# Patient Record
Sex: MALE | Race: WHITE | NOT HISPANIC OR LATINO | Employment: OTHER | URBAN - METROPOLITAN AREA
[De-identification: names, ages, dates, MRNs, and addresses within clinical notes are randomized per-mention and may not be internally consistent; named-entity substitution may affect disease eponyms.]

---

## 2018-02-14 ENCOUNTER — APPOINTMENT (OUTPATIENT)
Dept: RADIOLOGY | Facility: MEDICAL CENTER | Age: 73
End: 2018-02-14
Attending: EMERGENCY MEDICINE
Payer: MEDICARE

## 2018-02-14 ENCOUNTER — APPOINTMENT (OUTPATIENT)
Dept: RADIOLOGY | Facility: MEDICAL CENTER | Age: 73
End: 2018-02-14
Attending: INTERNAL MEDICINE
Payer: MEDICARE

## 2018-02-14 ENCOUNTER — HOSPITAL ENCOUNTER (EMERGENCY)
Facility: MEDICAL CENTER | Age: 73
End: 2018-02-14
Attending: EMERGENCY MEDICINE
Payer: MEDICARE

## 2018-02-14 VITALS
OXYGEN SATURATION: 98 % | WEIGHT: 165 LBS | HEART RATE: 62 BPM | TEMPERATURE: 97.5 F | HEIGHT: 73 IN | RESPIRATION RATE: 16 BRPM | SYSTOLIC BLOOD PRESSURE: 156 MMHG | BODY MASS INDEX: 21.87 KG/M2 | DIASTOLIC BLOOD PRESSURE: 96 MMHG

## 2018-02-14 DIAGNOSIS — S01.01XA LACERATION OF SCALP, INITIAL ENCOUNTER: ICD-10-CM

## 2018-02-14 DIAGNOSIS — S09.90XA CLOSED HEAD INJURY, INITIAL ENCOUNTER: ICD-10-CM
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
## 2018-02-14 PROCEDURE — 70450 CT HEAD/BRAIN W/O DYE: CPT

## 2018-02-14 PROCEDURE — 90715 TDAP VACCINE 7 YRS/> IM: CPT | Performed by: EMERGENCY MEDICINE

## 2018-02-14 PROCEDURE — 700101 HCHG RX REV CODE 250: Performed by: EMERGENCY MEDICINE

## 2018-02-14 PROCEDURE — 72125 CT NECK SPINE W/O DYE: CPT

## 2018-02-14 PROCEDURE — 90471 IMMUNIZATION ADMIN: CPT

## 2018-02-14 PROCEDURE — 305948 HCHG GREEN TRAUMA ACT PRE-NOTIFY NO CC

## 2018-02-14 PROCEDURE — 99284 EMERGENCY DEPT VISIT MOD MDM: CPT

## 2018-02-14 PROCEDURE — 700111 HCHG RX REV CODE 636 W/ 250 OVERRIDE (IP): Performed by: EMERGENCY MEDICINE

## 2018-02-14 RX ORDER — LIDOCAINE HYDROCHLORIDE 20 MG/ML
20 INJECTION, SOLUTION INFILTRATION; PERINEURAL ONCE
Status: COMPLETED | OUTPATIENT
Start: 2018-02-14 | End: 2018-02-14

## 2018-02-14 RX ORDER — LOSARTAN POTASSIUM 100 MG/1
100 TABLET ORAL DAILY
Status: SHIPPED | COMMUNITY
End: 2022-02-15

## 2018-02-14 RX ORDER — ASPIRIN 325 MG
325 TABLET ORAL 2 TIMES DAILY PRN
Status: ON HOLD | COMMUNITY
End: 2022-02-10

## 2018-02-14 RX ADMIN — LIDOCAINE HYDROCHLORIDE 20 ML: 20 INJECTION, SOLUTION INFILTRATION; PERINEURAL at 10:45

## 2018-02-14 RX ADMIN — CLOSTRIDIUM TETANI TOXOID ANTIGEN (FORMALDEHYDE INACTIVATED), CORYNEBACTERIUM DIPHTHERIAE TOXOID ANTIGEN (FORMALDEHYDE INACTIVATED), BORDETELLA PERTUSSIS TOXOID ANTIGEN (GLUTARALDEHYDE INACTIVATED), BORDETELLA PERTUSSIS FILAMENTOUS HEMAGGLUTININ ANTIGEN (FORMALDEHYDE INACTIVATED), BORDETELLA PERTUSSIS PERTACTIN ANTIGEN, AND BORDETELLA PERTUSSIS FIMBRIAE 2/3 ANTIGEN 0.5 ML: 5; 2; 2.5; 5; 3; 5 INJECTION, SUSPENSION INTRAMUSCULAR at 10:49

## 2018-02-14 NOTE — DISCHARGE INSTRUCTIONS
Head Injury, Adult  You have a head injury. Headaches and throwing up (vomiting) are common after a head injury. It should be easy to wake up from sleeping. Sometimes you must stay in the hospital. Most problems happen within the first 24 hours. Side effects may occur up to 7-10 days after the injury.   WHAT ARE THE TYPES OF HEAD INJURIES?  Head injuries can be as minor as a bump. Some head injuries can be more severe. More severe head injuries include:  · A jarring injury to the brain (concussion).  · A bruise of the brain (contusion). This mean there is bleeding in the brain that can cause swelling.  · A cracked skull (skull fracture).  · Bleeding in the brain that collects, clots, and forms a bump (hematoma).  WHEN SHOULD I GET HELP RIGHT AWAY?   · You are confused or sleepy.  · You cannot be woken up.  · You feel sick to your stomach (nauseous) or keep throwing up (vomiting).  · Your dizziness or unsteadiness is getting worse.  · You have very bad, lasting headaches that are not helped by medicine. Take medicines only as told by your doctor.  · You cannot use your arms or legs like normal.  · You cannot walk.  · You notice changes in the black spots in the center of the colored part of your eye (pupil).  · You have clear or bloody fluid coming from your nose or ears.  · You have trouble seeing.  During the next 24 hours after the injury, you must stay with someone who can watch you. This person should get help right away (call 911 in the [place] if you start to shake and are not able to control it (have seizures), you pass out, or you are unable to wake up.  HOW CAN I PREVENT A HEAD INJURY IN THE FUTURE?  · Wear seat belts.  · Wear a helmet while bike riding and playing sports like football.  · Stay away from dangerous activities around the house.  WHEN CAN I RETURN TO NORMAL ACTIVITIES AND ATHLETICS?  See your doctor before doing these activities. You should not do normal activities or play contact sports until 1  week after the following symptoms have stopped:  · Headache that does not go away.  · Dizziness.  · Poor attention.  · Confusion.  · Memory problems.  · Sickness to your stomach or throwing up.  · Tiredness.  · Fussiness.  · Bothered by bright lights or loud noises.  · Anxiousness or depression.  · Restless sleep.  MAKE SURE YOU:   · Understand these instructions.  · Will watch your condition.  · Will get help right away if you are not doing well or get worse.     This information is not intended to replace advice given to you by your health care provider. Make sure you discuss any questions you have with your health care provider.     Document Released: 11/30/2009 Document Revised: 01/08/2016 Document Reviewed: 08/25/2014  [employer] Interactive Patient Education ©2016 [employer]      Laceration Care, Adult  A laceration is a cut that goes through all layers of the skin. The cut also goes into the tissue that is right under the skin. Some cuts heal on their own. Others need to be closed with stitches (sutures), staples, skin adhesive strips, or wound glue. Taking care of your cut lowers your risk of infection and helps your cut to heal better.  HOW TO TAKE CARE OF YOUR CUT  For stitches or staples:  · Keep the wound clean and dry.  · If you were given a bandage (dressing), you should change it at least one time per day or as told by your doctor. You should also change it if it gets wet or dirty.  · Keep the wound completely dry for the first 24 hours or as told by your doctor. After that time, you may take a shower or a bath. However, make sure that the wound is not soaked in water until after the stitches or staples have been removed.  · Clean the wound one time each day or as told by your doctor:  ¨ Wash the wound with soap and water.  ¨ Rinse the wound with water until all of the soap comes off.  ¨ Pat the wound dry with a clean towel. Do not rub the wound.  · After you clean the wound, put a thin layer of  antibiotic ointment on it as told by your doctor. This ointment:  ¨ Helps to prevent infection.  ¨ Keeps the bandage from sticking to the wound.  · Have your stitches or staples removed as told by your doctor.  If your doctor used skin adhesive strips:   · Keep the wound clean and dry.  · If you were given a bandage, you should change it at least one time per day or as told by your doctor. You should also change it if it gets dirty or wet.  · Do not get the skin adhesive strips wet. You can take a shower or a bath, but be careful to keep the wound dry.  · If the wound gets wet, pat it dry with a clean towel. Do not rub the wound.  · Skin adhesive strips fall off on their own. You can trim the strips as the wound heals. Do not remove any strips that are still stuck to the wound. They will fall off after a while.  If your doctor used wound glue:  · Try to keep your wound dry, but you may briefly wet it in the shower or bath. Do not soak the wound in water, such as by swimming.  · After you take a shower or a bath, gently pat the wound dry with a clean towel. Do not rub the wound.  · Do not do any activities that will make you really sweaty until the skin glue has fallen off on its own.  · Do not apply liquid, cream, or ointment medicine to your wound while the skin glue is still on.  · If you were given a bandage, you should change it at least one time per day or as told by your doctor. You should also change it if it gets dirty or wet.  · If a bandage is placed over the wound, do not let the tape for the bandage touch the skin glue.  · Do not pick at the glue. The skin glue usually stays on for 5-10 days. Then, it falls off of the skin.  General Instructions   · To help prevent scarring, make sure to cover your wound with sunscreen whenever you are outside after stitches are removed, after adhesive strips are removed, or when wound glue stays in place and the wound is healed. Make sure to wear a sunscreen of at least  30 SPF.  · Take over-the-counter and prescription medicines only as told by your doctor.  · If you were given antibiotic medicine or ointment, take or apply it as told by your doctor. Do not stop using the antibiotic even if your wound is getting better.  · Do not scratch or pick at the wound.  · Keep all follow-up visits as told by your doctor. This is important.  · Check your wound every day for signs of infection. Watch for:  ¨ Redness, swelling, or pain.  ¨ Fluid, blood, or pus.  · Raise (elevate) the injured area above the level of your heart while you are sitting or lying down, if possible.  GET HELP IF:  · You got a tetanus shot and you have any of these problems at the injection site:  ¨ Swelling.  ¨ Very bad pain.  ¨ Redness.  ¨ Bleeding.  · You have a fever.  · A wound that was closed breaks open.  · You notice a bad smell coming from your wound or your bandage.  · You notice something coming out of the wound, such as wood or glass.  · Medicine does not help your pain.  · You have more redness, swelling, or pain at the site of your wound.  · You have fluid, blood, or pus coming from your wound.  · You notice a change in the color of your skin near your wound.  · You need to change the bandage often because fluid, blood, or pus is coming from the wound.  · You start to have a new rash.  · You start to have numbness around the wound.  GET HELP RIGHT AWAY IF:  · You have very bad swelling around the wound.  · Your pain suddenly gets worse and is very bad.  · You notice painful lumps near the wound or on skin that is anywhere on your body.  · You have a red streak going away from your wound.  · The wound is on your hand or foot and you cannot move a finger or toe like you usually can.  · The wound is on your hand or foot and you notice that your fingers or toes look pale or bluish.     This information is not intended to replace advice given to you by your health care provider. Make sure you discuss any  questions you have with your health care provider.     Document Released: 06/05/2009 Document Revised: 05/03/2016 Document Reviewed: 12/14/2015  [employer] Interactive Patient Education ©2016 [employer]
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
## 2018-04-28 ENCOUNTER — HOSPITAL ENCOUNTER (OUTPATIENT)
Facility: MEDICAL CENTER | Age: 73
End: 2018-04-28

## 2018-04-28 ENCOUNTER — HOSPITAL ENCOUNTER (INPATIENT)
Facility: MEDICAL CENTER | Age: 73
LOS: 8 days | DRG: 029 | End: 2018-05-07
Attending: HOSPITALIST | Admitting: INTERNAL MEDICINE
Payer: MEDICARE

## 2018-04-28 DIAGNOSIS — G06.1 SPINAL EPIDURAL ABSCESS: ICD-10-CM

## 2018-04-28 PROBLEM — D72.829 LEUKOCYTOSIS: Status: ACTIVE | Noted: 2018-04-28

## 2018-04-28 PROBLEM — I10 HYPERTENSION: Status: ACTIVE | Noted: 2018-04-28

## 2018-04-28 PROCEDURE — A9270 NON-COVERED ITEM OR SERVICE: HCPCS | Performed by: INTERNAL MEDICINE

## 2018-04-28 PROCEDURE — 700102 HCHG RX REV CODE 250 W/ 637 OVERRIDE(OP): Performed by: INTERNAL MEDICINE

## 2018-04-28 PROCEDURE — G0378 HOSPITAL OBSERVATION PER HR: HCPCS

## 2018-04-28 PROCEDURE — 99220 PR INITIAL OBSERVATION CARE,LEVL III: CPT | Performed by: INTERNAL MEDICINE

## 2018-04-28 PROCEDURE — 700105 HCHG RX REV CODE 258: Performed by: INTERNAL MEDICINE

## 2018-04-28 PROCEDURE — 700111 HCHG RX REV CODE 636 W/ 250 OVERRIDE (IP): Performed by: INTERNAL MEDICINE

## 2018-04-28 RX ORDER — SODIUM CHLORIDE 9 MG/ML
30 INJECTION, SOLUTION INTRAVENOUS
Status: DISCONTINUED | OUTPATIENT
Start: 2018-04-28 | End: 2018-05-07 | Stop reason: HOSPADM

## 2018-04-28 RX ORDER — AMOXICILLIN 250 MG
2 CAPSULE ORAL 2 TIMES DAILY
Status: DISCONTINUED | OUTPATIENT
Start: 2018-04-28 | End: 2018-05-05

## 2018-04-28 RX ORDER — POLYETHYLENE GLYCOL 3350 17 G/17G
1 POWDER, FOR SOLUTION ORAL
Status: DISCONTINUED | OUTPATIENT
Start: 2018-04-28 | End: 2018-05-05

## 2018-04-28 RX ORDER — ONDANSETRON 4 MG/1
4 TABLET, ORALLY DISINTEGRATING ORAL EVERY 4 HOURS PRN
Status: DISCONTINUED | OUTPATIENT
Start: 2018-04-28 | End: 2018-05-07 | Stop reason: HOSPADM

## 2018-04-28 RX ORDER — SODIUM CHLORIDE 9 MG/ML
1000 INJECTION, SOLUTION INTRAVENOUS
Status: DISCONTINUED | OUTPATIENT
Start: 2018-04-28 | End: 2018-05-07 | Stop reason: HOSPADM

## 2018-04-28 RX ORDER — OXYCODONE HYDROCHLORIDE 10 MG/1
10 TABLET ORAL
Status: DISCONTINUED | OUTPATIENT
Start: 2018-04-28 | End: 2018-05-07 | Stop reason: HOSPADM

## 2018-04-28 RX ORDER — ONDANSETRON 2 MG/ML
4 INJECTION INTRAMUSCULAR; INTRAVENOUS EVERY 4 HOURS PRN
Status: DISCONTINUED | OUTPATIENT
Start: 2018-04-28 | End: 2018-05-07 | Stop reason: HOSPADM

## 2018-04-28 RX ORDER — MORPHINE SULFATE 4 MG/ML
4 INJECTION, SOLUTION INTRAMUSCULAR; INTRAVENOUS
Status: DISCONTINUED | OUTPATIENT
Start: 2018-04-28 | End: 2018-05-07 | Stop reason: HOSPADM

## 2018-04-28 RX ORDER — SODIUM CHLORIDE 9 MG/ML
INJECTION, SOLUTION INTRAVENOUS CONTINUOUS
Status: DISCONTINUED | OUTPATIENT
Start: 2018-04-28 | End: 2018-05-07 | Stop reason: HOSPADM

## 2018-04-28 RX ORDER — OXYCODONE HYDROCHLORIDE 5 MG/1
5 TABLET ORAL
Status: DISCONTINUED | OUTPATIENT
Start: 2018-04-28 | End: 2018-05-01

## 2018-04-28 RX ORDER — BISACODYL 10 MG
10 SUPPOSITORY, RECTAL RECTAL
Status: DISCONTINUED | OUTPATIENT
Start: 2018-04-28 | End: 2018-05-05

## 2018-04-28 RX ORDER — ACETAMINOPHEN 325 MG/1
650 TABLET ORAL EVERY 6 HOURS PRN
Status: DISCONTINUED | OUTPATIENT
Start: 2018-04-28 | End: 2018-05-07 | Stop reason: HOSPADM

## 2018-04-28 RX ADMIN — MORPHINE SULFATE 4 MG: 4 INJECTION INTRAVENOUS at 22:44

## 2018-04-28 RX ADMIN — SODIUM CHLORIDE: 9 INJECTION, SOLUTION INTRAVENOUS at 22:44

## 2018-04-28 RX ADMIN — SENNOSIDES AND DOCUSATE SODIUM 2 TABLET: 8.6; 5 TABLET ORAL at 22:44

## 2018-04-28 RX ADMIN — CEFEPIME 2 G: 2 INJECTION, POWDER, FOR SOLUTION INTRAMUSCULAR; INTRAVENOUS at 22:44

## 2018-04-28 ASSESSMENT — ENCOUNTER SYMPTOMS
HEADACHES: 0
CONSTIPATION: 1
NAUSEA: 0
DIARRHEA: 0
FOCAL WEAKNESS: 0
CHILLS: 1
SENSORY CHANGE: 0
COUGH: 0
ABDOMINAL PAIN: 0
SEIZURES: 0
FEVER: 1
SHORTNESS OF BREATH: 0
VOMITING: 0
DIZZINESS: 0
BACK PAIN: 1
SPEECH CHANGE: 0
LOSS OF CONSCIOUSNESS: 0
TINGLING: 0

## 2018-04-28 ASSESSMENT — COPD QUESTIONNAIRES
COPD SCREENING SCORE: 2
DURING THE PAST 4 WEEKS HOW MUCH DID YOU FEEL SHORT OF BREATH: NONE/LITTLE OF THE TIME
HAVE YOU SMOKED AT LEAST 100 CIGARETTES IN YOUR ENTIRE LIFE: NO/DON'T KNOW
DO YOU EVER COUGH UP ANY MUCUS OR PHLEGM?: NO/ONLY WITH OCCASIONAL COLDS OR INFECTIONS

## 2018-04-28 ASSESSMENT — LIFESTYLE VARIABLES: EVER_SMOKED: NEVER

## 2018-04-28 ASSESSMENT — PAIN SCALES - GENERAL: PAINLEVEL_OUTOF10: 10

## 2018-04-29 ENCOUNTER — HOSPITAL ENCOUNTER (OUTPATIENT)
Dept: RADIOLOGY | Facility: MEDICAL CENTER | Age: 73
End: 2018-04-29

## 2018-04-29 ENCOUNTER — APPOINTMENT (OUTPATIENT)
Dept: RADIOLOGY | Facility: MEDICAL CENTER | Age: 73
DRG: 029 | End: 2018-04-29
Attending: NEUROLOGICAL SURGERY
Payer: MEDICARE

## 2018-04-29 ENCOUNTER — APPOINTMENT (OUTPATIENT)
Dept: RADIOLOGY | Facility: MEDICAL CENTER | Age: 73
DRG: 029 | End: 2018-04-29
Attending: PHYSICIAN ASSISTANT
Payer: MEDICARE

## 2018-04-29 PROBLEM — R06.81 APNEA: Status: ACTIVE | Noted: 2018-04-29

## 2018-04-29 LAB
ANION GAP SERPL CALC-SCNC: 4 MMOL/L (ref 0–11.9)
APTT PPP: 30.9 SEC (ref 24.7–36)
BASOPHILS # BLD AUTO: 0.2 % (ref 0–1.8)
BASOPHILS # BLD: 0.02 K/UL (ref 0–0.12)
BUN SERPL-MCNC: 36 MG/DL (ref 8–22)
CALCIUM SERPL-MCNC: 8 MG/DL (ref 8.5–10.5)
CHLORIDE SERPL-SCNC: 106 MMOL/L (ref 96–112)
CO2 SERPL-SCNC: 25 MMOL/L (ref 20–33)
CREAT SERPL-MCNC: 0.93 MG/DL (ref 0.5–1.4)
EKG IMPRESSION: NORMAL
EOSINOPHIL # BLD AUTO: 0 K/UL (ref 0–0.51)
EOSINOPHIL NFR BLD: 0 % (ref 0–6.9)
ERYTHROCYTE [DISTWIDTH] IN BLOOD BY AUTOMATED COUNT: 47.7 FL (ref 35.9–50)
GLUCOSE SERPL-MCNC: 155 MG/DL (ref 65–99)
HCT VFR BLD AUTO: 39.5 % (ref 42–52)
HGB BLD-MCNC: 13 G/DL (ref 14–18)
IMM GRANULOCYTES # BLD AUTO: 0.05 K/UL (ref 0–0.11)
IMM GRANULOCYTES NFR BLD AUTO: 0.5 % (ref 0–0.9)
INR PPP: 1.1 (ref 0.87–1.13)
LYMPHOCYTES # BLD AUTO: 0.89 K/UL (ref 1–4.8)
LYMPHOCYTES NFR BLD: 8.1 % (ref 22–41)
MAGNESIUM SERPL-MCNC: 2 MG/DL (ref 1.5–2.5)
MCH RBC QN AUTO: 33.2 PG (ref 27–33)
MCHC RBC AUTO-ENTMCNC: 32.9 G/DL (ref 33.7–35.3)
MCV RBC AUTO: 100.8 FL (ref 81.4–97.8)
MONOCYTES # BLD AUTO: 1.36 K/UL (ref 0–0.85)
MONOCYTES NFR BLD AUTO: 12.4 % (ref 0–13.4)
NEUTROPHILS # BLD AUTO: 8.69 K/UL (ref 1.82–7.42)
NEUTROPHILS NFR BLD: 78.8 % (ref 44–72)
NRBC # BLD AUTO: 0 K/UL
NRBC BLD-RTO: 0 /100 WBC
PLATELET # BLD AUTO: 191 K/UL (ref 164–446)
PMV BLD AUTO: 9 FL (ref 9–12.9)
POTASSIUM SERPL-SCNC: 4 MMOL/L (ref 3.6–5.5)
PROTHROMBIN TIME: 13.9 SEC (ref 12–14.6)
RBC # BLD AUTO: 3.92 M/UL (ref 4.7–6.1)
SODIUM SERPL-SCNC: 135 MMOL/L (ref 135–145)
WBC # BLD AUTO: 11 K/UL (ref 4.8–10.8)

## 2018-04-29 PROCEDURE — A6402 STERILE GAUZE <= 16 SQ IN: HCPCS | Performed by: NEUROLOGICAL SURGERY

## 2018-04-29 PROCEDURE — 700102 HCHG RX REV CODE 250 W/ 637 OVERRIDE(OP): Performed by: INTERNAL MEDICINE

## 2018-04-29 PROCEDURE — 87206 SMEAR FLUORESCENT/ACID STAI: CPT

## 2018-04-29 PROCEDURE — 00NY0ZZ RELEASE LUMBAR SPINAL CORD, OPEN APPROACH: ICD-10-PCS | Performed by: NEUROLOGICAL SURGERY

## 2018-04-29 PROCEDURE — A6223 GAUZE >16<=48 NO W/SAL W/O B: HCPCS | Performed by: NEUROLOGICAL SURGERY

## 2018-04-29 PROCEDURE — 700111 HCHG RX REV CODE 636 W/ 250 OVERRIDE (IP): Performed by: INTERNAL MEDICINE

## 2018-04-29 PROCEDURE — 71046 X-RAY EXAM CHEST 2 VIEWS: CPT

## 2018-04-29 PROCEDURE — 160002 HCHG RECOVERY MINUTES (STAT): Performed by: NEUROLOGICAL SURGERY

## 2018-04-29 PROCEDURE — 160036 HCHG PACU - EA ADDL 30 MINS PHASE I: Performed by: NEUROLOGICAL SURGERY

## 2018-04-29 PROCEDURE — 85610 PROTHROMBIN TIME: CPT

## 2018-04-29 PROCEDURE — 93005 ELECTROCARDIOGRAM TRACING: CPT | Performed by: PHYSICIAN ASSISTANT

## 2018-04-29 PROCEDURE — A9270 NON-COVERED ITEM OR SERVICE: HCPCS | Performed by: INTERNAL MEDICINE

## 2018-04-29 PROCEDURE — 87070 CULTURE OTHR SPECIMN AEROBIC: CPT | Mod: 91

## 2018-04-29 PROCEDURE — 500122 HCHG BOVIE, BLADE: Performed by: NEUROLOGICAL SURGERY

## 2018-04-29 PROCEDURE — 160048 HCHG OR STATISTICAL LEVEL 1-5: Performed by: NEUROLOGICAL SURGERY

## 2018-04-29 PROCEDURE — 85730 THROMBOPLASTIN TIME PARTIAL: CPT

## 2018-04-29 PROCEDURE — 160009 HCHG ANES TIME/MIN: Performed by: NEUROLOGICAL SURGERY

## 2018-04-29 PROCEDURE — 700111 HCHG RX REV CODE 636 W/ 250 OVERRIDE (IP)

## 2018-04-29 PROCEDURE — 700105 HCHG RX REV CODE 258: Performed by: INTERNAL MEDICINE

## 2018-04-29 PROCEDURE — 501838 HCHG SUTURE GENERAL: Performed by: NEUROLOGICAL SURGERY

## 2018-04-29 PROCEDURE — 110454 HCHG SHELL REV 250: Performed by: NEUROLOGICAL SURGERY

## 2018-04-29 PROCEDURE — 01NB0ZZ RELEASE LUMBAR NERVE, OPEN APPROACH: ICD-10-PCS | Performed by: NEUROLOGICAL SURGERY

## 2018-04-29 PROCEDURE — 87116 MYCOBACTERIA CULTURE: CPT | Mod: 91

## 2018-04-29 PROCEDURE — 700101 HCHG RX REV CODE 250

## 2018-04-29 PROCEDURE — 80048 BASIC METABOLIC PNL TOTAL CA: CPT

## 2018-04-29 PROCEDURE — 503195 HCHG SEALER, BIPOLAR AQUAMANTYS: Performed by: NEUROLOGICAL SURGERY

## 2018-04-29 PROCEDURE — 99233 SBSQ HOSP IP/OBS HIGH 50: CPT | Performed by: INTERNAL MEDICINE

## 2018-04-29 PROCEDURE — 160029 HCHG SURGERY MINUTES - 1ST 30 MINS LEVEL 4: Performed by: NEUROLOGICAL SURGERY

## 2018-04-29 PROCEDURE — 72020 X-RAY EXAM OF SPINE 1 VIEW: CPT

## 2018-04-29 PROCEDURE — 160035 HCHG PACU - 1ST 60 MINS PHASE I: Performed by: NEUROLOGICAL SURGERY

## 2018-04-29 PROCEDURE — 87102 FUNGUS ISOLATION CULTURE: CPT | Mod: 91

## 2018-04-29 PROCEDURE — 87205 SMEAR GRAM STAIN: CPT

## 2018-04-29 PROCEDURE — 160041 HCHG SURGERY MINUTES - EA ADDL 1 MIN LEVEL 4: Performed by: NEUROLOGICAL SURGERY

## 2018-04-29 PROCEDURE — 83735 ASSAY OF MAGNESIUM: CPT

## 2018-04-29 PROCEDURE — 87015 SPECIMEN INFECT AGNT CONCNTJ: CPT | Mod: 91

## 2018-04-29 PROCEDURE — 770001 HCHG ROOM/CARE - MED/SURG/GYN PRIV*

## 2018-04-29 PROCEDURE — 500367 HCHG DRAIN KIT, HEMOVAC: Performed by: NEUROLOGICAL SURGERY

## 2018-04-29 PROCEDURE — 0SB20ZZ EXCISION OF LUMBAR VERTEBRAL DISC, OPEN APPROACH: ICD-10-PCS | Performed by: NEUROLOGICAL SURGERY

## 2018-04-29 PROCEDURE — 500885 HCHG PACK, JACKSON TABLE: Performed by: NEUROLOGICAL SURGERY

## 2018-04-29 PROCEDURE — 93010 ELECTROCARDIOGRAM REPORT: CPT | Performed by: INTERNAL MEDICINE

## 2018-04-29 PROCEDURE — 87075 CULTR BACTERIA EXCEPT BLOOD: CPT | Mod: 91

## 2018-04-29 PROCEDURE — 85025 COMPLETE CBC W/AUTO DIFF WBC: CPT

## 2018-04-29 PROCEDURE — 36415 COLL VENOUS BLD VENIPUNCTURE: CPT

## 2018-04-29 PROCEDURE — 500445 HCHG HEMOSTAT, SURGICEL 4X8: Performed by: NEUROLOGICAL SURGERY

## 2018-04-29 RX ORDER — MEPERIDINE HYDROCHLORIDE 50 MG/ML
INJECTION INTRAMUSCULAR; INTRAVENOUS; SUBCUTANEOUS
Status: COMPLETED
Start: 2018-04-29 | End: 2018-04-29

## 2018-04-29 RX ORDER — EPINEPHRINE 1 MG/ML
INJECTION INTRAMUSCULAR; INTRAVENOUS; SUBCUTANEOUS
Status: DISPENSED
Start: 2018-04-29 | End: 2018-04-30

## 2018-04-29 RX ORDER — MAGNESIUM HYDROXIDE 1200 MG/15ML
LIQUID ORAL
Status: COMPLETED | OUTPATIENT
Start: 2018-04-29 | End: 2018-04-29

## 2018-04-29 RX ORDER — BUPIVACAINE HYDROCHLORIDE AND EPINEPHRINE 5; 5 MG/ML; UG/ML
INJECTION, SOLUTION EPIDURAL; INTRACAUDAL; PERINEURAL
Status: DISCONTINUED | OUTPATIENT
Start: 2018-04-29 | End: 2018-04-29 | Stop reason: HOSPADM

## 2018-04-29 RX ORDER — SODIUM CHLORIDE, SODIUM LACTATE, POTASSIUM CHLORIDE, AND CALCIUM CHLORIDE .6; .31; .03; .02 G/100ML; G/100ML; G/100ML; G/100ML
IRRIGANT IRRIGATION
Status: DISCONTINUED | OUTPATIENT
Start: 2018-04-29 | End: 2018-04-29 | Stop reason: HOSPADM

## 2018-04-29 RX ORDER — HYDROMORPHONE HYDROCHLORIDE 2 MG/ML
INJECTION, SOLUTION INTRAMUSCULAR; INTRAVENOUS; SUBCUTANEOUS
Status: DISPENSED
Start: 2018-04-29 | End: 2018-04-30

## 2018-04-29 RX ORDER — OMEPRAZOLE 20 MG/1
20 CAPSULE, DELAYED RELEASE ORAL 2 TIMES DAILY
Status: DISCONTINUED | OUTPATIENT
Start: 2018-04-29 | End: 2018-05-07 | Stop reason: HOSPADM

## 2018-04-29 RX ORDER — BACITRACIN 50000 [IU]/1
INJECTION, POWDER, FOR SOLUTION INTRAMUSCULAR
Status: DISCONTINUED | OUTPATIENT
Start: 2018-04-29 | End: 2018-04-29 | Stop reason: HOSPADM

## 2018-04-29 RX ORDER — ALUMINA, MAGNESIA, AND SIMETHICONE 2400; 2400; 240 MG/30ML; MG/30ML; MG/30ML
10 SUSPENSION ORAL 4 TIMES DAILY PRN
Status: DISCONTINUED | OUTPATIENT
Start: 2018-04-29 | End: 2018-05-07 | Stop reason: HOSPADM

## 2018-04-29 RX ADMIN — CEFEPIME 2 G: 2 INJECTION, POWDER, FOR SOLUTION INTRAMUSCULAR; INTRAVENOUS at 07:42

## 2018-04-29 RX ADMIN — OXYCODONE HYDROCHLORIDE 10 MG: 10 TABLET ORAL at 23:24

## 2018-04-29 RX ADMIN — OMEPRAZOLE 20 MG: 20 CAPSULE, DELAYED RELEASE ORAL at 20:25

## 2018-04-29 RX ADMIN — MEPERIDINE HYDROCHLORIDE 12.5 MG: 50 INJECTION INTRAMUSCULAR; INTRAVENOUS; SUBCUTANEOUS at 16:05

## 2018-04-29 RX ADMIN — MEPERIDINE HYDROCHLORIDE 12.5 MG: 50 INJECTION INTRAMUSCULAR; INTRAVENOUS; SUBCUTANEOUS at 16:10

## 2018-04-29 RX ADMIN — MORPHINE SULFATE 4 MG: 4 INJECTION INTRAVENOUS at 07:42

## 2018-04-29 RX ADMIN — VANCOMYCIN HYDROCHLORIDE 1500 MG: 100 INJECTION, POWDER, LYOPHILIZED, FOR SOLUTION INTRAVENOUS at 20:26

## 2018-04-29 RX ADMIN — ONDANSETRON 4 MG: 4 TABLET, ORALLY DISINTEGRATING ORAL at 07:42

## 2018-04-29 RX ADMIN — Medication 0.5 MG: at 13:28

## 2018-04-29 RX ADMIN — OXYCODONE HYDROCHLORIDE 10 MG: 10 TABLET ORAL at 20:25

## 2018-04-29 RX ADMIN — VANCOMYCIN HYDROCHLORIDE 800 MG: 100 INJECTION, POWDER, LYOPHILIZED, FOR SOLUTION INTRAVENOUS at 00:19

## 2018-04-29 RX ADMIN — MORPHINE SULFATE 4 MG: 4 INJECTION INTRAVENOUS at 04:14

## 2018-04-29 RX ADMIN — OXYCODONE HYDROCHLORIDE 10 MG: 10 TABLET ORAL at 10:35

## 2018-04-29 RX ADMIN — OMEPRAZOLE 20 MG: 20 CAPSULE, DELAYED RELEASE ORAL at 10:17

## 2018-04-29 RX ADMIN — ONDANSETRON 4 MG: 2 INJECTION, SOLUTION INTRAMUSCULAR; INTRAVENOUS at 23:29

## 2018-04-29 RX ADMIN — HYDROMORPHONE HYDROCHLORIDE 1 MG: 10 INJECTION, SOLUTION INTRAMUSCULAR; INTRAVENOUS; SUBCUTANEOUS at 06:42

## 2018-04-29 RX ADMIN — OXYCODONE HYDROCHLORIDE 10 MG: 10 TABLET ORAL at 05:33

## 2018-04-29 RX ADMIN — SODIUM CHLORIDE: 9 INJECTION, SOLUTION INTRAVENOUS at 17:47

## 2018-04-29 RX ADMIN — CEFEPIME 2 G: 2 INJECTION, POWDER, FOR SOLUTION INTRAMUSCULAR; INTRAVENOUS at 22:46

## 2018-04-29 ASSESSMENT — ENCOUNTER SYMPTOMS
FEVER: 1
BACK PAIN: 1
INSOMNIA: 0
WEAKNESS: 0
FALLS: 0
HEADACHES: 0
COUGH: 1
CHILLS: 1
DIARRHEA: 0
VOMITING: 0
SEIZURES: 0
EYE PAIN: 0
MYALGIAS: 1
FOCAL WEAKNESS: 0
HEMOPTYSIS: 0
EYE REDNESS: 0
PALPITATIONS: 0
LOSS OF CONSCIOUSNESS: 0
TREMORS: 0
CONSTIPATION: 1
DIZZINESS: 0
ABDOMINAL PAIN: 0
WHEEZING: 0
BLOOD IN STOOL: 0
NERVOUS/ANXIOUS: 0
NAUSEA: 0

## 2018-04-29 ASSESSMENT — LIFESTYLE VARIABLES
EVER HAD A DRINK FIRST THING IN THE MORNING TO STEADY YOUR NERVES TO GET RID OF A HANGOVER: NO
ON A TYPICAL DAY WHEN YOU DRINK ALCOHOL HOW MANY DRINKS DO YOU HAVE: 2
AVERAGE NUMBER OF DAYS PER WEEK YOU HAVE A DRINK CONTAINING ALCOHOL: 3
CONSUMPTION TOTAL: POSITIVE
TOTAL SCORE: 0
TOTAL SCORE: 0
EVER FELT BAD OR GUILTY ABOUT YOUR DRINKING: NO
ALCOHOL_USE: YES
EVER_SMOKED: NEVER
HAVE PEOPLE ANNOYED YOU BY CRITICIZING YOUR DRINKING: NO
TOTAL SCORE: 0
HOW MANY TIMES IN THE PAST YEAR HAVE YOU HAD 5 OR MORE DRINKS IN A DAY: 2
HAVE YOU EVER FELT YOU SHOULD CUT DOWN ON YOUR DRINKING: NO

## 2018-04-29 ASSESSMENT — PATIENT HEALTH QUESTIONNAIRE - PHQ9
1. LITTLE INTEREST OR PLEASURE IN DOING THINGS: NOT AT ALL
SUM OF ALL RESPONSES TO PHQ9 QUESTIONS 1 AND 2: 0
2. FEELING DOWN, DEPRESSED, IRRITABLE, OR HOPELESS: NOT AT ALL

## 2018-04-29 ASSESSMENT — PAIN SCALES - GENERAL
PAINLEVEL_OUTOF10: 8
PAINLEVEL_OUTOF10: 0
PAINLEVEL_OUTOF10: 5
PAINLEVEL_OUTOF10: 0
PAINLEVEL_OUTOF10: 5
PAINLEVEL_OUTOF10: 9
PAINLEVEL_OUTOF10: 0
PAINLEVEL_OUTOF10: 0
PAINLEVEL_OUTOF10: 9
PAINLEVEL_OUTOF10: 10
PAINLEVEL_OUTOF10: 0
PAINLEVEL_OUTOF10: 8
PAINLEVEL_OUTOF10: 5
PAINLEVEL_OUTOF10: 5
PAINLEVEL_OUTOF10: 0
PAINLEVEL_OUTOF10: 9
PAINLEVEL_OUTOF10: 0
PAINLEVEL_OUTOF10: 9
PAINLEVEL_OUTOF10: 0

## 2018-04-29 NOTE — CARE PLAN
Problem: Safety  Goal: Will remain free from falls  Bed alarm in place.  Family at bedside.  Educated on calling before getting up.    Problem: Infection  Goal: Will remain free from infection  Hand hygiene performed before and after pt care.  Gloves worn at all times while caring for pt.

## 2018-04-29 NOTE — ASSESSMENT & PLAN NOTE
Per outside blood cultures; await record to confirm;  Check repeat blood cultures here.  On IV abx.

## 2018-04-29 NOTE — H&P
Hospital Medicine History and Physical    Date of Service  4/28/2018    Chief Complaint  Low back pain    History of Presenting Illness  73 y.o. male with a past medical history of hypertension who presented 4/28/2018 with low back pain for the past 5 days. Patient reports progressively worsening low back pain with radiation down to his right leg. Pain is worse with movement. He presented to an outlying facility where he underwent an MRI lumbar spine that revealed possible epidural abscess the level of L4-L5. He was found to have ESR 60, CRP 13.6 and WBC count of 12.6 at [facility]. He was transferred to [facility] for neurosurgical consultation. Dr. urbano from neurosurgery was consulted. Patient endorses fevers and chills. He denies any muscle weakness, numbness, tingling, urinary or bowel incontinence.      Primary Care Physician  Pcp Not In Computer    Consultants  Neurosurgery     Code Status  Full Code    Review of Systems  Review of Systems   Constitutional: Positive for chills and fever.   Respiratory: Negative for cough and shortness of breath.    Cardiovascular: Negative for chest pain.   Gastrointestinal: Positive for constipation. Negative for abdominal pain, diarrhea, nausea and vomiting.   Genitourinary: Negative for dysuria.   Musculoskeletal: Positive for back pain.   Neurological: Negative for dizziness, tingling, sensory change, speech change, focal weakness, seizures, loss of consciousness and headaches.   All other systems reviewed and are negative.       Past Medical History  Hypertension    Surgical History  Hernia repair    Medications  No current facility-administered medications on file prior to encounter.      Current Outpatient Prescriptions on File Prior to Encounter   Medication Sig Dispense Refill   • losartan (COZAAR) 100 MG Tab Take 100 mg by mouth every day.     • aspirin (ASA) 325 MG Tab Take 325 mg by mouth every day.         Family History  No pertinent  family history    Social History  Denies smoking or illicit drug use  Drinks alcohol occasionally  Allergies  No Known Allergies     Physical Exam  Laboratory   Hemodynamics  Temp (24hrs), Av.3 °C (99.1 °F), Min:37.3 °C (99.1 °F), Max:37.3 °C (99.1 °F)   Temperature: 37.3 °C (99.1 °F)  Pulse  Av  Min: 65  Max: 65    Blood Pressure : (!) 172/87      Respiratory      Respiration: 16             Physical Exam   Constitutional: He is oriented to person, place, and time. He appears well-developed and well-nourished. No distress.   HENT:   Head: Normocephalic and atraumatic.   Eyes: Pupils are equal, round, and reactive to light.   Neck: Neck supple.   Cardiovascular: Normal rate, regular rhythm and normal heart sounds.    Pulmonary/Chest: Effort normal and breath sounds normal. No respiratory distress. He has no wheezes. He has no rales.   Abdominal: Soft. Bowel sounds are normal. He exhibits no distension. There is no tenderness. There is no rebound.   Musculoskeletal: Normal range of motion. He exhibits no edema or tenderness.   Neurological: He is alert and oriented to person, place, and time. No cranial nerve deficit. Coordination normal.   Strength and sensation intact in all 4 extremities   Skin: Skin is warm.   Psychiatric: He has a normal mood and affect. His behavior is normal.   Nursing note and vitals reviewed.              No results for input(s): ALTSGPT, ASTSGOT, ALKPHOSPHAT, TBILIRUBIN, DBILIRUBIN, GAMMAGT, AMYLASE, LIPASE, ALB, PREALBUMIN, GLUCOSE in the last 72 hours.              No results found for: TROPONINI  Urinalysis:  No results found for: SPECGRAVITY, GLUCOSEUR, KETONES, NITRITE, WBCURINE, RBCURINE, BACTERIA, EPITHELCELL     Imaging  No orders to display        Assessment/Plan     I anticipate this patient will require at least two midnights for appropriate medical management, necessitating inpatient admission.    Spinal epidural abscess- (present on admission)   Assessment & Plan     MRI lumbar spine reveals epidural collection with peripheral enhancement above L4-5 disc level posteriorly and centrally, extending caudally into the left causing marked central canal stenosis.  CRP 13.6, ESR 60 and WBC 12.6, patient endorses fevers and chills  Blood cultures were obtained at [facility], we will obtain these results once available  Patient will be started on IV vancomycin and IV cefepime  Neurosurgery has been consulted and notified  Pain control with oxycodone and IV morphine, monitor respiratory status closely  PTOT  We will consider an infectious disease consultation in the morning        Leukocytosis- (present on admission)   Assessment & Plan    In the setting of epidural abscess  Patient does not appear to be septic at this time  Continue to monitor vitals and CBC        Hypertension- (present on admission)   Assessment & Plan    Uncontrolled  Continue losartan 100 mg daily  IV hydralazine when necessary for SBP greater than 160            VTE prophylaxis: SCD.

## 2018-04-29 NOTE — OR SURGEON
Immediate Post OP Note    PreOp Diagnosis: L4-S1 stenosis, epidural mass, right L4,5 synovial cyst    PostOp Diagnosis: L4-S1 stenosis, epidural pus, right L4,5 facet abscess not synovial cyst    Procedure(s):  LUMBAR LAMINECTOMY Y L4-S1 - Wound Class: Dirty or Infected  IRRIGATION & DEBRIDEMENT NEURO- epidural mass - Wound Class: Dirty or Infected    Surgeon(s):  Fercho Herron M.D.    Anesthesiologist/Type of Anesthesia:  Anesthesiologist: Teofilo Ken M.D./General    Surgical Staff:  Circulator: Alyx Hurtado R.N.  Scrub Person: Joseph Plaza  [place]: Fercho Singh R.N.  Radiology Technologist: Bahman Diaz    Specimens removed if any:  * No specimens in log *    Estimated Blood Loss: minimal    Findings: + manuel paraspinous pus in right L4,5 facet joint, + epidural pus, + stenosis    Complications: none        4/29/2018 4:08 PM Fercho Herron M.D.

## 2018-04-29 NOTE — ASSESSMENT & PLAN NOTE
MRI lumbar spine reveals epidural collection with peripheral enhancement above L4-5 disc level posteriorly and centrally, extending caudally into the left causing marked central canal stenosis.  S/p surgery 4/29, drain to be dc'ed today; clinically better with improved pain.  Wound culture grew strep viridans.  ID following.  Will need 6-8 weeks of IV abx; SNF vs home eventually.  PT/OT.

## 2018-04-29 NOTE — PROGRESS NOTES
Patient on the way to transport. I was able to see the patient briefly     He expresses concern with anesthesia, describes laryngospasm and dysphagia after anesthesia.     Will d/w anesthesia prior to surgery    Preop testing reviewed, will review CXR, EKG  Consent ordered    Surgery later today at 100 PM, d/w nursing, son, Dr. Herron    Patient agrees with treatment plan.

## 2018-04-29 NOTE — PROGRESS NOTES
Received report from Xander at [facility] and assumed care when pt arrived as direct admit to the unit.  A&Ox4.  Family at bedside.  Treaded socks and bed alarm on.  Call light and personal belongings within reach.  Pt complaining of 10/10 pain.  Medication given and food given when orders were entered into system.

## 2018-04-29 NOTE — PROGRESS NOTES
Patient taken down for Preop. CHG bath and preop checklist complete. Pt is saline locked, still in significant pain- 9/10.

## 2018-04-29 NOTE — PROGRESS NOTES
Pt continues to complain of 10/10 pain with minimal relief from IV Morphine and PO Oxycodone 10 mg.  Refuses ice and heat.  Paged hospitalist.  Dr. Chavez ordered 1 mg IV Dilaudid x1.  Medication administered and pt now sleeping.

## 2018-04-29 NOTE — PROGRESS NOTES
Patient is a direct admit from [facility], patient of Dr. Olivo's for an epidural abscess.  Dr. Addison is accepting the patient.  Telephone ADT order received and entered into epic on 4/28.  Held orders in epic to be released upon patients arrival to unit.
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
## 2018-04-29 NOTE — OP REPORT
DATE OF SERVICE:  04/29/2018    PREOPERATIVE DIAGNOSES:  L4-S1 stenosis, suspected epidural mass and   infection, right L4-L5 synovial cyst.    POSTOPERATIVE DIAGNOSES:  L4-S1 stenosis, degenerative scoliosis, epidural pus   that was confirmed, right L4-L5 juxtafacet joint abscess, not synovial cyst   confirmed, and right L4 pars defect.    PRINCIPAL PROCEDURE PERFORMED:  L4, L5, and S1 decompressive laminectomy,   bilateral medial facetectomies and bilateral foraminotomies and extradural   resection of a mass with microdissection.    SURGEON:  Fercho Herron MD    ASSISTANT:  None.    ANESTHESIA:  Procedure was performed under general anesthesia.    ANESTHESIOLOGIST:  Teofilo Ken MD    COMPLICATIONS:  There were no complications.    FINDINGS:  Include manuel paraspinous pus in the right L4-L5 facet joint,   epidural pus and stenosis and right L4 pars defect.    COMPLICATIONS:  None.    DISPOSITION:  Patient was extubated and found to be moving all 4 extremities   in the recovery room.    CLINICAL HISTORY:  The patient is a 73-year-old male who presents with severe   back pain, right hip pain, right anterior thigh, right lateral thigh, right   medial thigh and right posterior thigh pain.  The symptoms have gotten really   bad over the last 5 days.  The patient also complained of fevers.  The patient   had an elevated sed rate and C-reactive protein at [facility].    The patient was transferred to a / hospital for a higher level of care.    The patient had a nonfocal exam.  However, out of an abundance of caution, I   recommended surgery.  Risks, benefits, and options were discussed.  The   patient signed written informed consent.    DESCRIPTION OF PROCEDURE:  The patient was brought to the operating room and   placed under general anesthesia.  He was turned prone atop a radiolucent OSI   table.  All pressure points were padded.  The back was shaved, prepped and   draped in the usual sterile  fashion.  The patient had already started   antibiotics after blood cultures were drawn.  A time-out was performed.  Local   anesthetic was infiltrated in the skin.  A midline skin incision was centered   over L4-S1.  The thoracolumbar fascia was incised.  A sharp subperiosteal   dissection was performed.  Intraoperative fluoroscopy was used to demonstrate   the correct level.  I used a Leksell rongeur to remove the inferior spinous   process of L4 and removed the spinous process of L5, removed the spinous   processes of S1.  Jose Miguel pus was seen in the area of the right L4-L5 facet   joint.  The radiologist dictated a synovial cyst posterior to the right L4-L5   facet joint.  This ended up being a pus pocket.  Aerobic, anaerobic, fungal,   and AFB cultures were sent.  Pus was seen emanating out of the right L4 pars   defect.  Intraoperative fluoroscopy demonstrated the correct level.  An AM-8   drill bit was used to create gutters just medial to the facet complexes.  I   used a Kerrison 2, Kerrison 3 and Kerrison 4 punch to complete decompressive   laminectomies, bilateral medial facetectomies and bilateral foraminotomies at   L4, L5 and S1 and bilateral L4, bilateral L5, bilateral S1 nerve roots were   completely decompressed.  Perfect hemostasis was achieved.  Epidural pus was   also seen from L4-S1.  This was sent for aerobic, anaerobic, fungal, and AFB   cultures.  Perfect hemostasis was achieved.  Vancomycin powder was applied to   the wound after irrigating with 3 liters of antibiotic irrigation.  A   subfascial drain was tunneled through a separate stab wound and the wound was   closed in anatomic layers and a sterile dressing was applied.       ____________________________________     MD YOVANA TEJADA / WILSON    DD:  04/29/2018 16:28:22  DT:  04/29/2018 16:41:13    D#:  [id number]  Job#:  [id number]
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
## 2018-04-29 NOTE — PROGRESS NOTES
"Pharmacy Kinetics 73 y.o. male on vancomycin day # 1 2018    Currently on Vancomycin 1800 mg (1000mg given at outlying facility) iv loading dose followed by Vancomycin 1500 mg q24 hours    Indication for Treatment: Epidural abscess     Pertinent history per medical record: Admitted on 2018 for epidural abscess.  Patient was transferred from [facility] for a neurosurgery consult.  They have been experiencing low back pain that has been worsening over the last week.  MRI shows possible epidural abscess.  Positive for fever and chills.      Other antibiotics: Cefepime 2g q12h    Allergies: Patient has no known allergies.     List concerns for renal function: Age, BUN/SCr > 20:1    Pertinent cultures to date:     None    No results for input(s): WBC, NEUTSPOLYS, BANDSSTABS in the last 72 hours.  No results for input(s): BUN, CREATININE, ALBUMIN in the last 72 hours.  No results for input(s): VANCOTROUGH, VANCOPEAK, VANCORANDOM in the last 72 hours.  Intake/Output Summary (Last 24 hours) at 18 0254  Last data filed at 18 0200   Gross per 24 hour   Intake              350 ml   Output                0 ml   Net              350 ml      Blood pressure 126/65, pulse 67, temperature 37.8 °C (100 °F), resp. rate 18, height 1.854 m (6' 1\"), weight 72.6 kg (160 lb), SpO2 98 %. Temp (24hrs), Av.6 °C (99.6 °F), Min:37.3 °C (99.1 °F), Max:37.8 °C (100 °F)      A/P   1. Vancomycin dose change: New start  2. Next vancomycin level: Prior to 3rd dose (not ordered)  3. Goal trough: 18-22 mcg/mL  4. Comments: Patient received 1000 mg of vancomycin prior to arrival.  Another 800 mg was given here to complete a 25 mg/kg loading dose.  Will start a maintenance dose and recommend a trough level prior to the 3rd dose.  Pharmacy will continue to follow.      Quinten Molina, PharmD      "

## 2018-04-29 NOTE — PROGRESS NOTES
"Pharmacy Kinetics 73 y.o. male on vancomycin day # 1 2018    Currently on Vancomycin 1500 mg iv q24hr    Indication for Treatment: Epidural abscess    Pertinent history per medical record: Admitted on 2018 for epidural abscess.  Patient was transferred from [facility] for a neurosurgery consult.  They have been experiencing low back pain that has been worsening over the last week.  MRI shows possible epidural abscess.  Positive for fever and chills.  ID has been consulted    Other antibiotics: Cefepime 2g IV q12h    Allergies: Patient has no known allergies.     List concerns for renal function : age, BUN/SCr > 20:1     Pertinent cultures to date:   None    Recent Labs      18   0309   WBC  11.0*   NEUTSPOLYS  78.80*     Recent Labs      18   0309   BUN  36*   CREATININE  0.93     No results for input(s): VANCOTROUGH, VANCOPEAK, VANCORANDOM in the last 72 hours.  Intake/Output Summary (Last 24 hours) at 18 1446  Last data filed at 18 1415   Gross per 24 hour   Intake             2175 ml   Output              100 ml   Net             2075 ml      Blood pressure (!) 161/78, pulse 70, temperature 37.9 °C (100.2 °F), resp. rate 16, height 1.854 m (6' 1\"), weight 72.6 kg (160 lb), SpO2 97 %. Temp (24hrs), Av.6 °C (99.7 °F), Min:36.2 °C (97.1 °F), Max:39 °C (102.2 °F)      A/P   1. Vancomycin dose change: Continue current dose  2. Next vancomycin level: ~2 days (not ordered)  3. Goal trough: 18-22 mcg/mL  4. Comments: Agree with current dosing regimen.  ID has been consulted.  Recommend obtaining cultures if possible to guide antibiotic therapy.  Pharmacy will continue to monitor.    Dalia Barrera, PharmD., BCPS  PGY-2 Critical Care Resident  [phone]      "

## 2018-04-30 PROBLEM — R78.81 GRAM-NEGATIVE BACTEREMIA: Status: ACTIVE | Noted: 2018-04-28

## 2018-04-30 LAB
ANION GAP SERPL CALC-SCNC: 6 MMOL/L (ref 0–11.9)
BUN SERPL-MCNC: 18 MG/DL (ref 8–22)
CALCIUM SERPL-MCNC: 8.1 MG/DL (ref 8.5–10.5)
CHLORIDE SERPL-SCNC: 105 MMOL/L (ref 96–112)
CO2 SERPL-SCNC: 25 MMOL/L (ref 20–33)
CREAT SERPL-MCNC: 0.87 MG/DL (ref 0.5–1.4)
ERYTHROCYTE [DISTWIDTH] IN BLOOD BY AUTOMATED COUNT: 49 FL (ref 35.9–50)
GLUCOSE SERPL-MCNC: 107 MG/DL (ref 65–99)
GRAM STN SPEC: NORMAL
GRAM STN SPEC: NORMAL
HCT VFR BLD AUTO: 39 % (ref 42–52)
HGB BLD-MCNC: 12.7 G/DL (ref 14–18)
MCH RBC QN AUTO: 33.2 PG (ref 27–33)
MCHC RBC AUTO-ENTMCNC: 32.6 G/DL (ref 33.7–35.3)
MCV RBC AUTO: 102.1 FL (ref 81.4–97.8)
PLATELET # BLD AUTO: 168 K/UL (ref 164–446)
PMV BLD AUTO: 8.5 FL (ref 9–12.9)
POTASSIUM SERPL-SCNC: 4.1 MMOL/L (ref 3.6–5.5)
RBC # BLD AUTO: 3.82 M/UL (ref 4.7–6.1)
RHODAMINE-AURAMINE STN SPEC: NORMAL
RHODAMINE-AURAMINE STN SPEC: NORMAL
SIGNIFICANT IND 70042: NORMAL
SITE SITE: NORMAL
SODIUM SERPL-SCNC: 136 MMOL/L (ref 135–145)
SOURCE SOURCE: NORMAL
WBC # BLD AUTO: 9.2 K/UL (ref 4.8–10.8)

## 2018-04-30 PROCEDURE — 700111 HCHG RX REV CODE 636 W/ 250 OVERRIDE (IP): Performed by: INTERNAL MEDICINE

## 2018-04-30 PROCEDURE — 97162 PT EVAL MOD COMPLEX 30 MIN: CPT

## 2018-04-30 PROCEDURE — 85027 COMPLETE CBC AUTOMATED: CPT

## 2018-04-30 PROCEDURE — 87040 BLOOD CULTURE FOR BACTERIA: CPT | Mod: 91

## 2018-04-30 PROCEDURE — G8979 MOBILITY GOAL STATUS: HCPCS | Mod: CI

## 2018-04-30 PROCEDURE — G8988 SELF CARE GOAL STATUS: HCPCS | Mod: CI

## 2018-04-30 PROCEDURE — 36415 COLL VENOUS BLD VENIPUNCTURE: CPT

## 2018-04-30 PROCEDURE — A9270 NON-COVERED ITEM OR SERVICE: HCPCS | Performed by: INTERNAL MEDICINE

## 2018-04-30 PROCEDURE — 80048 BASIC METABOLIC PNL TOTAL CA: CPT

## 2018-04-30 PROCEDURE — 97165 OT EVAL LOW COMPLEX 30 MIN: CPT

## 2018-04-30 PROCEDURE — 99233 SBSQ HOSP IP/OBS HIGH 50: CPT | Performed by: INTERNAL MEDICINE

## 2018-04-30 PROCEDURE — G8987 SELF CARE CURRENT STATUS: HCPCS | Mod: CK

## 2018-04-30 PROCEDURE — G8978 MOBILITY CURRENT STATUS: HCPCS | Mod: CK

## 2018-04-30 PROCEDURE — 700102 HCHG RX REV CODE 250 W/ 637 OVERRIDE(OP): Performed by: INTERNAL MEDICINE

## 2018-04-30 PROCEDURE — 700105 HCHG RX REV CODE 258: Performed by: INTERNAL MEDICINE

## 2018-04-30 PROCEDURE — 770001 HCHG ROOM/CARE - MED/SURG/GYN PRIV*

## 2018-04-30 RX ORDER — CYCLOBENZAPRINE HCL 10 MG
5 TABLET ORAL 3 TIMES DAILY PRN
Status: ON HOLD | COMMUNITY
End: 2018-05-04

## 2018-04-30 RX ORDER — CYCLOBENZAPRINE HCL 10 MG
5 TABLET ORAL 3 TIMES DAILY PRN
Status: DISCONTINUED | OUTPATIENT
Start: 2018-04-30 | End: 2018-05-03

## 2018-04-30 RX ADMIN — OXYCODONE HYDROCHLORIDE 10 MG: 10 TABLET ORAL at 15:29

## 2018-04-30 RX ADMIN — CEFEPIME 2 G: 2 INJECTION, POWDER, FOR SOLUTION INTRAMUSCULAR; INTRAVENOUS at 22:57

## 2018-04-30 RX ADMIN — OMEPRAZOLE 20 MG: 20 CAPSULE, DELAYED RELEASE ORAL at 08:52

## 2018-04-30 RX ADMIN — CEFEPIME 2 G: 2 INJECTION, POWDER, FOR SOLUTION INTRAMUSCULAR; INTRAVENOUS at 08:52

## 2018-04-30 RX ADMIN — OMEPRAZOLE 20 MG: 20 CAPSULE, DELAYED RELEASE ORAL at 20:07

## 2018-04-30 RX ADMIN — MORPHINE SULFATE 4 MG: 4 INJECTION INTRAVENOUS at 06:35

## 2018-04-30 RX ADMIN — SODIUM CHLORIDE: 9 INJECTION, SOLUTION INTRAVENOUS at 13:06

## 2018-04-30 RX ADMIN — CYCLOBENZAPRINE 5 MG: 10 TABLET, FILM COATED ORAL at 18:08

## 2018-04-30 RX ADMIN — OXYCODONE HYDROCHLORIDE 10 MG: 10 TABLET ORAL at 12:21

## 2018-04-30 RX ADMIN — ONDANSETRON 4 MG: 2 INJECTION, SOLUTION INTRAMUSCULAR; INTRAVENOUS at 04:19

## 2018-04-30 RX ADMIN — VANCOMYCIN HYDROCHLORIDE 1500 MG: 100 INJECTION, POWDER, LYOPHILIZED, FOR SOLUTION INTRAVENOUS at 20:07

## 2018-04-30 RX ADMIN — OXYCODONE HYDROCHLORIDE 10 MG: 10 TABLET ORAL at 20:08

## 2018-04-30 RX ADMIN — OXYCODONE HYDROCHLORIDE 10 MG: 10 TABLET ORAL at 04:19

## 2018-04-30 RX ADMIN — POLYETHYLENE GLYCOL 3350 1 PACKET: 17 POWDER, FOR SOLUTION ORAL at 08:56

## 2018-04-30 RX ADMIN — SENNOSIDES AND DOCUSATE SODIUM 2 TABLET: 8.6; 5 TABLET ORAL at 08:52

## 2018-04-30 RX ADMIN — SENNOSIDES AND DOCUSATE SODIUM 1 TABLET: 8.6; 5 TABLET ORAL at 20:08

## 2018-04-30 ASSESSMENT — PAIN SCALES - GENERAL
PAINLEVEL_OUTOF10: 3
PAINLEVEL_OUTOF10: 8
PAINLEVEL_OUTOF10: 8
PAINLEVEL_OUTOF10: 3
PAINLEVEL_OUTOF10: 5
PAINLEVEL_OUTOF10: 7
PAINLEVEL_OUTOF10: 0
PAINLEVEL_OUTOF10: 8

## 2018-04-30 ASSESSMENT — ENCOUNTER SYMPTOMS
SEIZURES: 0
BACK PAIN: 1
FEVER: 1
VOMITING: 0
EYE PAIN: 0
TREMORS: 0
EYE REDNESS: 0
MYALGIAS: 1
FOCAL WEAKNESS: 0
WHEEZING: 0
NAUSEA: 0
HEMOPTYSIS: 0
HEADACHES: 0
FALLS: 0
CHILLS: 1
DIARRHEA: 0
CONSTIPATION: 1
BLOOD IN STOOL: 0
DIZZINESS: 0
LOSS OF CONSCIOUSNESS: 0
WEAKNESS: 0
PALPITATIONS: 0
COUGH: 1
NERVOUS/ANXIOUS: 0
ABDOMINAL PAIN: 0
INSOMNIA: 0

## 2018-04-30 ASSESSMENT — GAIT ASSESSMENTS
GAIT LEVEL OF ASSIST: CONTACT GUARD ASSIST
DEVIATION: DECREASED BASE OF SUPPORT;BRADYKINETIC;OTHER (COMMENT)
DISTANCE (FEET): 100
ASSISTIVE DEVICE: OTHER (COMMENTS)

## 2018-04-30 ASSESSMENT — COGNITIVE AND FUNCTIONAL STATUS - GENERAL
DRESSING REGULAR LOWER BODY CLOTHING: A LOT
STANDING UP FROM CHAIR USING ARMS: A LITTLE
SUGGESTED CMS G CODE MODIFIER MOBILITY: CK
DRESSING REGULAR UPPER BODY CLOTHING: A LITTLE
WALKING IN HOSPITAL ROOM: A LITTLE
SUGGESTED CMS G CODE MODIFIER DAILY ACTIVITY: CK
PERSONAL GROOMING: A LITTLE
TURNING FROM BACK TO SIDE WHILE IN FLAT BAD: A LITTLE
MOVING TO AND FROM BED TO CHAIR: A LITTLE
DAILY ACTIVITIY SCORE: 17
HELP NEEDED FOR BATHING: A LOT
CLIMB 3 TO 5 STEPS WITH RAILING: A LITTLE
MOBILITY SCORE: 18
TOILETING: A LITTLE
MOVING FROM LYING ON BACK TO SITTING ON SIDE OF FLAT BED: A LITTLE

## 2018-04-30 ASSESSMENT — ACTIVITIES OF DAILY LIVING (ADL): TOILETING: INDEPENDENT

## 2018-04-30 NOTE — CARE PLAN
Problem: Safety  Goal: Will remain free from injury  Call light within reach, bed locked in low position, hourly rounding, educated on calling for assistance     Problem: Mobility  Goal: Risk for activity intolerance will decrease  Patient continues to decline therapy/ambulation, not related to pain medication, just wants to rest, education provided

## 2018-04-30 NOTE — CARE PLAN
Problem: Infection  Goal: Will remain free from infection  Outcome: PROGRESSING AS EXPECTED  Patient remains afebrile, administering all antibiotics as ordered.     Problem: Pain Management  Goal: Pain level will decrease to patient's comfort goal  Outcome: PROGRESSING AS EXPECTED  After surgery, patient is complaining of no pain, refusing medication.

## 2018-04-30 NOTE — PROGRESS NOTES
"Pharmacy Kinetics 73 y.o. male on vancomycin day # 2 2018    Currently on Vancomycin 1500 mg iv q24hr     Indication for Treatment: Epidural abscess     Pertinent history per medical record: Admitted on 2018 for epidural abscess.  Patient was transferred from [facility] for a neurosurgery consult.  They have been experiencing low back pain that has been worsening over the last week.  MRI shows possible epidural abscess.  Positive for fever and chills.  ID has been consulted.     Other antibiotics: Cefepime 2g IV q12h     Allergies: Patient has no known allergies.      List concerns for renal function : age, BUN/SCr > 20:1      Pertinent cultures to date:   18 spinal tissue culture fungal/anaerobic: in progress  18 blood culture: in progress  18 epidural mass tissue culture: GPCs + GNRs      Recent Labs      18   0309  18   0411   WBC  11.0*  9.2   NEUTSPOLYS  78.80*   --      Recent Labs      18   0309  18   0411   BUN  36*  18   CREATININE  0.93  0.87     No results for input(s): VANCOTROUGH, VANCOPEAK, VANCORANDOM in the last 72 hours.  Intake/Output Summary (Last 24 hours) at 18 1506  Last data filed at 18 1155   Gross per 24 hour   Intake             1760 ml   Output             1435 ml   Net              325 ml      Blood pressure 126/73, pulse 64, temperature 37.9 °C (100.3 °F), resp. rate 18, height 1.854 m (6' 1\"), weight 85 kg (187 lb 6.3 oz), SpO2 97 %. Temp (24hrs), Av.2 °C (99 °F), Min:36.4 °C (97.5 °F), Max:37.9 °C (100.3 °F)      A/P   1. Vancomycin dose change: continue 1500 mg iv q24hr  2. Next vancomycin level:  @1930 (prior to 4th dose)  3. Goal trough: 18-22  4. Comments: ID has seen the patient, plan is for 6-8 weeks Iv antibiotics. Epidural abscess cultures pending, anticipate Staph aureus will be present. Renal indices WNL and stable. Plan to check a vancomycin trough level tomorrow night prior to 4th dose. Pharmacy will " follow.    Arlet HoffD

## 2018-04-30 NOTE — THERAPY
"Occupational Therapy Evaluation completed.   Functional Status:  CGA UB dressing, Mod A LB dressing, Toileting CGA, functional mobility CGA  Plan of Care: Will benefit from Occupational Therapy 3 times per week  Discharge Recommendations:  Equipment: Will Continue to Assess for Equipment Needs. Post-acute therapy Discharge to home with outpatient or home health for additional skilled therapy services.    See \"Rehab Therapy-Acute\" Patient Summary Report for complete documentation.      Pt presents to acute s/p L4-S1 laminectomy, extradural resection of mass. RN aware and approved of initiating OT eval. Pt lives in 1 story condo w/ his adult son who can provide intermittent physical support. Ed/trained pt on how to perform BADLs. Pt. Reports that he is illiterate. Pt presents w/ decreased activity tolerance, balance, and functional mobility 2' increased spinal pain impacting ability to participate in BADLs independently. Will follow for Acute OT services. Recommend DC home w/ home health or outpt therapy.   "

## 2018-04-30 NOTE — PROGRESS NOTES
[facility] Hospitalist Progress Note    Date of Service: 2018    Chief Complaint  73 y.o. male admitted 2018 low back pain      Interval Problem Update  Still complaining of low back pain  Has epidural abscess  Plan for OR today  Worried about difficult airway as he had choking sensation when intubated for hernia procedure years ago  Snores and son notes he had O2sat 87 at night    Consultants/Specialty      Disposition  PT/OT        Review of Systems   Constitutional: Positive for chills and fever.   HENT: Negative for congestion, hearing loss and nosebleeds.    Eyes: Negative for pain and redness.   Respiratory: Positive for cough (at night choking and coughing sensation). Negative for hemoptysis and wheezing.    Cardiovascular: Negative for chest pain and palpitations.   Gastrointestinal: Positive for constipation. Negative for abdominal pain, blood in stool, diarrhea, nausea and vomiting.   Genitourinary: Negative for dysuria, frequency and hematuria.   Musculoskeletal: Positive for back pain and myalgias. Negative for falls and joint pain.   Skin: Negative for rash.   Neurological: Negative for dizziness, tremors, focal weakness, seizures, loss of consciousness, weakness and headaches.   Psychiatric/Behavioral: The patient is not nervous/anxious and does not have insomnia.    All other systems reviewed and are negative.     Physical Exam  Laboratory/Imaging   Hemodynamics  Temp (24hrs), Av.6 °C (99.7 °F), Min:36.2 °C (97.1 °F), Max:39 °C (102.2 °F)   Temperature: 37.4 °C (99.4 °F)  Pulse  Av  Min: 60  Max: 87 Heart Rate (Monitored): 66  Blood Pressure : (!) 161/78, NIBP: 133/77      Respiratory      Respiration: 15, Pulse Oximetry: 98 %, O2 Daily Delivery Respiratory : Room Air with O2 Available             Fluids    Intake/Output Summary (Last 24 hours) at 18 1756  Last data filed at 18 1645   Gross per 24 hour   Intake             2235 ml   Output              275 ml   Net              1960 ml       Nutrition  Orders Placed This Encounter   Procedures   • DIET ORDER     Standing Status:   Standing     Number of Occurrences:   1     Order Specific Question:   Diet:     Answer:   Clear Liquid [10]     Order Specific Question:   Diet:     Answer:   Regular [1]     Physical Exam   Constitutional: He appears well-developed and well-nourished.   HENT:   Head: Normocephalic and atraumatic.   Eyes: Conjunctivae and EOM are normal. No scleral icterus.   Neck: Normal range of motion. Neck supple.   Cardiovascular: Normal rate and regular rhythm.  Exam reveals no gallop and no friction rub.    No murmur heard.  Pulmonary/Chest: Effort normal and breath sounds normal. No respiratory distress. He has no wheezes. He has no rales.   Abdominal: Soft. Bowel sounds are normal. He exhibits no distension. There is no tenderness. There is no rebound and no guarding.   Musculoskeletal: He exhibits tenderness (back soreness). He exhibits no edema.   Neurological: He is alert.   Skin: Skin is warm.   Psychiatric: He has a normal mood and affect. His behavior is normal.       Recent Labs      04/29/18   0309   WBC  11.0*   RBC  3.92*   HEMOGLOBIN  13.0*   HEMATOCRIT  39.5*   MCV  100.8*   MCH  33.2*   MCHC  32.9*   RDW  47.7   PLATELETCT  191   MPV  9.0     Recent Labs      04/29/18   0309   SODIUM  135   POTASSIUM  4.0   CHLORIDE  106   CO2  25   GLUCOSE  155*   BUN  36*   CREATININE  0.93   CALCIUM  8.0*     Recent Labs      04/29/18   0309   APTT  30.9   INR  1.10                  Assessment/Plan     * Spinal epidural abscess- (present on admission)   Assessment & Plan    MRI lumbar spine reveals epidural collection with peripheral enhancement above L4-5 disc level posteriorly and centrally, extending caudally into the left causing marked central canal stenosis.  CRP 13.6, ESR 60 and WBC 12.6, patient endorses fevers and chills  Blood cultures were obtained at [facility], we will obtain these  results once available  Patient will be started on IV vancomycin and IV cefepime  Neurosurgery has been consulted and notified  Pain control with oxycodone and IV morphine, monitor respiratory status closely  PTOT  We will consider an infectious disease consultation in the morning        Apnea   Assessment & Plan    Complaints of snoring and choking at night  Treat acid reflux  Nocturnal oximetry; if positive will consult Pulmo for empiric CPAP        Leukocytosis- (present on admission)   Assessment & Plan    In the setting of epidural abscess  Patient does not appear to be septic at this time  Continue to monitor vitals and CBC        Hypertension- (present on admission)   Assessment & Plan    Uncontrolled  Continue losartan 100 mg daily  IV hydralazine when necessary for SBP greater than 160        I spent *39 minutes, reviewing the chart, notes, vitals, labs, imaging, ordering labs, evaluating Yunier Denney for assessment, enacting the plan above. 50% of the time was spent in counseling Yunier Denney and and son answering questions. Discussed with RN, Dr. Valerio. Medical decision making is therefore complex. Time was devoted to counseling and coordinating care including review of records, pertinent lab data and studies, as well as discussing diagnostic evaluation and work up, planned therapeutic interventions and future disposition of care. Where indicated, the assessment and plan reflect discussion of patient with consultants, other healthcare providers, family members, and additional research needed to obtain further information in formulating the plan of care for Yunier Denney.     Quality-Core Measures
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
## 2018-04-30 NOTE — PROGRESS NOTES
Patient arrived from PACU this evening. Asleep but easily aroused to voice. Slightly confused but reoriented easily. NC on 1 L, saturating 94%. SCDs on, bed alarm on, NS running 125 mL/hr. Pt reports no pain. Pt has voided without difficulty. Hemovac in place. Sleeping. Will continue to monitor breathing and pain.

## 2018-04-30 NOTE — PROGRESS NOTES
[facility] Hospitalist Progress Note    Date of Service: 2018    Chief Complaint  73 y.o. male admitted 2018 low back pain      Interval Problem Update  Improving low back pain  Understands about positive blood cultures  If he is to go home on antibiotics, he prefer to do it at home or [facility] and not facility    Consultants/Specialty      Disposition  PT/OT        Review of Systems   Constitutional: Positive for chills and fever.   HENT: Negative for congestion, hearing loss and nosebleeds.    Eyes: Negative for pain and redness.   Respiratory: Positive for cough (at night choking and coughing sensation). Negative for hemoptysis and wheezing.    Cardiovascular: Negative for chest pain and palpitations.   Gastrointestinal: Positive for constipation. Negative for abdominal pain, blood in stool, diarrhea, nausea and vomiting.   Genitourinary: Negative for dysuria, frequency and hematuria.   Musculoskeletal: Positive for back pain and myalgias. Negative for falls and joint pain.   Skin: Negative for rash.   Neurological: Negative for dizziness, tremors, focal weakness, seizures, loss of consciousness, weakness and headaches.   Psychiatric/Behavioral: The patient is not nervous/anxious and does not have insomnia.    All other systems reviewed and are negative.     Physical Exam  Laboratory/Imaging   Hemodynamics  Temp (24hrs), Av.3 °C (99.1 °F), Min:36.4 °C (97.5 °F), Max:37.9 °C (100.3 °F)   Temperature: 37.8 °C (100.1 °F)  Pulse  Av.6  Min: 60  Max: 87 Heart Rate (Monitored): 66  Blood Pressure : 132/70, NIBP: 133/77      Respiratory      Respiration: 16, Pulse Oximetry: 97 %, O2 Daily Delivery Respiratory : Silicone Nasal Cannula             Fluids    Intake/Output Summary (Last 24 hours) at 18 1653  Last data filed at 18 1613   Gross per 24 hour   Intake             1700 ml   Output             1610 ml   Net               90 ml       Nutrition  Orders Placed This Encounter   Procedures   •  DIET ORDER     Standing Status:   Standing     Number of Occurrences:   1     Order Specific Question:   Diet:     Answer:   Clear Liquid [10]     Order Specific Question:   Diet:     Answer:   Regular [1]     Physical Exam   Constitutional: He appears well-developed and well-nourished.   HENT:   Head: Normocephalic and atraumatic.   Eyes: Conjunctivae and EOM are normal. No scleral icterus.   Neck: Normal range of motion. Neck supple.   Cardiovascular: Normal rate and regular rhythm.  Exam reveals no gallop and no friction rub.    No murmur heard.  Pulmonary/Chest: Effort normal and breath sounds normal. No respiratory distress. He has no wheezes. He has no rales.   Abdominal: Soft. Bowel sounds are normal. He exhibits no distension. There is no tenderness. There is no rebound and no guarding.   Musculoskeletal: He exhibits tenderness (back soreness). He exhibits no edema.   Neurological: He is alert.   Skin: Skin is warm.   Psychiatric: He has a normal mood and affect. His behavior is normal.       Recent Labs      04/29/18   0309  04/30/18   0411   WBC  11.0*  9.2   RBC  3.92*  3.82*   HEMOGLOBIN  13.0*  12.7*   HEMATOCRIT  39.5*  39.0*   MCV  100.8*  102.1*   MCH  33.2*  33.2*   MCHC  32.9*  32.6*   RDW  47.7  49.0   PLATELETCT  191  168   MPV  9.0  8.5*     Recent Labs      04/29/18   0309  04/30/18   0411   SODIUM  135  136   POTASSIUM  4.0  4.1   CHLORIDE  106  105   CO2  25  25   GLUCOSE  155*  107*   BUN  36*  18   CREATININE  0.93  0.87   CALCIUM  8.0*  8.1*     Recent Labs      04/29/18   0309   APTT  30.9   INR  1.10                  Assessment/Plan     * Spinal epidural abscess- (present on admission)   Assessment & Plan    MRI lumbar spine reveals epidural collection with peripheral enhancement above L4-5 disc level posteriorly and centrally, extending caudally into the left causing marked central canal stenosis.  CRP 13.6, ESR 60 and WBC 12.6, patient endorses fevers and chills  Blood cultures were  obtained at [facility], we will obtain these results once available  Patient will be started on IV vancomycin and IV cefepime  Neurosurgery has been consulted and notified  Pain control with oxycodone and IV morphine, monitor respiratory status closely  PTOT  I called Dr. Grewal for ID consultation        Apnea   Assessment & Plan    Complaints of snoring and choking at night  Treat acid reflux  Nocturnal oximetry  I called Dr. Fitzgerald, Pulmonology, no pulmonary issue to postpone surgery. As per Anesthesia regarding difficult airway        Leukocytosis- (present on admission)   Assessment & Plan    Leukocytosis the setting of epidural abscess  Patient does not appear to be septic at this time; please review admission physician's and prior attending's notes if there was sepsis on admission  Continue to monitor vitals and CBC  On 4/30, discussed with ID and report was outside facility blood cultures were pasitive for Gram negatives; continue current antibiotics and will try to obtain blood cultures from that facility. Meanwhile repeat blood cultures ordered.          Hypertension- (present on admission)   Assessment & Plan    Uncontrolled  Continue losartan 100 mg daily  IV hydralazine when necessary for SBP greater than 160        I spent 40 minutes, reviewing the chart, notes, vitals, labs, imaging, ordering labs, evaluating Yunier Denney for assessment, enacting the plan above. 50% of the time was spent in counseling Yunier Denney and and son answering questions. Discussed with RN, Dr. Grewal. Medical decision making is therefore complex. Time was devoted to counseling and coordinating care including review of records, pertinent lab data and studies, as well as discussing diagnostic evaluation and work up, planned therapeutic interventions and future disposition of care. Where indicated, the assessment and plan reflect discussion of patient with consultants, other healthcare providers, family  members, and additional research needed to obtain further information in formulating the plan of care for Yunier ZIGGY VicenteDenney.     Quality-Core Measures

## 2018-04-30 NOTE — PROGRESS NOTES
Neurosurgery Progress Note    Subjective:  Reports improvement in BLE pain, some back/incisional pain.   Drain in with 10ml/shift.   Blood cultures so far have been negative, verbal read for positive blood cultures this am per nursing.   VSS- no fevers, chills, nausea, vomiting.   No new increases in pain, numbness, tingling.     Exam:  VSS  A&Ox4, NAD  NM: 5/5 hip flexion, knee extension, knee flexion, plantar flexion, dorsiflexion, EHL  Sensation intact and equal throughout all four extremities.   Abdomen: soft, non-tender  Non-labored breathing on room air, normal respiratory effort  Capillary refill in all four extremities <2 seconds  No LE edema, erythema, cyanosis, clubbing  Calves non-tender to compression bilat  Incision CDI, no halo sign  Drain: 10ml/shift, sanguinous drainage.           BP  Min: 124/67  Max: 161/78  Pulse  Av.9  Min: 62  Max: 87  Resp  Avg: 15.2  Min: 12  Max: 18  Temp  Av.3 °C (99.2 °F)  Min: 36.4 °C (97.5 °F)  Max: 37.9 °C (100.3 °F)  SpO2  Av.2 %  Min: 91 %  Max: 98 %    No Data Recorded    Recent Labs      18   03018   041   WBC  11.0*  9.2   RBC  3.92*  3.82*   HEMOGLOBIN  13.0*  12.7*   HEMATOCRIT  39.5*  39.0*   MCV  100.8*  102.1*   MCH  33.2*  33.2*   MCHC  32.9*  32.6*   RDW  47.7  49.0   PLATELETCT  191  168   MPV  9.0  8.5*     Recent Labs      18   0309  18   0411   SODIUM  135  136   POTASSIUM  4.0  4.1   CHLORIDE  106  105   CO2  25  25   GLUCOSE  155*  107*   BUN  36*  18   CREATININE  0.93  0.87   CALCIUM  8.0*  8.1*     Recent Labs      18   APTT  30.9   INR  1.10           Intake/Output       18 0700 - 18 0659 18 07 - 18 0659      [id number] 5874-1541 Total [id number] 6600-4446 Total       Intake    P.O.  60  -- 60  --  -- --    P.O. 60 -- 60 -- -- --    I.V.  --  1700 1700  --  -- --    IV Piggyback Volume (Antibiotics) -- 600 600 -- -- --    IV Volume (NS) -- 1100 1100 -- -- --    Total  Intake 60 1700 1760 -- -- --       Output    Urine  275  1200 1475  --  -- --    Urine Void (mL) (non-catheter) 275 1200 1475 -- -- --    Drains  --  10 10  --  -- --    Output (ml) (Surgical Drain Group Lower;Back Hemovac 1 (A)) -- 10 10 -- -- --    Total Output 275 1210 1485 -- -- --       Net I/O     -215 490 275 -- -- --            Intake/Output Summary (Last 24 hours) at 04/30/18 0803  Last data filed at 04/30/18 0600   Gross per 24 hour   Intake             1760 ml   Output             1485 ml   Net              275 ml            • vancomycin  20 mg/kg Q24HR   • omeprazole  20 mg BID   • mag hydrox-al hydrox-simeth  10 mL 4X/DAY PRN   • NS  30 mL/kg Once PRN   • senna-docusate  2 Tab BID    And   • polyethylene glycol/lytes  1 Packet QDAY PRN    And   • magnesium hydroxide  30 mL QDAY PRN    And   • bisacodyl  10 mg QDAY PRN   • Respiratory Care per Protocol   Continuous RT   • NS   Continuous   • NS  1,000 mL Once PRN   • acetaminophen  650 mg Q6HRS PRN   • Pharmacy Consult Request   PRN    And   • oxyCODONE immediate-release  5 mg Q3HRS PRN    And   • oxyCODONE immediate-release  10 mg Q3HRS PRN    And   • morphine injection  4 mg Q3HRS PRN   • ondansetron  4 mg Q4HRS PRN   • ondansetron  4 mg Q4HRS PRN   • MD ALERT... vancomycin   pharmacy to dose   • cefepime  2 g Q12HRS       Assessment:  POD #1 L4-S1 laminectomy, extradural resection of mass  Prophylactic anticoagulation: no, 24h after drain is out.      Plan:  Increase ambulation, work with therapies.   Continue pain control  Continue drain until this afternoon, RN to call with output  IS every hour  Will continue to monitor results of blood cultures, so far have been negative aside from this am. Repeat blood cultures, ID on board.   Continue stool softeners to encourage BM.     Case discussed with patient, RN, Dr. Herron.

## 2018-04-30 NOTE — DISCHARGE PLANNING
Medical Social Work    Referral:  Rounds    Intervention:  Pt discussed during rounds. Pt is not medically clear.  Pt is receiving IV abx for 6/8 weeks.  SW needs TBD    Plan:  SW will remain available for dc planning

## 2018-04-30 NOTE — PROGRESS NOTES
Call received from Ingris Brewer from [facility]. When pt was in their ER they oliver blood cultures, she reports that 3/4 have come back positive for gram + cocci chain. Asked if results could be faxed, she said to call [facility]'s micro department in the morning after 0700.

## 2018-04-30 NOTE — PROGRESS NOTES
Pt is AAOx4.  Moves all extremities 4/5.  Denies numbness or tingling.  Not OOB this shift.  Pt complains of 5/10 pain.  Oxycodone given PRN.  Pt denies nausea/vomiting.  Tolerating full liquid diet.  Pt voiding without difficulty.  Hemovac in place, compressed.  Surgical incision/dressing CDI.  POC discussed, pt verbalizes understanding.  Bed alarm on.  Call light within reach, bed in lowest position.          0230- Spoke with Dr. Chavez to report that the pt had blood cultures drawn at [facility] that are positive for gram positive cocci chains.  Reported that pt is currently of Vancomycin and Cefepime.  No new orders at this time.

## 2018-04-30 NOTE — RESPIRATORY CARE
Nocturnal Oximetry    Setup Time: 2233    O2 (LPM): 2 (04/30/18 0000)    RN Notified: YES    Study End Time: 0530      Pt started NOC OX study on ROOM AIR @ 2233  Pt's SpO2% decreased to mid-80's and maintained for >10 minutes  Pt then started on 2 LPM O2 therapy via Nasal Cannula and remained on 2 LPM for the rest of the NOC OX study.

## 2018-04-30 NOTE — THERAPY
Physical Therapy Contact Note    Pt declining to get out of bed until next round of pain meds in late morning; will attempt back when able;     Jennifer Rojas, PT, DPT Pager: [phone]

## 2018-04-30 NOTE — PROGRESS NOTES
AAOX4, SO   Denies N/T  Not yet OOB, continues to decline ambulating and declining pain medication, states just wants to rest and catch up on sleep   Pt complaining of back pain at  3/10   Declines intervention at this time  Hemovac in place, compressed   Dressing clean, dry and intact  Voiding without difficulty  Bed alarm on. Call light within reach. Treaded socks on

## 2018-05-01 LAB
BACTERIA TISS AEROBE CULT: ABNORMAL
BACTERIA TISS AEROBE CULT: ABNORMAL
BACTERIA WND AEROBE CULT: ABNORMAL
BACTERIA WND AEROBE CULT: ABNORMAL
ERYTHROCYTE [DISTWIDTH] IN BLOOD BY AUTOMATED COUNT: 47 FL (ref 35.9–50)
GRAM STN SPEC: ABNORMAL
GRAM STN SPEC: ABNORMAL
HCT VFR BLD AUTO: 36.3 % (ref 42–52)
HGB BLD-MCNC: 12 G/DL (ref 14–18)
MCH RBC QN AUTO: 33.1 PG (ref 27–33)
MCHC RBC AUTO-ENTMCNC: 33.1 G/DL (ref 33.7–35.3)
MCV RBC AUTO: 100.3 FL (ref 81.4–97.8)
PLATELET # BLD AUTO: 174 K/UL (ref 164–446)
PMV BLD AUTO: 8.9 FL (ref 9–12.9)
RBC # BLD AUTO: 3.62 M/UL (ref 4.7–6.1)
SIGNIFICANT IND 70042: ABNORMAL
SIGNIFICANT IND 70042: ABNORMAL
SITE SITE: ABNORMAL
SITE SITE: ABNORMAL
SOURCE SOURCE: ABNORMAL
SOURCE SOURCE: ABNORMAL
VANCOMYCIN TROUGH SERPL-MCNC: 6.4 UG/ML (ref 10–20)
WBC # BLD AUTO: 9.7 K/UL (ref 4.8–10.8)

## 2018-05-01 PROCEDURE — 700102 HCHG RX REV CODE 250 W/ 637 OVERRIDE(OP): Performed by: INTERNAL MEDICINE

## 2018-05-01 PROCEDURE — A9270 NON-COVERED ITEM OR SERVICE: HCPCS | Performed by: PHYSICIAN ASSISTANT

## 2018-05-01 PROCEDURE — 700111 HCHG RX REV CODE 636 W/ 250 OVERRIDE (IP): Performed by: INTERNAL MEDICINE

## 2018-05-01 PROCEDURE — 770001 HCHG ROOM/CARE - MED/SURG/GYN PRIV*

## 2018-05-01 PROCEDURE — A9270 NON-COVERED ITEM OR SERVICE: HCPCS | Performed by: INTERNAL MEDICINE

## 2018-05-01 PROCEDURE — 700105 HCHG RX REV CODE 258: Performed by: INTERNAL MEDICINE

## 2018-05-01 PROCEDURE — 80202 ASSAY OF VANCOMYCIN: CPT

## 2018-05-01 PROCEDURE — 700102 HCHG RX REV CODE 250 W/ 637 OVERRIDE(OP): Performed by: PHYSICIAN ASSISTANT

## 2018-05-01 PROCEDURE — 99232 SBSQ HOSP IP/OBS MODERATE 35: CPT | Performed by: INTERNAL MEDICINE

## 2018-05-01 PROCEDURE — 36415 COLL VENOUS BLD VENIPUNCTURE: CPT

## 2018-05-01 PROCEDURE — 85027 COMPLETE CBC AUTOMATED: CPT

## 2018-05-01 PROCEDURE — 97535 SELF CARE MNGMENT TRAINING: CPT

## 2018-05-01 RX ORDER — HYDROCODONE BITARTRATE AND ACETAMINOPHEN 10; 325 MG/1; MG/1
1-2 TABLET ORAL EVERY 4 HOURS PRN
Status: DISCONTINUED | OUTPATIENT
Start: 2018-05-01 | End: 2018-05-07 | Stop reason: HOSPADM

## 2018-05-01 RX ORDER — LOSARTAN POTASSIUM 50 MG/1
100 TABLET ORAL
Status: DISCONTINUED | OUTPATIENT
Start: 2018-05-01 | End: 2018-05-07 | Stop reason: HOSPADM

## 2018-05-01 RX ADMIN — CEFEPIME 2 G: 2 INJECTION, POWDER, FOR SOLUTION INTRAMUSCULAR; INTRAVENOUS at 08:10

## 2018-05-01 RX ADMIN — OMEPRAZOLE 20 MG: 20 CAPSULE, DELAYED RELEASE ORAL at 20:23

## 2018-05-01 RX ADMIN — HYDROCODONE BITARTRATE AND ACETAMINOPHEN 1 TABLET: 10; 325 TABLET ORAL at 20:23

## 2018-05-01 RX ADMIN — LOSARTAN POTASSIUM 100 MG: 50 TABLET, FILM COATED ORAL at 09:57

## 2018-05-01 RX ADMIN — CEFEPIME 2 G: 2 INJECTION, POWDER, FOR SOLUTION INTRAMUSCULAR; INTRAVENOUS at 22:48

## 2018-05-01 RX ADMIN — OXYCODONE HYDROCHLORIDE 10 MG: 10 TABLET ORAL at 03:27

## 2018-05-01 RX ADMIN — SODIUM CHLORIDE: 9 INJECTION, SOLUTION INTRAVENOUS at 08:09

## 2018-05-01 RX ADMIN — VANCOMYCIN HYDROCHLORIDE 1500 MG: 100 INJECTION, POWDER, LYOPHILIZED, FOR SOLUTION INTRAVENOUS at 20:23

## 2018-05-01 RX ADMIN — SENNOSIDES AND DOCUSATE SODIUM 2 TABLET: 8.6; 5 TABLET ORAL at 20:23

## 2018-05-01 RX ADMIN — MAGNESIUM HYDROXIDE 30 ML: 400 SUSPENSION ORAL at 08:10

## 2018-05-01 RX ADMIN — HYDROCODONE BITARTRATE AND ACETAMINOPHEN 1 TABLET: 10; 325 TABLET ORAL at 12:50

## 2018-05-01 RX ADMIN — SENNOSIDES AND DOCUSATE SODIUM 2 TABLET: 8.6; 5 TABLET ORAL at 08:10

## 2018-05-01 RX ADMIN — OMEPRAZOLE 20 MG: 20 CAPSULE, DELAYED RELEASE ORAL at 08:10

## 2018-05-01 RX ADMIN — ACETAMINOPHEN 650 MG: 325 TABLET, FILM COATED ORAL at 05:43

## 2018-05-01 ASSESSMENT — COGNITIVE AND FUNCTIONAL STATUS - GENERAL
DRESSING REGULAR UPPER BODY CLOTHING: A LITTLE
HELP NEEDED FOR BATHING: A LOT
SUGGESTED CMS G CODE MODIFIER DAILY ACTIVITY: CK
PERSONAL GROOMING: A LITTLE
DRESSING REGULAR LOWER BODY CLOTHING: A LOT
DAILY ACTIVITIY SCORE: 17
TOILETING: A LITTLE

## 2018-05-01 ASSESSMENT — PAIN SCALES - GENERAL
PAINLEVEL_OUTOF10: 5
PAINLEVEL_OUTOF10: 5
PAINLEVEL_OUTOF10: 2
PAINLEVEL_OUTOF10: 4
PAINLEVEL_OUTOF10: 3
PAINLEVEL_OUTOF10: 2
PAINLEVEL_OUTOF10: 5
PAINLEVEL_OUTOF10: 3
PAINLEVEL_OUTOF10: 3
PAINLEVEL_OUTOF10: 6

## 2018-05-01 ASSESSMENT — ENCOUNTER SYMPTOMS
FEVER: 1
FOCAL WEAKNESS: 0
COUGH: 1
EYE REDNESS: 0
CHILLS: 0
FEVER: 0
CHILLS: 1
PALPITATIONS: 0
ABDOMINAL PAIN: 0
COUGH: 0
FALLS: 0
MYALGIAS: 1
NERVOUS/ANXIOUS: 0
WHEEZING: 0
TREMORS: 0
SHORTNESS OF BREATH: 0
SEIZURES: 0
INSOMNIA: 0
WEAKNESS: 0
VOMITING: 0
EYE PAIN: 0
CONSTIPATION: 1
HEMOPTYSIS: 0
HEADACHES: 0
BACK PAIN: 1
NAUSEA: 0
LOSS OF CONSCIOUSNESS: 0
DIZZINESS: 0
BLOOD IN STOOL: 0
DIARRHEA: 0

## 2018-05-01 NOTE — PROGRESS NOTES
[facility] Hospitalist Progress Note    Date of Service: 2018    Chief Complaint  73 y.o. male admitted 2018 low back pain      Interval Problem Update  Feels better with improved back pain, 3-4/10 now.  Able to walk short distance with PT yesterday.  Has constipation, no bm for 3 days.  No new c/o.    Consultants/Specialty      Disposition  PT/OT        Review of Systems   Constitutional: Positive for chills and fever.   HENT: Negative for congestion, hearing loss and nosebleeds.    Eyes: Negative for pain and redness.   Respiratory: Positive for cough (at night choking and coughing sensation). Negative for hemoptysis and wheezing.    Cardiovascular: Negative for chest pain and palpitations.   Gastrointestinal: Positive for constipation. Negative for abdominal pain, blood in stool, diarrhea, nausea and vomiting.   Genitourinary: Negative for dysuria, frequency and hematuria.   Musculoskeletal: Positive for back pain and myalgias. Negative for falls and joint pain.   Skin: Negative for rash.   Neurological: Negative for dizziness, tremors, focal weakness, seizures, loss of consciousness, weakness and headaches.   Psychiatric/Behavioral: The patient is not nervous/anxious and does not have insomnia.    All other systems reviewed and are negative.     Physical Exam  Laboratory/Imaging   Hemodynamics  Temp (24hrs), Av.6 °C (99.7 °F), Min:37.2 °C (98.9 °F), Max:37.9 °C (100.2 °F)   Temperature: 37.9 °C (100.2 °F)  Pulse  Av.6  Min: 60  Max: 87    Blood Pressure : 157/86      Respiratory      Respiration: 16        RUL Breath Sounds: Clear, RML Breath Sounds: Clear, RLL Breath Sounds: Diminished, DESTINI Breath Sounds: Clear, LLL Breath Sounds: Diminished    Fluids    Intake/Output Summary (Last 24 hours) at 18 0838  Last data filed at 18 0600   Gross per 24 hour   Intake             3460 ml   Output             1040 ml   Net             2420 ml       Nutrition  Orders Placed This Encounter    Procedures   • DIET ORDER     Standing Status:   Standing     Number of Occurrences:   1     Order Specific Question:   Diet:     Answer:   Clear Liquid [10]     Order Specific Question:   Diet:     Answer:   Regular [1]     Physical Exam   Constitutional: He is oriented to person, place, and time. He appears well-developed and well-nourished.   HENT:   Head: Normocephalic and atraumatic.   Eyes: Conjunctivae and EOM are normal. No scleral icterus.   Neck: Normal range of motion. Neck supple.   Cardiovascular: Normal rate and regular rhythm.  Exam reveals no gallop and no friction rub.    No murmur heard.  Pulmonary/Chest: Effort normal and breath sounds normal. No respiratory distress. He has no wheezes. He has no rales.   Abdominal: Soft. Bowel sounds are normal. He exhibits no distension. There is no tenderness. There is no rebound and no guarding.   Musculoskeletal: He exhibits tenderness (back soreness). He exhibits no edema.   Neurological: He is alert and oriented to person, place, and time.   Skin: Skin is warm.   Psychiatric: He has a normal mood and affect. His behavior is normal.       Recent Labs      04/29/18   0309 04/30/18   0411  05/01/18   0324   WBC  11.0*  9.2  9.7   RBC  3.92*  3.82*  3.62*   HEMOGLOBIN  13.0*  12.7*  12.0*   HEMATOCRIT  39.5*  39.0*  36.3*   MCV  100.8*  102.1*  100.3*   MCH  33.2*  33.2*  33.1*   MCHC  32.9*  32.6*  33.1*   RDW  47.7  49.0  47.0   PLATELETCT  191  168  174   MPV  9.0  8.5*  8.9*     Recent Labs      04/29/18   0309  04/30/18   0411   SODIUM  135  136   POTASSIUM  4.0  4.1   CHLORIDE  106  105   CO2  25  25   GLUCOSE  155*  107*   BUN  36*  18   CREATININE  0.93  0.87   CALCIUM  8.0*  8.1*     Recent Labs      04/29/18   0309   APTT  30.9   INR  1.10                  Assessment/Plan     * Spinal epidural abscess- (present on admission)   Assessment & Plan    MRI lumbar spine reveals epidural collection with peripheral enhancement above L4-5 disc level  posteriorly and centrally, extending caudally into the left causing marked central canal stenosis.  S/p surgery 4/29, drain to be dc'ed today; clinically better with improved pain.  Wound culture grew strep viridans.  ID following.  Will need 6-8 weeks of IV abx; SNF vs home eventually.  PT/OT.        Gram-positive bacteremia- (present on admission)   Assessment & Plan    Per outside blood cultures; await record to confirm;  Check repeat blood cultures here.  On IV abx.          Apnea- (present on admission)   Assessment & Plan    Complaints of snoring and choking at night  Treat acid reflux  Nocturnal oximetry  I called Dr. Fitzgerald, Pulmonology, no pulmonary issue to postpone surgery. As per Anesthesia regarding difficult airway        Hypertension- (present on admission)   Assessment & Plan    Not optimally controlled  Continue losartan 100 mg daily and add meds as indicated.            Quality-Core Measures

## 2018-05-01 NOTE — THERAPY
"Physical Therapy Evaluation completed.   Bed Mobility:  Supine to Sit: Contact Guard Assist (log roll)  Transfers: Sit to Stand: Contact Guard Assist  Gait: Level Of Assist: Contact Guard Assist with No Equipment Needed       Plan of Care: Will benefit from Physical Therapy 3 times per week  Discharge Recommendations: Equipment: Will Continue to Assess for Equipment Needs. Check OT note for bathroom equipment.    Pt presents with increased pain, impaired gait mechanics, impaired balance, and impared bed mobility 2/2 L4-S1 laminectomy and extradural removal of mass; Pt most limited by pain during session and wished to return to room; Pending pain control, anticipate 1 more session to ensure progress then d/c home     See \"Rehab Therapy-Acute\" Patient Summary Report for complete documentation.     "

## 2018-05-01 NOTE — PROGRESS NOTES
Infectious Disease Progress Note    Author: Mandy Grewal M.D. Date & Time of service: 2018  8:47 AM    Chief Complaint:  FU epidural abscess    Interval History:   Tmax 100.2, WBC 9.7, ongoing back pain, constipated, OR cx viridans strep, tolerating abx without issues  Labs Reviewed, Medications Reviewed, Radiology Reviewed and Wound Reviewed.    Review of Systems:  Review of Systems   Constitutional: Negative for chills and fever.   Respiratory: Negative for cough and shortness of breath.    Cardiovascular: Negative for chest pain.   Gastrointestinal: Positive for constipation.   Musculoskeletal: Positive for back pain.   All other systems reviewed and are negative.      Hemodynamics:  Temp (24hrs), Av.6 °C (99.7 °F), Min:37.2 °C (98.9 °F), Max:37.9 °C (100.2 °F)  Temperature: 37.9 °C (100.2 °F)  Pulse  Av.6  Min: 60  Max: 87   Blood Pressure : 157/86       Physical Exam:  Physical Exam   Constitutional: He is oriented to person, place, and time. He appears well-developed.   HENT:   Head: Normocephalic and atraumatic.   Poor dentition/dental caries  Missing teeth   Eyes: EOM are normal. Pupils are equal, round, and reactive to light.   Neck: Neck supple.   Cardiovascular: Normal rate and regular rhythm.    Pulmonary/Chest: Effort normal. He has no wheezes. He has no rales.   Abdominal: Soft. There is no tenderness.   Musculoskeletal: He exhibits no edema.   Lumbar surgical site dressed +hemovac   Neurological: He is alert and oriented to person, place, and time.   Skin: No rash noted.       Meds:    Current Facility-Administered Medications:   •  HYDROcodone/acetaminophen  •  losartan  •  cyclobenzaprine  •  vancomycin  •  omeprazole  •  mag hydrox-al hydrox-simeth  •  NS  •  senna-docusate **AND** polyethylene glycol/lytes **AND** magnesium hydroxide **AND** bisacodyl  •  Respiratory Care per Protocol  •  NS  •  NS  •  acetaminophen  •  Notify provider if pain remains uncontrolled **AND** Use  the numeric rating scale (NRS-11) on regular floors and Critical-Care Pain Observation Tool (CPOT) on ICUs/Trauma to assess pain **AND** Pulse Ox (Oximetry) **AND** Pharmacy Consult Request **AND** If patient difficult to arouse and/or has respiratory depression, stop any opiates that are currently infusing and call a Rapid Response. **AND** [DISCONTINUED] oxyCODONE immediate-release **AND** oxyCODONE immediate-release **AND** morphine injection  •  ondansetron  •  ondansetron  •  MD ALERT... vancomycin  •  cefepime    Labs:  Recent Labs      04/29/18 0309 04/30/18   0411 05/01/18   0324   WBC  11.0*  9.2  9.7   RBC  3.92*  3.82*  3.62*   HEMOGLOBIN  13.0*  12.7*  12.0*   HEMATOCRIT  39.5*  39.0*  36.3*   MCV  100.8*  102.1*  100.3*   MCH  33.2*  33.2*  33.1*   RDW  47.7  49.0  47.0   PLATELETCT  191  168  174   MPV  9.0  8.5*  8.9*   NEUTSPOLYS  78.80*   --    --    LYMPHOCYTES  8.10*   --    --    MONOCYTES  12.40   --    --    EOSINOPHILS  0.00   --    --    BASOPHILS  0.20   --    --      Recent Labs      04/29/18 0309 04/30/18   0411   SODIUM  135  136   POTASSIUM  4.0  4.1   CHLORIDE  106  105   CO2  25  25   GLUCOSE  155*  107*   BUN  36*  18     Recent Labs      04/29/18 0309 04/30/18   0411   CREATININE  0.93  0.87       Imaging:  Dx-chest-2 Views    Result Date: 4/29/2018 4/29/2018 10:11 AM HISTORY/REASON FOR EXAM: Back pain. Shortness of breath. Cough. TECHNIQUE/EXAM DESCRIPTION AND NUMBER OF VIEWS: Two views of the chest. COMPARISON: None. FINDINGS: The lungs are hyperinflated. There is a large hiatal hernia. The heart is mildly enlarged. There is no evidence of pleural effusion. Soft tissues and bony structures are unremarkable.     1.  Mild cardiomegaly. 2.  Hiatal hernia.    Dx-spine-any One View    Result Date: 4/29/2018 4/29/2018 2:45 PM HISTORY/REASON FOR EXAM:  Back pain. TECHNIQUE/EXAM DESCRIPTION AND NUMBER OF VIEWS:  Single view of the spine. COMPARISON: None. FINDINGS:  Intraoperative fluoroscopy spot images were obtained by the neurosurgeon. Single lateral view demonstrates a surgical probe directed at the L5-S1 level. The performing clinician used a total of 4 seconds fluoroscopy time.     Intraoperative fluoroscopy spot images as described above.    Dx-portable Fluoro > 1 Hour    Result Date: 4/30/2018 4/29/2018 2:45 PM HISTORY/REASON FOR EXAM:  Lumbar pain, laminectomy TECHNIQUE/EXAM DESCRIPTION AND NUMBER OF VIEWS: Portable fluoroscopy for greater than one hour FINDINGS:      The portable fluoroscopy unit was obligated to the procedure for greater than one hour.   Actual fluoro time was 4 seconds.     Portable fluoroscopy utilized for 4 seconds.       Micro:  Results     Procedure Component Value Units Date/Time    ACID FAST STAIN [[id number]] Collected:  04/29/18 1446    Order Status:  Completed Specimen:  Wound Updated:  04/30/18 1719     Significant Indicator NEG     Source WND     Site Right Paraspinous Soft Tissue     AFB Smear Results No acid fast bacilli seen.    GRAM STAIN [[id number]] Collected:  04/29/18 1446    Order Status:  Completed Specimen:  Wound Updated:  04/30/18 1719     Significant Indicator .     Source WND     Site Right Paraspinous Soft Tissue     Gram Stain Result Moderate WBCs.  Moderate Gram positive cocci.      GRAM STAIN [[id number]] Collected:  04/29/18 1500    Order Status:  Completed Specimen:  Tissue Updated:  04/30/18 1719     Significant Indicator .     Source TISS     Site L4-L5 Epidural Mass     Gram Stain Result Moderate WBCs.  Rare Gram positive cocci.  Rare Gram positive rods.      ACID FAST STAIN [[id number]] Collected:  04/29/18 1500    Order Status:  Completed Specimen:  Tissue Updated:  04/30/18 1719     Significant Indicator NEG     Source TISS     Site L4-L5 Epidural Mass     AFB Smear Results No acid fast bacilli seen.    CULTURE TISSUE W/ GRM STAIN [[id number]]  (Abnormal) Collected:  04/29/18 1500    Order Status:  Completed  Specimen:  Tissue Updated:  04/30/18 1719     Significant Indicator POS (POS)     Source TISS     Site L4-L5 Epidural Mass     Tissue Culture -- (A)     Gram Stain Result Moderate WBCs.  Rare Gram positive cocci.  Rare Gram positive rods.       Tissue Culture Viridans Streptococcus  Moderate growth   (A)    ANAEROBIC CULTURE [[id number]] Collected:  04/29/18 1500    Order Status:  Completed Specimen:  Tissue Updated:  04/30/18 1719     Significant Indicator NEG     Source TISS     Site L4-L5 Epidural Mass     Anaerobic Culture, Culture Res Culture in progress.    FUNGAL CULTURE [[id number]] Collected:  04/29/18 1500    Order Status:  Completed Specimen:  Tissue Updated:  04/30/18 1719     Significant Indicator NEG     Source TISS     Site L4-L5 Epidural Mass     Fungal Culture Culture in progress.    AFB CULTURE [[id number]] Collected:  04/29/18 1500    Order Status:  Completed Specimen:  Tissue Updated:  04/30/18 1719     Significant Indicator NEG     Source TISS     Site L4-L5 Epidural Mass     AFB Culture Culture in progress.     AFB Smear Results No acid fast bacilli seen.    AFB CULTURE [[id number]] Collected:  04/29/18 1446    Order Status:  Completed Specimen:  Wound Updated:  04/30/18 1718     Significant Indicator NEG     Source WND     Site Right Paraspinous Soft Tissue     AFB Culture Culture in progress.  NOTE:  Swabs are not recommended for the isolation of  Mycobacteria, since they provide limited material.  They  are acceptable only if a specimen cannot be collected by  any other means.  Negative results obtained from these  specimens submitted on swabs are not reliable.       AFB Smear Results No acid fast bacilli seen.    CULTURE WOUND W/ GRAM STAIN [[id number]]  (Abnormal) Collected:  04/29/18 1446    Order Status:  Completed Specimen:  Wound Updated:  04/30/18 1718     Significant Indicator POS (POS)     Source WND     Site Right Paraspinous Soft Tissue     Culture Result Wound -- (A)     Gram Stain  "Result Moderate WBCs.  Moderate Gram positive cocci.       Culture Result Wound Viridans Streptococcus  Moderate growth   (A)    ANAEROBIC CULTURE [[id number]] Collected:  04/29/18 1446    Order Status:  Completed Specimen:  Wound Updated:  04/30/18 1718     Significant Indicator NEG     Source WND     Site Right Paraspinous Soft Tissue     Anaerobic Culture, Culture Res Culture in progress.    FUNGAL CULTURE [[id number]] Collected:  04/29/18 1446    Order Status:  Completed Specimen:  Wound Updated:  04/30/18 1718     Significant Indicator NEG     Source WND     Site Right Paraspinous Soft Tissue     Fungal Culture Culture in progress.    BLOOD CULTURE [[id number]] Collected:  04/30/18 0947    Order Status:  Completed Specimen:  Blood from Peripheral Updated:  04/30/18 0957    Narrative:       Per Hospital Policy: Only change Specimen Src: to \"Line\" if  specified by physician order.    BLOOD CULTURE [[id number]] Collected:  04/30/18 0947    Order Status:  Completed Specimen:  Blood from Peripheral Updated:  04/30/18 0957    Narrative:       Per Hospital Policy: Only change Specimen Src: to \"Line\" if  specified by physician order.          Assessment:  Active Hospital Problems    Diagnosis   • *Spinal epidural abscess [G06.1]   • Gram-positive bacteremia [R78.81]   • Apnea [R06.81]   • Hypertension [I10]       Plan:  Gram positive sepsis - ongoing work up  Fever resolved  Leukocytosis resolved  OSH Bcx reportedly with [facility]  Bcx 4/30 - pending  Continue vanco and cefepime for now  Monitor renal function and vanco trough  De-escalate as needed  PICC once Bcx neg x 48 hrs  Obtain OSH blood culture results    Lumbar epidural abscess  S/p s/p lumbar decompressive laminectiomy L4-S1,  bilateral foraminotomies and extradural resection of a mass with microdissection on 4/29.   Epidural pus was noted intraoperatively  OR +viridans strep  Abx per above  Plan for 6 weeks of IV abx    Discussed with internal medicine.  "

## 2018-05-01 NOTE — PROGRESS NOTES
Pt is AAOx4.  Moves all extremities 4/5.  Denies numbness or tingling.  Up with 1 person assist, steady gait.  Pt complains of 5/10 pain.  Oxycodone given PRN.  Pt denies nausea/vomiting.  Tolerating full liquid diet.  Pt voiding without difficulty.  Hemovac in place, compressed.  Surgical incision/dressing CDI.  POC discussed, pt verbalizes understanding.  Bed alarm on.  Call light within reach, bed in lowest position.

## 2018-05-01 NOTE — CARE PLAN
Problem: Safety  Goal: Will remain free from falls  Bed alarm on, treaded socks on, bed locked in low position, hourly rounding, call light within reach, pt verbalizes understanding to call for assistance     Problem: Fluid Volume:  Goal: Will maintain balanced intake and output  IVF infusing, pt voiding adequately

## 2018-05-01 NOTE — PROGRESS NOTES
Neurosurgery Progress Note    Subjective:  Reports improvement in BLE pain, some back/incisional pain.   Drain in with 40ml/shift.   Not ambulatory yet, has refused OOB with PT/nursing.  Positive blood cultures.   VSS- no fevers, chills, nausea, vomiting.   No new increases in pain, numbness, tingling.     Exam:  VSS  A&Ox4, NAD  NM: 5/5 hip flexion, knee extension, knee flexion, plantar flexion, dorsiflexion, EHL  Sensation intact and equal throughout all four extremities.   Abdomen: soft, non-tender  Non-labored breathing on room air, normal respiratory effort  Capillary refill in all four extremities <2 seconds  No LE edema, erythema, cyanosis, clubbing  Calves non-tender to compression bilat  Incision CDI, no halo sign  Drain: 40ml/shift, serosanguinous drainage.         BP  Min: 132/70  Max: 157/86  Pulse  Av.3  Min: 64  Max: 68  Resp  Av  Min: 16  Max: 16  Temp  Av.6 °C (99.7 °F)  Min: 37.2 °C (98.9 °F)  Max: 37.9 °C (100.2 °F)    No Data Recorded    Recent Labs      18   03018   0324   WBC  11.0*  9.2  9.7   RBC  3.92*  3.82*  3.62*   HEMOGLOBIN  13.0*  12.7*  12.0*   HEMATOCRIT  39.5*  39.0*  36.3*   MCV  100.8*  102.1*  100.3*   MCH  33.2*  33.2*  33.1*   MCHC  32.9*  32.6*  33.1*   RDW  47.7  49.0  47.0   PLATELETCT  191  168  174   MPV  9.0  8.5*  8.9*     Recent Labs      18   041   SODIUM  135  136   POTASSIUM  4.0  4.1   CHLORIDE  106  105   CO2  25  25   GLUCOSE  155*  107*   BUN  36*  18   CREATININE  0.93  0.87   CALCIUM  8.0*  8.1*     Recent Labs      18   APTT  30.9   INR  1.10           Intake/Output       18 07 - 18 0659 18 07 - 18 0659       Total  Total       Intake    P.O.  --  480 480  --  -- --    P.O. -- 480 480 -- -- --    I.V.  1200  1780 2980  --  -- --    IV Piggyback Volume (Antibiotics) 100 600 700 -- -- --    IV Volume (NS) 1100 1180  2280 -- -- --    Total Intake 1200 2260 3460 -- -- --       Output    Urine  350  600 950  --  -- --    Number of Times Voided 4 x -- 4 x -- -- --    Urine Void (mL) (non-catheter) 350 600 950 -- -- --    Drains  50  40 90  --  -- --    Output (ml) (Surgical Drain Group Lower;Back Hemovac 1 (A)) 50 40 90 -- -- --    Stool  --  -- --  --  -- --    Number of Times Stooled 0 x -- 0 x -- -- --    Total Output  -- -- --       Net I/O     800 1620 2420 -- -- --            Intake/Output Summary (Last 24 hours) at 05/01/18 0758  Last data filed at 05/01/18 0600   Gross per 24 hour   Intake             3460 ml   Output             1040 ml   Net             2420 ml            • cyclobenzaprine  5 mg TID PRN   • vancomycin  20 mg/kg Q24HR   • omeprazole  20 mg BID   • mag hydrox-al hydrox-simeth  10 mL 4X/DAY PRN   • NS  30 mL/kg Once PRN   • senna-docusate  2 Tab BID    And   • polyethylene glycol/lytes  1 Packet QDAY PRN    And   • magnesium hydroxide  30 mL QDAY PRN    And   • bisacodyl  10 mg QDAY PRN   • Respiratory Care per Protocol   Continuous RT   • NS   Continuous   • NS  1,000 mL Once PRN   • acetaminophen  650 mg Q6HRS PRN   • Pharmacy Consult Request   PRN    And   • oxyCODONE immediate-release  5 mg Q3HRS PRN    And   • oxyCODONE immediate-release  10 mg Q3HRS PRN    And   • morphine injection  4 mg Q3HRS PRN   • ondansetron  4 mg Q4HRS PRN   • ondansetron  4 mg Q4HRS PRN   • MD ALERT... vancomycin   pharmacy to dose   • cefepime  2 g Q12HRS       Assessment:  POD #2 L4-S1 laminectomy, extradural resection of mass  Prophylactic anticoagulation: no, 24h after drain is out.      Plan:  Increase ambulation, work with therapies.   Continue pain control  D/c drain, ok for Hepain in 24h  IS every hour  Positive blood cultures, ID following.    Continue stool softeners to encourage BM.   Ok to d/c to home with HH from neurosurgical standpoint once drain is out and cleared by PT/OT.    Case discussed with  patient, RN, Dr. Herron.

## 2018-05-01 NOTE — DISCHARGE PLANNING
Medical Social Work    Referral:  Rounds    Intervention:  Pt discussed during rounds. Pt is not medically clear.      Plan:  SW will remain available for dc planning

## 2018-05-01 NOTE — PROGRESS NOTES
"Pharmacy Kinetics 73 y.o. male on vancomycin day # 3 2018    Currently on Vancomycin 1500 mg IV q24hr     Indication for Treatment: Epidural abscess     Pertinent history per medical record: Admitted on 2018 for epidural abscess.  Patient was transferred from [facility] for a neurosurgery consult.  They have been experiencing low back pain that has been worsening over the last week.  MRI shows possible epidural abscess.  Positive for fever and chills.  ID has been consulted.     Other antibiotics: Cefepime 2g IV q12h     Allergies: Patient has no known allergies.      List concerns for renal function : age, BUN/SCr > 20:1      Pertinent cultures to date:   18 spinal tissue culture fungal/anaerobic: NGTD  18 blood culture: NGTD  18 epidural mass tissue culture: Strep + Gram pos rods     Recent Labs      18   0309  18   0411  18   0324   WBC  11.0*  9.2  9.7   NEUTSPOLYS  78.80*   --    --      Recent Labs      18   0309  18   0411   BUN  36*  18   CREATININE  0.93  0.87     No results for input(s): VANCOTROUGH, VANCOPEAK, VANCORANDOM in the last 72 hours.  Intake/Output Summary (Last 24 hours) at 18 1139  Last data filed at 18 1100   Gross per 24 hour   Intake             3460 ml   Output             1290 ml   Net             2170 ml      Blood pressure 137/76, pulse 61, temperature 37 °C (98.6 °F), resp. rate 20, height 1.854 m (6' 1\"), weight 88 kg (194 lb 0.1 oz), SpO2 97 %. Temp (24hrs), Av.5 °C (99.5 °F), Min:37 °C (98.6 °F), Max:37.9 °C (100.2 °F)      A/P   1. Vancomycin dose change: continue 1500 mg q24hr  2. Next vancomycin level: tonight @1930   3. Goal trough: 18-22 mcg/mL  4. Comments: Patient has been afebrile and WBC is WNL. Continuing vancomycin per ID recs.No new chem panel to assess, will order BMP for AM labs. Vancomycin trough level scheduled for tonight prior to 4th total dose, rounding pharmacist to follow up result in " BRET Yang, PharmD

## 2018-05-01 NOTE — PROGRESS NOTES
AAOX4, SO   Denies N/T  Up with 1x assist, FWW     Pt complaining of back pain at  3/10   Denies intervention at this time  Hemovac removed per order  Dressing clean, dry and intact  Voiding without difficulty, Bowel protocol in place, +gas  Bed alarm on. Call light within reach. SCDs on

## 2018-05-01 NOTE — PROGRESS NOTES
Pt continues to refuse ambulation or mobilization to chair despite education. Pt reports he is still foggy from the pain medication. Hourly rounding in place

## 2018-05-01 NOTE — THERAPY
"Occupational Therapy Evaluation completed.   Functional Status:  SPV set up grooming seated, CGA toilet transfer and functional mobility, Max A LB dressing  Plan of Care: Will benefit from Occupational Therapy 3 times per week  Discharge Recommendations:  Equipment: Will Continue to Assess for Equipment Needs. Post-acute therapy: See assessment    See \"Rehab Therapy-Acute\" Patient Summary Report for complete documentation.      Pt seen for OT tx on this date, he was limited by back pain and abdominal pain. Pt. Anxious to have bowel movement. Pt demo'd increased activity tolerance by mobilizing further than yesterday, unclear how much retention is occurring w/ new learning. If pt does not progress by the time he is medically cleared by Acute recommend DC to transitional care facility for therapy 5x/wk. Pt continues to have decreased activity tolerance, functional mobility, balance, and knowledge of how to perform BADLs w/ decreased spinal pain indicating a need for Acute OT services.   "

## 2018-05-02 ENCOUNTER — APPOINTMENT (OUTPATIENT)
Dept: RADIOLOGY | Facility: MEDICAL CENTER | Age: 73
DRG: 029 | End: 2018-05-02
Attending: INTERNAL MEDICINE
Payer: MEDICARE

## 2018-05-02 PROBLEM — D64.9 POSTOPERATIVE ANEMIA: Status: ACTIVE | Noted: 2018-05-02

## 2018-05-02 PROBLEM — E87.6 HYPOKALEMIA: Status: ACTIVE | Noted: 2018-05-02

## 2018-05-02 LAB
ANION GAP SERPL CALC-SCNC: 9 MMOL/L (ref 0–11.9)
BACTERIA SPEC ANAEROBE CULT: NORMAL
BACTERIA SPEC ANAEROBE CULT: NORMAL
BUN SERPL-MCNC: 15 MG/DL (ref 8–22)
CALCIUM SERPL-MCNC: 8 MG/DL (ref 8.5–10.5)
CHLORIDE SERPL-SCNC: 105 MMOL/L (ref 96–112)
CO2 SERPL-SCNC: 25 MMOL/L (ref 20–33)
CREAT SERPL-MCNC: 0.64 MG/DL (ref 0.5–1.4)
ERYTHROCYTE [DISTWIDTH] IN BLOOD BY AUTOMATED COUNT: 44.1 FL (ref 35.9–50)
GLUCOSE SERPL-MCNC: 117 MG/DL (ref 65–99)
HCT VFR BLD AUTO: 35 % (ref 42–52)
HGB BLD-MCNC: 12 G/DL (ref 14–18)
MCH RBC QN AUTO: 33.5 PG (ref 27–33)
MCHC RBC AUTO-ENTMCNC: 34.3 G/DL (ref 33.7–35.3)
MCV RBC AUTO: 97.8 FL (ref 81.4–97.8)
PLATELET # BLD AUTO: 205 K/UL (ref 164–446)
PMV BLD AUTO: 8.7 FL (ref 9–12.9)
POTASSIUM SERPL-SCNC: 3.4 MMOL/L (ref 3.6–5.5)
RBC # BLD AUTO: 3.58 M/UL (ref 4.7–6.1)
SIGNIFICANT IND 70042: NORMAL
SIGNIFICANT IND 70042: NORMAL
SITE SITE: NORMAL
SITE SITE: NORMAL
SODIUM SERPL-SCNC: 139 MMOL/L (ref 135–145)
SOURCE SOURCE: NORMAL
SOURCE SOURCE: NORMAL
VANCOMYCIN SERPL-MCNC: 10.5 UG/ML
WBC # BLD AUTO: 8 K/UL (ref 4.8–10.8)

## 2018-05-02 PROCEDURE — 99232 SBSQ HOSP IP/OBS MODERATE 35: CPT | Performed by: HOSPITALIST

## 2018-05-02 PROCEDURE — 700102 HCHG RX REV CODE 250 W/ 637 OVERRIDE(OP): Performed by: INTERNAL MEDICINE

## 2018-05-02 PROCEDURE — 700111 HCHG RX REV CODE 636 W/ 250 OVERRIDE (IP): Performed by: PHYSICIAN ASSISTANT

## 2018-05-02 PROCEDURE — 36569 INSJ PICC 5 YR+ W/O IMAGING: CPT

## 2018-05-02 PROCEDURE — 85027 COMPLETE CBC AUTOMATED: CPT

## 2018-05-02 PROCEDURE — 02HV33Z INSERTION OF INFUSION DEVICE INTO SUPERIOR VENA CAVA, PERCUTANEOUS APPROACH: ICD-10-PCS | Performed by: INTERNAL MEDICINE

## 2018-05-02 PROCEDURE — 700105 HCHG RX REV CODE 258: Performed by: INTERNAL MEDICINE

## 2018-05-02 PROCEDURE — 36415 COLL VENOUS BLD VENIPUNCTURE: CPT

## 2018-05-02 PROCEDURE — A9270 NON-COVERED ITEM OR SERVICE: HCPCS | Performed by: PHYSICIAN ASSISTANT

## 2018-05-02 PROCEDURE — A9270 NON-COVERED ITEM OR SERVICE: HCPCS | Performed by: HOSPITALIST

## 2018-05-02 PROCEDURE — A9270 NON-COVERED ITEM OR SERVICE: HCPCS | Performed by: INTERNAL MEDICINE

## 2018-05-02 PROCEDURE — 700102 HCHG RX REV CODE 250 W/ 637 OVERRIDE(OP): Performed by: PHYSICIAN ASSISTANT

## 2018-05-02 PROCEDURE — 80048 BASIC METABOLIC PNL TOTAL CA: CPT

## 2018-05-02 PROCEDURE — 700111 HCHG RX REV CODE 636 W/ 250 OVERRIDE (IP): Performed by: INTERNAL MEDICINE

## 2018-05-02 PROCEDURE — 700102 HCHG RX REV CODE 250 W/ 637 OVERRIDE(OP): Performed by: HOSPITALIST

## 2018-05-02 PROCEDURE — 80202 ASSAY OF VANCOMYCIN: CPT

## 2018-05-02 PROCEDURE — 770001 HCHG ROOM/CARE - MED/SURG/GYN PRIV*

## 2018-05-02 PROCEDURE — 97530 THERAPEUTIC ACTIVITIES: CPT

## 2018-05-02 PROCEDURE — B548ZZA ULTRASONOGRAPHY OF SUPERIOR VENA CAVA, GUIDANCE: ICD-10-PCS | Performed by: INTERNAL MEDICINE

## 2018-05-02 PROCEDURE — 97116 GAIT TRAINING THERAPY: CPT

## 2018-05-02 RX ORDER — HEPARIN SODIUM 5000 [USP'U]/ML
5000 INJECTION, SOLUTION INTRAVENOUS; SUBCUTANEOUS EVERY 8 HOURS
Status: DISCONTINUED | OUTPATIENT
Start: 2018-05-02 | End: 2018-05-07 | Stop reason: HOSPADM

## 2018-05-02 RX ORDER — BACLOFEN 10 MG/1
5 TABLET ORAL EVERY 6 HOURS PRN
Status: DISCONTINUED | OUTPATIENT
Start: 2018-05-02 | End: 2018-05-03

## 2018-05-02 RX ORDER — POTASSIUM CHLORIDE 20 MEQ/1
40 TABLET, EXTENDED RELEASE ORAL ONCE
Status: COMPLETED | OUTPATIENT
Start: 2018-05-02 | End: 2018-05-02

## 2018-05-02 RX ADMIN — ACETAMINOPHEN 325 MG: 325 TABLET, FILM COATED ORAL at 12:04

## 2018-05-02 RX ADMIN — HYDROCODONE BITARTRATE AND ACETAMINOPHEN 1 TABLET: 10; 325 TABLET ORAL at 23:31

## 2018-05-02 RX ADMIN — POLYETHYLENE GLYCOL 3350 1 PACKET: 17 POWDER, FOR SOLUTION ORAL at 13:25

## 2018-05-02 RX ADMIN — HYDROCODONE BITARTRATE AND ACETAMINOPHEN 1 TABLET: 10; 325 TABLET ORAL at 08:54

## 2018-05-02 RX ADMIN — CEFEPIME 2 G: 2 INJECTION, POWDER, FOR SOLUTION INTRAMUSCULAR; INTRAVENOUS at 08:59

## 2018-05-02 RX ADMIN — HEPARIN SODIUM 5000 UNITS: 5000 INJECTION, SOLUTION INTRAVENOUS; SUBCUTANEOUS at 20:16

## 2018-05-02 RX ADMIN — LOSARTAN POTASSIUM 100 MG: 50 TABLET, FILM COATED ORAL at 08:55

## 2018-05-02 RX ADMIN — HYDROCODONE BITARTRATE AND ACETAMINOPHEN 1 TABLET: 10; 325 TABLET ORAL at 02:34

## 2018-05-02 RX ADMIN — HYDROCODONE BITARTRATE AND ACETAMINOPHEN 1 TABLET: 10; 325 TABLET ORAL at 17:49

## 2018-05-02 RX ADMIN — HEPARIN SODIUM 5000 UNITS: 5000 INJECTION, SOLUTION INTRAVENOUS; SUBCUTANEOUS at 14:06

## 2018-05-02 RX ADMIN — HYDROCODONE BITARTRATE AND ACETAMINOPHEN 0.5 TABLET: 10; 325 TABLET ORAL at 13:25

## 2018-05-02 RX ADMIN — HEPARIN SODIUM 5000 UNITS: 5000 INJECTION, SOLUTION INTRAVENOUS; SUBCUTANEOUS at 08:55

## 2018-05-02 RX ADMIN — CEFTRIAXONE 2 G: 2 INJECTION, POWDER, FOR SOLUTION INTRAMUSCULAR; INTRAVENOUS at 11:54

## 2018-05-02 RX ADMIN — SENNOSIDES AND DOCUSATE SODIUM 2 TABLET: 8.6; 5 TABLET ORAL at 20:15

## 2018-05-02 RX ADMIN — POTASSIUM CHLORIDE 40 MEQ: 1500 TABLET, EXTENDED RELEASE ORAL at 14:10

## 2018-05-02 ASSESSMENT — ENCOUNTER SYMPTOMS
TREMORS: 0
NERVOUS/ANXIOUS: 0
CHILLS: 0
MYALGIAS: 1
FEVER: 1
PALPITATIONS: 0
SHORTNESS OF BREATH: 0
COUGH: 0
BLOOD IN STOOL: 0
BACK PAIN: 1
DIARRHEA: 0
COUGH: 1
LOSS OF CONSCIOUSNESS: 0
NAUSEA: 0
HEMOPTYSIS: 0
EYE REDNESS: 0
ABDOMINAL PAIN: 0
FEVER: 0
FOCAL WEAKNESS: 0
VOMITING: 0
SEIZURES: 0
CONSTIPATION: 1
FALLS: 0
HEADACHES: 0
WEAKNESS: 0
DIZZINESS: 0
WHEEZING: 0
CHILLS: 1
EYE PAIN: 0
INSOMNIA: 0

## 2018-05-02 ASSESSMENT — GAIT ASSESSMENTS
GAIT LEVEL OF ASSIST: STAND BY ASSIST
ASSISTIVE DEVICE: FRONT WHEEL WALKER
DEVIATION: ANTALGIC;DECREASED BASE OF SUPPORT;DECREASED HEEL STRIKE;DECREASED TOE OFF
DISTANCE (FEET): 150

## 2018-05-02 ASSESSMENT — PAIN SCALES - GENERAL
PAINLEVEL_OUTOF10: 2
PAINLEVEL_OUTOF10: 1
PAINLEVEL_OUTOF10: 6
PAINLEVEL_OUTOF10: 4
PAINLEVEL_OUTOF10: 2
PAINLEVEL_OUTOF10: 7
PAINLEVEL_OUTOF10: 2
PAINLEVEL_OUTOF10: 5

## 2018-05-02 ASSESSMENT — COGNITIVE AND FUNCTIONAL STATUS - GENERAL
STANDING UP FROM CHAIR USING ARMS: A LITTLE
CLIMB 3 TO 5 STEPS WITH RAILING: A LOT
SUGGESTED CMS G CODE MODIFIER MOBILITY: CK
MOBILITY SCORE: 17
MOVING TO AND FROM BED TO CHAIR: A LITTLE
WALKING IN HOSPITAL ROOM: A LITTLE
MOVING FROM LYING ON BACK TO SITTING ON SIDE OF FLAT BED: A LITTLE
TURNING FROM BACK TO SIDE WHILE IN FLAT BAD: A LITTLE

## 2018-05-02 NOTE — PROGRESS NOTES
"Received report from night shift RN. Assumed care of patient. Pt assessed and stable. VSS. Patient reports 7/10 pain at this time.  Administered medication for pain.  Discussed plan of care for day with patient and received verbal understanding. Call light within reach, strip alarm active, bed in low position.      Pt refused all bowel medications despite education. Pt states, \"I know my body and I do not want or need these medications. They are crap.\"   "
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
## 2018-05-02 NOTE — PROCEDURES
PICC Insertion Procedure Note    Consents confirmed, vessel patency confirmed with ultrasound, real time ultrasound image printed and placed in patient chart. Risks and benefits of procedure explained to patient/family and education regarding central line associated bloodstream infections provided. Questions answered.     PICC placed in LUE per MD order with ultrasound guidance. 4 Fr, single lumen PICC placed in basilic vein after 1 attempt(s). 3 cc's of 1% lidocaine injected intradermally, 21 gauge microintroducer needle and modified Seldinger technique used. 48 cm total catheter length, 0 cm external measurement inserted with good blood return. Each lumen flushed without resistance with 10 mL 0.9% normal saline. PICC line secured with Biopatch and Tegaderm.    PICC tip location in the SVC confirmed by ECG technology. Pt tolerated procedure well.  Patient condition relayed to unit RN or ordering physician via this post procedure note in the EMR.     M[employer] Power PICC ref # [id number], Lot # [id number]

## 2018-05-02 NOTE — THERAPY
"Pt w/improved balance, gait, and activity tolerance. Pt demonstrated slow movements w/all mobility though steady. Pt limted by pain and appears to be self limiting due to pain. Pt would benefit from further acute PT txs to progress towards goals and independence. Pt should be able to return home w/family support and  services.    Physical Therapy Treatment completed.   Bed Mobility:  Supine to Sit: Supervised  Transfers: Sit to Stand: Supervised  Gait: Level Of Assist: Stand by Assist with Front-Wheel Walker       Plan of Care: Will benefit from Physical Therapy 3 times per week  See \"Rehab Therapy-Acute\" Patient Summary Report for complete documentation.       "

## 2018-05-02 NOTE — PROGRESS NOTES
Infectious Disease Progress Note    Author: Mandy Grewal M.D. Date & Time of service: 2018  10:00 AM    Chief Complaint:  FU epidural abscess    Interval History:   Tmax 100.2, WBC 9.7, ongoing back pain, constipated, OR cx viridans strep, tolerating abx without issues   Tmax 100.6 WBC 8, pain grumpy, remains constipated last BM prior to admission, no change in back pain, will attempt to work with PT today  Labs Reviewed, Medications Reviewed, Radiology Reviewed and Wound Reviewed.    Review of Systems:  Review of Systems   Constitutional: Negative for chills and fever.   Respiratory: Negative for cough and shortness of breath.    Cardiovascular: Negative for chest pain.   Gastrointestinal: Positive for constipation.   Musculoskeletal: Positive for back pain.   All other systems reviewed and are negative.      Hemodynamics:  Temp (24hrs), Av.7 °C (99.8 °F), Min:37.1 °C (98.8 °F), Max:38.1 °C (100.6 °F)  Temperature: 37.6 °C (99.6 °F)  Pulse  Av.4  Min: 60  Max: 87   Blood Pressure : 159/80       Physical Exam:  Physical Exam   Constitutional: He is oriented to person, place, and time. He appears well-developed.   HENT:   Head: Normocephalic and atraumatic.   Poor dentition/dental caries  Missing teeth   Eyes: EOM are normal. Pupils are equal, round, and reactive to light.   Neck: Neck supple.   Cardiovascular: Normal rate and regular rhythm.    Pulmonary/Chest: Effort normal. He has no wheezes. He has no rales.   Abdominal: Soft. There is no tenderness.   Musculoskeletal: He exhibits no edema.   Lumbar surgical site dressed   Neurological: He is alert and oriented to person, place, and time.   Skin: No rash noted.   Psychiatric:   Agitated       Meds:    Current Facility-Administered Medications:   •  heparin  •  HYDROcodone/acetaminophen  •  losartan  •  cyclobenzaprine  •  vancomycin  •  omeprazole  •  mag hydrox-al hydrox-simeth  •  NS  •  senna-docusate **AND** polyethylene glycol/lytes  **AND** magnesium hydroxide **AND** bisacodyl  •  Respiratory Care per Protocol  •  NS  •  NS  •  acetaminophen  •  Notify provider if pain remains uncontrolled **AND** Use the numeric rating scale (NRS-11) on regular floors and Critical-Care Pain Observation Tool (CPOT) on ICUs/Trauma to assess pain **AND** Pulse Ox (Oximetry) **AND** Pharmacy Consult Request **AND** If patient difficult to arouse and/or has respiratory depression, stop any opiates that are currently infusing and call a Rapid Response. **AND** [DISCONTINUED] oxyCODONE immediate-release **AND** oxyCODONE immediate-release **AND** morphine injection  •  ondansetron  •  ondansetron  •  MD ALERT... vancomycin  •  cefepime    Labs:  Recent Labs      04/30/18 0411 05/01/18   0324  05/02/18   0054   WBC  9.2  9.7  8.0   RBC  3.82*  3.62*  3.58*   HEMOGLOBIN  12.7*  12.0*  12.0*   HEMATOCRIT  39.0*  36.3*  35.0*   MCV  102.1*  100.3*  97.8   MCH  33.2*  33.1*  33.5*   RDW  49.0  47.0  44.1   PLATELETCT  168  174  205   MPV  8.5*  8.9*  8.7*     Recent Labs      04/30/18   0411 05/02/18   0054   SODIUM  136  139   POTASSIUM  4.1  3.4*   CHLORIDE  105  105   CO2  25  25   GLUCOSE  107*  117*   BUN  18  15     Recent Labs      04/30/18 0411 05/02/18   0054   CREATININE  0.87  0.64       Imaging:  Dx-chest-2 Views    Result Date: 4/29/2018 4/29/2018 10:11 AM HISTORY/REASON FOR EXAM: Back pain. Shortness of breath. Cough. TECHNIQUE/EXAM DESCRIPTION AND NUMBER OF VIEWS: Two views of the chest. COMPARISON: None. FINDINGS: The lungs are hyperinflated. There is a large hiatal hernia. The heart is mildly enlarged. There is no evidence of pleural effusion. Soft tissues and bony structures are unremarkable.     1.  Mild cardiomegaly. 2.  Hiatal hernia.    Dx-spine-any One View    Result Date: 4/29/2018 4/29/2018 2:45 PM HISTORY/REASON FOR EXAM:  Back pain. TECHNIQUE/EXAM DESCRIPTION AND NUMBER OF VIEWS:  Single view of the spine. COMPARISON: None. FINDINGS:  "Intraoperative fluoroscopy spot images were obtained by the neurosurgeon. Single lateral view demonstrates a surgical probe directed at the L5-S1 level. The performing clinician used a total of 4 seconds fluoroscopy time.     Intraoperative fluoroscopy spot images as described above.    Dx-portable Fluoro > 1 Hour    Result Date: 4/30/2018 4/29/2018 2:45 PM HISTORY/REASON FOR EXAM:  Lumbar pain, laminectomy TECHNIQUE/EXAM DESCRIPTION AND NUMBER OF VIEWS: Portable fluoroscopy for greater than one hour FINDINGS:      The portable fluoroscopy unit was obligated to the procedure for greater than one hour.   Actual fluoro time was 4 seconds.     Portable fluoroscopy utilized for 4 seconds.       Micro:  Results     Procedure Component Value Units Date/Time    BLOOD CULTURE [[id number]] Collected:  04/30/18 0947    Order Status:  Completed Specimen:  Blood from Peripheral Updated:  05/01/18 0948     Significant Indicator NEG     Source BLD     Site PERIPHERAL     Blood Culture No Growth    Note: Blood cultures are incubated for 5 days and  are monitored continuously.Positive blood cultures  are called to the RN and reported as soon as  they are identified.      Narrative:       Per Hospital Policy: Only change Specimen Src: to \"Line\" if  specified by physician order.    BLOOD CULTURE [[id number]] Collected:  04/30/18 0947    Order Status:  Completed Specimen:  Blood from Peripheral Updated:  05/01/18 0948     Significant Indicator NEG     Source BLD     Site PERIPHERAL     Blood Culture No Growth    Note: Blood cultures are incubated for 5 days and  are monitored continuously.Positive blood cultures  are called to the RN and reported as soon as  they are identified.      Narrative:       Per Hospital Policy: Only change Specimen Src: to \"Line\" if  specified by physician order.    CULTURE TISSUE W/ GRM STAIN [[id number]]  (Abnormal) Collected:  04/29/18 1500    Order Status:  Completed Specimen:  Tissue Updated:  05/01/18 " 0921     Significant Indicator POS (POS)     Source TISS     Site L4-L5 Epidural Mass     Tissue Culture -- (A)     Gram Stain Result Moderate WBCs.  Rare Gram positive cocci.  Rare Gram positive rods.   (A)     Tissue Culture Viridans Streptococcus  Moderate growth   (A)    ANAEROBIC CULTURE [[id number]] Collected:  04/29/18 1500    Order Status:  Completed Specimen:  Tissue Updated:  05/01/18 0921     Significant Indicator NEG     Source TISS     Site L4-L5 Epidural Mass     Anaerobic Culture, Culture Res Culture in progress.    FUNGAL CULTURE [[id number]] Collected:  04/29/18 1500    Order Status:  Completed Specimen:  Tissue Updated:  05/01/18 0921     Significant Indicator NEG     Source TISS     Site L4-L5 Epidural Mass     Fungal Culture Culture in progress.    AFB CULTURE [[id number]] Collected:  04/29/18 1500    Order Status:  Completed Specimen:  Tissue Updated:  05/01/18 0921     Significant Indicator NEG     Source TISS     Site L4-L5 Epidural Mass     AFB Culture Culture in progress.     AFB Smear Results No acid fast bacilli seen.    ANAEROBIC CULTURE [[id number]] Collected:  04/29/18 1446    Order Status:  Completed Specimen:  Wound Updated:  05/01/18 0920     Significant Indicator NEG     Source WND     Site Right Paraspinous Soft Tissue     Anaerobic Culture, Culture Res Culture in progress.    FUNGAL CULTURE [[id number]] Collected:  04/29/18 1446    Order Status:  Completed Specimen:  Wound Updated:  05/01/18 0920     Significant Indicator NEG     Source WND     Site Right Paraspinous Soft Tissue     Fungal Culture Culture in progress.    AFB CULTURE [[id number]] Collected:  04/29/18 1446    Order Status:  Completed Specimen:  Wound Updated:  05/01/18 0920     Significant Indicator NEG     Source WND     Site Right Paraspinous Soft Tissue     AFB Culture Culture in progress.  NOTE:  Swabs are not recommended for the isolation of  Mycobacteria, since they provide limited material.  They  are acceptable  only if a specimen cannot be collected by  any other means.  Negative results obtained from these  specimens submitted on swabs are not reliable.       AFB Smear Results No acid fast bacilli seen.    CULTURE WOUND W/ GRAM STAIN [[id number]]  (Abnormal) Collected:  04/29/18 1446    Order Status:  Completed Specimen:  Wound Updated:  05/01/18 0920     Significant Indicator POS (POS)     Source WND     Site Right Paraspinous Soft Tissue     Culture Result Wound -- (A)     Gram Stain Result Moderate WBCs.  Moderate Gram positive cocci.       Culture Result Wound Viridans Streptococcus  Moderate growth   (A)    ACID FAST STAIN [[id number]] Collected:  04/29/18 1446    Order Status:  Completed Specimen:  Wound Updated:  04/30/18 1719     Significant Indicator NEG     Source WND     Site Right Paraspinous Soft Tissue     AFB Smear Results No acid fast bacilli seen.    GRAM STAIN [[id number]] Collected:  04/29/18 1446    Order Status:  Completed Specimen:  Wound Updated:  04/30/18 1719     Significant Indicator .     Source WND     Site Right Paraspinous Soft Tissue     Gram Stain Result Moderate WBCs.  Moderate Gram positive cocci.      GRAM STAIN [[id number]] Collected:  04/29/18 1500    Order Status:  Completed Specimen:  Tissue Updated:  04/30/18 1719     Significant Indicator .     Source TISS     Site L4-L5 Epidural Mass     Gram Stain Result Moderate WBCs.  Rare Gram positive cocci.  Rare Gram positive rods.      ACID FAST STAIN [[id number]] Collected:  04/29/18 1500    Order Status:  Completed Specimen:  Tissue Updated:  04/30/18 1719     Significant Indicator NEG     Source TISS     Site L4-L5 Epidural Mass     AFB Smear Results No acid fast bacilli seen.          Assessment:  Active Hospital Problems    Diagnosis   • *Spinal epidural abscess [G06.1]   • Gram-positive bacteremia [R78.81]   • Apnea [R06.81]   • Hypertension [I10]       Plan:  Gram positive sepsis  Fever resolved  Leukocytosis resolved  OSH Bcx reportedly  with [facility]  Bcx 4/30 - NGTD  DC vanco and cefepime   Transition to ceftriaxone  Obtain OSH blood culture results  PICC ordered    Lumbar epidural abscess  S/p s/p lumbar decompressive laminectiomy L4-S1,  bilateral foraminotomies and extradural resection of a mass with microdissection on 4/29.   Epidural pus was noted intraoperatively  OR +viridans strep  Abx per above  Plan for 6 weeks of IV abx  Stop date 06/11/18    PT/OT evaluation  Recommend SNF    Discussed with internal medicine RN.

## 2018-05-02 NOTE — CARE PLAN
Problem: Safety  Goal: Will remain free from injury  Outcome: PROGRESSING AS EXPECTED  Pt up in chair with alarm on. Pt knows to call before getting up.    Problem: Venous Thromboembolism (VTW)/Deep Vein Thrombosis (DVT) Prevention:  Goal: Patient will participate in Venous Thrombosis (VTE)/Deep Vein Thrombosis (DVT)Prevention Measures  Outcome: PROGRESSING AS EXPECTED  Pt ambulated with PT today, is up in chair, received heparin. Refused scds.

## 2018-05-02 NOTE — DISCHARGE PLANNING
Medical Social Work    Referral:  Rounds    Intervention:  Pt discussed during rounds. It is anticipated that pt will be medically clear 5/3. SNF vs resuming HH/OP Infusion at this time.    Plan:  SW will remain available for dc planning

## 2018-05-02 NOTE — PROGRESS NOTES
Neurosurgery Progress Note    Subjective:  Reports improvement in BLE pain, some back/incisional pain.   Drain out  Not ambulatory yet, has refused OOB with PT/nursing.  No BM yet, voiding.   Seems depressed per nursing, SW now involved  Positive blood cultures. Plan for 6 weeks of IV abx per ID.  VSS- no fevers, chills, nausea, vomiting.   No new increases in pain, numbness, tingling.     Exam:  VSS  A&Ox4, NAD  NM: 5/5 hip flexion, knee extension, knee flexion, plantar flexion, dorsiflexion, EHL  Sensation intact and equal throughout all four extremities.   Abdomen: soft, non-tender  Non-labored breathing on room air, normal respiratory effort  Capillary refill in all four extremities <2 seconds  No LE edema, erythema, cyanosis, clubbing  Calves non-tender to compression bilat  Incision CDI, no halo sign      BP  Min: 133/75  Max: 161/77  Pulse  Av.7  Min: 65  Max: 73  Resp  Av.3  Min: 16  Max: 20  Temp  Av.7 °C (99.8 °F)  Min: 37.1 °C (98.8 °F)  Max: 38.1 °C (100.6 °F)  SpO2  Av.7 %  Min: 90 %  Max: 91 %    No Data Recorded    Recent Labs      184  18   0054   WBC  9.2  9.7  8.0   RBC  3.82*  3.62*  3.58*   HEMOGLOBIN  12.7*  12.0*  12.0*   HEMATOCRIT  39.0*  36.3*  35.0*   MCV  102.1*  100.3*  97.8   MCH  33.2*  33.1*  33.5*   MCHC  32.6*  33.1*  34.3   RDW  49.0  47.0  44.1   PLATELETCT  168  174  205   MPV  8.5*  8.9*  8.7*     Recent Labs      18   0054   SODIUM  136  139   POTASSIUM  4.1  3.4*   CHLORIDE  105  105   CO2  25  25   GLUCOSE  107*  117*   BUN  18  15   CREATININE  0.87  0.64   CALCIUM  8.1*  8.0*               Intake/Output       18 07 - 18 0659 18 - 18 0659       Total  Total       Intake    P.O.  --  350 350  --  -- --    P.O. -- 350 350 -- -- --    I.V.  250  600 850  --  -- --    IV Piggyback Volume (Antibiotics) 200 100 300 -- -- --    vanco --  500 500 -- -- --    IV Volume (NS) 50 -- 50 -- -- --    Total Intake  -- -- --       Output    Urine  250  -- 250  --  -- --    Number of Times Voided 1 x -- 1 x -- -- --    Urine Void (mL) (non-catheter) 250 -- 250 -- -- --    Stool  --  -- --  --  -- --    Number of Times Stooled 0 x -- 0 x -- -- --    Total Output 250 -- 250 -- -- --       Net I/O     0 950 950 -- -- --            Intake/Output Summary (Last 24 hours) at 05/02/18 0807  Last data filed at 05/02/18 0600   Gross per 24 hour   Intake             1200 ml   Output              250 ml   Net              950 ml            • HYDROcodone/acetaminophen  1-2 Tab Q4HRS PRN   • losartan  100 mg Q DAY   • cyclobenzaprine  5 mg TID PRN   • vancomycin  20 mg/kg Q24HR   • omeprazole  20 mg BID   • mag hydrox-al hydrox-simeth  10 mL 4X/DAY PRN   • NS  30 mL/kg Once PRN   • senna-docusate  2 Tab BID    And   • polyethylene glycol/lytes  1 Packet QDAY PRN    And   • magnesium hydroxide  30 mL QDAY PRN    And   • bisacodyl  10 mg QDAY PRN   • Respiratory Care per Protocol   Continuous RT   • NS   Continuous   • NS  1,000 mL Once PRN   • acetaminophen  650 mg Q6HRS PRN   • Pharmacy Consult Request   PRN    And   • oxyCODONE immediate-release  10 mg Q3HRS PRN    And   • morphine injection  4 mg Q3HRS PRN   • ondansetron  4 mg Q4HRS PRN   • ondansetron  4 mg Q4HRS PRN   • MD ALERT... vancomycin   pharmacy to dose   • cefepime  2 g Q12HRS       Assessment:  POD #3 L4-S1 laminectomy, extradural resection of mass  Prophylactic anticoagulation: yes, ok for heparin.      Plan:  Increase ambulation, work with therapies. Very important that he work with PT/OT.    Continue pain control  Ok for heparin  IS every hour  Positive blood cultures, ID following. Plan for 6 weeks of IV abx  Continue stool softeners to encourage BM. Recommend suppository this am.  Ok to d/c to home with HH from neurosurgical standpoint once cleared by PT/OT, primary team.    Case discussed  with patient, RN, Dr. Herron.

## 2018-05-03 PROBLEM — R06.6 HICCUPS: Status: ACTIVE | Noted: 2018-05-03

## 2018-05-03 LAB
ANION GAP SERPL CALC-SCNC: 7 MMOL/L (ref 0–11.9)
BUN SERPL-MCNC: 15 MG/DL (ref 8–22)
CALCIUM SERPL-MCNC: 7.8 MG/DL (ref 8.5–10.5)
CHLORIDE SERPL-SCNC: 105 MMOL/L (ref 96–112)
CO2 SERPL-SCNC: 26 MMOL/L (ref 20–33)
CREAT SERPL-MCNC: 0.58 MG/DL (ref 0.5–1.4)
ERYTHROCYTE [DISTWIDTH] IN BLOOD BY AUTOMATED COUNT: 42.2 FL (ref 35.9–50)
GLUCOSE SERPL-MCNC: 106 MG/DL (ref 65–99)
HCT VFR BLD AUTO: 31.7 % (ref 42–52)
HGB BLD-MCNC: 11.2 G/DL (ref 14–18)
MAGNESIUM SERPL-MCNC: 1.8 MG/DL (ref 1.5–2.5)
MCH RBC QN AUTO: 33.7 PG (ref 27–33)
MCHC RBC AUTO-ENTMCNC: 35.3 G/DL (ref 33.7–35.3)
MCV RBC AUTO: 95.5 FL (ref 81.4–97.8)
PHOSPHATE SERPL-MCNC: 2.2 MG/DL (ref 2.5–4.5)
PLATELET # BLD AUTO: 194 K/UL (ref 164–446)
PMV BLD AUTO: 8.8 FL (ref 9–12.9)
POTASSIUM SERPL-SCNC: 3.5 MMOL/L (ref 3.6–5.5)
RBC # BLD AUTO: 3.32 M/UL (ref 4.7–6.1)
SODIUM SERPL-SCNC: 138 MMOL/L (ref 135–145)
WBC # BLD AUTO: 6.2 K/UL (ref 4.8–10.8)

## 2018-05-03 PROCEDURE — 700102 HCHG RX REV CODE 250 W/ 637 OVERRIDE(OP): Performed by: INTERNAL MEDICINE

## 2018-05-03 PROCEDURE — 700102 HCHG RX REV CODE 250 W/ 637 OVERRIDE(OP): Performed by: PHYSICIAN ASSISTANT

## 2018-05-03 PROCEDURE — 80048 BASIC METABOLIC PNL TOTAL CA: CPT

## 2018-05-03 PROCEDURE — A9270 NON-COVERED ITEM OR SERVICE: HCPCS | Performed by: INTERNAL MEDICINE

## 2018-05-03 PROCEDURE — 99232 SBSQ HOSP IP/OBS MODERATE 35: CPT | Performed by: FAMILY MEDICINE

## 2018-05-03 PROCEDURE — 85027 COMPLETE CBC AUTOMATED: CPT

## 2018-05-03 PROCEDURE — 700102 HCHG RX REV CODE 250 W/ 637 OVERRIDE(OP): Mod: JG | Performed by: FAMILY MEDICINE

## 2018-05-03 PROCEDURE — 97535 SELF CARE MNGMENT TRAINING: CPT

## 2018-05-03 PROCEDURE — A9270 NON-COVERED ITEM OR SERVICE: HCPCS | Performed by: PHYSICIAN ASSISTANT

## 2018-05-03 PROCEDURE — 700102 HCHG RX REV CODE 250 W/ 637 OVERRIDE(OP): Mod: JG | Performed by: HOSPITALIST

## 2018-05-03 PROCEDURE — 83735 ASSAY OF MAGNESIUM: CPT

## 2018-05-03 PROCEDURE — A9270 NON-COVERED ITEM OR SERVICE: HCPCS | Mod: JG | Performed by: FAMILY MEDICINE

## 2018-05-03 PROCEDURE — 770001 HCHG ROOM/CARE - MED/SURG/GYN PRIV*

## 2018-05-03 PROCEDURE — 84100 ASSAY OF PHOSPHORUS: CPT

## 2018-05-03 PROCEDURE — 700111 HCHG RX REV CODE 636 W/ 250 OVERRIDE (IP): Performed by: PHYSICIAN ASSISTANT

## 2018-05-03 PROCEDURE — 700111 HCHG RX REV CODE 636 W/ 250 OVERRIDE (IP): Performed by: INTERNAL MEDICINE

## 2018-05-03 PROCEDURE — A9270 NON-COVERED ITEM OR SERVICE: HCPCS | Mod: JG | Performed by: HOSPITALIST

## 2018-05-03 RX ORDER — LORAZEPAM 1 MG/1
0.5 TABLET ORAL ONCE
Status: COMPLETED | OUTPATIENT
Start: 2018-05-03 | End: 2018-05-03

## 2018-05-03 RX ORDER — BACLOFEN 10 MG/1
5 TABLET ORAL 3 TIMES DAILY
Status: DISCONTINUED | OUTPATIENT
Start: 2018-05-03 | End: 2018-05-03

## 2018-05-03 RX ORDER — BACLOFEN 10 MG/1
10 TABLET ORAL 3 TIMES DAILY
Status: DISCONTINUED | OUTPATIENT
Start: 2018-05-03 | End: 2018-05-07 | Stop reason: HOSPADM

## 2018-05-03 RX ADMIN — HYDROCODONE BITARTRATE AND ACETAMINOPHEN 0.5 TABLET: 10; 325 TABLET ORAL at 07:37

## 2018-05-03 RX ADMIN — HEPARIN SODIUM 5000 UNITS: 5000 INJECTION, SOLUTION INTRAVENOUS; SUBCUTANEOUS at 21:55

## 2018-05-03 RX ADMIN — BACLOFEN 10 MG: 10 TABLET ORAL at 21:55

## 2018-05-03 RX ADMIN — SENNOSIDES AND DOCUSATE SODIUM 2 TABLET: 8.6; 5 TABLET ORAL at 21:55

## 2018-05-03 RX ADMIN — HEPARIN SODIUM 5000 UNITS: 5000 INJECTION, SOLUTION INTRAVENOUS; SUBCUTANEOUS at 13:55

## 2018-05-03 RX ADMIN — BACLOFEN 5 MG: 10 TABLET ORAL at 07:32

## 2018-05-03 RX ADMIN — LORAZEPAM 0.5 MG: 1 TABLET ORAL at 12:13

## 2018-05-03 RX ADMIN — BACLOFEN 10 MG: 10 TABLET ORAL at 13:55

## 2018-05-03 RX ADMIN — CEFTRIAXONE 2 G: 2 INJECTION, POWDER, FOR SOLUTION INTRAMUSCULAR; INTRAVENOUS at 11:38

## 2018-05-03 RX ADMIN — OMEPRAZOLE 20 MG: 20 CAPSULE, DELAYED RELEASE ORAL at 21:55

## 2018-05-03 RX ADMIN — HYDROCODONE BITARTRATE AND ACETAMINOPHEN 1 TABLET: 10; 325 TABLET ORAL at 13:55

## 2018-05-03 RX ADMIN — LOSARTAN POTASSIUM 100 MG: 50 TABLET, FILM COATED ORAL at 07:31

## 2018-05-03 RX ADMIN — POLYETHYLENE GLYCOL 3350 1 PACKET: 17 POWDER, FOR SOLUTION ORAL at 17:57

## 2018-05-03 ASSESSMENT — ENCOUNTER SYMPTOMS
HEMOPTYSIS: 0
DIAPHORESIS: 0
NAUSEA: 0
CHILLS: 0
DOUBLE VISION: 0
BACK PAIN: 1
CONSTIPATION: 1
PALPITATIONS: 0
COUGH: 0
VOMITING: 0
ORTHOPNEA: 0
NECK PAIN: 0
HEADACHES: 0
DIZZINESS: 0
BLURRED VISION: 0
PHOTOPHOBIA: 0
TINGLING: 0
FEVER: 0
SPUTUM PRODUCTION: 0
HEARTBURN: 0
SHORTNESS OF BREATH: 0

## 2018-05-03 ASSESSMENT — COGNITIVE AND FUNCTIONAL STATUS - GENERAL
SUGGESTED CMS G CODE MODIFIER DAILY ACTIVITY: CK
DAILY ACTIVITIY SCORE: 17
DRESSING REGULAR UPPER BODY CLOTHING: A LITTLE
PERSONAL GROOMING: A LITTLE
DRESSING REGULAR LOWER BODY CLOTHING: A LOT
HELP NEEDED FOR BATHING: A LOT
TOILETING: A LITTLE

## 2018-05-03 ASSESSMENT — PATIENT HEALTH QUESTIONNAIRE - PHQ9
1. LITTLE INTEREST OR PLEASURE IN DOING THINGS: NOT AT ALL
2. FEELING DOWN, DEPRESSED, IRRITABLE, OR HOPELESS: NOT AT ALL
SUM OF ALL RESPONSES TO PHQ9 QUESTIONS 1 AND 2: 0

## 2018-05-03 ASSESSMENT — PAIN SCALES - GENERAL
PAINLEVEL_OUTOF10: 5
PAINLEVEL_OUTOF10: 1
PAINLEVEL_OUTOF10: 1

## 2018-05-03 NOTE — DISCHARGE PLANNING
Medical Social Work    Referral:  Rounds    Intervention:  Pt discussed during rounds.  Pt is not medically clear.  SNF referrals have been sent out to all area facilities.    Plan:  SW will remain available for dc planning

## 2018-05-03 NOTE — DISCHARGE PLANNING
Medical Social Worker    DIANA obtained SNF CHOICE for all [facility]. DIANA faxed CHOICE to [facility].     DIANA completed DC IMM with pt and placed IMM on pt's chart.

## 2018-05-03 NOTE — CARE PLAN
Problem: Venous Thromboembolism (VTW)/Deep Vein Thrombosis (DVT) Prevention:  Goal: Patient will participate in Venous Thrombosis (VTE)/Deep Vein Thrombosis (DVT)Prevention Measures  Outcome: PROGRESSING AS EXPECTED  Pt ambulating some and receiving heparin.     Problem: Psychosocial Needs:  Goal: Level of anxiety will decrease  Outcome: PROGRESSING AS EXPECTED  Administered ativan for anxiety. Taught breathing exercises for relaxation.

## 2018-05-03 NOTE — CARE PLAN
Problem: Bowel/Gastric:  Goal: Will not experience complications related to bowel motility  Outcome: PROGRESSING SLOWER THAN EXPECTED  Pt refusing further intervention. Pt educated on methods to help with BM.    Problem: Pain Management  Goal: Pain level will decrease to patient's comfort goal  Outcome: PROGRESSING AS EXPECTED  Pt educated to call for pain meds before pain exceeds comfort goal.

## 2018-05-03 NOTE — PROGRESS NOTES
Infectious Disease Progress Note    Author: Mandy Grewal M.D. Date & Time of service: 5/3/2018  11:33 AM    Chief Complaint:  FU epidural abscess    Interval History:   Tmax 100.2, WBC 9.7, ongoing back pain, constipated, OR cx viridans strep, tolerating abx without issues   Tmax 100.6 WBC 8, pain grumpy, remains constipated last BM prior to admission, no change in back pain, will attempt to work with PT today  5/3 AF WBC 6.2, continuous hiccups, ambulated with PT yesterday, back pain stable  Labs Reviewed, Medications Reviewed, Radiology Reviewed and Wound Reviewed.    Review of Systems:  Review of Systems   Constitutional: Negative for chills and fever.   Respiratory: Negative for cough and shortness of breath.         Hiccups   Cardiovascular: Negative for chest pain.   Gastrointestinal: Positive for constipation.   Musculoskeletal: Positive for back pain.   All other systems reviewed and are negative.      Hemodynamics:  Temp (24hrs), Av.1 °C (98.7 °F), Min:36.6 °C (97.8 °F), Max:37.3 °C (99.1 °F)  Temperature: 37.3 °C (99.1 °F)  Pulse  Av.9  Min: 58  Max: 87   Blood Pressure : (!) 178/75       Physical Exam:  Physical Exam   Constitutional: He is oriented to person, place, and time. He appears well-developed.   HENT:   Head: Normocephalic and atraumatic.   Poor dentition/dental caries  Missing teeth   Eyes: EOM are normal. Pupils are equal, round, and reactive to light.   Neck: Neck supple.   Cardiovascular: Normal rate and regular rhythm.    Pulmonary/Chest: Effort normal. He has no wheezes. He has no rales.   Abdominal: Soft. There is no tenderness.   Musculoskeletal: He exhibits no edema.   Lumbar surgical site with staples in place. Well approximated. No drainage or surrounding erythema    LUE PICC   Neurological: He is alert and oriented to person, place, and time.   Skin: No rash noted.   Psychiatric:   Agitated       Meds:    Current Facility-Administered Medications:   •   baclofen  •  heparin  •  cefTRIAXone (ROCEPHIN) IV  •  PICC Line Insertion has been implemented **AND** May use Lidocaine 1% not to exceed 3 mls for local at insertion site **AND** NOTIFY MD **AND** Tip to dwell in the superior vena cava **AND** Do not use PICC Line until placement verified by Chest X Ray **AND** [CANCELED] DX-CHEST-FOR PICC LINE Perform procedure in: Other(comment f6 below): **AND** If radiologist reading of chest X-ray states any of the following the PICC should be used **AND** Further evaluation of the PICC placement can be retrieved from X-Ray and Imaging **AND** Blood draws through PICC line; draws by RN only **AND** FLUSHING GUIDELINES WHEN IN USE **AND** normal saline PF **AND** FLUSHING GUIDELINES WHEN NOT IN USE **AND** DRESSING MAINTENANCE **AND** Change needleless pressure ports and IV tubing every 72 hours per hospital policy **AND** TUBING **AND** If there is an MD order to remove the PICC line, any RN may remove the PICC line **AND** [] PATIENT EDUCATION MATERIALS **AND** NURSING COMMUNICATION  •  HYDROcodone/acetaminophen  •  losartan  •  cyclobenzaprine  •  omeprazole  •  mag hydrox-al hydrox-simeth  •  NS  •  senna-docusate **AND** polyethylene glycol/lytes **AND** magnesium hydroxide **AND** bisacodyl  •  Respiratory Care per Protocol  •  NS  •  NS  •  acetaminophen  •  Notify provider if pain remains uncontrolled **AND** Use the numeric rating scale (NRS-11) on regular floors and Critical-Care Pain Observation Tool (CPOT) on ICUs/Trauma to assess pain **AND** Pulse Ox (Oximetry) **AND** Pharmacy Consult Request **AND** If patient difficult to arouse and/or has respiratory depression, stop any opiates that are currently infusing and call a Rapid Response. **AND** [DISCONTINUED] oxyCODONE immediate-release **AND** oxyCODONE immediate-release **AND** morphine injection  •  ondansetron  •  ondansetron    Labs:  Recent Labs      18   0324  18   0054  18   0411    WBC  9.7  8.0  6.2   RBC  3.62*  3.58*  3.32*   HEMOGLOBIN  12.0*  12.0*  11.2*   HEMATOCRIT  36.3*  35.0*  31.7*   MCV  100.3*  97.8  95.5   MCH  33.1*  33.5*  33.7*   RDW  47.0  44.1  42.2   PLATELETCT  174  205  194   MPV  8.9*  8.7*  8.8*     Recent Labs      05/02/18   0054  05/03/18   0411   SODIUM  139  138   POTASSIUM  3.4*  3.5*   CHLORIDE  105  105   CO2  25  26   GLUCOSE  117*  106*   BUN  15  15     Recent Labs      05/02/18 0054  05/03/18   0411   CREATININE  0.64  0.58       Imaging:  Dx-chest-2 Views    Result Date: 4/29/2018 4/29/2018 10:11 AM HISTORY/REASON FOR EXAM: Back pain. Shortness of breath. Cough. TECHNIQUE/EXAM DESCRIPTION AND NUMBER OF VIEWS: Two views of the chest. COMPARISON: None. FINDINGS: The lungs are hyperinflated. There is a large hiatal hernia. The heart is mildly enlarged. There is no evidence of pleural effusion. Soft tissues and bony structures are unremarkable.     1.  Mild cardiomegaly. 2.  Hiatal hernia.    Dx-spine-any One View    Result Date: 4/29/2018 4/29/2018 2:45 PM HISTORY/REASON FOR EXAM:  Back pain. TECHNIQUE/EXAM DESCRIPTION AND NUMBER OF VIEWS:  Single view of the spine. COMPARISON: None. FINDINGS: Intraoperative fluoroscopy spot images were obtained by the neurosurgeon. Single lateral view demonstrates a surgical probe directed at the L5-S1 level. The performing clinician used a total of 4 seconds fluoroscopy time.     Intraoperative fluoroscopy spot images as described above.    Dx-portable Fluoro > 1 Hour    Result Date: 4/30/2018 4/29/2018 2:45 PM HISTORY/REASON FOR EXAM:  Lumbar pain, laminectomy TECHNIQUE/EXAM DESCRIPTION AND NUMBER OF VIEWS: Portable fluoroscopy for greater than one hour FINDINGS:      The portable fluoroscopy unit was obligated to the procedure for greater than one hour.   Actual fluoro time was 4 seconds.     Portable fluoroscopy utilized for 4 seconds.       Micro:  Results     Procedure Component Value Units Date/Time    CULTURE  TISSUE W/ GRM STAIN [[id number]]  (Abnormal) Collected:  04/29/18 1500    Order Status:  Completed Specimen:  Tissue Updated:  05/02/18 1055     Significant Indicator POS (POS)     Source TISS     Site L4-L5 Epidural Mass     Tissue Culture -- (A)     Gram Stain Result Moderate WBCs.  Rare Gram positive cocci.  Rare Gram positive rods.   (A)     Tissue Culture Viridans Streptococcus  Moderate growth   (A)    ANAEROBIC CULTURE [[id number]] Collected:  04/29/18 1500    Order Status:  Completed Specimen:  Tissue Updated:  05/02/18 1055     Significant Indicator NEG     Source TISS     Site L4-L5 Epidural Mass     Anaerobic Culture, Culture Res No Anaerobes isolated.    FUNGAL CULTURE [[id number]] Collected:  04/29/18 1500    Order Status:  Completed Specimen:  Tissue Updated:  05/02/18 1055     Significant Indicator NEG     Source TISS     Site L4-L5 Epidural Mass     Fungal Culture Culture in progress.    AFB CULTURE [[id number]] Collected:  04/29/18 1500    Order Status:  Completed Specimen:  Tissue Updated:  05/02/18 1055     Significant Indicator NEG     Source TISS     Site L4-L5 Epidural Mass     AFB Culture Culture in progress.     AFB Smear Results No acid fast bacilli seen.    CULTURE WOUND W/ GRAM STAIN [[id number]]  (Abnormal) Collected:  04/29/18 1446    Order Status:  Completed Specimen:  Wound Updated:  05/02/18 1055     Significant Indicator POS (POS)     Source WND     Site Right Paraspinous Soft Tissue     Culture Result Wound -- (A)     Gram Stain Result Moderate WBCs.  Moderate Gram positive cocci.       Culture Result Wound Viridans Streptococcus  Moderate growth   (A)    ANAEROBIC CULTURE [[id number]] Collected:  04/29/18 1446    Order Status:  Completed Specimen:  Wound Updated:  05/02/18 1055     Significant Indicator NEG     Source WND     Site Right Paraspinous Soft Tissue     Anaerobic Culture, Culture Res No Anaerobes isolated.    FUNGAL CULTURE [[id number]] Collected:  04/29/18 1446    Order  "Status:  Completed Specimen:  Wound Updated:  05/02/18 1055     Significant Indicator NEG     Source WND     Site Right Paraspinous Soft Tissue     Fungal Culture Culture in progress.    AFB CULTURE [[id number]] Collected:  04/29/18 1446    Order Status:  Completed Specimen:  Wound Updated:  05/02/18 1055     Significant Indicator NEG     Source WND     Site Right Paraspinous Soft Tissue     AFB Culture Culture in progress.  NOTE:  Swabs are not recommended for the isolation of  Mycobacteria, since they provide limited material.  They  are acceptable only if a specimen cannot be collected by  any other means.  Negative results obtained from these  specimens submitted on swabs are not reliable.       AFB Smear Results No acid fast bacilli seen.    BLOOD CULTURE [[id number]] Collected:  04/30/18 0947    Order Status:  Completed Specimen:  Blood from Peripheral Updated:  05/01/18 0948     Significant Indicator NEG     Source BLD     Site PERIPHERAL     Blood Culture No Growth    Note: Blood cultures are incubated for 5 days and  are monitored continuously.Positive blood cultures  are called to the RN and reported as soon as  they are identified.      Narrative:       Per Hospital Policy: Only change Specimen Src: to \"Line\" if  specified by physician order.    BLOOD CULTURE [[id number]] Collected:  04/30/18 0947    Order Status:  Completed Specimen:  Blood from Peripheral Updated:  05/01/18 0948     Significant Indicator NEG     Source BLD     Site PERIPHERAL     Blood Culture No Growth    Note: Blood cultures are incubated for 5 days and  are monitored continuously.Positive blood cultures  are called to the RN and reported as soon as  they are identified.      Narrative:       Per Hospital Policy: Only change Specimen Src: to \"Line\" if  specified by physician order.    ACID FAST STAIN [[id number]] Collected:  04/29/18 1446    Order Status:  Completed Specimen:  Wound Updated:  04/30/18 1719     Significant Indicator NEG    "  Source WND     Site Right Paraspinous Soft Tissue     AFB Smear Results No acid fast bacilli seen.    GRAM STAIN [[id number]] Collected:  04/29/18 1446    Order Status:  Completed Specimen:  Wound Updated:  04/30/18 1719     Significant Indicator .     Source WND     Site Right Paraspinous Soft Tissue     Gram Stain Result Moderate WBCs.  Moderate Gram positive cocci.      GRAM STAIN [[id number]] Collected:  04/29/18 1500    Order Status:  Completed Specimen:  Tissue Updated:  04/30/18 1719     Significant Indicator .     Source TISS     Site L4-L5 Epidural Mass     Gram Stain Result Moderate WBCs.  Rare Gram positive cocci.  Rare Gram positive rods.      ACID FAST STAIN [[id number]] Collected:  04/29/18 1500    Order Status:  Completed Specimen:  Tissue Updated:  04/30/18 1719     Significant Indicator NEG     Source TISS     Site L4-L5 Epidural Mass     AFB Smear Results No acid fast bacilli seen.          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   Epidural pus was noted intraoperatively  OR +viridans strep  Abx per above  Plan for 6 weeks of IV abx  Stop date 06/11/18    PT/OT evaluation  Recommend SNF    Discussed with internal medicine RN.

## 2018-05-03 NOTE — PROGRESS NOTES
Pt requesting fluids be disconnected from PICC. Pt educated on importance of continuous fluids to prevent line from becoming occluded. Agreed to have fluids continue.

## 2018-05-03 NOTE — PROGRESS NOTES
Neurosurgery Progress Note    Subjective:  Reports improvement in BLE pain, some back/incisional pain.   Ambulating, cooperative with PT yesterday.   No BM yet, voiding.   Positive blood cultures. Plan for 6 weeks of IV abx per ID.  VSS- no fevers, chills, nausea, vomiting.   No new increases in pain, numbness, tingling.     Exam:  VSS  A&Ox4, NAD  NM: 5/5 hip flexion, knee extension, knee flexion, plantar flexion, dorsiflexion, EHL  Sensation intact and equal throughout all four extremities.   Abdomen: soft, non-tender  Non-labored breathing on room air, normal respiratory effort  Capillary refill in all four extremities <2 seconds  No LE edema, erythema, cyanosis, clubbing  Calves non-tender to compression bilat  Incision CDI, no halo sign      BP  Min: 149/86  Max: 161/76  Pulse  Av.7  Min: 59  Max: 68  Resp  Av.7  Min: 16  Max: 18  Temp  Av °C (98.6 °F)  Min: 36.6 °C (97.8 °F)  Max: 37.3 °C (99.1 °F)  SpO2  Av %  Min: 92 %  Max: 98 %    No Data Recorded    Recent Labs      18   0324  18   0054  18   0411   WBC  9.7  8.0  6.2   RBC  3.62*  3.58*  3.32*   HEMOGLOBIN  12.0*  12.0*  11.2*   HEMATOCRIT  36.3*  35.0*  31.7*   MCV  100.3*  97.8  95.5   MCH  33.1*  33.5*  33.7*   MCHC  33.1*  34.3  35.3   RDW  47.0  44.1  42.2   PLATELETCT  174  205  194   MPV  8.9*  8.7*  8.8*     Recent Labs      18   0054  18   0411   SODIUM  139  138   POTASSIUM  3.4*  3.5*   CHLORIDE  105  105   CO2  25  26   GLUCOSE  117*  106*   BUN  15  15   CREATININE  0.64  0.58   CALCIUM  8.0*  7.8*               Intake/Output       18 07 - 18 0659 18 07 - 18 0659      [id number] 1464-3905 Total 4563-1468 8014-3212 Total       Intake    P.O.  --  400 400  --  -- --    P.O. -- 400 400 -- -- --    Total Intake -- 400 400 -- -- --       Output    Urine  --  -- --  --  -- --    Number of Times Voided 4 x -- 4 x -- -- --    Stool  --  -- --  --  -- --    Number of Times  Stooled 0 x 0 x 0 x 0 x -- 0 x    Total Output -- -- -- -- -- --       Net I/O     -- 400 400 -- -- --            Intake/Output Summary (Last 24 hours) at 05/03/18 0807  Last data filed at 05/03/18 0600   Gross per 24 hour   Intake              400 ml   Output                0 ml   Net              400 ml            • heparin  5,000 Units Q8HRS   • cefTRIAXone (ROCEPHIN) IV  2 g Q24HRS   • normal saline PF  10-20 mL PRN   • baclofen  5 mg Q6HRS PRN   • HYDROcodone/acetaminophen  1-2 Tab Q4HRS PRN   • losartan  100 mg Q DAY   • cyclobenzaprine  5 mg TID PRN   • omeprazole  20 mg BID   • mag hydrox-al hydrox-simeth  10 mL 4X/DAY PRN   • NS  30 mL/kg Once PRN   • senna-docusate  2 Tab BID    And   • polyethylene glycol/lytes  1 Packet QDAY PRN    And   • magnesium hydroxide  30 mL QDAY PRN    And   • bisacodyl  10 mg QDAY PRN   • Respiratory Care per Protocol   Continuous RT   • NS   Continuous   • NS  1,000 mL Once PRN   • acetaminophen  650 mg Q6HRS PRN   • Pharmacy Consult Request   PRN    And   • oxyCODONE immediate-release  10 mg Q3HRS PRN    And   • morphine injection  4 mg Q3HRS PRN   • ondansetron  4 mg Q4HRS PRN   • ondansetron  4 mg Q4HRS PRN       Assessment:  POD #4 L4-S1 laminectomy, extradural resection of mass  Prophylactic anticoagulation: yes, ok for heparin.      Plan:  Increase ambulation, work with therapies.  Continue pain control  IS every hour  Positive blood cultures, ID following. Plan for 6 weeks of IV abx. Got PICC yesterday.  Continue stool softeners to encourage BM. Recommend suppository.   Ok to d/c to home with HH from neurosurgical standpoint once cleared by PT/OT, primary team.    Case discussed with patient, RN, Dr. Herron.

## 2018-05-03 NOTE — PROGRESS NOTES
Hospital Medicine Progress Note, Adult, Complex               Author: Yessenia Sam Date & Time created: 5/3/2018  10:54 AM     Interval History:  epiduarl spinal abcess and s/p drainage    Review of Systems:  Review of Systems   Constitutional: Negative for chills, diaphoresis and fever.   HENT: Negative for ear pain, hearing loss and tinnitus.    Eyes: Negative for blurred vision, double vision and photophobia.   Respiratory: Negative for cough, hemoptysis and sputum production.    Cardiovascular: Negative for chest pain, palpitations and orthopnea.   Gastrointestinal: Negative for heartburn, nausea and vomiting.        HICCUPS continously   Genitourinary: Negative for dysuria, frequency and urgency.   Musculoskeletal: Positive for back pain. Negative for neck pain.   Skin: Negative for itching and rash.   Neurological: Negative for dizziness, tingling and headaches.       Physical Exam:  Physical Exam   Constitutional: He is oriented to person, place, and time. He appears well-developed and well-nourished.   HENT:   Head: Normocephalic and atraumatic.   Eyes: Conjunctivae are normal. Pupils are equal, round, and reactive to light.   Neck: Normal range of motion. Neck supple.   Cardiovascular: Normal rate and regular rhythm.    Pulmonary/Chest: Effort normal and breath sounds normal.   Abdominal: Soft. Bowel sounds are normal.   Musculoskeletal: He exhibits no edema or tenderness.   Neurological: He is alert and oriented to person, place, and time.   Skin: Skin is warm and dry.       Labs:        Invalid input(s): [id number]      Recent Labs      05/02/18   0054  05/03/18   0411   SODIUM  139  138   POTASSIUM  3.4*  3.5*   CHLORIDE  105  105   CO2  25  26   BUN  15  15   CREATININE  0.64  0.58   MAGNESIUM   --   1.8   PHOSPHORUS   --   2.2*   CALCIUM  8.0*  7.8*     Recent Labs      05/02/18   0054  05/03/18   0411   GLUCOSE  117*  106*     Recent Labs      05/01/18   0324  05/02/18   0054  05/03/18    0411   RBC  3.62*  3.58*  3.32*   HEMOGLOBIN  12.0*  12.0*  11.2*   HEMATOCRIT  36.3*  35.0*  31.7*   PLATELETCT  174  205  194     Recent Labs      18   0324  18   0054  18   0411   WBC  9.7  8.0  6.2           Hemodynamics:  Temp (24hrs), Av.1 °C (98.7 °F), Min:36.6 °C (97.8 °F), Max:37.3 °C (99.1 °F)  Temperature: 37.3 °C (99.1 °F)  Pulse  Av.9  Min: 58  Max: 87   Blood Pressure : (!) 178/75     Respiratory:    Respiration: 16, Pulse Oximetry: 95 %        RUL Breath Sounds: (P) Clear, RML Breath Sounds: (P) Clear, RLL Breath Sounds: (P) Diminished, DESTINI Breath Sounds: (P) Clear, LLL Breath Sounds: (P) Diminished  Fluids:    Intake/Output Summary (Last 24 hours) at 18 1054  Last data filed at 18 0600   Gross per 24 hour   Intake              400 ml   Output                0 ml   Net              400 ml        GI/Nutrition:  Orders Placed This Encounter   Procedures   • DIET ORDER     Standing Status:   Standing     Number of Occurrences:   1     Order Specific Question:   Diet:     Answer:   Regular [1]     Medical Decision Making, by Problem:  Active Hospital Problems    Diagnosis   • *Spinal epidural abscess [G06.1]  neurosurgey input is noted  s/p drainage  I.D input is noted  On rocephin     • Gram-positive bacteremia [R78.81]  I.D input is noted  Rocephin  s/p picc line placement     • Hiccups [R06.6]  continous  Changed baclofen to 5mg tid     • Hypokalemia [E87.6]  Has resolved     • Postoperative anemia [D64.9]  Mild  No intervention is needed     • Apnea [R06.81]  outpt f/u     • Hypertension [I10]  losartan       Quality-Core Measures   Gunter catheter::  No Gunter  DVT prophylaxis pharmacological::  Heparin

## 2018-05-03 NOTE — DISCHARGE PLANNING
[facility], [facility], and [facility] have accepted patient. [facility] has declined patient due to no skilled need.

## 2018-05-03 NOTE — PROGRESS NOTES
Assessed patient. Stable. Refused bowel medications. States will ambulate later on. Persistent hiccups, Baclofen given. Pain 5/10, gave half tab of Norco. Bed is in low-lock position, call light within reach, all needs met at this time.

## 2018-05-04 LAB
ERYTHROCYTE [DISTWIDTH] IN BLOOD BY AUTOMATED COUNT: 42.2 FL (ref 35.9–50)
HCT VFR BLD AUTO: 32.6 % (ref 42–52)
HGB BLD-MCNC: 11.5 G/DL (ref 14–18)
LV EJECT FRACT  99904: 55
LV EJECT FRACT MOD 2C 99903: 57.24
LV EJECT FRACT MOD 4C 99902: 48.44
LV EJECT FRACT MOD BP 99901: 51.76
MCH RBC QN AUTO: 33.4 PG (ref 27–33)
MCHC RBC AUTO-ENTMCNC: 35.3 G/DL (ref 33.7–35.3)
MCV RBC AUTO: 94.8 FL (ref 81.4–97.8)
PLATELET # BLD AUTO: 209 K/UL (ref 164–446)
PMV BLD AUTO: 8.7 FL (ref 9–12.9)
RBC # BLD AUTO: 3.44 M/UL (ref 4.7–6.1)
WBC # BLD AUTO: 7 K/UL (ref 4.8–10.8)

## 2018-05-04 PROCEDURE — 85027 COMPLETE CBC AUTOMATED: CPT

## 2018-05-04 PROCEDURE — 93306 TTE W/DOPPLER COMPLETE: CPT

## 2018-05-04 PROCEDURE — 93306 TTE W/DOPPLER COMPLETE: CPT | Mod: 26 | Performed by: INTERNAL MEDICINE

## 2018-05-04 PROCEDURE — 770001 HCHG ROOM/CARE - MED/SURG/GYN PRIV*

## 2018-05-04 PROCEDURE — A9270 NON-COVERED ITEM OR SERVICE: HCPCS | Performed by: PHYSICIAN ASSISTANT

## 2018-05-04 PROCEDURE — 700102 HCHG RX REV CODE 250 W/ 637 OVERRIDE(OP): Performed by: INTERNAL MEDICINE

## 2018-05-04 PROCEDURE — 99231 SBSQ HOSP IP/OBS SF/LOW 25: CPT | Performed by: FAMILY MEDICINE

## 2018-05-04 PROCEDURE — 700111 HCHG RX REV CODE 636 W/ 250 OVERRIDE (IP): Performed by: PHYSICIAN ASSISTANT

## 2018-05-04 PROCEDURE — A9270 NON-COVERED ITEM OR SERVICE: HCPCS | Performed by: INTERNAL MEDICINE

## 2018-05-04 PROCEDURE — 700111 HCHG RX REV CODE 636 W/ 250 OVERRIDE (IP): Performed by: INTERNAL MEDICINE

## 2018-05-04 PROCEDURE — A9270 NON-COVERED ITEM OR SERVICE: HCPCS | Mod: JG | Performed by: FAMILY MEDICINE

## 2018-05-04 PROCEDURE — 700102 HCHG RX REV CODE 250 W/ 637 OVERRIDE(OP): Mod: JG | Performed by: FAMILY MEDICINE

## 2018-05-04 PROCEDURE — 700102 HCHG RX REV CODE 250 W/ 637 OVERRIDE(OP): Performed by: PHYSICIAN ASSISTANT

## 2018-05-04 RX ORDER — HYDROCODONE BITARTRATE AND ACETAMINOPHEN 10; 325 MG/1; MG/1
1-2 TABLET ORAL EVERY 4 HOURS PRN
Qty: 15 TAB | Refills: 0 | Status: SHIPPED | OUTPATIENT
Start: 2018-05-04 | End: 2018-05-07

## 2018-05-04 RX ORDER — BACLOFEN 10 MG/1
15 TABLET ORAL 3 TIMES DAILY
Qty: 20 TAB | Refills: 0
Start: 2018-05-04 | End: 2018-05-07

## 2018-05-04 RX ADMIN — BACLOFEN 10 MG: 10 TABLET ORAL at 08:28

## 2018-05-04 RX ADMIN — HEPARIN SODIUM 5000 UNITS: 5000 INJECTION, SOLUTION INTRAVENOUS; SUBCUTANEOUS at 16:50

## 2018-05-04 RX ADMIN — SENNOSIDES AND DOCUSATE SODIUM 2 TABLET: 8.6; 5 TABLET ORAL at 08:27

## 2018-05-04 RX ADMIN — BACLOFEN 10 MG: 10 TABLET ORAL at 16:51

## 2018-05-04 RX ADMIN — CEFTRIAXONE 2 G: 2 INJECTION, POWDER, FOR SOLUTION INTRAMUSCULAR; INTRAVENOUS at 08:36

## 2018-05-04 RX ADMIN — HEPARIN SODIUM 5000 UNITS: 5000 INJECTION, SOLUTION INTRAVENOUS; SUBCUTANEOUS at 05:11

## 2018-05-04 RX ADMIN — HYDROCODONE BITARTRATE AND ACETAMINOPHEN 0.5 TABLET: 10; 325 TABLET ORAL at 08:27

## 2018-05-04 RX ADMIN — BACLOFEN 10 MG: 10 TABLET ORAL at 21:07

## 2018-05-04 RX ADMIN — LOSARTAN POTASSIUM 100 MG: 50 TABLET, FILM COATED ORAL at 08:27

## 2018-05-04 RX ADMIN — HYDROCODONE BITARTRATE AND ACETAMINOPHEN 0.5 TABLET: 10; 325 TABLET ORAL at 10:06

## 2018-05-04 RX ADMIN — HYDROCODONE BITARTRATE AND ACETAMINOPHEN 1 TABLET: 10; 325 TABLET ORAL at 16:51

## 2018-05-04 RX ADMIN — HEPARIN SODIUM 5000 UNITS: 5000 INJECTION, SOLUTION INTRAVENOUS; SUBCUTANEOUS at 21:07

## 2018-05-04 RX ADMIN — HYDROCODONE BITARTRATE AND ACETAMINOPHEN 1 TABLET: 10; 325 TABLET ORAL at 23:47

## 2018-05-04 ASSESSMENT — ENCOUNTER SYMPTOMS
CONSTIPATION: 1
SHORTNESS OF BREATH: 0
CHILLS: 0
BACK PAIN: 1
FEVER: 0
COUGH: 0

## 2018-05-04 ASSESSMENT — PAIN SCALES - GENERAL: PAINLEVEL_OUTOF10: 6

## 2018-05-04 NOTE — DISCHARGE PLANNING
Medical Social Work    Referral:  SNF    Intervention:  DIANA met with pt bedside. SW explained that [facility] excepted pt and could transport him there at 1300.  Pt is not agreeable to transfer to SNF at all. He wants to go home with op infusion.      Plan:  DIANA will remain available for dc planning    Updated @ 10:55am:  Bessy ORTEGA will set up OP Infusion if needed

## 2018-05-04 NOTE — DISCHARGE PLANNING
Arranged patient's transport to [place] at 1300 via [facility]. Margaret(DIANA) notified of transport.

## 2018-05-04 NOTE — DISCHARGE PLANNING
"Contacted [facility]. Per staff, MD who is signing orders must have privileges. In order to arrange outpatient infusion at [facility] with MD who has privileges, MD to MD report must be completed. Phone number for report is [phone]. Once that is completed, Infusion order and referral to be faxed. PICC Line Care order requested by Alton Decker. Order must also state \"Pull PICC Line after last dose of ABX\" for PICC line removal.  "

## 2018-05-04 NOTE — DISCHARGE PLANNING
Medical Social Work    Referral:  F/U    Intervention: Pt discussed during rounds.  Pt is refusing SNF. Pt wants to go home with OP Infusion.  Hos RN, Herve will speak to pt to clarify.    Plan:  SW will remain available fr [place] planning    Updated @ 1415:  This worker and [place] RN, Herve met w/ pt bedside.  Pt does not want to go to SNF and wants OP Infusion even if he has to come to [place].  SW will work on getting it set up. Pt made aware that set up may not be completed before the weekend.

## 2018-05-04 NOTE — PROGRESS NOTES
Neurosurgery Progress Note    Subjective:  No overnight events.   Ambulatory.   No BM yet, voiding.   Positive blood cultures. Plan for 6 weeks of IV abx per ID.  VSS- no fevers, chills, nausea, vomiting.   No new increases in pain, numbness, tingling.     Exam:  VSS  A&Ox4, NAD  NM: 5/5 hip flexion, knee extension, knee flexion, plantar flexion, dorsiflexion, EHL  Sensation intact and equal throughout all four extremities.   Abdomen: soft, non-tender  Non-labored breathing on room air, normal respiratory effort  Capillary refill in all four extremities <2 seconds  No LE edema, erythema, cyanosis, clubbing  Calves non-tender to compression bilat  Incision CDI, no halo sign      BP  Min: 149/83  Max: 178/75  Pulse  Av  Min: 56  Max: 76  Resp  Av  Min: 16  Max: 16  Temp  Av.4 °C (99.4 °F)  Min: 36.9 °C (98.5 °F)  Max: 38.2 °C (100.7 °F)  SpO2  Av.3 %  Min: 93 %  Max: 95 %    No Data Recorded    Recent Labs      18   0054  18   0411  18   0415   WBC  8.0  6.2  7.0   RBC  3.58*  3.32*  3.44*   HEMOGLOBIN  12.0*  11.2*  11.5*   HEMATOCRIT  35.0*  31.7*  32.6*   MCV  97.8  95.5  94.8   MCH  33.5*  33.7*  33.4*   MCHC  34.3  35.3  35.3   RDW  44.1  42.2  42.2   PLATELETCT  205  194  209   MPV  8.7*  8.8*  8.7*     Recent Labs      18   0054  18   0411   SODIUM  139  138   POTASSIUM  3.4*  3.5*   CHLORIDE  105  105   CO2  25  26   GLUCOSE  117*  106*   BUN  15  15   CREATININE  0.64  0.58   CALCIUM  8.0*  7.8*               Intake/Output       18 07 - 18 0659 18 - 18 0659      1900-0659 Total 5817-8540 0400-0114 Total       Intake    P.O.  --  240 240  --  -- --    P.O. -- 240 240 -- -- --    Total Intake -- 240 240 -- -- --       Output    Urine  --  650 650  --  -- --    Urine Void (mL) (non-catheter) -- 650 650 -- -- --    Stool  --  -- --  --  -- --    Number of Times Stooled 0 x -- 0 x -- -- --    Total Output -- 650 650 -- -- --        Net I/O     -- -410 -410 -- -- --            Intake/Output Summary (Last 24 hours) at 05/04/18 0725  Last data filed at 05/04/18 0600   Gross per 24 hour   Intake              240 ml   Output              650 ml   Net             -410 ml            • baclofen  10 mg TID   • heparin  5,000 Units Q8HRS   • cefTRIAXone (ROCEPHIN) IV  2 g Q24HRS   • normal saline PF  10-20 mL PRN   • HYDROcodone/acetaminophen  1-2 Tab Q4HRS PRN   • losartan  100 mg Q DAY   • omeprazole  20 mg BID   • mag hydrox-al hydrox-simeth  10 mL 4X/DAY PRN   • NS  30 mL/kg Once PRN   • senna-docusate  2 Tab BID    And   • polyethylene glycol/lytes  1 Packet QDAY PRN    And   • magnesium hydroxide  30 mL QDAY PRN    And   • bisacodyl  10 mg QDAY PRN   • Respiratory Care per Protocol   Continuous RT   • NS   Continuous   • NS  1,000 mL Once PRN   • acetaminophen  650 mg Q6HRS PRN   • Pharmacy Consult Request   PRN    And   • oxyCODONE immediate-release  10 mg Q3HRS PRN    And   • morphine injection  4 mg Q3HRS PRN   • ondansetron  4 mg Q4HRS PRN   • ondansetron  4 mg Q4HRS PRN       Assessment:  POD #5 L4-S1 laminectomy, extradural resection of mass  Prophylactic anticoagulation: yes, ok for heparin.      Plan:  Increase ambulation, work with therapies.  Continue pain control  IS every hour  Positive blood cultures, ID following. Plan for 6 weeks of IV abx. Got PICC yesterday.  Continue stool softeners to encourage BM. Recommend suppository.   Ok to d/c to home with HH from neurosurgical standpoint once cleared by PT/OT, primary team.    Case discussed with patient, RN, Dr. Herron.

## 2018-05-04 NOTE — DISCHARGE SUMMARY
CHIEF COMPLAINT ON ADMISSION  No chief complaint on file.      CODE STATUS  Full Code    HPI & HOSPITAL COURSE  This is a 73 y.o. male here with  low back pain for the past 5 days. Patient reports progressively worsening low back pain with radiation down to his right leg. Pain is worse with movement. He presented to an outlying facility where he underwent an MRI lumbar spine that revealed possible epidural abscess the level of L4-L5. He was found to have ESR 60, CRP 13.6 and WBC count of 12.6 at [facility]. He was transferred to [facility] for neurosurgical consultation.   He was admitted and initially was started on vancomycin and cefepime.  He was taken to the O.R  And  Had  L4, L5, and S1 decompressive laminectomy,   bilateral medial facetectomies and bilateral foraminotomies and extradural   resection of a mass with microdissection.  He was seen by I.D  And  Recommended to continue with current antibiotics.His leukocytosis resolved and his culture showed OSH Bcx reportedly with GPC.  His antibiotics were changed to rocephin and the last day would be 6/11/2018.he also has received a picc line  He is also noted to have hiccups that has been going on for the past 1 week.his baclofen was changed to tid with increased dose and he stated that he is a bit better.I have increased the baclofen to 15mg tid and will see if that would help more or ythe pt needs higher dose.  The patient met 2-midnight criteria for an inpatient stay at the time of discharge.    Therefore, he is discharged in fair and stable condition with close outpatient follow-up.    SPECIFIC OUTPATIENT FOLLOW-UP  pcp this coming week  neurosurgery    DISCHARGE PROBLEM LIST  Principal Problem:    Spinal epidural abscess POA: Yes  Active Problems:    Gram-positive bacteremia POA: Yes    Hypertension POA: Yes    Apnea POA: Yes    Hypokalemia POA: Unknown    Postoperative anemia POA: Unknown    Hiccups POA: Unknown  Resolved Problems:    * No  resolved hospital problems. *      FOLLOW UP  No future appointments.  No follow-up provider specified.    MEDICATIONS ON DISCHARGE   Yunier Denney   Home Medication Instructions ALFREDO:[id number]    Printed on:05/04/18 0340   Medication Information                      aspirin (ASA) 325 MG Tab  Take 325 mg by mouth every day.             baclofen (LIORESAL) 10 MG Tab  Take 1.5 Tabs by mouth 3 times a day.             cefTRIAXone (ROCEPHIN)  20 mL by Intravenous route every 24 hours.             HYDROcodone/acetaminophen (NORCO)  MG Tab  Take 1-2 Tabs by mouth every four hours as needed for up to 3 days.             losartan (COZAAR) 100 MG Tab  Take 100 mg by mouth every day.                 DIET  Orders Placed This Encounter   Procedures   • DIET ORDER     Standing Status:   Standing     Number of Occurrences:   1     Order Specific Question:   Diet:     Answer:   Regular [1]       ACTIVITY  As tolerated and directed by skilled nursing.  Weight bearing as tolerated      CONSULTATIONS  Neurosurgery  I.D    PROCEDURES  L4, L5, and S1 decompressive laminectomy,   bilateral medial facetectomies and bilateral foraminotomies and extradural   resection of a mass with microdissection.    LABORATORY  Lab Results   Component Value Date/Time    SODIUM 138 05/03/2018 04:11 AM    POTASSIUM 3.5 (L) 05/03/2018 04:11 AM    CHLORIDE 105 05/03/2018 04:11 AM    CO2 26 05/03/2018 04:11 AM    GLUCOSE 106 (H) 05/03/2018 04:11 AM    BUN 15 05/03/2018 04:11 AM    CREATININE 0.58 05/03/2018 04:11 AM        Lab Results   Component Value Date/Time    WBC 7.0 05/04/2018 04:15 AM    HEMOGLOBIN 11.5 (L) 05/04/2018 04:15 AM    HEMATOCRIT 32.6 (L) 05/04/2018 04:15 AM    PLATELETCT 209 05/04/2018 04:15 AM        Total time of the discharge process exceeds 36 minutes

## 2018-05-04 NOTE — DISCHARGE PLANNING
Medical Social Work    Referral:  Transport    Intervention:  Transport form faxed to [facility]    Plan:  SW will remain available for dc planning

## 2018-05-04 NOTE — DISCHARGE PLANNING
Medical Social Work    Referral:  F/U    Intervention:  SW contacted Hos. RN , Herve in regards to what is needed to set up OP Infusion at [facility]    Plan:  SW will remain available for dc planning

## 2018-05-04 NOTE — PROGRESS NOTES
Infectious Disease Progress Note    Author: Mandy Grewal M.D. Date & Time of service: 2018  11:14 AM    Chief Complaint:  FU epidural abscess    Interval History:   Tmax 100.2, WBC 9.7, ongoing back pain, constipated, OR cx viridans strep, tolerating abx without issues   Tmax 100.6 WBC 8, pain grumpy, remains constipated last BM prior to admission, no change in back pain, will attempt to work with PT today  5/3 AF WBC 6.2, continuous hiccups, ambulated with PT yesterday, back pain stable   Tmax 99.3 WBC 7 ongoing hiccups, otherwise no new issues, still no BM, plan for SNF transfer today  Labs Reviewed, Medications Reviewed, Radiology Reviewed and Wound Reviewed.    Review of Systems:  Review of Systems   Constitutional: Negative for chills and fever.   Respiratory: Negative for cough and shortness of breath.         Hiccups   Cardiovascular: Negative for chest pain.   Gastrointestinal: Positive for constipation.   Musculoskeletal: Positive for back pain.   All other systems reviewed and are negative.      Hemodynamics:  Temp (24hrs), Av.4 °C (99.4 °F), Min:36.9 °C (98.5 °F), Max:38.2 °C (100.7 °F)  Temperature: 37.3 °C (99.1 °F)  Pulse  Av.6  Min: 56  Max: 87   Blood Pressure : 108/82       Physical Exam:  Physical Exam   Constitutional: He is oriented to person, place, and time. He appears well-developed.   HENT:   Head: Normocephalic and atraumatic.   Poor dentition/dental caries  Missing teeth   Eyes: EOM are normal. Pupils are equal, round, and reactive to light.   Neck: Neck supple.   Cardiovascular: Normal rate and regular rhythm.    Pulmonary/Chest: Effort normal. He has no wheezes. He has no rales.   Abdominal: Soft. There is no tenderness.   Musculoskeletal: He exhibits no edema.   Lumbar surgical site with staples in place. Well approximated. No drainage or surrounding erythema    LUE PICC   Neurological: He is alert and oriented to person, place, and time.   Skin: No rash  noted.   Psychiatric:   Agitated       Meds:    Current Facility-Administered Medications:   •  baclofen  •  heparin  •  cefTRIAXone (ROCEPHIN) IV  •  PICC Line Insertion has been implemented **AND** May use Lidocaine 1% not to exceed 3 mls for local at insertion site **AND** NOTIFY MD **AND** Tip to dwell in the superior vena cava **AND** Do not use PICC Line until placement verified by Chest X Ray **AND** [CANCELED] DX-CHEST-FOR PICC LINE Perform procedure in: Other(comment f6 below): **AND** If radiologist reading of chest X-ray states any of the following the PICC should be used **AND** Further evaluation of the PICC placement can be retrieved from X-Ray and Imaging **AND** Blood draws through PICC line; draws by RN only **AND** FLUSHING GUIDELINES WHEN IN USE **AND** normal saline PF **AND** FLUSHING GUIDELINES WHEN NOT IN USE **AND** DRESSING MAINTENANCE **AND** Change needleless pressure ports and IV tubing every 72 hours per hospital policy **AND** TUBING **AND** If there is an MD order to remove the PICC line, any RN may remove the PICC line **AND** [] PATIENT EDUCATION MATERIALS **AND** NURSING COMMUNICATION  •  HYDROcodone/acetaminophen  •  losartan  •  omeprazole  •  mag hydrox-al hydrox-simeth  •  NS  •  senna-docusate **AND** polyethylene glycol/lytes **AND** magnesium hydroxide **AND** bisacodyl  •  Respiratory Care per Protocol  •  NS  •  NS  •  acetaminophen  •  Notify provider if pain remains uncontrolled **AND** Use the numeric rating scale (NRS-11) on regular floors and Critical-Care Pain Observation Tool (CPOT) on ICUs/Trauma to assess pain **AND** Pulse Ox (Oximetry) **AND** Pharmacy Consult Request **AND** If patient difficult to arouse and/or has respiratory depression, stop any opiates that are currently infusing and call a Rapid Response. **AND** [DISCONTINUED] oxyCODONE immediate-release **AND** oxyCODONE immediate-release **AND** morphine injection  •  ondansetron  •   ondansetron    Labs:  Recent Labs      05/02/18   0054  05/03/18   0411  05/04/18   0415   WBC  8.0  6.2  7.0   RBC  3.58*  3.32*  3.44*   HEMOGLOBIN  12.0*  11.2*  11.5*   HEMATOCRIT  35.0*  31.7*  32.6*   MCV  97.8  95.5  94.8   MCH  33.5*  33.7*  33.4*   RDW  44.1  42.2  42.2   PLATELETCT  205  194  209   MPV  8.7*  8.8*  8.7*     Recent Labs      05/02/18   0054  05/03/18   0411   SODIUM  139  138   POTASSIUM  3.4*  3.5*   CHLORIDE  105  105   CO2  25  26   GLUCOSE  117*  106*   BUN  15  15     Recent Labs      05/02/18   0054 05/03/18   0411   CREATININE  0.64  0.58       Imaging:  Dx-chest-2 Views    Result Date: 4/29/2018 4/29/2018 10:11 AM HISTORY/REASON FOR EXAM: Back pain. Shortness of breath. Cough. TECHNIQUE/EXAM DESCRIPTION AND NUMBER OF VIEWS: Two views of the chest. COMPARISON: None. FINDINGS: The lungs are hyperinflated. There is a large hiatal hernia. The heart is mildly enlarged. There is no evidence of pleural effusion. Soft tissues and bony structures are unremarkable.     1.  Mild cardiomegaly. 2.  Hiatal hernia.    Dx-spine-any One View    Result Date: 4/29/2018 4/29/2018 2:45 PM HISTORY/REASON FOR EXAM:  Back pain. TECHNIQUE/EXAM DESCRIPTION AND NUMBER OF VIEWS:  Single view of the spine. COMPARISON: None. FINDINGS: Intraoperative fluoroscopy spot images were obtained by the neurosurgeon. Single lateral view demonstrates a surgical probe directed at the L5-S1 level. The performing clinician used a total of 4 seconds fluoroscopy time.     Intraoperative fluoroscopy spot images as described above.    Dx-portable Fluoro > 1 Hour    Result Date: 4/30/2018 4/29/2018 2:45 PM HISTORY/REASON FOR EXAM:  Lumbar pain, laminectomy TECHNIQUE/EXAM DESCRIPTION AND NUMBER OF VIEWS: Portable fluoroscopy for greater than one hour FINDINGS:      The portable fluoroscopy unit was obligated to the procedure for greater than one hour.   Actual fluoro time was 4 seconds.     Portable fluoroscopy utilized for 4  seconds.       Micro:  Results     Procedure Component Value Units Date/Time    CULTURE TISSUE W/ GRM STAIN [[id number]]  (Abnormal) Collected:  04/29/18 1500    Order Status:  Completed Specimen:  Tissue Updated:  05/02/18 1055     Significant Indicator POS (POS)     Source TISS     Site L4-L5 Epidural Mass     Tissue Culture -- (A)     Gram Stain Result Moderate WBCs.  Rare Gram positive cocci.  Rare Gram positive rods.   (A)     Tissue Culture Viridans Streptococcus  Moderate growth   (A)    ANAEROBIC CULTURE [[id number]] Collected:  04/29/18 1500    Order Status:  Completed Specimen:  Tissue Updated:  05/02/18 1055     Significant Indicator NEG     Source TISS     Site L4-L5 Epidural Mass     Anaerobic Culture, Culture Res No Anaerobes isolated.    FUNGAL CULTURE [[id number]] Collected:  04/29/18 1500    Order Status:  Completed Specimen:  Tissue Updated:  05/02/18 1055     Significant Indicator NEG     Source TISS     Site L4-L5 Epidural Mass     Fungal Culture Culture in progress.    AFB CULTURE [[id number]] Collected:  04/29/18 1500    Order Status:  Completed Specimen:  Tissue Updated:  05/02/18 1055     Significant Indicator NEG     Source TISS     Site L4-L5 Epidural Mass     AFB Culture Culture in progress.     AFB Smear Results No acid fast bacilli seen.    CULTURE WOUND W/ GRAM STAIN [[id number]]  (Abnormal) Collected:  04/29/18 1446    Order Status:  Completed Specimen:  Wound Updated:  05/02/18 1055     Significant Indicator POS (POS)     Source WND     Site Right Paraspinous Soft Tissue     Culture Result Wound -- (A)     Gram Stain Result Moderate WBCs.  Moderate Gram positive cocci.       Culture Result Wound Viridans Streptococcus  Moderate growth   (A)    ANAEROBIC CULTURE [[id number]] Collected:  04/29/18 1446    Order Status:  Completed Specimen:  Wound Updated:  05/02/18 1055     Significant Indicator NEG     Source WND     Site Right Paraspinous Soft Tissue     Anaerobic Culture, Culture Res No  "Anaerobes isolated.    FUNGAL CULTURE [[id number]] Collected:  04/29/18 1446    Order Status:  Completed Specimen:  Wound Updated:  05/02/18 1055     Significant Indicator NEG     Source WND     Site Right Paraspinous Soft Tissue     Fungal Culture Culture in progress.    AFB CULTURE [[id number]] Collected:  04/29/18 1446    Order Status:  Completed Specimen:  Wound Updated:  05/02/18 1055     Significant Indicator NEG     Source WND     Site Right Paraspinous Soft Tissue     AFB Culture Culture in progress.  NOTE:  Swabs are not recommended for the isolation of  Mycobacteria, since they provide limited material.  They  are acceptable only if a specimen cannot be collected by  any other means.  Negative results obtained from these  specimens submitted on swabs are not reliable.       AFB Smear Results No acid fast bacilli seen.    BLOOD CULTURE [[id number]] Collected:  04/30/18 0947    Order Status:  Completed Specimen:  Blood from Peripheral Updated:  05/01/18 0948     Significant Indicator NEG     Source BLD     Site PERIPHERAL     Blood Culture No Growth    Note: Blood cultures are incubated for 5 days and  are monitored continuously.Positive blood cultures  are called to the RN and reported as soon as  they are identified.      Narrative:       Per Hospital Policy: Only change Specimen Src: to \"Line\" if  specified by physician order.    BLOOD CULTURE [[id number]] Collected:  04/30/18 0947    Order Status:  Completed Specimen:  Blood from Peripheral Updated:  05/01/18 0948     Significant Indicator NEG     Source BLD     Site PERIPHERAL     Blood Culture No Growth    Note: Blood cultures are incubated for 5 days and  are monitored continuously.Positive blood cultures  are called to the RN and reported as soon as  they are identified.      Narrative:       Per Hospital Policy: Only change Specimen Src: to \"Line\" if  specified by physician order.    ACID FAST STAIN [[id number]] Collected:  04/29/18 1446    Order Status: "  Completed Specimen:  Wound Updated:  04/30/18 1719     Significant Indicator NEG     Source WND     Site Right Paraspinous Soft Tissue     AFB Smear Results No acid fast bacilli seen.    GRAM STAIN [[id number]] Collected:  04/29/18 1446    Order Status:  Completed Specimen:  Wound Updated:  04/30/18 1719     Significant Indicator .     Source WND     Site Right Paraspinous Soft Tissue     Gram Stain Result Moderate WBCs.  Moderate Gram positive cocci.      GRAM STAIN [[id number]] Collected:  04/29/18 1500    Order Status:  Completed Specimen:  Tissue Updated:  04/30/18 1719     Significant Indicator .     Source TISS     Site L4-L5 Epidural Mass     Gram Stain Result Moderate WBCs.  Rare Gram positive cocci.  Rare Gram positive rods.      ACID FAST STAIN [[id number]] Collected:  04/29/18 1500    Order Status:  Completed Specimen:  Tissue Updated:  04/30/18 1719     Significant Indicator NEG     Source TISS     Site L4-L5 Epidural Mass     AFB Smear Results No acid fast bacilli seen.          Assessment:  Active Hospital Problems    Diagnosis   • *Spinal epidural abscess [G06.1]   • Gram-positive bacteremia [R78.81]   • Apnea [R06.81]   • Hypertension [I10]       Plan:  Gram positive sepsis  Fever resolved  Leukocytosis resolved  OSH Bcx reportedly with GPC  Bcx 4/30 - NGTD  Continue ceftriaxone  Obtain OSH blood culture results  TTE - neg    Lumbar epidural abscess  S/p s/p lumbar decompressive laminectiomy L4-S1, bilateral foraminotomies and extradural resection of a mass with microdissection on 4/29.   Epidural pus was noted intraoperatively  OR +viridans strep  Abx per above  Plan for 6 weeks of IV abx  Stop date 06/11/18    Plan for transfer to SNF today. OK from ID standpoint    FU ID clinic    Discussed with internal medicine CM    ADDENDUM:  Received a call from hospitalist RN that pt no longer wants to go to SNF. Pt would like to go to [facility] versus [facility]. MD at [facility] will  have to take over orders as we do not have privileges at that hospital.

## 2018-05-04 NOTE — DISCHARGE PLANNING
Medical Social Work    Referral:  SNF    Intervention:  DIANA made tc call to pt's wife, Sorin and 17 yr old son, Asa ([phone]). Notified them that pt would be transported to [facility] today at 1300. DIANA gave them contact information.    Plan:  DIANA will remain available for dc planning

## 2018-05-04 NOTE — PROGRESS NOTES
Alert to staff and able to make needs known. No distress noted in general condition. Vital signs stable. No complaints of pain or discomfort. Eating and drinking well. Continent of urine with bedside urinal. Checked often for safety and level of comfort. Cooperative toward staff and current plan of care. Call button and personal items within reach. Uneventful evening at present. Needs attended in timely manner.

## 2018-05-04 NOTE — CARE PLAN
Problem: Pain Management  Goal: Pain level will decrease to patient's comfort goal    Intervention: Follow pain managment plan developed in collaboration with patient and Interdisciplinary Team  Medicated as needed for pain management. Able to express need for pain management intervention. Checked often for level of comfort.

## 2018-05-04 NOTE — CARE PLAN
Problem: Safety  Goal: Will remain free from injury  Outcome: PROGRESSING AS EXPECTED  No accidents or injuries noted. Checked often for safety. Call button and personal items within reach.

## 2018-05-05 LAB
BACTERIA BLD CULT: NORMAL
BACTERIA BLD CULT: NORMAL
ERYTHROCYTE [DISTWIDTH] IN BLOOD BY AUTOMATED COUNT: 41.1 FL (ref 35.9–50)
HCT VFR BLD AUTO: 34.8 % (ref 42–52)
HGB BLD-MCNC: 12.5 G/DL (ref 14–18)
MCH RBC QN AUTO: 33.4 PG (ref 27–33)
MCHC RBC AUTO-ENTMCNC: 35.9 G/DL (ref 33.7–35.3)
MCV RBC AUTO: 93 FL (ref 81.4–97.8)
PLATELET # BLD AUTO: 235 K/UL (ref 164–446)
PMV BLD AUTO: 9.2 FL (ref 9–12.9)
RBC # BLD AUTO: 3.74 M/UL (ref 4.7–6.1)
SIGNIFICANT IND 70042: NORMAL
SIGNIFICANT IND 70042: NORMAL
SITE SITE: NORMAL
SITE SITE: NORMAL
SOURCE SOURCE: NORMAL
SOURCE SOURCE: NORMAL
WBC # BLD AUTO: 6.8 K/UL (ref 4.8–10.8)

## 2018-05-05 PROCEDURE — A9270 NON-COVERED ITEM OR SERVICE: HCPCS | Performed by: INTERNAL MEDICINE

## 2018-05-05 PROCEDURE — 36415 COLL VENOUS BLD VENIPUNCTURE: CPT

## 2018-05-05 PROCEDURE — 700111 HCHG RX REV CODE 636 W/ 250 OVERRIDE (IP): Performed by: INTERNAL MEDICINE

## 2018-05-05 PROCEDURE — 700111 HCHG RX REV CODE 636 W/ 250 OVERRIDE (IP): Performed by: PHYSICIAN ASSISTANT

## 2018-05-05 PROCEDURE — 99231 SBSQ HOSP IP/OBS SF/LOW 25: CPT | Performed by: FAMILY MEDICINE

## 2018-05-05 PROCEDURE — 700102 HCHG RX REV CODE 250 W/ 637 OVERRIDE(OP): Performed by: INTERNAL MEDICINE

## 2018-05-05 PROCEDURE — 700111 HCHG RX REV CODE 636 W/ 250 OVERRIDE (IP): Mod: JG | Performed by: FAMILY MEDICINE

## 2018-05-05 PROCEDURE — 700102 HCHG RX REV CODE 250 W/ 637 OVERRIDE(OP): Performed by: PHYSICIAN ASSISTANT

## 2018-05-05 PROCEDURE — 770001 HCHG ROOM/CARE - MED/SURG/GYN PRIV*

## 2018-05-05 PROCEDURE — A9270 NON-COVERED ITEM OR SERVICE: HCPCS | Performed by: PHYSICIAN ASSISTANT

## 2018-05-05 PROCEDURE — 85027 COMPLETE CBC AUTOMATED: CPT

## 2018-05-05 RX ORDER — POLYETHYLENE GLYCOL 3350 17 G/17G
1 POWDER, FOR SOLUTION ORAL
Status: DISCONTINUED | OUTPATIENT
Start: 2018-05-05 | End: 2018-05-07 | Stop reason: HOSPADM

## 2018-05-05 RX ORDER — BISACODYL 10 MG
10 SUPPOSITORY, RECTAL RECTAL ONCE
Status: ACTIVE | OUTPATIENT
Start: 2018-05-05 | End: 2018-05-06

## 2018-05-05 RX ORDER — HYDRALAZINE HYDROCHLORIDE 20 MG/ML
10 INJECTION INTRAMUSCULAR; INTRAVENOUS EVERY 4 HOURS PRN
Status: DISCONTINUED | OUTPATIENT
Start: 2018-05-05 | End: 2018-05-07 | Stop reason: HOSPADM

## 2018-05-05 RX ORDER — AMOXICILLIN 250 MG
2 CAPSULE ORAL 2 TIMES DAILY
Status: DISCONTINUED | OUTPATIENT
Start: 2018-05-05 | End: 2018-05-07 | Stop reason: HOSPADM

## 2018-05-05 RX ADMIN — HYDRALAZINE HYDROCHLORIDE 10 MG: 20 INJECTION INTRAMUSCULAR; INTRAVENOUS at 23:00

## 2018-05-05 RX ADMIN — ALTEPLASE 2 MG: 2.2 INJECTION, POWDER, LYOPHILIZED, FOR SOLUTION INTRAVENOUS at 07:13

## 2018-05-05 RX ADMIN — HEPARIN SODIUM 5000 UNITS: 5000 INJECTION, SOLUTION INTRAVENOUS; SUBCUTANEOUS at 06:02

## 2018-05-05 RX ADMIN — OXYCODONE HYDROCHLORIDE 10 MG: 10 TABLET ORAL at 14:53

## 2018-05-05 RX ADMIN — MORPHINE SULFATE 4 MG: 4 INJECTION INTRAVENOUS at 21:19

## 2018-05-05 RX ADMIN — OMEPRAZOLE 20 MG: 20 CAPSULE, DELAYED RELEASE ORAL at 21:21

## 2018-05-05 RX ADMIN — HYDROCODONE BITARTRATE AND ACETAMINOPHEN 1 TABLET: 10; 325 TABLET ORAL at 06:02

## 2018-05-05 RX ADMIN — LOSARTAN POTASSIUM 100 MG: 50 TABLET, FILM COATED ORAL at 09:43

## 2018-05-05 RX ADMIN — HEPARIN SODIUM 5000 UNITS: 5000 INJECTION, SOLUTION INTRAVENOUS; SUBCUTANEOUS at 21:21

## 2018-05-05 RX ADMIN — HEPARIN SODIUM 5000 UNITS: 5000 INJECTION, SOLUTION INTRAVENOUS; SUBCUTANEOUS at 14:54

## 2018-05-05 RX ADMIN — ALUMINUM HYDROXIDE, MAGNESIUM HYDROXIDE,SIMETHICONE 10 ML: 400; 400; 40 LIQUID ORAL at 19:29

## 2018-05-05 RX ADMIN — CEFTRIAXONE 2 G: 2 INJECTION, POWDER, FOR SOLUTION INTRAMUSCULAR; INTRAVENOUS at 09:48

## 2018-05-05 RX ADMIN — ALTEPLASE 2 MG: 2.2 INJECTION, POWDER, LYOPHILIZED, FOR SOLUTION INTRAVENOUS at 04:46

## 2018-05-05 RX ADMIN — ONDANSETRON 4 MG: 2 INJECTION, SOLUTION INTRAMUSCULAR; INTRAVENOUS at 21:19

## 2018-05-05 ASSESSMENT — ENCOUNTER SYMPTOMS
FEVER: 0
DOUBLE VISION: 0
PALPITATIONS: 0
SPUTUM PRODUCTION: 0
BACK PAIN: 1
HEARTBURN: 0
ORTHOPNEA: 0
DIZZINESS: 0
BLURRED VISION: 0
NECK PAIN: 0
DIAPHORESIS: 0
TINGLING: 0
HEMOPTYSIS: 0
COUGH: 0
HEADACHES: 0
NAUSEA: 0
VOMITING: 0
CHILLS: 0
PHOTOPHOBIA: 0

## 2018-05-05 ASSESSMENT — PAIN SCALES - GENERAL
PAINLEVEL_OUTOF10: 3
PAINLEVEL_OUTOF10: 2
PAINLEVEL_OUTOF10: 6

## 2018-05-05 ASSESSMENT — PATIENT HEALTH QUESTIONNAIRE - PHQ9
2. FEELING DOWN, DEPRESSED, IRRITABLE, OR HOPELESS: NOT AT ALL
SUM OF ALL RESPONSES TO PHQ9 QUESTIONS 1 AND 2: 0
1. LITTLE INTEREST OR PLEASURE IN DOING THINGS: NOT AT ALL

## 2018-05-05 NOTE — PROGRESS NOTES
Hospital Medicine Progress Note, Adult, Complex               Author: Yessenia Sam Date & Time created: 5/5/2018  11:12 AM     Interval History:  epiduarl spinal abcess and s/p drainage    Review of Systems:  Review of Systems   Constitutional: Negative for chills, diaphoresis and fever.   HENT: Negative for ear pain, hearing loss and tinnitus.    Eyes: Negative for blurred vision, double vision and photophobia.   Respiratory: Negative for cough, hemoptysis and sputum production.    Cardiovascular: Negative for chest pain, palpitations and orthopnea.   Gastrointestinal: Negative for heartburn, nausea and vomiting.        HICCUPS continously   Genitourinary: Negative for dysuria, frequency and urgency.   Musculoskeletal: Positive for back pain. Negative for neck pain.   Skin: Negative for itching and rash.   Neurological: Negative for dizziness, tingling and headaches.       Physical Exam:  Physical Exam   Constitutional: He is oriented to person, place, and time. No distress.   HENT:   Right Ear: External ear normal.   Left Ear: External ear normal.   Eyes: Conjunctivae are normal. Pupils are equal, round, and reactive to light.   Neck: Normal range of motion. Neck supple.   Cardiovascular: Normal rate and regular rhythm.    Pulmonary/Chest: Effort normal and breath sounds normal.   Abdominal: Soft. Bowel sounds are normal.   Musculoskeletal: He exhibits no edema or tenderness.   Neurological: He is alert and oriented to person, place, and time.   Skin: Skin is warm and dry. He is not diaphoretic.       Labs:        Invalid input(s): [id number]      Recent Labs      05/03/18   0411   SODIUM  138   POTASSIUM  3.5*   CHLORIDE  105   CO2  26   BUN  15   CREATININE  0.58   MAGNESIUM  1.8   PHOSPHORUS  2.2*   CALCIUM  7.8*     Recent Labs      05/03/18   0411   GLUCOSE  106*     Recent Labs      05/03/18   0411  05/04/18   0415  05/05/18   0536   RBC  3.32*  3.44*  3.74*   HEMOGLOBIN  11.2*  11.5*  12.5*    HEMATOCRIT  31.7*  32.6*  34.8*   PLATELETCT  194  209  235     Recent Labs      18   0411  18   0415  18   0536   WBC  6.2  7.0  6.8           Hemodynamics:  Temp (24hrs), Av.3 °C (99.1 °F), Min:36.8 °C (98.3 °F), Max:37.6 °C (99.7 °F)  Temperature: 36.8 °C (98.3 °F)  Pulse  Av  Min: 54  Max: 95   Blood Pressure : 152/70     Respiratory:    Respiration: 16, Pulse Oximetry: 95 %        RUL Breath Sounds: Clear, RML Breath Sounds: Clear, RLL Breath Sounds: Diminished, DESTINI Breath Sounds: Clear, LLL Breath Sounds: Diminished  Fluids:    Intake/Output Summary (Last 24 hours) at 18 1112  Last data filed at 18 0600   Gross per 24 hour   Intake              360 ml   Output              700 ml   Net             -340 ml        GI/Nutrition:  Orders Placed This Encounter   Procedures   • DIET ORDER     Standing Status:   Standing     Number of Occurrences:   1     Order Specific Question:   Diet:     Answer:   Regular [1]     Medical Decision Making, by Problem:  Active Hospital Problems    Diagnosis   • *Spinal epidural abscess [G06.1]  neurosurgey input is noted  s/p drainage  I.D input is noted  On rocephin  Pt has changed his mind and has decided to go home instead  D/w the staff and awaiting arrangements for iv antibiotic infusion  As an outpt     • Gram-positive bacteremia [R78.81]  I.D input is noted  Rocephin  s/p picc line placement     • Hiccups [R06.6]  continous  Changed baclofen to 5mg tid     • Hypokalemia [E87.6]  Has resolved     • Postoperative anemia [D64.9]  Mild  No intervention is needed     • Apnea [R06.81]  outpt f/u     • Hypertension [I10]  losartan       Quality-Core Measures   Gunter catheter::  No Gunter  DVT prophylaxis pharmacological::  Heparin

## 2018-05-05 NOTE — PROGRESS NOTES
Dr. Jhaveri on call. MD made aware of patient's PICC without adequate blood return. New order noted and carried out for PRN Cathflow. Patient alert and made aware.

## 2018-05-05 NOTE — DISCHARGE PLANNING
Spoke with Dr. Loaiza who stated that patient either needs to come to [facility] in [place] or go to SNF, [facility] is not an option since patient does not have a physician there to take his case.

## 2018-05-05 NOTE — CARE PLAN
Problem: Safety  Goal: Will remain free from injury  Outcome: PROGRESSING AS EXPECTED  No accidents or injuries noted. Checked often for safety. Encouraged to ask for assist before getting out of bed. Cooperative toward safety plan of care. Call button and personal items within reach.

## 2018-05-05 NOTE — PROGRESS NOTES
Patient continues to have hiccups, refusing baclofen stating that it does not work , doctor was made aware of this in am

## 2018-05-05 NOTE — PROGRESS NOTES
Alert to staff and able to make needs known. No distress noted in general condition. Vital signs stable. No complaints of pain or discomfort. Checked often for safety and level of comfort. Eating and drinking well. Continent individual with urinal at bedside; good urine output noted. Cooperative toward staff and current plan of care. PICC line intact. Call button and personal items within reach. Uneventful evening at present. Needs attended in timely manner.

## 2018-05-05 NOTE — DISCHARGE PLANNING
DIANA notified by Hospitalist [facility] that Dr. Conde to call provided number ([phone]) to offer report to [facility].     Once MD to MD report is completed SW to move forward with coordinating infusion.

## 2018-05-05 NOTE — PROGRESS NOTES
Neurosurgery Progress Note    Subjective:  No overnight events.   Ambulatory.   No BM yet, voiding.   Positive blood cultures. Plan for 6 weeks of IV abx per ID.  PICC line in, working on outpatient infusions for abx   VSS- no fevers, chills, nausea, vomiting.   No new increases in pain, numbness, tingling.     Exam:  VSS  A&Ox4, NAD  NM: 5/5 hip flexion, knee extension, knee flexion, plantar flexion, dorsiflexion, EHL  Sensation intact and equal throughout all four extremities.   Abdomen: soft, non-tender  Non-labored breathing on room air, normal respiratory effort  Capillary refill in all four extremities <2 seconds  No LE edema, erythema, cyanosis, clubbing  Calves non-tender to compression bilat  Incision CDI, well healed, no evidence of infection. Minimal edema WNL at this stage post operatively, no halo sign       BP  Min: 157/85  Max: 182/92  Pulse  Av  Min: 56  Max: 95  Resp  Av  Min: 16  Max: 16  Temp  Av.4 °C (99.4 °F)  Min: 37.2 °C (98.9 °F)  Max: 37.6 °C (99.7 °F)  SpO2  Av %  Min: 93 %  Max: 95 %    No Data Recorded    Recent Labs      18   0411  18   0415  18   0536   WBC  6.2  7.0  6.8   RBC  3.32*  3.44*  3.74*   HEMOGLOBIN  11.2*  11.5*  12.5*   HEMATOCRIT  31.7*  32.6*  34.8*   MCV  95.5  94.8  93.0   MCH  33.7*  33.4*  33.4*   MCHC  35.3  35.3  35.9*   RDW  42.2  42.2  41.1   PLATELETCT  194  209  235   MPV  8.8*  8.7*  9.2     Recent Labs      18   0411   SODIUM  138   POTASSIUM  3.5*   CHLORIDE  105   CO2  26   GLUCOSE  106*   BUN  15   CREATININE  0.58   CALCIUM  7.8*               Intake/Output       18 - 18 0659 18 - 18 Total [id number] Total       Intake    P.O.  --  360 360  --  -- --    P.O. -- 360 360 -- -- --    Total Intake -- 360 360 -- -- --       Output    Urine  300  700 1000  --  -- --    Number of Times Voided 1 x -- 1 x -- -- --    Urine Void (mL) (non-catheter)   -- -- --    Stool  --  -- --  --  -- --    Number of Times Stooled 0 x -- 0 x -- -- --    Total Output  -- -- --       Net I/O     -300 -340 -640 -- -- --            Intake/Output Summary (Last 24 hours) at 05/05/18 0902  Last data filed at 05/05/18 0600   Gross per 24 hour   Intake              360 ml   Output             1000 ml   Net             -640 ml            • alteplase  2 mg PRN   • baclofen  10 mg TID   • heparin  5,000 Units Q8HRS   • cefTRIAXone (ROCEPHIN) IV  2 g Q24HRS   • normal saline PF  10-20 mL PRN   • HYDROcodone/acetaminophen  1-2 Tab Q4HRS PRN   • losartan  100 mg Q DAY   • omeprazole  20 mg BID   • mag hydrox-al hydrox-simeth  10 mL 4X/DAY PRN   • NS  30 mL/kg Once PRN   • senna-docusate  2 Tab BID    And   • polyethylene glycol/lytes  1 Packet QDAY PRN    And   • magnesium hydroxide  30 mL QDAY PRN    And   • bisacodyl  10 mg QDAY PRN   • Respiratory Care per Protocol   Continuous RT   • NS   Continuous   • NS  1,000 mL Once PRN   • acetaminophen  650 mg Q6HRS PRN   • Pharmacy Consult Request   PRN    And   • oxyCODONE immediate-release  10 mg Q3HRS PRN    And   • morphine injection  4 mg Q3HRS PRN   • ondansetron  4 mg Q4HRS PRN   • ondansetron  4 mg Q4HRS PRN       Assessment:  POD #6 L4-S1 laminectomy, extradural resection of mass  Prophylactic anticoagulation: yes, ok for heparin.      Plan:  Increase ambulation, work with therapies.  Continue pain control  IS every hour  Positive blood cultures, ID following. Plan for 6 weeks of IV abx. Has PICC line, will need outpatient infusions  Continue stool softeners to encourage BM. Recommend suppository. Bowel protocol per Primary team.   Ok to d/c to home with HH from neurosurgical standpoint once cleared by PT/OT, primary team.  Needs f/u in 2 weeks.     Will follow peripherally while patient here.     Case discussed with patient, Dr. Herron.

## 2018-05-05 NOTE — CARE PLAN
Problem: Pain Management  Goal: Pain level will decrease to patient's comfort goal    Intervention: Follow pain managment plan developed in collaboration with patient and Interdisciplinary Team  Able to express need for pain management intervention as needed. Checked often for level of comfort.

## 2018-05-05 NOTE — PROGRESS NOTES
Notifed Dr. Loaiza, who is on for ID this weekend, to please contact [facility] [phone] to do MD to MD report so that we can arrange OP IV ABX for this patient in [place].

## 2018-05-05 NOTE — DISCHARGE SUMMARY
CHIEF COMPLAINT ON ADMISSION  No chief complaint on file.      CODE STATUS  Full Code    HPI & HOSPITAL COURSE  This is a 73 y.o. male here with  low back pain for the past 5 days. Patient reports progressively worsening low back pain with radiation down to his right leg. Pain is worse with movement. He presented to an outlying facility where he underwent an MRI lumbar spine that revealed possible epidural abscess the level of L4-L5. He was found to have ESR 60, CRP 13.6 and WBC count of 12.6 at [facility]. He was transferred to [facility] for neurosurgical consultation.   He was admitted and initially was started on vancomycin and cefepime.  He was taken to the O.R  And  Had  L4, L5, and S1 decompressive laminectomy,   bilateral medial facetectomies and bilateral foraminotomies and extradural   resection of a mass with microdissection.  He was seen by I.D  And  Recommended to continue with current antibiotics.His leukocytosis resolved and his culture showed OSH Bcx reportedly with GPC.  His antibiotics were changed to rocephin and the last day would be 6/11/2018.he also has received a picc line  He is also noted to have hiccups that has been going on for the past 1 week.his baclofen was changed to tid with increased dose and he stated that he is a bit better.I have increased the baclofen to 15mg tid and will see if that would help more or ythe pt needs higher dose.  The patient met 2-midnight criteria for an inpatient stay at the time of discharge.    Therefore, he is discharged in fair and stable condition with close outpatient follow-up.    SPECIFIC OUTPATIENT FOLLOW-UP  pcp this coming week  neurosurgery    DISCHARGE PROBLEM LIST  Principal Problem:    Spinal epidural abscess POA: Yes  Active Problems:    Gram-positive bacteremia POA: Yes    Hypertension POA: Yes    Apnea POA: Yes    Hypokalemia POA: Unknown    Postoperative anemia POA: Unknown    Hiccups POA: Unknown  Resolved Problems:    * No  resolved hospital problems. *      FOLLOW UP  No future appointments.  No follow-up provider specified.    MEDICATIONS ON DISCHARGE   Yunier Denney   Home Medication Instructions ALFREDO:[id number]    Printed on:05/04/18 6450   Medication Information                      aspirin (ASA) 325 MG Tab  Take 325 mg by mouth every day.             baclofen (LIORESAL) 10 MG Tab  Take 1.5 Tabs by mouth 3 times a day.             cefTRIAXone (ROCEPHIN)  20 mL by Intravenous route every 24 hours.             HYDROcodone/acetaminophen (NORCO)  MG Tab  Take 1-2 Tabs by mouth every four hours as needed for up to 3 days.             losartan (COZAAR) 100 MG Tab  Take 100 mg by mouth every day.                 DIET  Orders Placed This Encounter   Procedures   • DIET ORDER     Standing Status:   Standing     Number of Occurrences:   1     Order Specific Question:   Diet:     Answer:   Regular [1]       ACTIVITY  As tolerated and directed by skilled nursing.  Weight bearing as tolerated      CONSULTATIONS  Neurosurgery  I.D    PROCEDURES  L4, L5, and S1 decompressive laminectomy,   bilateral medial facetectomies and bilateral foraminotomies and extradural   resection of a mass with microdissection.    LABORATORY  Lab Results   Component Value Date/Time    SODIUM 138 05/03/2018 04:11 AM    POTASSIUM 3.5 (L) 05/03/2018 04:11 AM    CHLORIDE 105 05/03/2018 04:11 AM    CO2 26 05/03/2018 04:11 AM    GLUCOSE 106 (H) 05/03/2018 04:11 AM    BUN 15 05/03/2018 04:11 AM    CREATININE 0.58 05/03/2018 04:11 AM        Lab Results   Component Value Date/Time    WBC 6.8 05/05/2018 05:36 AM    HEMOGLOBIN 12.5 (L) 05/05/2018 05:36 AM    HEMATOCRIT 34.8 (L) 05/05/2018 05:36 AM    PLATELETCT 235 05/05/2018 05:36 AM      5/5/18  Pt is seen at the bed side and does not want to go to SNF and wants to go home instead.    Total time of the discharge process exceeds 36 minutes

## 2018-05-06 LAB
ERYTHROCYTE [DISTWIDTH] IN BLOOD BY AUTOMATED COUNT: 43.7 FL (ref 35.9–50)
HCT VFR BLD AUTO: 37.3 % (ref 42–52)
HGB BLD-MCNC: 12.6 G/DL (ref 14–18)
MCH RBC QN AUTO: 32.6 PG (ref 27–33)
MCHC RBC AUTO-ENTMCNC: 33.8 G/DL (ref 33.7–35.3)
MCV RBC AUTO: 96.4 FL (ref 81.4–97.8)
PLATELET # BLD AUTO: 268 K/UL (ref 164–446)
PMV BLD AUTO: 8.7 FL (ref 9–12.9)
RBC # BLD AUTO: 3.87 M/UL (ref 4.7–6.1)
WBC # BLD AUTO: 7.7 K/UL (ref 4.8–10.8)

## 2018-05-06 PROCEDURE — 700111 HCHG RX REV CODE 636 W/ 250 OVERRIDE (IP): Performed by: INTERNAL MEDICINE

## 2018-05-06 PROCEDURE — 700111 HCHG RX REV CODE 636 W/ 250 OVERRIDE (IP): Performed by: PHYSICIAN ASSISTANT

## 2018-05-06 PROCEDURE — 700102 HCHG RX REV CODE 250 W/ 637 OVERRIDE(OP): Performed by: INTERNAL MEDICINE

## 2018-05-06 PROCEDURE — 770001 HCHG ROOM/CARE - MED/SURG/GYN PRIV*

## 2018-05-06 PROCEDURE — G8997 SWALLOW GOAL STATUS: HCPCS | Mod: CH

## 2018-05-06 PROCEDURE — G8996 SWALLOW CURRENT STATUS: HCPCS | Mod: CI

## 2018-05-06 PROCEDURE — A9270 NON-COVERED ITEM OR SERVICE: HCPCS | Performed by: INTERNAL MEDICINE

## 2018-05-06 PROCEDURE — A9270 NON-COVERED ITEM OR SERVICE: HCPCS | Mod: JG | Performed by: FAMILY MEDICINE

## 2018-05-06 PROCEDURE — 85027 COMPLETE CBC AUTOMATED: CPT

## 2018-05-06 PROCEDURE — 700102 HCHG RX REV CODE 250 W/ 637 OVERRIDE(OP): Mod: JG | Performed by: FAMILY MEDICINE

## 2018-05-06 PROCEDURE — A9270 NON-COVERED ITEM OR SERVICE: HCPCS | Performed by: PHYSICIAN ASSISTANT

## 2018-05-06 PROCEDURE — 92610 EVALUATE SWALLOWING FUNCTION: CPT

## 2018-05-06 PROCEDURE — 99231 SBSQ HOSP IP/OBS SF/LOW 25: CPT | Performed by: FAMILY MEDICINE

## 2018-05-06 PROCEDURE — 700111 HCHG RX REV CODE 636 W/ 250 OVERRIDE (IP): Performed by: FAMILY MEDICINE

## 2018-05-06 PROCEDURE — 700102 HCHG RX REV CODE 250 W/ 637 OVERRIDE(OP): Performed by: PHYSICIAN ASSISTANT

## 2018-05-06 RX ADMIN — BACLOFEN 10 MG: 10 TABLET ORAL at 08:33

## 2018-05-06 RX ADMIN — ONDANSETRON 4 MG: 4 TABLET, ORALLY DISINTEGRATING ORAL at 08:25

## 2018-05-06 RX ADMIN — LOSARTAN POTASSIUM 100 MG: 50 TABLET, FILM COATED ORAL at 08:34

## 2018-05-06 RX ADMIN — STANDARDIZED SENNA CONCENTRATE AND DOCUSATE SODIUM 2 TABLET: 8.6; 5 TABLET, FILM COATED ORAL at 08:33

## 2018-05-06 RX ADMIN — HEPARIN SODIUM 5000 UNITS: 5000 INJECTION, SOLUTION INTRAVENOUS; SUBCUTANEOUS at 20:12

## 2018-05-06 RX ADMIN — MORPHINE SULFATE 4 MG: 4 INJECTION INTRAVENOUS at 05:11

## 2018-05-06 RX ADMIN — HEPARIN SODIUM 5000 UNITS: 5000 INJECTION, SOLUTION INTRAVENOUS; SUBCUTANEOUS at 05:11

## 2018-05-06 RX ADMIN — HYDRALAZINE HYDROCHLORIDE 10 MG: 20 INJECTION INTRAMUSCULAR; INTRAVENOUS at 03:31

## 2018-05-06 RX ADMIN — BACLOFEN 10 MG: 10 TABLET ORAL at 20:12

## 2018-05-06 RX ADMIN — ONDANSETRON 4 MG: 2 INJECTION, SOLUTION INTRAMUSCULAR; INTRAVENOUS at 13:53

## 2018-05-06 RX ADMIN — OMEPRAZOLE 20 MG: 20 CAPSULE, DELAYED RELEASE ORAL at 08:33

## 2018-05-06 RX ADMIN — ONDANSETRON 4 MG: 2 INJECTION, SOLUTION INTRAMUSCULAR; INTRAVENOUS at 01:43

## 2018-05-06 RX ADMIN — STANDARDIZED SENNA CONCENTRATE AND DOCUSATE SODIUM 2 TABLET: 8.6; 5 TABLET, FILM COATED ORAL at 20:12

## 2018-05-06 RX ADMIN — ONDANSETRON 4 MG: 2 INJECTION, SOLUTION INTRAMUSCULAR; INTRAVENOUS at 08:33

## 2018-05-06 RX ADMIN — ACETAMINOPHEN 162.5 MG: 325 TABLET, FILM COATED ORAL at 01:47

## 2018-05-06 RX ADMIN — MORPHINE SULFATE 4 MG: 4 INJECTION INTRAVENOUS at 13:01

## 2018-05-06 RX ADMIN — ONDANSETRON 4 MG: 2 INJECTION, SOLUTION INTRAMUSCULAR; INTRAVENOUS at 08:35

## 2018-05-06 RX ADMIN — ALUMINUM HYDROXIDE, MAGNESIUM HYDROXIDE,SIMETHICONE 10 ML: 400; 400; 40 LIQUID ORAL at 01:36

## 2018-05-06 RX ADMIN — BACLOFEN 10 MG: 10 TABLET ORAL at 13:53

## 2018-05-06 RX ADMIN — CEFTRIAXONE 2 G: 2 INJECTION, POWDER, FOR SOLUTION INTRAMUSCULAR; INTRAVENOUS at 08:44

## 2018-05-06 RX ADMIN — MORPHINE SULFATE 4 MG: 4 INJECTION INTRAVENOUS at 01:43

## 2018-05-06 RX ADMIN — OMEPRAZOLE 20 MG: 20 CAPSULE, DELAYED RELEASE ORAL at 20:12

## 2018-05-06 RX ADMIN — ONDANSETRON 4 MG: 2 INJECTION, SOLUTION INTRAMUSCULAR; INTRAVENOUS at 20:12

## 2018-05-06 RX ADMIN — HEPARIN SODIUM 5000 UNITS: 5000 INJECTION, SOLUTION INTRAVENOUS; SUBCUTANEOUS at 13:52

## 2018-05-06 ASSESSMENT — PAIN SCALES - GENERAL
PAINLEVEL_OUTOF10: 4
PAINLEVEL_OUTOF10: 8
PAINLEVEL_OUTOF10: 6
PAINLEVEL_OUTOF10: 2
PAINLEVEL_OUTOF10: 6
PAINLEVEL_OUTOF10: 8

## 2018-05-06 ASSESSMENT — ENCOUNTER SYMPTOMS
DIAPHORESIS: 0
TINGLING: 0
DIZZINESS: 0
CLAUDICATION: 0
EYE DISCHARGE: 0
VOMITING: 0
PHOTOPHOBIA: 0
HEARTBURN: 1
WEAKNESS: 1
ABDOMINAL PAIN: 0
EYE PAIN: 0
HEMOPTYSIS: 0
WHEEZING: 0
SPUTUM PRODUCTION: 0
NECK PAIN: 0
BACK PAIN: 1
ORTHOPNEA: 0
HEADACHES: 0

## 2018-05-06 NOTE — DISCHARGE PLANNING
Medical Social Worker    Pt discussed in rounds. Per attending MD, pt will require outpatient IV infusion set up. This will have to be completed by Neuro Unit SW on Monday.

## 2018-05-06 NOTE — PROGRESS NOTES
Hospital Medicine Progress Note, Adult, Complex               Author: Yessenia Sam Date & Time created: 5/6/2018  9:35 AM     Interval History:  epiduarl spinal abcess and s/p drainage    Review of Systems:  Review of Systems   Constitutional: Negative for diaphoresis.   HENT: Negative for ear discharge, ear pain and nosebleeds.    Eyes: Negative for photophobia, pain and discharge.   Respiratory: Negative for hemoptysis, sputum production and wheezing.    Cardiovascular: Negative for orthopnea, claudication and leg swelling.   Gastrointestinal: Positive for heartburn. Negative for abdominal pain and vomiting.        HICCUPS continously   Genitourinary: Negative for frequency, hematuria and urgency.   Musculoskeletal: Positive for back pain. Negative for neck pain.   Skin: Negative for itching and rash.   Neurological: Positive for weakness. Negative for dizziness, tingling and headaches.       Physical Exam:  Physical Exam   Constitutional: He is oriented to person, place, and time. He appears well-developed and well-nourished.   HENT:   Head: Normocephalic and atraumatic.   No stridor   Eyes: Right eye exhibits no discharge. Left eye exhibits no discharge.   Neck: No tracheal deviation present. No thyromegaly present.   Cardiovascular: Normal heart sounds.    Pulmonary/Chest: Effort normal and breath sounds normal.   Abdominal: He exhibits no distension. There is no tenderness. There is no rebound.   Musculoskeletal: He exhibits no edema or tenderness.   Neurological: He is alert and oriented to person, place, and time.   Skin: Skin is warm and dry.       Labs:        Invalid input(s): [id number]      No results for input(s): SODIUM, POTASSIUM, CHLORIDE, CO2, BUN, CREATININE, MAGNESIUM, PHOSPHORUS, CALCIUM in the last 72 hours.  No results for input(s): ALTSGPT, ASTSGOT, ALKPHOSPHAT, TBILIRUBIN, DBILIRUBIN, GAMMAGT, AMYLASE, LIPASE, ALB, PREALBUMIN, GLUCOSE in the last 72 hours.  Recent Labs       18   0415  18   0536  18   0530   RBC  3.44*  3.74*  3.87*   HEMOGLOBIN  11.5*  12.5*  12.6*   HEMATOCRIT  32.6*  34.8*  37.3*   PLATELETCT  209  235  268     Recent Labs      18   0415  18   0536  18   0530   WBC  7.0  6.8  7.7           Hemodynamics:  Temp (24hrs), Av.1 °C (98.7 °F), Min:36.7 °C (98 °F), Max:37.7 °C (99.8 °F)  Temperature: 37.1 °C (98.8 °F)  Pulse  Av.8  Min: 54  Max: 95   Blood Pressure : 152/70     Respiratory:    Respiration: 16, Pulse Oximetry: 94 %        RUL Breath Sounds: Clear, RML Breath Sounds: Clear, RLL Breath Sounds: Diminished, DESTINI Breath Sounds: Clear, LLL Breath Sounds: Diminished  Fluids:    Intake/Output Summary (Last 24 hours) at 18 0935  Last data filed at 18 1145   Gross per 24 hour   Intake                0 ml   Output              300 ml   Net             -300 ml     Weight: 81 kg (178 lb 9.2 oz)  GI/Nutrition:  Orders Placed This Encounter   Procedures   • DIET ORDER     Standing Status:   Standing     Number of Occurrences:   1     Order Specific Question:   Diet:     Answer:   Regular [1]     Medical Decision Making, by Problem:  Active Hospital Problems    Diagnosis   • *Spinal epidural abscess [G06.1]  neurosurgey input is noted  s/p drainage  I.D input is noted  On rocephin  Pt has changed his mind and has decided to go home instead  Awaiting for the arrnagements to be made for infusion center  D/w the staff and awaiting arrangements for iv antibiotic infusion  As an outpt     • Gram-positive bacteremia [R78.81]  I.D input is noted  Rocephin  s/p picc line placement     • Hiccups [R06.6]  Pt was better but decided to not take the medication  He has agreed to take the baclofen  He also complained of having swallow problems with food and will  Get speech therapist to see him  Pt was seen with the nurse in the room     • Hypokalemia [E87.6]  Has resolved     • Postoperative anemia [D64.9]  Mild  No intervention is  needed     • Apnea [R06.81]  outpt f/u     • Hypertension [I10]  losartan       Quality-Core Measures   Gunter catheter::  No Gunter  DVT prophylaxis pharmacological::  Heparin

## 2018-05-06 NOTE — THERAPY
"  Speech Language Therapy Clinical Swallow Evaluation completed.  Functional Status: Patient was seen this date for CSE in the setting of continual hiccups and reported difficulty with swallowing. Patient stated that difficulty with swallowing is related to the hiccups. He feels that foods get stuck in his throat when he is trying to swallow and then has the hiccups. He was noted to have them consistently every few seconds throughout the eval. Oral mech exam was essentially WFL. Patient has poor dentition and self-reported that he has difficulty chewing foods. He was observed while eating his lunch tray which consisted of pureed consistencies, soft mechanicals, and thin liquids. Patient did not demonstrate any s/sx concerning for aspiration during the evaluation. He demonstrated episodes where he appeared to be in some sort of \"spasm\" related to the hiccups where he would gasp for air. This was unrelated to eating. He would move through these episodes in about 2-3 seconds. Of note, patient found to have hiatal hernia which may be related as well as current neurological incidents. Question the possibility of paradoxical vocal fold movements. Trials of breath support exercises helped relieve hiccups for short periods of about 10-15 seconds. Unclear if breath support exercises would help patient in the long term, but may help to relieve some discomfort in the present. Additional, recommended that patient avoid foods that he feels are difficult, including breads, large bites of meats, and other foods that require significant mastication. Recommended a Dysphagia 2 diet with thin liquids and patient was in agreement.    Recommendations - Diet: Diet / Liquid Recommendation: Dysphagia II, Thin Liquid                          Strategies: Head of Bed at 90 Degrees                          Medication Administration: Medication Administration : (P) Float Whole with Puree, Whole with Liquid Wash (Based on patient " "preference)    Plan of Care: Will benefit from Speech Therapy 3 times per week    Post-Acute Therapy: Discharge to home with outpatient or home health for additional skilled therapy services.    See \"Rehab Therapy-Acute\" Patient Summary Report for complete documentation.   "

## 2018-05-06 NOTE — PROGRESS NOTES
Patient care assumed at 0730, bedside report given by Matt SALCEDO and Grayson. Patient a/0x4, RA, resting comfortably. Plan discussed to discharge home with PICC for IV abx at infusion center. Discussed POC and plan for pain management on PO meds vs. IV for discharge.

## 2018-05-06 NOTE — PROGRESS NOTES
Pt states that he does not want to take oral pain meds because he believes they may be causing his hiccups.  Explained to pt that administering IV morphine may delay his discharge.  Pt verbalizes understanding and insists on taking IV morphine.      Spoke with Dr. Jhaveri at 2150 to report that the pt's blood pressure was 182/88.  Dr. Jhaveri ordered 10 mg of hydralazine every 4 hours for systolic blood pressure greater than 160.  Order read back to MD.

## 2018-05-07 VITALS
BODY MASS INDEX: 23.67 KG/M2 | TEMPERATURE: 99.2 F | HEART RATE: 65 BPM | HEIGHT: 73 IN | SYSTOLIC BLOOD PRESSURE: 135 MMHG | RESPIRATION RATE: 16 BRPM | WEIGHT: 178.57 LBS | DIASTOLIC BLOOD PRESSURE: 84 MMHG | OXYGEN SATURATION: 95 %

## 2018-05-07 LAB
ERYTHROCYTE [DISTWIDTH] IN BLOOD BY AUTOMATED COUNT: 43.9 FL (ref 35.9–50)
HCT VFR BLD AUTO: 36.4 % (ref 42–52)
HGB BLD-MCNC: 12.5 G/DL (ref 14–18)
MCH RBC QN AUTO: 33.2 PG (ref 27–33)
MCHC RBC AUTO-ENTMCNC: 34.3 G/DL (ref 33.7–35.3)
MCV RBC AUTO: 96.8 FL (ref 81.4–97.8)
PLATELET # BLD AUTO: 306 K/UL (ref 164–446)
PMV BLD AUTO: 8.7 FL (ref 9–12.9)
RBC # BLD AUTO: 3.76 M/UL (ref 4.7–6.1)
WBC # BLD AUTO: 7.7 K/UL (ref 4.8–10.8)

## 2018-05-07 PROCEDURE — 97535 SELF CARE MNGMENT TRAINING: CPT

## 2018-05-07 PROCEDURE — 700102 HCHG RX REV CODE 250 W/ 637 OVERRIDE(OP): Performed by: PHYSICIAN ASSISTANT

## 2018-05-07 PROCEDURE — 700102 HCHG RX REV CODE 250 W/ 637 OVERRIDE(OP): Performed by: INTERNAL MEDICINE

## 2018-05-07 PROCEDURE — 700102 HCHG RX REV CODE 250 W/ 637 OVERRIDE(OP): Mod: JG | Performed by: FAMILY MEDICINE

## 2018-05-07 PROCEDURE — 700111 HCHG RX REV CODE 636 W/ 250 OVERRIDE (IP): Performed by: INTERNAL MEDICINE

## 2018-05-07 PROCEDURE — 99239 HOSP IP/OBS DSCHRG MGMT >30: CPT | Performed by: FAMILY MEDICINE

## 2018-05-07 PROCEDURE — A9270 NON-COVERED ITEM OR SERVICE: HCPCS | Performed by: INTERNAL MEDICINE

## 2018-05-07 PROCEDURE — A9270 NON-COVERED ITEM OR SERVICE: HCPCS | Mod: JG | Performed by: FAMILY MEDICINE

## 2018-05-07 PROCEDURE — 85027 COMPLETE CBC AUTOMATED: CPT

## 2018-05-07 PROCEDURE — A9270 NON-COVERED ITEM OR SERVICE: HCPCS | Performed by: PHYSICIAN ASSISTANT

## 2018-05-07 PROCEDURE — 700111 HCHG RX REV CODE 636 W/ 250 OVERRIDE (IP): Performed by: PHYSICIAN ASSISTANT

## 2018-05-07 RX ORDER — BACLOFEN 10 MG/1
15 TABLET ORAL 3 TIMES DAILY
Qty: 20 TAB | Refills: 0 | Status: SHIPPED | OUTPATIENT
Start: 2018-05-07 | End: 2018-05-16

## 2018-05-07 RX ORDER — 0.9 % SODIUM CHLORIDE 0.9 %
10-20 VIAL (ML) INJECTION PRN
Status: CANCELLED | OUTPATIENT
Start: 2018-05-08

## 2018-05-07 RX ORDER — 0.9 % SODIUM CHLORIDE 0.9 %
5 VIAL (ML) INJECTION PRN
Status: CANCELLED | OUTPATIENT
Start: 2018-05-08

## 2018-05-07 RX ADMIN — HYDROCODONE BITARTRATE AND ACETAMINOPHEN 0.5 TABLET: 10; 325 TABLET ORAL at 15:59

## 2018-05-07 RX ADMIN — LOSARTAN POTASSIUM 100 MG: 50 TABLET, FILM COATED ORAL at 07:40

## 2018-05-07 RX ADMIN — CEFTRIAXONE 2 G: 2 INJECTION, POWDER, FOR SOLUTION INTRAMUSCULAR; INTRAVENOUS at 07:40

## 2018-05-07 RX ADMIN — OMEPRAZOLE 20 MG: 20 CAPSULE, DELAYED RELEASE ORAL at 07:40

## 2018-05-07 RX ADMIN — ONDANSETRON 4 MG: 4 TABLET, ORALLY DISINTEGRATING ORAL at 06:56

## 2018-05-07 RX ADMIN — HYDROCODONE BITARTRATE AND ACETAMINOPHEN 0.5 TABLET: 10; 325 TABLET ORAL at 01:23

## 2018-05-07 RX ADMIN — HEPARIN SODIUM 5000 UNITS: 5000 INJECTION, SOLUTION INTRAVENOUS; SUBCUTANEOUS at 06:56

## 2018-05-07 RX ADMIN — BACLOFEN 10 MG: 10 TABLET ORAL at 15:00

## 2018-05-07 RX ADMIN — HEPARIN SODIUM 5000 UNITS: 5000 INJECTION, SOLUTION INTRAVENOUS; SUBCUTANEOUS at 15:13

## 2018-05-07 RX ADMIN — ONDANSETRON 4 MG: 2 INJECTION, SOLUTION INTRAMUSCULAR; INTRAVENOUS at 01:23

## 2018-05-07 RX ADMIN — BACLOFEN 10 MG: 10 TABLET ORAL at 07:40

## 2018-05-07 RX ADMIN — HYDROCODONE BITARTRATE AND ACETAMINOPHEN 0.5 TABLET: 10; 325 TABLET ORAL at 06:56

## 2018-05-07 RX ADMIN — HYDROCODONE BITARTRATE AND ACETAMINOPHEN 0.5 TABLET: 10; 325 TABLET ORAL at 11:45

## 2018-05-07 RX ADMIN — STANDARDIZED SENNA CONCENTRATE AND DOCUSATE SODIUM 2 TABLET: 8.6; 5 TABLET, FILM COATED ORAL at 07:39

## 2018-05-07 ASSESSMENT — ENCOUNTER SYMPTOMS
BACK PAIN: 1
FEVER: 0
COUGH: 0
SHORTNESS OF BREATH: 0
CONSTIPATION: 1
CHILLS: 0

## 2018-05-07 ASSESSMENT — PAIN SCALES - GENERAL
PAINLEVEL_OUTOF10: 6
PAINLEVEL_OUTOF10: 6
PAINLEVEL_OUTOF10: 7
PAINLEVEL_OUTOF10: 4
PAINLEVEL_OUTOF10: 7
PAINLEVEL_OUTOF10: 6

## 2018-05-07 ASSESSMENT — COGNITIVE AND FUNCTIONAL STATUS - GENERAL
SUGGESTED CMS G CODE MODIFIER DAILY ACTIVITY: CK
TOILETING: A LITTLE
DAILY ACTIVITIY SCORE: 17
DRESSING REGULAR LOWER BODY CLOTHING: A LOT
PERSONAL GROOMING: A LITTLE
DRESSING REGULAR UPPER BODY CLOTHING: A LITTLE
HELP NEEDED FOR BATHING: A LOT

## 2018-05-07 NOTE — CARE PLAN
Problem: Safety  Goal: Will remain free from falls  Outcome: PROGRESSING AS EXPECTED  Assessed the patient for fall risk factors. Provided education regarding the need to call for assistance. Bed alarm in place, bed in lowest position, call light in reach.      Problem: Pain Management  Goal: Pain level will decrease to patient's comfort goal  Outcome: PROGRESSING AS EXPECTED  Discussed pain management POC with pt.  Assessed pain Q2hr.  Administered Baclofen as scheduled.

## 2018-05-07 NOTE — DISCHARGE PLANNING
Medical Social Work    Referral:  F/U    Intervention:  SW met with pt bedside. Pt was given OP Infusion appointment information for tomorrow at 5:30 pm in the [facility] (ground floor)     Bedside RN, Kaylie notified.    Plan:  SW will remain available for dc planning

## 2018-05-07 NOTE — THERAPY
"Occupational Therapy Treatment completed with focus on ADLs, ADL transfers and patient education.  Functional Status:  Pt seen for OT tx today.  Pt was pleasant and motivated during the session.  Continues to be limited by safety and higher level IADLs.  Pt completed toileting with supervision, LB dressing seated mod I, and UB dressing I, seated shower with supervision.  Needing occasional cues to initiate but good follow through and problem solve during ADL tasks.  Pt completed sit to stand and room ambulation using FWW from bed to toilet with supervision.  Pt declined instruction on tub transfer.  Pt would benefit from continued home health OT as they are continuing to need assistance with endurance and IADLs.  Pt states he will be returning home with help from son and has friends available.    Plan of Care: Will benefit from Occupational Therapy 3 times per week  Discharge Recommendations:  Equipment Will Continue to Assess for Equipment Needs.    See \"Rehab Therapy-Acute\" Patient Summary Report for complete documentation.   "

## 2018-05-07 NOTE — DISCHARGE PLANNING
Received choice form from Margaret(DIANA). Scheduled patient's appointment at [facility] tomorrow at 5:30. Margaret(DIANA) notified.

## 2018-05-07 NOTE — DISCHARGE INSTRUCTIONS
Discharge Instructions    Discharged to home by car with relative. Discharged via wheelchair, hospital escort: Yes.  Special equipment needed: N/A, pt's son has front wheeled walker at home    Be sure to schedule a follow-up appointment with your primary care doctor or any specialists as instructed.     Discharge Plan:   Pneumococcal Vaccine Administered/Refused: Not given - Patient refused pneumococcal vaccine  Influenza Vaccine Indication: Patient Refuses    I understand that a diet low in cholesterol, fat, and sodium is recommended for good health. Unless I have been given specific instructions below for another diet, I accept this instruction as my diet prescription.   Other diet: dysphagia 2    Special Instructions: follow up with outpatient infusion center, and make appointment with Dr. Herron's office for check up in 1 week    · Is patient discharged on Warfarin / Coumadin?   No     Depression / Suicide Risk    As you are discharged from this [facility] facility, it is important to learn how to keep safe from harming yourself.    Recognize the warning signs:  · Abrupt changes in personality, positive or negative- including increase in energy   · Giving away possessions  · Change in eating patterns- significant weight changes-  positive or negative  · Change in sleeping patterns- unable to sleep or sleeping all the time   · Unwillingness or inability to communicate  · Depression  · Unusual sadness, discouragement and loneliness  · Talk of wanting to die  · Neglect of personal appearance   · Rebelliousness- reckless behavior  · Withdrawal from people/activities they love  · Confusion- inability to concentrate     If you or a loved one observes any of these behaviors or has concerns about self-harm, here's what you can do:  · Talk about it- your feelings and reasons for harming yourself  · Remove any means that you might use to hurt yourself (examples: pills, rope, extension cords, firearm)  · Get professional  help from the community (Mental Health, Substance Abuse, psychological counseling)  · Do not be alone:Call your Safe Contact- someone whom you trust who will be there for you.  · Call your local CRISIS HOTLINE 018-0484 or 271-389-8639  · Call your local Children's Mobile Crisis Response Team [place] (705) 728-6835 or www.[employer]  · Call the toll free National Suicide Prevention Hotlines   · National Suicide Prevention Lifeline 504-019-URPE (1901)  · [place] Hope Line Network 800-SUICIDE (736-9501)    Laminectomy, Care After  Refer to this sheet in the next few weeks. These instructions provide you with information about caring for yourself after your procedure. Your health care provider may also give you more specific instructions. Your treatment has been planned according to current medical practices, but problems sometimes occur. Call your health care provider if you have any problems or questions after your procedure.  WHAT TO EXPECT AFTER THE PROCEDURE  After your procedure, it is common to have some pain around the incision.  HOME CARE INSTRUCTIONS  Bathing  · You may shower after the bandage (dressing) has been removed or as directed by your health care provider.  · Do not take baths, swim, or use a hot tub for 2 weeks or until your incision has healed completely. Check with your health care provider before you start any of these activities.  Incision Care  · There are many different ways to close and cover an incision, including stitches, skin glue, and adhesive strips. Follow instructions from your health care provider about:  ¨ Incision care.  ¨ Dressing changes and removal.  ¨ Incision closure removal.  · Check your incision area every day for signs of infection. Watch for:  ¨ Redness, swelling, or pain.  ¨ Fluid, blood, or pus.  Activity  · Return to your normal activities as directed by your health care provider. Ask your health care provider what activities are safe for you.  · Perform  "breathing exercises if directed by your health care provider.  · Avoid bending or twisting at your waist. Always bend at your knees.  · Do not sit for more than 20-30 minutes at a time. Lie down or walk between periods of sitting.  · Do not lift anything that is heavier than 10 lb (4.5 kg).  · Do not drive for 2 weeks after your procedure or as directed by your health care provider. You may be a passenger for 20-30 minute trips.  · Do not drive or operate heavy machinery while taking pain medicine.  General Instructions  · Take medicines only as directed by your health care provider.  · Keep all follow-up visits as directed by your health care provider. This is important.  SEEK MEDICAL CARE IF:  · You have redness, swelling, or increasing pain in the area of your incision.  · You notice a bad smell coming from the incision or dressing.  · You are not able to return to activities or perform exercises as instructed by your health care provider.  SEEK IMMEDIATE MEDICAL CARE IF:  · You develop a rash.  · You have fluid, blood, or pus coming from your incision.  · You have chills or a fever.  · You have episodes of dizziness or fainting while standing.  · You develop shortness of breath or you have difficulty breathing.  · You lose the ability to control your bladder or bowel.  · You become weak.  · You cannot use your legs.     This information is not intended to replace advice given to you by your health care provider. Make sure you discuss any questions you have with your health care provider.     Document Released: 07/07/2006 Document Revised: 05/03/2016 Document Reviewed: 12/14/2015  [employer] Interactive Patient Education ©2016 [employer]    PICC Home Guide  A peripherally inserted central catheter (PICC) is a long, thin, flexible tube that is inserted into a vein in the upper arm. It is a form of intravenous (IV) access. It is considered to be a \"central\" line because the tip of the PICC ends in a large vein in " "your chest. This large vein is called the superior vena cava (SVC). The PICC tip ends in the SVC because there is a lot of blood flow in the SVC. This allows medicines and IV fluids to be quickly distributed throughout the body. The PICC is inserted using a sterile technique by a specially trained nurse or physician. After the PICC is inserted, a chest X-ray exam is done to be sure it is in the correct place.   A PICC may be placed for different reasons, such as:  · To give medicines and liquid nutrition that can only be given through a central line. Examples are:  ¨ Certain antibiotic treatments.  ¨ Chemotherapy.  ¨ Total parenteral nutrition (TPN).  · To take frequent blood samples.  · To give IV fluids and blood products.  · If there is difficulty placing a peripheral intravenous (PIV) catheter.  If taken care of properly, a PICC can remain in place for several months. A PICC can also allow a person to go home from the hospital early. Medicine and PICC care can be managed at home by a family member or home health care team.  WHAT PROBLEMS CAN HAPPEN WHEN I HAVE A PICC?  Problems with a PICC can occasionally occur. These may include the following:  · A blood clot (thrombus) forming in or at the tip of the PICC. This can cause the PICC to become clogged. A clot-dissolving medicine called tissue plasminogen activator (tPA) can be given through the PICC to help break up the clot.  · Inflammation of the vein (phlebitis) in which the PICC is placed. Signs of inflammation may include redness, pain at the insertion site, red streaks, or being able to feel a \"cord\" in the vein where the PICC is located.  · Infection in the PICC or at the insertion site. Signs of infection may include fever, chills, redness, swelling, or pus drainage from the PICC insertion site.  · PICC movement (malposition). The PICC tip may move from its original position due to excessive physical activity, forceful coughing, sneezing, or " vomiting.  · A break or cut in the PICC. It is important to not use scissors near the PICC.  · Nerve or tendon irritation or injury during PICC insertion.  WHAT SHOULD I KEEP IN MIND ABOUT ACTIVITIES WHEN I HAVE A PICC?  · You may bend your arm and move it freely. If your PICC is near or at the bend of your elbow, avoid activity with repeated motion at the elbow.  · Rest at home for the remainder of the day following PICC line insertion.  · Avoid lifting heavy objects as instructed by your health care provider.  · Avoid using a crutch with the arm on the same side as your PICC. You may need to use a walker.  WHAT SHOULD I KNOW ABOUT MY PICC DRESSING?  · Keep your PICC bandage (dressing) clean and dry to prevent infection.  ¨ Ask your health care provider when you may shower. Ask your health care provider to teach you how to wrap the PICC when you do take a shower.  · Change the PICC dressing as instructed by your health care provider.  · Change your PICC dressing if it becomes loose or wet.  WHAT SHOULD I KNOW ABOUT PICC CARE?  · Check the PICC insertion site daily for leakage, redness, swelling, or pain.  · Do not take a bath, swim, or use hot tubs when you have a PICC. Cover PICC line with clear plastic wrap and tape to keep it dry while showering.  · Flush the PICC as directed by your health care provider. Let your health care provider know right away if the PICC is difficult to flush or does not flush. Do not use force to flush the PICC.  · Do not use a syringe that is less than 10 mL to flush the PICC.  · Never pull or tug on the PICC.  · Avoid blood pressure checks on the arm with the PICC.  · Keep your PICC identification card with you at all times.  · Do not take the PICC out yourself. Only a trained clinical professional should remove the PICC.  SEEK IMMEDIATE MEDICAL CARE IF:  · Your PICC is accidentally pulled all the way out. If this happens, cover the insertion site with a bandage or gauze dressing. Do  "not throw the PICC away. Your health care provider will need to inspect it.  · Your PICC was tugged or pulled and has partially come out. Do not  push the PICC back in.  · There is any type of drainage, redness, or swelling where the PICC enters the skin.  · You cannot flush the PICC, it is difficult to flush, or the PICC leaks around the insertion site when it is flushed.  · You hear a \"flushing\" sound when the PICC is flushed.  · You have pain, discomfort, or numbness in your arm, shoulder, or jaw on the same side as the PICC.  · You feel your heart \"racing\" or skipping beats.  · You notice a hole or tear in the PICC.  · You develop chills or a fever.  MAKE SURE YOU:   · Understand these instructions.  · Will watch your condition.  · Will get help right away if you are not doing well or get worse.     This information is not intended to replace advice given to you by your health care provider. Make sure you discuss any questions you have with your health care provider.     Document Released: 06/23/2004 Document Revised: 01/08/2016 Document Reviewed: 08/25/2014  [employer]vier Interactive Patient Education ©2016 [employer]    "

## 2018-05-07 NOTE — DISCHARGE SUMMARY
CHIEF COMPLAINT ON ADMISSION  No chief complaint on file.      CODE STATUS  Full Code    HPI & HOSPITAL COURSE  This is a 73 y.o. male here with  low back pain for the past 5 days. Patient reports progressively worsening low back pain with radiation down to his right leg. Pain is worse with movement. He presented to an outlying facility where he underwent an MRI lumbar spine that revealed possible epidural abscess the level of L4-L5. He was found to have ESR 60, CRP 13.6 and WBC count of 12.6 at [facility]. He was transferred to [facility] for neurosurgical consultation.   He was admitted and initially was started on vancomycin and cefepime.  He was taken to the O.R  And  Had  L4, L5, and S1 decompressive laminectomy,   bilateral medial facetectomies and bilateral foraminotomies and extradural   resection of a mass with microdissection.  He was seen by I.D  And  Recommended to continue with current antibiotics.His leukocytosis resolved and his culture showed OSH Bcx reportedly with GPC.  His antibiotics were changed to rocephin and the last day would be 6/11/2018.he also has received a picc line  He is also noted to have hiccups that has been going on for the past 1 week.his baclofen was changed to tid with increased dose and he stated that he is a bit better.I have increased the baclofen to 15mg tid and will see if that would help more or ythe pt needs higher dose.  The patient met 2-midnight criteria for an inpatient stay at the time of discharge.    Therefore, he is discharged in fair and stable condition with close outpatient follow-up.    SPECIFIC OUTPATIENT FOLLOW-UP  pcp this coming week  neurosurgery    DISCHARGE PROBLEM LIST  Principal Problem:    Spinal epidural abscess POA: Yes  Active Problems:    Gram-positive bacteremia POA: Yes    Hypertension POA: Yes    Apnea POA: Yes    Hypokalemia POA: Unknown    Postoperative anemia POA: Unknown    Hiccups POA: Unknown  Resolved Problems:    * No  resolved hospital problems. *      FOLLOW UP  No future appointments.  No follow-up provider specified.    MEDICATIONS ON DISCHARGE   Yunier Denney   Home Medication Instructions ALFREDO:[id number]    Printed on:05/04/18 4540   Medication Information                      aspirin (ASA) 325 MG Tab  Take 325 mg by mouth every day.             baclofen (LIORESAL) 10 MG Tab  Take 1.5 Tabs by mouth 3 times a day.             cefTRIAXone (ROCEPHIN)  20 mL by Intravenous route every 24 hours.             HYDROcodone/acetaminophen (NORCO)  MG Tab  Take 1-2 Tabs by mouth every four hours as needed for up to 3 days.             losartan (COZAAR) 100 MG Tab  Take 100 mg by mouth every day.                 DIET  Orders Placed This Encounter   Procedures   • DIET ORDER     Standing Status:   Standing     Number of Occurrences:   1     Order Specific Question:   Diet:     Answer:   Regular [1]     Order Specific Question:   Texture/Fiber modifications:     Answer:   Dysphagia 2(Pureed/Chopped)specify fluid consistency(question 6) [2]     Order Specific Question:   Consistency/Fluid modifications:     Answer:   Thin Liquids [3]       ACTIVITY  As tolerated and directed by skilled nursing.  Weight bearing as tolerated      CONSULTATIONS  Neurosurgery  I.D    PROCEDURES  L4, L5, and S1 decompressive laminectomy,   bilateral medial facetectomies and bilateral foraminotomies and extradural   resection of a mass with microdissection.    LABORATORY  Lab Results   Component Value Date/Time    SODIUM 138 05/03/2018 04:11 AM    POTASSIUM 3.5 (L) 05/03/2018 04:11 AM    CHLORIDE 105 05/03/2018 04:11 AM    CO2 26 05/03/2018 04:11 AM    GLUCOSE 106 (H) 05/03/2018 04:11 AM    BUN 15 05/03/2018 04:11 AM    CREATININE 0.58 05/03/2018 04:11 AM        Lab Results   Component Value Date/Time    WBC 7.7 05/07/2018 01:21 AM    HEMOGLOBIN 12.5 (L) 05/07/2018 01:21 AM    HEMATOCRIT 36.4 (L) 05/07/2018 01:21 AM    PLATELETCT 306 05/07/2018 01:21 AM       5/5/18  Pt is seen at the bed side and does not want to go to SNF and wants to go home instead.    5/7/2018  Pt is seen at the bed side and he states that his hiccups are much better and he was seen by speech therapist who recommended modified diet.  Once the iv antibiotic infusion is arranged then he can be discharged.  He was seen at the bed side with the nurse in the room.    Total time of the discharge process exceeds 36 minutes

## 2018-05-07 NOTE — PROGRESS NOTES
AAOX4, SO   Denies N/T  Up with SBA, FWW     Pt complaining of back and R leg pain at  6/10   Norco and baclofen given with positive results   Baclofen for hiccups   Voiding without difficulty  Bed alarm on. Call light within reach. Treaded socks and SCDs in place  Hourly rounding

## 2018-05-07 NOTE — THERAPY
Spoke w/bedside RN, pt has no acute IP PT needs before he returns home. Pt LOF at [facility] [place] [facility] was SPV for all mobility w/a FWW. RN stated pt son will be bringing him a FWW prior to DC. Pt plans to recieve outpt infusion and has declined HH/outpt PT services, as pt feels he does not need services and is capable at this level.

## 2018-05-07 NOTE — PROGRESS NOTES
Pt is AAOx4.  Moves all extremities 5/5.  Denies numbness or tingling.  Up with standby assist, steady gait.  Pt complains of 8/10 pain.  Baclofen given as scheduled.  Pt denies nausea/vomiting, but still has persistent hiccups.  Tolerating DYS 2 diet.  Pt voiding without difficulty.  Surgical incision/dressing CDI.  POC discussed, pt verbalizes understanding.  Bed alarm on.  Call light within reach, bed in lowest position.

## 2018-05-07 NOTE — PROGRESS NOTES
Infectious Disease Progress Note    Author: Mandy Grewal M.D. Date & Time of service: 2018  10:28 AM    Chief Complaint:  FU epidural abscess    Interval History:   Tmax 100.2, WBC 9.7, ongoing back pain, constipated, OR cx viridans strep, tolerating abx without issues   Tmax 100.6 WBC 8, pain grumpy, remains constipated last BM prior to admission, no change in back pain, will attempt to work with PT today  5/3 AF WBC 6.2, continuous hiccups, ambulated with PT yesterday, back pain stable   Tmax 99.3 WBC 7 ongoing hiccups, otherwise no new issues, still no BM, plan for SNF transfer today   AF WBC 7.7, doing well, ambulating without issues, wants to do [facility] in [place]  Labs Reviewed, Medications Reviewed, Radiology Reviewed and Wound Reviewed.    Review of Systems:  Review of Systems   Constitutional: Negative for chills and fever.   Respiratory: Negative for cough and shortness of breath.         Hiccups   Cardiovascular: Negative for chest pain.   Gastrointestinal: Positive for constipation.   Musculoskeletal: Positive for back pain.   All other systems reviewed and are negative.      Hemodynamics:  Temp (24hrs), Av.2 °C (98.9 °F), Min:37 °C (98.6 °F), Max:37.3 °C (99.2 °F)  Temperature: 37.3 °C (99.2 °F)  Pulse  Av.5  Min: 54  Max: 96   Blood Pressure : 135/84       Physical Exam:  Physical Exam   Constitutional: He is oriented to person, place, and time. He appears well-developed.   HENT:   Head: Normocephalic and atraumatic.   Poor dentition/dental caries  Missing teeth   Eyes: EOM are normal. Pupils are equal, round, and reactive to light.   Neck: Neck supple.   Cardiovascular: Normal rate and regular rhythm.    Pulmonary/Chest: Effort normal. He has no wheezes. He has no rales.   Abdominal: Soft. There is no tenderness.   Musculoskeletal: He exhibits no edema.   Lumbar surgical site with staples in place. Well approximated. No drainage or surrounding erythema    LUE PICC    Neurological: He is alert and oriented to person, place, and time.   Skin: No rash noted.       Meds:    Current Facility-Administered Medications:   •  alteplase  •  senna-docusate **AND** polyethylene glycol/lytes **AND** magnesium hydroxide **AND** [] bisacodyl  •  hydrALAZINE  •  baclofen  •  heparin  •  cefTRIAXone (ROCEPHIN) IV  •  PICC Line Insertion has been implemented **AND** May use Lidocaine 1% not to exceed 3 mls for local at insertion site **AND** NOTIFY MD **AND** Tip to dwell in the superior vena cava **AND** Do not use PICC Line until placement verified by Chest X Ray **AND** [CANCELED] DX-CHEST-FOR PICC LINE Perform procedure in: Other(comment f6 below): **AND** If radiologist reading of chest X-ray states any of the following the PICC should be used **AND** Further evaluation of the PICC placement can be retrieved from X-Ray and Imaging **AND** Blood draws through PICC line; draws by RN only **AND** FLUSHING GUIDELINES WHEN IN USE **AND** normal saline PF **AND** FLUSHING GUIDELINES WHEN NOT IN USE **AND** DRESSING MAINTENANCE **AND** Change needleless pressure ports and IV tubing every 72 hours per hospital policy **AND** TUBING **AND** If there is an MD order to remove the PICC line, any RN may remove the PICC line **AND** [] PATIENT EDUCATION MATERIALS **AND** NURSING COMMUNICATION  •  HYDROcodone/acetaminophen  •  losartan  •  omeprazole  •  mag hydrox-al hydrox-simeth  •  NS  •  Respiratory Care per Protocol  •  NS  •  NS  •  acetaminophen  •  Notify provider if pain remains uncontrolled **AND** Use the numeric rating scale (NRS-11) on regular floors and Critical-Care Pain Observation Tool (CPOT) on ICUs/Trauma to assess pain **AND** Pulse Ox (Oximetry) **AND** Pharmacy Consult Request **AND** If patient difficult to arouse and/or has respiratory depression, stop any opiates that are currently infusing and call a Rapid Response. **AND** [DISCONTINUED] oxyCODONE  immediate-release **AND** oxyCODONE immediate-release **AND** morphine injection  •  ondansetron  •  ondansetron    Labs:  Recent Labs      05/05/18   0536  05/06/18   0530  05/07/18   0121   WBC  6.8  7.7  7.7   RBC  3.74*  3.87*  3.76*   HEMOGLOBIN  12.5*  12.6*  12.5*   HEMATOCRIT  34.8*  37.3*  36.4*   MCV  93.0  96.4  96.8   MCH  33.4*  32.6  33.2*   RDW  41.1  43.7  43.9   PLATELETCT  235  268  306   MPV  9.2  8.7*  8.7*     No results for input(s): SODIUM, POTASSIUM, CHLORIDE, CO2, GLUCOSE, BUN, CPKTOTAL in the last 72 hours.  No results for input(s): ALBUMIN, TBILIRUBIN, ALKPHOSPHAT, TOTPROTEIN, ALTSGPT, ASTSGOT, CREATININE in the last 72 hours.    Imaging:  Dx-chest-2 Views    Result Date: 4/29/2018 4/29/2018 10:11 AM HISTORY/REASON FOR EXAM: Back pain. Shortness of breath. Cough. TECHNIQUE/EXAM DESCRIPTION AND NUMBER OF VIEWS: Two views of the chest. COMPARISON: None. FINDINGS: The lungs are hyperinflated. There is a large hiatal hernia. The heart is mildly enlarged. There is no evidence of pleural effusion. Soft tissues and bony structures are unremarkable.     1.  Mild cardiomegaly. 2.  Hiatal hernia.    Dx-spine-any One View    Result Date: 4/29/2018 4/29/2018 2:45 PM HISTORY/REASON FOR EXAM:  Back pain. TECHNIQUE/EXAM DESCRIPTION AND NUMBER OF VIEWS:  Single view of the spine. COMPARISON: None. FINDINGS: Intraoperative fluoroscopy spot images were obtained by the neurosurgeon. Single lateral view demonstrates a surgical probe directed at the L5-S1 level. The performing clinician used a total of 4 seconds fluoroscopy time.     Intraoperative fluoroscopy spot images as described above.    Dx-portable Fluoro > 1 Hour    Result Date: 4/30/2018 4/29/2018 2:45 PM HISTORY/REASON FOR EXAM:  Lumbar pain, laminectomy TECHNIQUE/EXAM DESCRIPTION AND NUMBER OF VIEWS: Portable fluoroscopy for greater than one hour FINDINGS:      The portable fluoroscopy unit was obligated to the procedure for greater than one  "hour.   Actual fluoro time was 4 seconds.     Portable fluoroscopy utilized for 4 seconds.       Micro:  Results     Procedure Component Value Units Date/Time    BLOOD CULTURE [[id number]] Collected:  04/30/18 0947    Order Status:  Completed Specimen:  Blood from Peripheral Updated:  05/05/18 1300     Significant Indicator NEG     Source BLD     Site PERIPHERAL     Blood Culture No growth after 5 days of incubation.    Narrative:       Per Hospital Policy: Only change Specimen Src: to \"Line\" if  specified by physician order.    BLOOD CULTURE [[id number]] Collected:  04/30/18 0947    Order Status:  Completed Specimen:  Blood from Peripheral Updated:  05/05/18 1300     Significant Indicator NEG     Source BLD     Site PERIPHERAL     Blood Culture No growth after 5 days of incubation.    Narrative:       Per Hospital Policy: Only change Specimen Src: to \"Line\" if  specified by physician order.    CULTURE TISSUE W/ GRM STAIN [[id number]]  (Abnormal) Collected:  04/29/18 1500    Order Status:  Completed Specimen:  Tissue Updated:  05/02/18 1055     Significant Indicator POS (POS)     Source TISS     Site L4-L5 Epidural Mass     Tissue Culture -- (A)     Gram Stain Result Moderate WBCs.  Rare Gram positive cocci.  Rare Gram positive rods.   (A)     Tissue Culture Viridans Streptococcus  Moderate growth   (A)    ANAEROBIC CULTURE [[id number]] Collected:  04/29/18 1500    Order Status:  Completed Specimen:  Tissue Updated:  05/02/18 1055     Significant Indicator NEG     Source TISS     Site L4-L5 Epidural Mass     Anaerobic Culture, Culture Res No Anaerobes isolated.    FUNGAL CULTURE [[id number]] Collected:  04/29/18 1500    Order Status:  Completed Specimen:  Tissue Updated:  05/02/18 1055     Significant Indicator NEG     Source TISS     Site L4-L5 Epidural Mass     Fungal Culture Culture in progress.    AFB CULTURE [[id number]] Collected:  04/29/18 1500    Order Status:  Completed Specimen:  Tissue Updated:  05/02/18 1055 "     Significant Indicator NEG     Source TISS     Site L4-L5 Epidural Mass     AFB Culture Culture in progress.     AFB Smear Results No acid fast bacilli seen.    CULTURE WOUND W/ GRAM STAIN [[id number]]  (Abnormal) Collected:  04/29/18 1446    Order Status:  Completed Specimen:  Wound Updated:  05/02/18 1055     Significant Indicator POS (POS)     Source WND     Site Right Paraspinous Soft Tissue     Culture Result Wound -- (A)     Gram Stain Result Moderate WBCs.  Moderate Gram positive cocci.       Culture Result Wound Viridans Streptococcus  Moderate growth   (A)    ANAEROBIC CULTURE [[id number]] Collected:  04/29/18 1446    Order Status:  Completed Specimen:  Wound Updated:  05/02/18 1055     Significant Indicator NEG     Source WND     Site Right Paraspinous Soft Tissue     Anaerobic Culture, Culture Res No Anaerobes isolated.    FUNGAL CULTURE [[id number]] Collected:  04/29/18 1446    Order Status:  Completed Specimen:  Wound Updated:  05/02/18 1055     Significant Indicator NEG     Source WND     Site Right Paraspinous Soft Tissue     Fungal Culture Culture in progress.    AFB CULTURE [[id number]] Collected:  04/29/18 1446    Order Status:  Completed Specimen:  Wound Updated:  05/02/18 1055     Significant Indicator NEG     Source WND     Site Right Paraspinous Soft Tissue     AFB Culture Culture in progress.  NOTE:  Swabs are not recommended for the isolation of  Mycobacteria, since they provide limited material.  They  are acceptable only if a specimen cannot be collected by  any other means.  Negative results obtained from these  specimens submitted on swabs are not reliable.       AFB Smear Results No acid fast bacilli seen.    ACID FAST STAIN [[id number]] Collected:  04/29/18 1446    Order Status:  Completed Specimen:  Wound Updated:  04/30/18 1719     Significant Indicator NEG     Source WND     Site Right Paraspinous Soft Tissue     AFB Smear Results No acid fast bacilli seen.    GRAM STAIN [[id number]]  Collected:  04/29/18 1446    Order Status:  Completed Specimen:  Wound Updated:  04/30/18 1719     Significant Indicator .     Source WND     Site Right Paraspinous Soft Tissue     Gram Stain Result Moderate WBCs.  Moderate Gram positive cocci.      GRAM STAIN [[id number]] Collected:  04/29/18 1500    Order Status:  Completed Specimen:  Tissue Updated:  04/30/18 1719     Significant Indicator .     Source TISS     Site L4-L5 Epidural Mass     Gram Stain Result Moderate WBCs.  Rare Gram positive cocci.  Rare Gram positive rods.      ACID FAST STAIN [[id number]] Collected:  04/29/18 1500    Order Status:  Completed Specimen:  Tissue Updated:  04/30/18 1719     Significant Indicator NEG     Source TISS     Site L4-L5 Epidural Mass     AFB Smear Results No acid fast bacilli seen.          Assessment:  Active Hospital Problems    Diagnosis   • *Spinal epidural abscess [G06.1]   • Gram-positive bacteremia [R78.81]   • Apnea [R06.81]   • Hypertension [I10]       Plan:  Gram positive sepsis  Fever resolved  Leukocytosis resolved  OSH Bcx reportedly with GPC  Bcx 4/30 - NGTD  Continue ceftriaxone  TTE - neg    Lumbar epidural abscess  S/p s/p lumbar decompressive laminectiomy L4-S1, bilateral foraminotomies and extradural resection of a mass with microdissection on 4/29.   Epidural pus was noted intraoperatively  OR +viridans strep  Abx per above  Plan for 6 weeks of IV abx  Stop date 06/11/18  IV abx orders via [facility] given to CM today    OK to DC pt once abx arranged    FU ID clinic    Discussed with internal medicine CM

## 2018-05-07 NOTE — DISCHARGE PLANNING
Medical Social work    Referral:  Rounds    Intervention:  Pt discussed during rounds.  Pt is being set up for OP Infusion through [facility].    Plan:  SW will remain available for dc planning

## 2018-05-07 NOTE — CARE PLAN
Problem: Communication  Goal: The ability to communicate needs accurately and effectively will improve  poc discussed, pt verbalizes udnerstanding    Problem: Safety  Goal: Will remain free from injury  Bed alarm on, treaded socks in place, hourly rounding, call light within reach

## 2018-05-08 ENCOUNTER — OUTPATIENT INFUSION SERVICES (OUTPATIENT)
Dept: ONCOLOGY | Facility: MEDICAL CENTER | Age: 73
End: 2018-05-08
Attending: INTERNAL MEDICINE
Payer: MEDICARE

## 2018-05-08 VITALS
HEART RATE: 71 BPM | TEMPERATURE: 98 F | BODY MASS INDEX: 23.33 KG/M2 | OXYGEN SATURATION: 98 % | DIASTOLIC BLOOD PRESSURE: 86 MMHG | WEIGHT: 166.67 LBS | SYSTOLIC BLOOD PRESSURE: 147 MMHG | HEIGHT: 71 IN | RESPIRATION RATE: 18 BRPM

## 2018-05-08 DIAGNOSIS — G06.1 SPINAL EPIDURAL ABSCESS: ICD-10-CM

## 2018-05-08 DIAGNOSIS — R78.81 GRAM-POSITIVE BACTEREMIA: ICD-10-CM

## 2018-05-08 LAB
ALBUMIN SERPL BCP-MCNC: 3.3 G/DL (ref 3.2–4.9)
ALBUMIN/GLOB SERPL: 1 G/DL
ALP SERPL-CCNC: 77 U/L (ref 30–99)
ALT SERPL-CCNC: 60 U/L (ref 2–50)
ANION GAP SERPL CALC-SCNC: 10 MMOL/L (ref 0–11.9)
AST SERPL-CCNC: 35 U/L (ref 12–45)
BILIRUB SERPL-MCNC: 0.3 MG/DL (ref 0.1–1.5)
BUN SERPL-MCNC: 19 MG/DL (ref 8–22)
CALCIUM SERPL-MCNC: 9.2 MG/DL (ref 8.5–10.5)
CHLORIDE SERPL-SCNC: 103 MMOL/L (ref 96–112)
CO2 SERPL-SCNC: 24 MMOL/L (ref 20–33)
CREAT SERPL-MCNC: 0.62 MG/DL (ref 0.5–1.4)
GLOBULIN SER CALC-MCNC: 3.2 G/DL (ref 1.9–3.5)
GLUCOSE SERPL-MCNC: 104 MG/DL (ref 65–99)
POTASSIUM SERPL-SCNC: 4.2 MMOL/L (ref 3.6–5.5)
PROT SERPL-MCNC: 6.5 G/DL (ref 6–8.2)
SODIUM SERPL-SCNC: 137 MMOL/L (ref 135–145)

## 2018-05-08 PROCEDURE — 36592 COLLECT BLOOD FROM PICC: CPT

## 2018-05-08 PROCEDURE — 96365 THER/PROPH/DIAG IV INF INIT: CPT

## 2018-05-08 PROCEDURE — 96374 THER/PROPH/DIAG INJ IV PUSH: CPT

## 2018-05-08 PROCEDURE — 80053 COMPREHEN METABOLIC PANEL: CPT

## 2018-05-08 PROCEDURE — 700111 HCHG RX REV CODE 636 W/ 250 OVERRIDE (IP): Performed by: INTERNAL MEDICINE

## 2018-05-08 RX ORDER — 0.9 % SODIUM CHLORIDE 0.9 %
10-20 VIAL (ML) INJECTION PRN
Status: CANCELLED | OUTPATIENT
Start: 2018-05-08

## 2018-05-08 RX ORDER — HYDROCODONE BITARTRATE AND ACETAMINOPHEN 5; 325 MG/1; MG/1
1-2 TABLET ORAL EVERY 4 HOURS PRN
COMMUNITY
End: 2022-01-20

## 2018-05-08 RX ORDER — 0.9 % SODIUM CHLORIDE 0.9 %
5 VIAL (ML) INJECTION PRN
Status: CANCELLED | OUTPATIENT
Start: 2018-05-08

## 2018-05-08 RX ADMIN — CEFTRIAXONE 2 G: 2 INJECTION, POWDER, FOR SOLUTION INTRAMUSCULAR; INTRAVENOUS at 17:38

## 2018-05-08 ASSESSMENT — PAIN SCALES - GENERAL: PAINLEVEL_OUTOF10: 4

## 2018-05-08 NOTE — PROGRESS NOTES
Clarified DC instructions with KEKE Carter. Pt ok to resume ASA and ok to shower. Went over discharge instructions w pt. PICC home care instructions given. Follow up appointments, medications, when to call MD, spinal precautions. Prescriptions and copy of DC paperwork given to pt. Pt had no further questions. Pt dc'ed to home with family with PICC line and FWW in the car via WC at 1830

## 2018-05-09 ENCOUNTER — OUTPATIENT INFUSION SERVICES (OUTPATIENT)
Dept: ONCOLOGY | Facility: MEDICAL CENTER | Age: 73
End: 2018-05-09
Attending: INTERNAL MEDICINE
Payer: MEDICARE

## 2018-05-09 VITALS
TEMPERATURE: 98.2 F | HEART RATE: 71 BPM | SYSTOLIC BLOOD PRESSURE: 108 MMHG | RESPIRATION RATE: 18 BRPM | DIASTOLIC BLOOD PRESSURE: 59 MMHG | OXYGEN SATURATION: 100 %

## 2018-05-09 DIAGNOSIS — R78.81 GRAM-POSITIVE BACTEREMIA: ICD-10-CM

## 2018-05-09 PROCEDURE — 700111 HCHG RX REV CODE 636 W/ 250 OVERRIDE (IP): Performed by: INTERNAL MEDICINE

## 2018-05-09 PROCEDURE — 96374 THER/PROPH/DIAG INJ IV PUSH: CPT

## 2018-05-09 RX ORDER — 0.9 % SODIUM CHLORIDE 0.9 %
10-20 VIAL (ML) INJECTION PRN
Status: CANCELLED | OUTPATIENT
Start: 2018-05-09

## 2018-05-09 RX ORDER — 0.9 % SODIUM CHLORIDE 0.9 %
5 VIAL (ML) INJECTION PRN
Status: CANCELLED | OUTPATIENT
Start: 2018-05-09

## 2018-05-09 RX ADMIN — CEFTRIAXONE 2 G: 2 INJECTION, POWDER, FOR SOLUTION INTRAMUSCULAR; INTRAVENOUS at 17:37

## 2018-05-09 ASSESSMENT — PAIN SCALES - GENERAL: PAINLEVEL_OUTOF10: 4

## 2018-05-09 NOTE — PROGRESS NOTES
Patient in to begin ceftriaxone therapy, family at chair side. Patient reports he has been receiving ceftriaxone in the hospital without difficulty, no questions regarding treatment at this time. Patient presents with left upper arm PICC line, which he states they have been unable to get blood return from. Brisk blood return noted prior to treatment, baseline CMP drawn per protocol, last CBC 5/6/18. Ceftriaxone infused over 3 minutes as ordered and well tolerated; blood return sluggish, but noted, following treatment. Clave changed per protocol. Appointment confirmed for 5/9/18 at 1730; discharged via wheelchair in no apparent distress.

## 2018-05-10 ENCOUNTER — OUTPATIENT INFUSION SERVICES (OUTPATIENT)
Dept: ONCOLOGY | Facility: MEDICAL CENTER | Age: 73
End: 2018-05-10
Attending: INTERNAL MEDICINE
Payer: MEDICARE

## 2018-05-10 VITALS
RESPIRATION RATE: 18 BRPM | DIASTOLIC BLOOD PRESSURE: 79 MMHG | TEMPERATURE: 99.3 F | HEART RATE: 67 BPM | SYSTOLIC BLOOD PRESSURE: 141 MMHG | OXYGEN SATURATION: 100 %

## 2018-05-10 DIAGNOSIS — R78.81 GRAM-POSITIVE BACTEREMIA: ICD-10-CM

## 2018-05-10 PROCEDURE — 96374 THER/PROPH/DIAG INJ IV PUSH: CPT

## 2018-05-10 PROCEDURE — 700111 HCHG RX REV CODE 636 W/ 250 OVERRIDE (IP): Performed by: INTERNAL MEDICINE

## 2018-05-10 PROCEDURE — 96365 THER/PROPH/DIAG IV INF INIT: CPT

## 2018-05-10 RX ORDER — 0.9 % SODIUM CHLORIDE 0.9 %
10-20 VIAL (ML) INJECTION PRN
Status: CANCELLED | OUTPATIENT
Start: 2018-05-10

## 2018-05-10 RX ORDER — 0.9 % SODIUM CHLORIDE 0.9 %
5 VIAL (ML) INJECTION PRN
Status: CANCELLED | OUTPATIENT
Start: 2018-05-10

## 2018-05-10 RX ADMIN — CEFTRIAXONE 2 G: 2 INJECTION, POWDER, FOR SOLUTION INTRAMUSCULAR; INTRAVENOUS at 16:18

## 2018-05-10 ASSESSMENT — PAIN SCALES - GENERAL: PAINLEVEL_OUTOF10: 4

## 2018-05-10 NOTE — PROGRESS NOTES
Patient arrived ambulatory to the [facility] for Rocephin. Reviewed vital signs, labs, and physician order. Patient reports surgical site to spine, visualized 12 staples in place to lower spine, open to air, no S&S of infection, reports staple removal on 5/15/18. Pt arrives with  PICC to [facility], visualized brisk blood return. Rocephin administered, no adverse reaction observed. IV flushed per protocol, 4X4 gauze and mesh sleeve placed for protection. Confirmed upcoming appointment date and time with patient. Patient left the [facility] ambulatory in no sign of distress.

## 2018-05-10 NOTE — PROGRESS NOTES
Pt to [facility] for daily ceftriaxone infusion for bacteremia.  Pt PICC line flushed per protocol w/ brisk blood return noted.  Ceftriaxone infused through PICC with no adverse reactions.  Pt PICC again flushed per protocol w/ brisk blood return noted, wrapped in gauze and protective sleeve placed.  Pt left on foot in NAD, next appt in place

## 2018-05-11 ENCOUNTER — OUTPATIENT INFUSION SERVICES (OUTPATIENT)
Dept: ONCOLOGY | Facility: MEDICAL CENTER | Age: 73
End: 2018-05-11
Attending: INTERNAL MEDICINE
Payer: MEDICARE

## 2018-05-11 DIAGNOSIS — R78.81 GRAM-POSITIVE BACTEREMIA: ICD-10-CM

## 2018-05-11 PROCEDURE — 700111 HCHG RX REV CODE 636 W/ 250 OVERRIDE (IP): Performed by: INTERNAL MEDICINE

## 2018-05-11 PROCEDURE — 96374 THER/PROPH/DIAG INJ IV PUSH: CPT

## 2018-05-11 RX ORDER — 0.9 % SODIUM CHLORIDE 0.9 %
5 VIAL (ML) INJECTION PRN
Status: CANCELLED | OUTPATIENT
Start: 2018-05-11

## 2018-05-11 RX ORDER — 0.9 % SODIUM CHLORIDE 0.9 %
10-20 VIAL (ML) INJECTION PRN
Status: CANCELLED | OUTPATIENT
Start: 2018-05-11

## 2018-05-11 RX ADMIN — CEFTRIAXONE 2 G: 2 INJECTION, POWDER, FOR SOLUTION INTRAMUSCULAR; INTRAVENOUS at 16:21

## 2018-05-11 ASSESSMENT — PAIN SCALES - GENERAL: PAINLEVEL_OUTOF10: 3

## 2018-05-11 NOTE — PROGRESS NOTES
Pt returns to infusion center for IV antibiotics.  PICC line in place with brisk blood return noted.  Pt tolerated infusion without incident.  PICC line flushed with saline per policy, gauze and net placed.  Pt left infusion center via wheelchair and in good condition.  Returns daily.

## 2018-05-12 ENCOUNTER — OUTPATIENT INFUSION SERVICES (OUTPATIENT)
Dept: ONCOLOGY | Facility: MEDICAL CENTER | Age: 73
End: 2018-05-12
Attending: INTERNAL MEDICINE
Payer: MEDICARE

## 2018-05-12 VITALS
SYSTOLIC BLOOD PRESSURE: 118 MMHG | RESPIRATION RATE: 16 BRPM | HEART RATE: 64 BPM | OXYGEN SATURATION: 99 % | DIASTOLIC BLOOD PRESSURE: 71 MMHG | TEMPERATURE: 98.4 F

## 2018-05-12 DIAGNOSIS — R78.81 GRAM-POSITIVE BACTEREMIA: ICD-10-CM

## 2018-05-12 PROCEDURE — 700111 HCHG RX REV CODE 636 W/ 250 OVERRIDE (IP)

## 2018-05-12 PROCEDURE — 700111 HCHG RX REV CODE 636 W/ 250 OVERRIDE (IP): Performed by: INTERNAL MEDICINE

## 2018-05-12 PROCEDURE — 96374 THER/PROPH/DIAG INJ IV PUSH: CPT

## 2018-05-12 RX ORDER — 0.9 % SODIUM CHLORIDE 0.9 %
10-20 VIAL (ML) INJECTION PRN
Status: CANCELLED | OUTPATIENT
Start: 2018-05-12

## 2018-05-12 RX ORDER — 0.9 % SODIUM CHLORIDE 0.9 %
5 VIAL (ML) INJECTION PRN
Status: CANCELLED | OUTPATIENT
Start: 2018-05-12

## 2018-05-12 RX ADMIN — CEFTRIAXONE 2 G: 2 INJECTION, POWDER, FOR SOLUTION INTRAMUSCULAR; INTRAVENOUS at 13:39

## 2018-05-12 ASSESSMENT — PAIN SCALES - GENERAL: PAINLEVEL_OUTOF10: 1

## 2018-05-12 NOTE — PROGRESS NOTES
Patient arrived via wheelchair to the [facility] for Rocephin. Reviewed vital signs, labs, and physician order. Patient reports mild constipation, a small firm BM today, reviewed importance of increased fluid and fiber intake, and stopping at the pharmacy on the way home to speak with the pharmacist to get something to assist with constipation, pt verbalized understanding of plan. Pt arrives with SL PICC in LUE, visualized brisk blood return. Rocephin administered per MD order, no adverse reaction observed. PICC flushed per protocol, dressing changed with sterile technique, 4X4 gauze and mesh sleeve placed for protection. Confirmed upcoming appointment date and time with patient. Patient left the [facility] ambulatory in no sign of distress.

## 2018-05-13 ENCOUNTER — OUTPATIENT INFUSION SERVICES (OUTPATIENT)
Dept: ONCOLOGY | Facility: MEDICAL CENTER | Age: 73
End: 2018-05-13
Attending: INTERNAL MEDICINE
Payer: MEDICARE

## 2018-05-13 DIAGNOSIS — R78.81 GRAM-POSITIVE BACTEREMIA: ICD-10-CM

## 2018-05-13 PROCEDURE — 96374 THER/PROPH/DIAG INJ IV PUSH: CPT

## 2018-05-13 PROCEDURE — 700111 HCHG RX REV CODE 636 W/ 250 OVERRIDE (IP)

## 2018-05-13 PROCEDURE — 700111 HCHG RX REV CODE 636 W/ 250 OVERRIDE (IP): Performed by: INTERNAL MEDICINE

## 2018-05-13 RX ORDER — 0.9 % SODIUM CHLORIDE 0.9 %
10-20 VIAL (ML) INJECTION PRN
Status: CANCELLED | OUTPATIENT
Start: 2018-05-13

## 2018-05-13 RX ORDER — 0.9 % SODIUM CHLORIDE 0.9 %
5 VIAL (ML) INJECTION PRN
Status: CANCELLED | OUTPATIENT
Start: 2018-05-13

## 2018-05-13 RX ADMIN — CEFTRIAXONE 2 G: 2 INJECTION, POWDER, FOR SOLUTION INTRAMUSCULAR; INTRAVENOUS at 13:26

## 2018-05-13 ASSESSMENT — PAIN SCALES - GENERAL: PAINLEVEL_OUTOF10: 1

## 2018-05-13 NOTE — DOCUMENTATION QUERY
DOCUMENTATION QUERY    PROVIDERS: Please select “Cosign w/ note”to reply to query.    To better represent the severity of illness of your patient, please review the following information and exercise your independent professional judgment in responding to this query.     Conflicting documentation regarding sepsis and bacteremia. Gram-positive Sepsis is documented in the Progress Notes by infectious disease.  Gram-positive Bacteremia is documented in the discharge summary.   Based upon the clinical findings, risk factors, treatment, your professional opinion, please clarify whether this was sepsis or bacteremia.        Please select all that apply:   • Gram-positive Sepsis   • Gram-positive bacteremia; No sepsis  • Other explanation of clinical findings (please document)  • Unable to determine        The medical record reflects the following:   Clinical Findings  epidural abscess at L4-5, +viridans strep    Leukocytosis    Positive blood cultures     Treatment  Per ID Progress notes:  Plan:  Gram positive sepsis  Fever resolved  Leukocytosis resolved  OSH Bcx reportedly with GPC  Bcx 4/30 - NGTD  Continue ceftriaxone    Per Neurosurgery Progress note:  Positive blood cultures. Plan for 6 weeks of IV abx per ID   Risk Factors Epidural abscess, leukocytosis   Location within medical record  Progress Notes, Discharge Summary and Lab Results      Thank you,   Chantal French CCA  Coding Reimbursement Trainee

## 2018-05-13 NOTE — PROGRESS NOTES
Pt is here for his scheduled IVABX. WC in use. He had a small BM today after using a suppository - he will take some MOM to further relief today. He also will attempt to take less opioids and use extra strength Tylenol instead to avoid constipation. No other concerns. Treatment completed without an incident. Discharged home to self care. Returns daily.

## 2018-05-13 NOTE — ADDENDUM NOTE
Encounter addended by: Chantal French on: 5/13/2018 12:15 PM<BR>    Actions taken: Pend clinical note

## 2018-05-14 ENCOUNTER — OUTPATIENT INFUSION SERVICES (OUTPATIENT)
Dept: ONCOLOGY | Facility: MEDICAL CENTER | Age: 73
End: 2018-05-14
Attending: INTERNAL MEDICINE
Payer: MEDICARE

## 2018-05-14 VITALS
DIASTOLIC BLOOD PRESSURE: 71 MMHG | HEART RATE: 64 BPM | TEMPERATURE: 97.4 F | RESPIRATION RATE: 18 BRPM | BODY MASS INDEX: 22.56 KG/M2 | SYSTOLIC BLOOD PRESSURE: 115 MMHG | WEIGHT: 161.16 LBS | OXYGEN SATURATION: 98 % | HEIGHT: 71 IN

## 2018-05-14 DIAGNOSIS — G06.1 SPINAL EPIDURAL ABSCESS: ICD-10-CM

## 2018-05-14 DIAGNOSIS — R78.81 GRAM-POSITIVE BACTEREMIA: ICD-10-CM

## 2018-05-14 LAB
ALBUMIN SERPL BCP-MCNC: 3.3 G/DL (ref 3.2–4.9)
ALBUMIN/GLOB SERPL: 1 G/DL
ALP SERPL-CCNC: 73 U/L (ref 30–99)
ALT SERPL-CCNC: 25 U/L (ref 2–50)
ANION GAP SERPL CALC-SCNC: 10 MMOL/L (ref 0–11.9)
AST SERPL-CCNC: 16 U/L (ref 12–45)
BASOPHILS # BLD AUTO: 0.8 % (ref 0–1.8)
BASOPHILS # BLD: 0.04 K/UL (ref 0–0.12)
BILIRUB SERPL-MCNC: 0.3 MG/DL (ref 0.1–1.5)
BUN SERPL-MCNC: 22 MG/DL (ref 8–22)
CALCIUM SERPL-MCNC: 9.3 MG/DL (ref 8.5–10.5)
CHLORIDE SERPL-SCNC: 105 MMOL/L (ref 96–112)
CO2 SERPL-SCNC: 24 MMOL/L (ref 20–33)
CREAT SERPL-MCNC: 0.6 MG/DL (ref 0.5–1.4)
EOSINOPHIL # BLD AUTO: 0.14 K/UL (ref 0–0.51)
EOSINOPHIL NFR BLD: 2.7 % (ref 0–6.9)
ERYTHROCYTE [DISTWIDTH] IN BLOOD BY AUTOMATED COUNT: 44.8 FL (ref 35.9–50)
GLOBULIN SER CALC-MCNC: 3.4 G/DL (ref 1.9–3.5)
GLUCOSE SERPL-MCNC: 103 MG/DL (ref 65–99)
HCT VFR BLD AUTO: 38.3 % (ref 42–52)
HGB BLD-MCNC: 12.5 G/DL (ref 14–18)
IMM GRANULOCYTES # BLD AUTO: 0.01 K/UL (ref 0–0.11)
IMM GRANULOCYTES NFR BLD AUTO: 0.2 % (ref 0–0.9)
LYMPHOCYTES # BLD AUTO: 1.27 K/UL (ref 1–4.8)
LYMPHOCYTES NFR BLD: 24.8 % (ref 22–41)
MCH RBC QN AUTO: 32 PG (ref 27–33)
MCHC RBC AUTO-ENTMCNC: 32.6 G/DL (ref 33.7–35.3)
MCV RBC AUTO: 98 FL (ref 81.4–97.8)
MONOCYTES # BLD AUTO: 0.67 K/UL (ref 0–0.85)
MONOCYTES NFR BLD AUTO: 13.1 % (ref 0–13.4)
NEUTROPHILS # BLD AUTO: 3 K/UL (ref 1.82–7.42)
NEUTROPHILS NFR BLD: 58.4 % (ref 44–72)
NRBC # BLD AUTO: 0 K/UL
NRBC BLD-RTO: 0 /100 WBC
PLATELET # BLD AUTO: 335 K/UL (ref 164–446)
PMV BLD AUTO: 8.2 FL (ref 9–12.9)
POTASSIUM SERPL-SCNC: 4.3 MMOL/L (ref 3.6–5.5)
PROT SERPL-MCNC: 6.7 G/DL (ref 6–8.2)
RBC # BLD AUTO: 3.91 M/UL (ref 4.7–6.1)
SODIUM SERPL-SCNC: 139 MMOL/L (ref 135–145)
WBC # BLD AUTO: 5.1 K/UL (ref 4.8–10.8)

## 2018-05-14 PROCEDURE — 96374 THER/PROPH/DIAG INJ IV PUSH: CPT

## 2018-05-14 PROCEDURE — 700111 HCHG RX REV CODE 636 W/ 250 OVERRIDE (IP): Performed by: INTERNAL MEDICINE

## 2018-05-14 PROCEDURE — 85025 COMPLETE CBC W/AUTO DIFF WBC: CPT

## 2018-05-14 PROCEDURE — 36592 COLLECT BLOOD FROM PICC: CPT

## 2018-05-14 PROCEDURE — 80053 COMPREHEN METABOLIC PANEL: CPT

## 2018-05-14 RX ORDER — 0.9 % SODIUM CHLORIDE 0.9 %
10-20 VIAL (ML) INJECTION PRN
Status: CANCELLED | OUTPATIENT
Start: 2018-05-14

## 2018-05-14 RX ORDER — 0.9 % SODIUM CHLORIDE 0.9 %
5 VIAL (ML) INJECTION PRN
Status: CANCELLED | OUTPATIENT
Start: 2018-05-14

## 2018-05-14 RX ADMIN — CEFTRIAXONE 2 G: 2 INJECTION, POWDER, FOR SOLUTION INTRAMUSCULAR; INTRAVENOUS at 14:56

## 2018-05-14 ASSESSMENT — PAIN SCALES - GENERAL: PAINLEVEL_OUTOF10: 1

## 2018-05-14 NOTE — PROGRESS NOTES
Pt returns to infusion center for IV antibiotics.  PICC line in place with brisk blood return noted.  Labs drawn as ordered.  Pt tolerated infusion without incident.  PICC line flushed with saline per policy, gauze and net placed.  Pt left infusion center via wheelchair and in good condition.  Returns daily.

## 2018-05-15 ENCOUNTER — OUTPATIENT INFUSION SERVICES (OUTPATIENT)
Dept: ONCOLOGY | Facility: MEDICAL CENTER | Age: 73
End: 2018-05-15
Attending: INTERNAL MEDICINE
Payer: MEDICARE

## 2018-05-15 VITALS
TEMPERATURE: 98 F | OXYGEN SATURATION: 98 % | HEART RATE: 66 BPM | DIASTOLIC BLOOD PRESSURE: 68 MMHG | RESPIRATION RATE: 18 BRPM | SYSTOLIC BLOOD PRESSURE: 131 MMHG

## 2018-05-15 DIAGNOSIS — R78.81 GRAM-POSITIVE BACTEREMIA: ICD-10-CM

## 2018-05-15 PROCEDURE — 96374 THER/PROPH/DIAG INJ IV PUSH: CPT

## 2018-05-15 PROCEDURE — 700111 HCHG RX REV CODE 636 W/ 250 OVERRIDE (IP): Performed by: INTERNAL MEDICINE

## 2018-05-15 RX ORDER — 0.9 % SODIUM CHLORIDE 0.9 %
10-20 VIAL (ML) INJECTION PRN
Status: CANCELLED | OUTPATIENT
Start: 2018-05-15

## 2018-05-15 RX ORDER — 0.9 % SODIUM CHLORIDE 0.9 %
5 VIAL (ML) INJECTION PRN
Status: CANCELLED | OUTPATIENT
Start: 2018-05-15

## 2018-05-15 RX ADMIN — CEFTRIAXONE 2 G: 2 INJECTION, POWDER, FOR SOLUTION INTRAMUSCULAR; INTRAVENOUS at 15:22

## 2018-05-15 ASSESSMENT — PAIN SCALES - GENERAL: PAINLEVEL_OUTOF10: 0

## 2018-05-15 NOTE — PROGRESS NOTES
Patient presents for daily IV antibiotics.  PICCb flushes well with brisk blood return. Rocephin given IV push as ordered, line flushed clear. PICC flushed per protocol and site secured. Patient returns tomorrow and released in no acute distress.

## 2018-05-15 NOTE — ADDENDUM NOTE
Encounter addended by: Chantal French on: 5/15/2018  1:14 PM<BR>    Actions taken: Sign clinical note

## 2018-05-16 ENCOUNTER — OUTPATIENT INFUSION SERVICES (OUTPATIENT)
Dept: ONCOLOGY | Facility: MEDICAL CENTER | Age: 73
End: 2018-05-16
Attending: INTERNAL MEDICINE
Payer: MEDICARE

## 2018-05-16 VITALS
DIASTOLIC BLOOD PRESSURE: 69 MMHG | RESPIRATION RATE: 18 BRPM | HEART RATE: 59 BPM | TEMPERATURE: 99 F | OXYGEN SATURATION: 98 % | SYSTOLIC BLOOD PRESSURE: 133 MMHG

## 2018-05-16 DIAGNOSIS — R78.81 GRAM-POSITIVE BACTEREMIA: ICD-10-CM

## 2018-05-16 PROCEDURE — 96374 THER/PROPH/DIAG INJ IV PUSH: CPT

## 2018-05-16 PROCEDURE — 700111 HCHG RX REV CODE 636 W/ 250 OVERRIDE (IP): Performed by: INTERNAL MEDICINE

## 2018-05-16 RX ORDER — 0.9 % SODIUM CHLORIDE 0.9 %
10-20 VIAL (ML) INJECTION PRN
Status: CANCELLED | OUTPATIENT
Start: 2018-05-16

## 2018-05-16 RX ORDER — 0.9 % SODIUM CHLORIDE 0.9 %
5 VIAL (ML) INJECTION PRN
Status: CANCELLED | OUTPATIENT
Start: 2018-05-16

## 2018-05-16 RX ADMIN — CEFTRIAXONE 2 G: 2 INJECTION, POWDER, FOR SOLUTION INTRAMUSCULAR; INTRAVENOUS at 17:59

## 2018-05-16 ASSESSMENT — PAIN SCALES - GENERAL: PAINLEVEL_OUTOF10: 1

## 2018-05-17 ENCOUNTER — OUTPATIENT INFUSION SERVICES (OUTPATIENT)
Dept: ONCOLOGY | Facility: MEDICAL CENTER | Age: 73
End: 2018-05-17
Attending: INTERNAL MEDICINE
Payer: MEDICARE

## 2018-05-17 VITALS
OXYGEN SATURATION: 99 % | RESPIRATION RATE: 16 BRPM | DIASTOLIC BLOOD PRESSURE: 82 MMHG | TEMPERATURE: 98.6 F | HEART RATE: 60 BPM | SYSTOLIC BLOOD PRESSURE: 140 MMHG

## 2018-05-17 DIAGNOSIS — R78.81 GRAM-POSITIVE BACTEREMIA: ICD-10-CM

## 2018-05-17 PROCEDURE — 96374 THER/PROPH/DIAG INJ IV PUSH: CPT

## 2018-05-17 PROCEDURE — 700111 HCHG RX REV CODE 636 W/ 250 OVERRIDE (IP): Performed by: INTERNAL MEDICINE

## 2018-05-17 RX ORDER — 0.9 % SODIUM CHLORIDE 0.9 %
10-20 VIAL (ML) INJECTION PRN
Status: CANCELLED | OUTPATIENT
Start: 2018-05-17

## 2018-05-17 RX ORDER — 0.9 % SODIUM CHLORIDE 0.9 %
5 VIAL (ML) INJECTION PRN
Status: CANCELLED | OUTPATIENT
Start: 2018-05-17

## 2018-05-17 RX ADMIN — CEFTRIAXONE 2 G: 2 INJECTION, POWDER, FOR SOLUTION INTRAMUSCULAR; INTRAVENOUS at 17:01

## 2018-05-17 NOTE — PROGRESS NOTES
Pt ambulated into department for for his daily Rocephin administration. Pt reported some fatigue, declined GI distress. Pt stated that he saw his surgeon earlier in the week, and his MD told him that his surgical wound appeared to be healing well. PICC had + blood return prior, flushed briskly. Rocephin given as prescribed, Pt tolerated well. PICC had + blood return after, flushed per [facility] policy, line secured to arm with 4x4's and tube gauze. Tomorrow's appointment at 17:00 confirmed with Pt prior to leaving, left department with son appearing in good spirits and NAD.

## 2018-05-18 ENCOUNTER — OUTPATIENT INFUSION SERVICES (OUTPATIENT)
Dept: ONCOLOGY | Facility: MEDICAL CENTER | Age: 73
End: 2018-05-18
Attending: INTERNAL MEDICINE
Payer: MEDICARE

## 2018-05-18 VITALS
RESPIRATION RATE: 18 BRPM | SYSTOLIC BLOOD PRESSURE: 121 MMHG | OXYGEN SATURATION: 96 % | TEMPERATURE: 98.7 F | DIASTOLIC BLOOD PRESSURE: 84 MMHG | HEART RATE: 62 BPM

## 2018-05-18 DIAGNOSIS — R78.81 GRAM-POSITIVE BACTEREMIA: ICD-10-CM

## 2018-05-18 PROCEDURE — 700111 HCHG RX REV CODE 636 W/ 250 OVERRIDE (IP): Performed by: INTERNAL MEDICINE

## 2018-05-18 PROCEDURE — 96374 THER/PROPH/DIAG INJ IV PUSH: CPT

## 2018-05-18 RX ORDER — 0.9 % SODIUM CHLORIDE 0.9 %
5 VIAL (ML) INJECTION PRN
Status: CANCELLED | OUTPATIENT
Start: 2018-05-18

## 2018-05-18 RX ORDER — 0.9 % SODIUM CHLORIDE 0.9 %
10-20 VIAL (ML) INJECTION PRN
Status: CANCELLED | OUTPATIENT
Start: 2018-05-18

## 2018-05-18 RX ADMIN — CEFTRIAXONE 2 G: 2 INJECTION, POWDER, FOR SOLUTION INTRAMUSCULAR; INTRAVENOUS at 16:08

## 2018-05-18 ASSESSMENT — PAIN SCALES - GENERAL: PAINLEVEL_OUTOF10: 1

## 2018-05-18 NOTE — PROGRESS NOTES
Pt returns to infusion center for IV antibiotics.  PICC line in place, brisk blood return noted.  Pt tolerated infusion without incident.  PICC line flushed with saline per policy, gauze and net placed.  Pt left infusion center ambulatory and in good condition.  Returns daily.

## 2018-05-18 NOTE — PROGRESS NOTES
"Pt is here for his scheduled IVABX. WC in use. No concerns. Small \"bump\" - on the caudal end of healed back incision - MD aware - no tpopical s/s infection.  Treatment completed without an incident. Discharged home to self care. Returns daily.  "

## 2018-05-19 ENCOUNTER — OUTPATIENT INFUSION SERVICES (OUTPATIENT)
Dept: ONCOLOGY | Facility: MEDICAL CENTER | Age: 73
End: 2018-05-19
Attending: INTERNAL MEDICINE
Payer: MEDICARE

## 2018-05-19 VITALS
OXYGEN SATURATION: 98 % | DIASTOLIC BLOOD PRESSURE: 62 MMHG | HEART RATE: 67 BPM | SYSTOLIC BLOOD PRESSURE: 117 MMHG | RESPIRATION RATE: 16 BRPM | TEMPERATURE: 98.7 F

## 2018-05-19 DIAGNOSIS — R78.81 GRAM-POSITIVE BACTEREMIA: ICD-10-CM

## 2018-05-19 PROCEDURE — 96374 THER/PROPH/DIAG INJ IV PUSH: CPT

## 2018-05-19 PROCEDURE — 96365 THER/PROPH/DIAG IV INF INIT: CPT

## 2018-05-19 PROCEDURE — 700111 HCHG RX REV CODE 636 W/ 250 OVERRIDE (IP): Performed by: INTERNAL MEDICINE

## 2018-05-19 PROCEDURE — 700111 HCHG RX REV CODE 636 W/ 250 OVERRIDE (IP)

## 2018-05-19 RX ORDER — 0.9 % SODIUM CHLORIDE 0.9 %
10-20 VIAL (ML) INJECTION PRN
Status: CANCELLED | OUTPATIENT
Start: 2018-05-19

## 2018-05-19 RX ORDER — 0.9 % SODIUM CHLORIDE 0.9 %
5 VIAL (ML) INJECTION PRN
Status: CANCELLED | OUTPATIENT
Start: 2018-05-19

## 2018-05-19 RX ADMIN — CEFTRIAXONE 2 G: 2 INJECTION, POWDER, FOR SOLUTION INTRAMUSCULAR; INTRAVENOUS at 16:04

## 2018-05-19 ASSESSMENT — PAIN SCALES - GENERAL: PAINLEVEL_OUTOF10: 1

## 2018-05-19 NOTE — PROGRESS NOTES
Patient in for continuation of IV rocephin therapy, reports no difficulties overnight. Rocephin infused via left upper arm PICC over 3 minutes and well tolerated; PICC dressing changed over a sterile field per [facility] protocol. Appointment confirmed for 5/20/18 at 1500; discharged ambulatory in no apparent distress.

## 2018-05-20 ENCOUNTER — OUTPATIENT INFUSION SERVICES (OUTPATIENT)
Dept: ONCOLOGY | Facility: MEDICAL CENTER | Age: 73
End: 2018-05-20
Attending: INTERNAL MEDICINE
Payer: MEDICARE

## 2018-05-20 VITALS
DIASTOLIC BLOOD PRESSURE: 84 MMHG | OXYGEN SATURATION: 94 % | SYSTOLIC BLOOD PRESSURE: 145 MMHG | HEART RATE: 68 BPM | TEMPERATURE: 98.7 F | RESPIRATION RATE: 18 BRPM

## 2018-05-20 DIAGNOSIS — R78.81 GRAM-POSITIVE BACTEREMIA: ICD-10-CM

## 2018-05-20 PROCEDURE — 700111 HCHG RX REV CODE 636 W/ 250 OVERRIDE (IP): Performed by: INTERNAL MEDICINE

## 2018-05-20 PROCEDURE — 96374 THER/PROPH/DIAG INJ IV PUSH: CPT

## 2018-05-20 PROCEDURE — 96365 THER/PROPH/DIAG IV INF INIT: CPT

## 2018-05-20 PROCEDURE — 700105 HCHG RX REV CODE 258

## 2018-05-20 PROCEDURE — 700111 HCHG RX REV CODE 636 W/ 250 OVERRIDE (IP): Mod: JG

## 2018-05-20 RX ORDER — 0.9 % SODIUM CHLORIDE 0.9 %
5 VIAL (ML) INJECTION PRN
Status: CANCELLED | OUTPATIENT
Start: 2018-05-20

## 2018-05-20 RX ORDER — 0.9 % SODIUM CHLORIDE 0.9 %
10-20 VIAL (ML) INJECTION PRN
Status: CANCELLED | OUTPATIENT
Start: 2018-05-20

## 2018-05-20 RX ADMIN — CEFTRIAXONE 2 G: 2 INJECTION, POWDER, FOR SOLUTION INTRAMUSCULAR; INTRAVENOUS at 15:24

## 2018-05-20 ASSESSMENT — PAIN SCALES - GENERAL: PAINLEVEL_OUTOF10: 1

## 2018-05-20 NOTE — PROGRESS NOTES
Patient arrived to infusion center with son for daily antibiotic infusion of Rocephin. Patient had no complaints today. PICC line flushed with positive blood return. Rocephin given IV push and  infused without difficulty. PICC line flushed with saline, and covered with gauze and netting. Patient confirms appointment for tomorrow at 1800. Discharged from infusion center with son and no apparent distress.

## 2018-05-21 ENCOUNTER — OUTPATIENT INFUSION SERVICES (OUTPATIENT)
Dept: ONCOLOGY | Facility: MEDICAL CENTER | Age: 73
End: 2018-05-21
Attending: INTERNAL MEDICINE
Payer: MEDICARE

## 2018-05-21 ENCOUNTER — DOCUMENTATION (OUTPATIENT)
Dept: INFECTIOUS DISEASES | Facility: MEDICAL CENTER | Age: 73
End: 2018-05-21

## 2018-05-21 VITALS
RESPIRATION RATE: 18 BRPM | SYSTOLIC BLOOD PRESSURE: 144 MMHG | DIASTOLIC BLOOD PRESSURE: 87 MMHG | HEIGHT: 71 IN | HEART RATE: 88 BPM | BODY MASS INDEX: 22.75 KG/M2 | WEIGHT: 162.48 LBS | TEMPERATURE: 98.6 F | OXYGEN SATURATION: 98 %

## 2018-05-21 DIAGNOSIS — R78.81 GRAM-POSITIVE BACTEREMIA: ICD-10-CM

## 2018-05-21 LAB
ALBUMIN SERPL BCP-MCNC: 3.6 G/DL (ref 3.2–4.9)
ALBUMIN/GLOB SERPL: 1.2 G/DL
ALP SERPL-CCNC: 72 U/L (ref 30–99)
ALT SERPL-CCNC: 22 U/L (ref 2–50)
ANION GAP SERPL CALC-SCNC: 11 MMOL/L (ref 0–11.9)
AST SERPL-CCNC: 19 U/L (ref 12–45)
BASOPHILS # BLD AUTO: 0.5 % (ref 0–1.8)
BASOPHILS # BLD: 0.02 K/UL (ref 0–0.12)
BILIRUB SERPL-MCNC: 0.3 MG/DL (ref 0.1–1.5)
BUN SERPL-MCNC: 19 MG/DL (ref 8–22)
CALCIUM SERPL-MCNC: 9.2 MG/DL (ref 8.5–10.5)
CHLORIDE SERPL-SCNC: 103 MMOL/L (ref 96–112)
CO2 SERPL-SCNC: 23 MMOL/L (ref 20–33)
CREAT SERPL-MCNC: 0.66 MG/DL (ref 0.5–1.4)
EOSINOPHIL # BLD AUTO: 0.25 K/UL (ref 0–0.51)
EOSINOPHIL NFR BLD: 6 % (ref 0–6.9)
ERYTHROCYTE [DISTWIDTH] IN BLOOD BY AUTOMATED COUNT: 42.7 FL (ref 35.9–50)
GLOBULIN SER CALC-MCNC: 3 G/DL (ref 1.9–3.5)
GLUCOSE SERPL-MCNC: 95 MG/DL (ref 65–99)
HCT VFR BLD AUTO: 36 % (ref 42–52)
HGB BLD-MCNC: 12.4 G/DL (ref 14–18)
IMM GRANULOCYTES # BLD AUTO: 0.01 K/UL (ref 0–0.11)
IMM GRANULOCYTES NFR BLD AUTO: 0.2 % (ref 0–0.9)
LYMPHOCYTES # BLD AUTO: 1.3 K/UL (ref 1–4.8)
LYMPHOCYTES NFR BLD: 31.3 % (ref 22–41)
MCH RBC QN AUTO: 32.6 PG (ref 27–33)
MCHC RBC AUTO-ENTMCNC: 34.4 G/DL (ref 33.7–35.3)
MCV RBC AUTO: 94.7 FL (ref 81.4–97.8)
MONOCYTES # BLD AUTO: 0.59 K/UL (ref 0–0.85)
MONOCYTES NFR BLD AUTO: 14.2 % (ref 0–13.4)
NEUTROPHILS # BLD AUTO: 1.98 K/UL (ref 1.82–7.42)
NEUTROPHILS NFR BLD: 47.8 % (ref 44–72)
NRBC # BLD AUTO: 0 K/UL
NRBC BLD-RTO: 0 /100 WBC
PLATELET # BLD AUTO: 197 K/UL (ref 164–446)
PMV BLD AUTO: 8.5 FL (ref 9–12.9)
POTASSIUM SERPL-SCNC: 3.8 MMOL/L (ref 3.6–5.5)
PROT SERPL-MCNC: 6.6 G/DL (ref 6–8.2)
RBC # BLD AUTO: 3.8 M/UL (ref 4.7–6.1)
SODIUM SERPL-SCNC: 137 MMOL/L (ref 135–145)
WBC # BLD AUTO: 4.2 K/UL (ref 4.8–10.8)

## 2018-05-21 PROCEDURE — 36592 COLLECT BLOOD FROM PICC: CPT

## 2018-05-21 PROCEDURE — 700111 HCHG RX REV CODE 636 W/ 250 OVERRIDE (IP): Performed by: INTERNAL MEDICINE

## 2018-05-21 PROCEDURE — 85025 COMPLETE CBC W/AUTO DIFF WBC: CPT

## 2018-05-21 PROCEDURE — 96374 THER/PROPH/DIAG INJ IV PUSH: CPT

## 2018-05-21 PROCEDURE — 80053 COMPREHEN METABOLIC PANEL: CPT

## 2018-05-21 RX ORDER — 0.9 % SODIUM CHLORIDE 0.9 %
10-20 VIAL (ML) INJECTION PRN
Status: CANCELLED | OUTPATIENT
Start: 2018-05-21

## 2018-05-21 RX ORDER — 0.9 % SODIUM CHLORIDE 0.9 %
5 VIAL (ML) INJECTION PRN
Status: CANCELLED | OUTPATIENT
Start: 2018-05-21

## 2018-05-21 RX ADMIN — CEFTRIAXONE 2 G: 2 INJECTION, POWDER, FOR SOLUTION INTRAMUSCULAR; INTRAVENOUS at 17:49

## 2018-05-21 ASSESSMENT — PAIN SCALES - GENERAL: PAINLEVEL: 1=MINIMAL PAIN

## 2018-05-22 ENCOUNTER — OUTPATIENT INFUSION SERVICES (OUTPATIENT)
Dept: ONCOLOGY | Facility: MEDICAL CENTER | Age: 73
End: 2018-05-22
Attending: INTERNAL MEDICINE
Payer: MEDICARE

## 2018-05-22 VITALS
SYSTOLIC BLOOD PRESSURE: 135 MMHG | HEART RATE: 63 BPM | OXYGEN SATURATION: 98 % | DIASTOLIC BLOOD PRESSURE: 70 MMHG | RESPIRATION RATE: 18 BRPM | TEMPERATURE: 98 F

## 2018-05-22 DIAGNOSIS — R78.81 GRAM-POSITIVE BACTEREMIA: ICD-10-CM

## 2018-05-22 PROCEDURE — 96374 THER/PROPH/DIAG INJ IV PUSH: CPT

## 2018-05-22 PROCEDURE — 700111 HCHG RX REV CODE 636 W/ 250 OVERRIDE (IP): Performed by: INTERNAL MEDICINE

## 2018-05-22 RX ORDER — 0.9 % SODIUM CHLORIDE 0.9 %
10-20 VIAL (ML) INJECTION PRN
Status: CANCELLED | OUTPATIENT
Start: 2018-05-22

## 2018-05-22 RX ORDER — 0.9 % SODIUM CHLORIDE 0.9 %
5 VIAL (ML) INJECTION PRN
Status: CANCELLED | OUTPATIENT
Start: 2018-05-22

## 2018-05-22 RX ADMIN — CEFTRIAXONE 2 G: 2 INJECTION, POWDER, FOR SOLUTION INTRAMUSCULAR; INTRAVENOUS at 18:04

## 2018-05-22 ASSESSMENT — PAIN SCALES - GENERAL: PAINLEVEL_OUTOF10: 0

## 2018-05-22 NOTE — PROGRESS NOTES
Patient here for Rocephin. Patient aware of missed appointment with Anuradha JOHNSON. Patient stated that he has rescheduled that appointment. Labs drawn as ordered. Rocephin given as IV push over 3 minutes. Eric tolerated well and without incident. PICC line in good condition and has positive blood return. PICC line flushed with saline per protocol. Next appointment scheduled. Discharged to self care; no apparent distress noted.

## 2018-05-23 ENCOUNTER — OUTPATIENT INFUSION SERVICES (OUTPATIENT)
Dept: ONCOLOGY | Facility: MEDICAL CENTER | Age: 73
End: 2018-05-23
Attending: INTERNAL MEDICINE
Payer: MEDICARE

## 2018-05-23 VITALS
TEMPERATURE: 98 F | DIASTOLIC BLOOD PRESSURE: 71 MMHG | HEART RATE: 65 BPM | OXYGEN SATURATION: 98 % | SYSTOLIC BLOOD PRESSURE: 123 MMHG | RESPIRATION RATE: 18 BRPM

## 2018-05-23 DIAGNOSIS — R78.81 GRAM-POSITIVE BACTEREMIA: ICD-10-CM

## 2018-05-23 PROCEDURE — 700111 HCHG RX REV CODE 636 W/ 250 OVERRIDE (IP): Performed by: INTERNAL MEDICINE

## 2018-05-23 PROCEDURE — 96374 THER/PROPH/DIAG INJ IV PUSH: CPT

## 2018-05-23 RX ORDER — 0.9 % SODIUM CHLORIDE 0.9 %
5 VIAL (ML) INJECTION PRN
Status: CANCELLED | OUTPATIENT
Start: 2018-05-23

## 2018-05-23 RX ORDER — 0.9 % SODIUM CHLORIDE 0.9 %
10-20 VIAL (ML) INJECTION PRN
Status: CANCELLED | OUTPATIENT
Start: 2018-05-23

## 2018-05-23 RX ADMIN — CEFTRIAXONE 2 G: 2 INJECTION, POWDER, FOR SOLUTION INTRAMUSCULAR; INTRAVENOUS at 17:58

## 2018-05-23 ASSESSMENT — PAIN SCALES - GENERAL: PAINLEVEL_OUTOF10: 0

## 2018-05-23 NOTE — PROGRESS NOTES
Patient arrived for daily Rocephin; reports no changes or concerns. PICC line flushed and patent.  Rocephin push given; pt tolerated well.  PICC flushed, new gauze dressing applied. Confirmed tomorrow's appt. Pt discharged home in good spirits under no apparent distress.

## 2018-05-23 NOTE — ADDENDUM NOTE
Encounter addended by: Yessenia Sam M.D. on: 5/23/2018  3:52 PM<BR>    Actions taken: Cosign clinical note with attestation

## 2018-05-24 ENCOUNTER — OUTPATIENT INFUSION SERVICES (OUTPATIENT)
Dept: ONCOLOGY | Facility: MEDICAL CENTER | Age: 73
End: 2018-05-24
Attending: INTERNAL MEDICINE
Payer: MEDICARE

## 2018-05-24 VITALS
OXYGEN SATURATION: 98 % | HEART RATE: 62 BPM | RESPIRATION RATE: 18 BRPM | SYSTOLIC BLOOD PRESSURE: 134 MMHG | TEMPERATURE: 98.2 F | DIASTOLIC BLOOD PRESSURE: 83 MMHG

## 2018-05-24 DIAGNOSIS — R78.81 GRAM-POSITIVE BACTEREMIA: ICD-10-CM

## 2018-05-24 LAB
FUNGUS SPEC CULT: NORMAL
FUNGUS SPEC CULT: NORMAL
SIGNIFICANT IND 70042: NORMAL
SIGNIFICANT IND 70042: NORMAL
SITE SITE: NORMAL
SITE SITE: NORMAL
SOURCE SOURCE: NORMAL
SOURCE SOURCE: NORMAL

## 2018-05-24 PROCEDURE — 700111 HCHG RX REV CODE 636 W/ 250 OVERRIDE (IP): Performed by: INTERNAL MEDICINE

## 2018-05-24 PROCEDURE — 96374 THER/PROPH/DIAG INJ IV PUSH: CPT

## 2018-05-24 RX ORDER — 0.9 % SODIUM CHLORIDE 0.9 %
10-20 VIAL (ML) INJECTION PRN
Status: CANCELLED | OUTPATIENT
Start: 2018-05-24

## 2018-05-24 RX ORDER — ACETAMINOPHEN 500 MG
500-1000 TABLET ORAL EVERY 6 HOURS PRN
COMMUNITY
End: 2022-01-20

## 2018-05-24 RX ORDER — 0.9 % SODIUM CHLORIDE 0.9 %
5 VIAL (ML) INJECTION PRN
Status: CANCELLED | OUTPATIENT
Start: 2018-05-24

## 2018-05-24 RX ADMIN — CEFTRIAXONE 2 G: 2 INJECTION, POWDER, FOR SOLUTION INTRAMUSCULAR; INTRAVENOUS at 16:57

## 2018-05-24 ASSESSMENT — PAIN SCALES - GENERAL: PAINLEVEL: 2=MINIMAL-SLIGHT

## 2018-05-24 NOTE — PROGRESS NOTES
Pt presented to infusion center for daily Rocephin. Pt reports no significant changes since yesterday. Left arm PICC line in place, flushed and brisk blood return observed. Rocephin given by IV push over 3 minutes with no s/s of adverse reaction. PICC line flushed, brisk blood return again observed, wrapped in gauze and protective sleeve. Has next appt, left with son in good spirits.

## 2018-05-25 ENCOUNTER — OUTPATIENT INFUSION SERVICES (OUTPATIENT)
Dept: ONCOLOGY | Facility: MEDICAL CENTER | Age: 73
End: 2018-05-25
Attending: INTERNAL MEDICINE
Payer: MEDICARE

## 2018-05-25 VITALS
OXYGEN SATURATION: 96 % | WEIGHT: 161.6 LBS | DIASTOLIC BLOOD PRESSURE: 85 MMHG | HEART RATE: 64 BPM | HEIGHT: 71 IN | BODY MASS INDEX: 22.62 KG/M2 | TEMPERATURE: 98.6 F | SYSTOLIC BLOOD PRESSURE: 137 MMHG | RESPIRATION RATE: 18 BRPM

## 2018-05-25 DIAGNOSIS — R78.81 GRAM-POSITIVE BACTEREMIA: ICD-10-CM

## 2018-05-25 PROCEDURE — 700111 HCHG RX REV CODE 636 W/ 250 OVERRIDE (IP): Performed by: INTERNAL MEDICINE

## 2018-05-25 PROCEDURE — 96375 TX/PRO/DX INJ NEW DRUG ADDON: CPT

## 2018-05-25 PROCEDURE — 96374 THER/PROPH/DIAG INJ IV PUSH: CPT

## 2018-05-25 RX ORDER — 0.9 % SODIUM CHLORIDE 0.9 %
5 VIAL (ML) INJECTION PRN
Status: CANCELLED | OUTPATIENT
Start: 2018-05-25

## 2018-05-25 RX ORDER — 0.9 % SODIUM CHLORIDE 0.9 %
10-20 VIAL (ML) INJECTION PRN
Status: CANCELLED | OUTPATIENT
Start: 2018-05-25

## 2018-05-25 RX ADMIN — CEFTRIAXONE 2 G: 2 INJECTION, POWDER, FOR SOLUTION INTRAMUSCULAR; INTRAVENOUS at 16:56

## 2018-05-25 ASSESSMENT — PAIN SCALES - GENERAL: PAINLEVEL_OUTOF10: 2

## 2018-05-25 NOTE — PROGRESS NOTES
Patient here for Rocephin. Rocephin given as IV push over 3 minutes. Eric tolerated well and without incident. PICC line in good condition and has positive blood return. PICC line flushed with saline per protocol. Next appointment scheduled. Discharged to self care; no apparent distress noted.

## 2018-05-26 ENCOUNTER — OUTPATIENT INFUSION SERVICES (OUTPATIENT)
Dept: ONCOLOGY | Facility: MEDICAL CENTER | Age: 73
End: 2018-05-26
Attending: INTERNAL MEDICINE
Payer: MEDICARE

## 2018-05-26 VITALS
DIASTOLIC BLOOD PRESSURE: 84 MMHG | RESPIRATION RATE: 16 BRPM | OXYGEN SATURATION: 98 % | HEART RATE: 60 BPM | SYSTOLIC BLOOD PRESSURE: 144 MMHG | TEMPERATURE: 98.2 F

## 2018-05-26 DIAGNOSIS — R78.81 GRAM-POSITIVE BACTEREMIA: ICD-10-CM

## 2018-05-26 PROCEDURE — 96374 THER/PROPH/DIAG INJ IV PUSH: CPT

## 2018-05-26 PROCEDURE — 700111 HCHG RX REV CODE 636 W/ 250 OVERRIDE (IP): Performed by: INTERNAL MEDICINE

## 2018-05-26 RX ORDER — 0.9 % SODIUM CHLORIDE 0.9 %
10-20 VIAL (ML) INJECTION PRN
Status: CANCELLED | OUTPATIENT
Start: 2018-05-26

## 2018-05-26 RX ORDER — 0.9 % SODIUM CHLORIDE 0.9 %
5 VIAL (ML) INJECTION PRN
Status: CANCELLED | OUTPATIENT
Start: 2018-05-26

## 2018-05-26 RX ADMIN — CEFTRIAXONE 2 G: 2 INJECTION, POWDER, FOR SOLUTION INTRAMUSCULAR; INTRAVENOUS at 17:54

## 2018-05-26 ASSESSMENT — PAIN SCALES - GENERAL: PAINLEVEL_OUTOF10: 1

## 2018-05-26 NOTE — PROGRESS NOTES
Pt arrived to [facility], ambulatory, for Rocephin infusion. Pt voices no complaints. PICC line flushed per policy, positive blood return noted. Rocephin administered via IV push with no s/sx of adverse reaction. PICC line flushed per policy. Pt left [facility] with no s/sx of distress. Follow up appointment confirmed.

## 2018-05-27 ENCOUNTER — OUTPATIENT INFUSION SERVICES (OUTPATIENT)
Dept: ONCOLOGY | Facility: MEDICAL CENTER | Age: 73
End: 2018-05-27
Attending: INTERNAL MEDICINE
Payer: MEDICARE

## 2018-05-27 VITALS
HEART RATE: 66 BPM | OXYGEN SATURATION: 96 % | SYSTOLIC BLOOD PRESSURE: 127 MMHG | RESPIRATION RATE: 18 BRPM | TEMPERATURE: 99 F | DIASTOLIC BLOOD PRESSURE: 79 MMHG

## 2018-05-27 DIAGNOSIS — R78.81 GRAM-POSITIVE BACTEREMIA: ICD-10-CM

## 2018-05-27 PROCEDURE — 700111 HCHG RX REV CODE 636 W/ 250 OVERRIDE (IP): Performed by: INTERNAL MEDICINE

## 2018-05-27 PROCEDURE — 96374 THER/PROPH/DIAG INJ IV PUSH: CPT

## 2018-05-27 PROCEDURE — 700111 HCHG RX REV CODE 636 W/ 250 OVERRIDE (IP)

## 2018-05-27 PROCEDURE — 700101 HCHG RX REV CODE 250

## 2018-05-27 RX ORDER — 0.9 % SODIUM CHLORIDE 0.9 %
5 VIAL (ML) INJECTION PRN
Status: CANCELLED | OUTPATIENT
Start: 2018-05-27

## 2018-05-27 RX ORDER — 0.9 % SODIUM CHLORIDE 0.9 %
10-20 VIAL (ML) INJECTION PRN
Status: CANCELLED | OUTPATIENT
Start: 2018-05-27

## 2018-05-27 RX ORDER — POLYETHYLENE GLYCOL 3350 17 G/17G
17 POWDER, FOR SOLUTION ORAL DAILY
COMMUNITY
End: 2022-01-20

## 2018-05-27 RX ADMIN — CEFTRIAXONE 2 G: 2 INJECTION, POWDER, FOR SOLUTION INTRAMUSCULAR; INTRAVENOUS at 17:42

## 2018-05-27 ASSESSMENT — PAIN SCALES - GENERAL: PAINLEVEL_OUTOF10: 1

## 2018-05-27 NOTE — PROGRESS NOTES
Patient arrived to clinic for daily Rocephin.  Denies any changes from previous visit.  PICC line flushed well with good blood return noted.  Rocephin given IV push per order, pt tolerated well.  PICC line flushed, dressing changed using sterile technique, and guaze/mesh cover placed.  Confirmed tomorrow's appointment and pt ambulated out of clinic in no apparent distress.

## 2018-05-28 ENCOUNTER — OUTPATIENT INFUSION SERVICES (OUTPATIENT)
Dept: ONCOLOGY | Facility: MEDICAL CENTER | Age: 73
End: 2018-05-28
Attending: INTERNAL MEDICINE
Payer: MEDICARE

## 2018-05-28 VITALS
RESPIRATION RATE: 18 BRPM | SYSTOLIC BLOOD PRESSURE: 116 MMHG | WEIGHT: 158.95 LBS | BODY MASS INDEX: 22.18 KG/M2 | HEART RATE: 68 BPM | OXYGEN SATURATION: 96 % | TEMPERATURE: 99 F | DIASTOLIC BLOOD PRESSURE: 76 MMHG

## 2018-05-28 DIAGNOSIS — R78.81 GRAM-POSITIVE BACTEREMIA: ICD-10-CM

## 2018-05-28 LAB
ALBUMIN SERPL BCP-MCNC: 3.7 G/DL (ref 3.2–4.9)
ALBUMIN/GLOB SERPL: 1.2 G/DL
ALP SERPL-CCNC: 75 U/L (ref 30–99)
ALT SERPL-CCNC: 22 U/L (ref 2–50)
ANION GAP SERPL CALC-SCNC: 9 MMOL/L (ref 0–11.9)
AST SERPL-CCNC: 15 U/L (ref 12–45)
BASOPHILS # BLD AUTO: 0.4 % (ref 0–1.8)
BASOPHILS # BLD: 0.02 K/UL (ref 0–0.12)
BILIRUB SERPL-MCNC: 0.3 MG/DL (ref 0.1–1.5)
BUN SERPL-MCNC: 22 MG/DL (ref 8–22)
CALCIUM SERPL-MCNC: 9.3 MG/DL (ref 8.5–10.5)
CHLORIDE SERPL-SCNC: 106 MMOL/L (ref 96–112)
CO2 SERPL-SCNC: 23 MMOL/L (ref 20–33)
CREAT SERPL-MCNC: 0.7 MG/DL (ref 0.5–1.4)
EOSINOPHIL # BLD AUTO: 0.26 K/UL (ref 0–0.51)
EOSINOPHIL NFR BLD: 5.6 % (ref 0–6.9)
ERYTHROCYTE [DISTWIDTH] IN BLOOD BY AUTOMATED COUNT: 44.4 FL (ref 35.9–50)
GLOBULIN SER CALC-MCNC: 3.1 G/DL (ref 1.9–3.5)
GLUCOSE SERPL-MCNC: 92 MG/DL (ref 65–99)
HCT VFR BLD AUTO: 37.3 % (ref 42–52)
HGB BLD-MCNC: 12.7 G/DL (ref 14–18)
IMM GRANULOCYTES # BLD AUTO: 0.01 K/UL (ref 0–0.11)
IMM GRANULOCYTES NFR BLD AUTO: 0.2 % (ref 0–0.9)
LYMPHOCYTES # BLD AUTO: 1.24 K/UL (ref 1–4.8)
LYMPHOCYTES NFR BLD: 26.6 % (ref 22–41)
MCH RBC QN AUTO: 32.4 PG (ref 27–33)
MCHC RBC AUTO-ENTMCNC: 34 G/DL (ref 33.7–35.3)
MCV RBC AUTO: 95.2 FL (ref 81.4–97.8)
MONOCYTES # BLD AUTO: 0.73 K/UL (ref 0–0.85)
MONOCYTES NFR BLD AUTO: 15.6 % (ref 0–13.4)
NEUTROPHILS # BLD AUTO: 2.41 K/UL (ref 1.82–7.42)
NEUTROPHILS NFR BLD: 51.6 % (ref 44–72)
NRBC # BLD AUTO: 0 K/UL
NRBC BLD-RTO: 0 /100 WBC
PLATELET # BLD AUTO: 191 K/UL (ref 164–446)
PMV BLD AUTO: 8.5 FL (ref 9–12.9)
POTASSIUM SERPL-SCNC: 4.2 MMOL/L (ref 3.6–5.5)
PROT SERPL-MCNC: 6.8 G/DL (ref 6–8.2)
RBC # BLD AUTO: 3.92 M/UL (ref 4.7–6.1)
SODIUM SERPL-SCNC: 138 MMOL/L (ref 135–145)
WBC # BLD AUTO: 4.7 K/UL (ref 4.8–10.8)

## 2018-05-28 PROCEDURE — 700111 HCHG RX REV CODE 636 W/ 250 OVERRIDE (IP)

## 2018-05-28 PROCEDURE — 36592 COLLECT BLOOD FROM PICC: CPT

## 2018-05-28 PROCEDURE — 700111 HCHG RX REV CODE 636 W/ 250 OVERRIDE (IP): Performed by: INTERNAL MEDICINE

## 2018-05-28 PROCEDURE — 96374 THER/PROPH/DIAG INJ IV PUSH: CPT

## 2018-05-28 PROCEDURE — 85025 COMPLETE CBC W/AUTO DIFF WBC: CPT

## 2018-05-28 PROCEDURE — 80053 COMPREHEN METABOLIC PANEL: CPT

## 2018-05-28 RX ORDER — 0.9 % SODIUM CHLORIDE 0.9 %
10-20 VIAL (ML) INJECTION PRN
Status: CANCELLED | OUTPATIENT
Start: 2018-05-28

## 2018-05-28 RX ORDER — 0.9 % SODIUM CHLORIDE 0.9 %
5 VIAL (ML) INJECTION PRN
Status: CANCELLED | OUTPATIENT
Start: 2018-05-28

## 2018-05-28 RX ORDER — BISACODYL 5 MG
5 TABLET, DELAYED RELEASE (ENTERIC COATED) ORAL
COMMUNITY
End: 2022-01-20

## 2018-05-28 RX ADMIN — CEFTRIAXONE 2 G: 2 INJECTION, POWDER, FOR SOLUTION INTRAMUSCULAR; INTRAVENOUS at 17:00

## 2018-05-28 ASSESSMENT — PAIN SCALES - GENERAL: PAINLEVEL_OUTOF10: 2

## 2018-05-28 NOTE — PROGRESS NOTES
Patient arrived to clinic for daily Rocephin.  States he is getting constipated from pain medication.  He already takes Miralax daily and is going to start Dulcolax, which he has done in the past and was effective.  Denies any other changes from yesterday's visit.  PICC line flushed with good blood return noted.  Rocephin given IV push per order, pt tolerated well.  PICC line flushed and guaze/mesh cover placed.  Confirmed tomorrow's appointment and pt ambulated out of clinic in no apparent distress.

## 2018-05-29 ENCOUNTER — OUTPATIENT INFUSION SERVICES (OUTPATIENT)
Dept: ONCOLOGY | Facility: MEDICAL CENTER | Age: 73
End: 2018-05-29
Attending: INTERNAL MEDICINE
Payer: MEDICARE

## 2018-05-29 VITALS
TEMPERATURE: 98.7 F | RESPIRATION RATE: 18 BRPM | HEART RATE: 79 BPM | DIASTOLIC BLOOD PRESSURE: 70 MMHG | OXYGEN SATURATION: 98 % | SYSTOLIC BLOOD PRESSURE: 118 MMHG

## 2018-05-29 DIAGNOSIS — R78.81 GRAM-POSITIVE BACTEREMIA: ICD-10-CM

## 2018-05-29 PROCEDURE — 700111 HCHG RX REV CODE 636 W/ 250 OVERRIDE (IP)

## 2018-05-29 PROCEDURE — 96374 THER/PROPH/DIAG INJ IV PUSH: CPT

## 2018-05-29 PROCEDURE — 700111 HCHG RX REV CODE 636 W/ 250 OVERRIDE (IP): Performed by: INTERNAL MEDICINE

## 2018-05-29 RX ORDER — 0.9 % SODIUM CHLORIDE 0.9 %
10-20 VIAL (ML) INJECTION PRN
Status: CANCELLED | OUTPATIENT
Start: 2018-05-29

## 2018-05-29 RX ORDER — 0.9 % SODIUM CHLORIDE 0.9 %
5 VIAL (ML) INJECTION PRN
Status: CANCELLED | OUTPATIENT
Start: 2018-05-29

## 2018-05-29 RX ADMIN — CEFTRIAXONE 2 G: 2 INJECTION, POWDER, FOR SOLUTION INTRAMUSCULAR; INTRAVENOUS at 17:51

## 2018-05-29 ASSESSMENT — PAIN SCALES - GENERAL: PAINLEVEL_OUTOF10: 3

## 2018-05-29 NOTE — PROGRESS NOTES
Patient arrived to clinic for daily Rocephin. Denies any changes.  PICC line flushed with good blood return noted.  Labs drawn per order.  Rocephin given IV push, pt tolerated well.  PICC line flushed and gauze/mesh cover placed.  Confirmed tomorrow's appointment and pt ambulated out of clinic in no apparent distress.

## 2018-05-30 ENCOUNTER — OUTPATIENT INFUSION SERVICES (OUTPATIENT)
Dept: ONCOLOGY | Facility: MEDICAL CENTER | Age: 73
End: 2018-05-30
Attending: INTERNAL MEDICINE
Payer: MEDICARE

## 2018-05-30 VITALS
DIASTOLIC BLOOD PRESSURE: 80 MMHG | OXYGEN SATURATION: 98 % | TEMPERATURE: 98.6 F | HEART RATE: 63 BPM | RESPIRATION RATE: 18 BRPM | SYSTOLIC BLOOD PRESSURE: 143 MMHG

## 2018-05-30 DIAGNOSIS — R78.81 GRAM-POSITIVE BACTEREMIA: ICD-10-CM

## 2018-05-30 PROCEDURE — 96374 THER/PROPH/DIAG INJ IV PUSH: CPT

## 2018-05-30 PROCEDURE — 700111 HCHG RX REV CODE 636 W/ 250 OVERRIDE (IP): Performed by: INTERNAL MEDICINE

## 2018-05-30 RX ORDER — 0.9 % SODIUM CHLORIDE 0.9 %
5 VIAL (ML) INJECTION PRN
Status: CANCELLED | OUTPATIENT
Start: 2018-05-30

## 2018-05-30 RX ORDER — 0.9 % SODIUM CHLORIDE 0.9 %
10-20 VIAL (ML) INJECTION PRN
Status: CANCELLED | OUTPATIENT
Start: 2018-05-30

## 2018-05-30 RX ADMIN — CEFTRIAXONE 2 G: 2 INJECTION, POWDER, FOR SOLUTION INTRAMUSCULAR; INTRAVENOUS at 17:47

## 2018-05-30 ASSESSMENT — PAIN SCALES - GENERAL: PAINLEVEL_OUTOF10: 3

## 2018-05-30 NOTE — PROGRESS NOTES
Patient arrived for daily Rocephin; denies any changes or concerns. Treatment completed without incident.  Confirmed tomorrow's appt; pt discharged home in good spirits under no apparent distress.

## 2018-05-31 ENCOUNTER — OUTPATIENT INFUSION SERVICES (OUTPATIENT)
Dept: ONCOLOGY | Facility: MEDICAL CENTER | Age: 73
End: 2018-05-31
Attending: INTERNAL MEDICINE
Payer: MEDICARE

## 2018-05-31 VITALS
DIASTOLIC BLOOD PRESSURE: 71 MMHG | SYSTOLIC BLOOD PRESSURE: 108 MMHG | OXYGEN SATURATION: 96 % | TEMPERATURE: 98.4 F | RESPIRATION RATE: 18 BRPM | HEART RATE: 68 BPM

## 2018-05-31 DIAGNOSIS — R78.81 GRAM-POSITIVE BACTEREMIA: ICD-10-CM

## 2018-05-31 PROCEDURE — 96374 THER/PROPH/DIAG INJ IV PUSH: CPT

## 2018-05-31 PROCEDURE — 700111 HCHG RX REV CODE 636 W/ 250 OVERRIDE (IP): Performed by: INTERNAL MEDICINE

## 2018-05-31 RX ORDER — 0.9 % SODIUM CHLORIDE 0.9 %
10-20 VIAL (ML) INJECTION PRN
Status: CANCELLED | OUTPATIENT
Start: 2018-05-31

## 2018-05-31 RX ORDER — IBUPROFEN 200 MG
200 TABLET ORAL EVERY 6 HOURS PRN
COMMUNITY
End: 2022-01-20

## 2018-05-31 RX ORDER — 0.9 % SODIUM CHLORIDE 0.9 %
5 VIAL (ML) INJECTION PRN
Status: CANCELLED | OUTPATIENT
Start: 2018-05-31

## 2018-05-31 RX ADMIN — CEFTRIAXONE 2 G: 2 INJECTION, POWDER, FOR SOLUTION INTRAMUSCULAR; INTRAVENOUS at 16:52

## 2018-05-31 ASSESSMENT — PAIN SCALES - GENERAL: PAINLEVEL_OUTOF10: 2

## 2018-05-31 NOTE — PROGRESS NOTES
Pt returns for daily  IV abx, 45 min late. Pt stated he thought his appt was at 6 pm. New printout of future appts provided and discussed with pt that there is a potential of his appt being cancelled if he is late for appts. L PICC in place, line patent with brisk blood return observed. Rocephin given IVP as prescribed. Pt ryley well. Line flushed clear and PICC wrapped. Confirmed time for tomorrow at 1700, pt verbalized understanding.  Pt discharged home under care of son Asa in no apparent distress.

## 2018-06-01 ENCOUNTER — OUTPATIENT INFUSION SERVICES (OUTPATIENT)
Dept: ONCOLOGY | Facility: MEDICAL CENTER | Age: 73
End: 2018-06-01
Attending: INTERNAL MEDICINE
Payer: MEDICARE

## 2018-06-01 VITALS
DIASTOLIC BLOOD PRESSURE: 71 MMHG | HEART RATE: 64 BPM | RESPIRATION RATE: 18 BRPM | TEMPERATURE: 99 F | SYSTOLIC BLOOD PRESSURE: 118 MMHG | OXYGEN SATURATION: 97 %

## 2018-06-01 DIAGNOSIS — R78.81 GRAM-POSITIVE BACTEREMIA: ICD-10-CM

## 2018-06-01 PROCEDURE — 700111 HCHG RX REV CODE 636 W/ 250 OVERRIDE (IP): Performed by: INTERNAL MEDICINE

## 2018-06-01 PROCEDURE — 96374 THER/PROPH/DIAG INJ IV PUSH: CPT

## 2018-06-01 RX ORDER — 0.9 % SODIUM CHLORIDE 0.9 %
10-20 VIAL (ML) INJECTION PRN
Status: CANCELLED | OUTPATIENT
Start: 2018-06-01

## 2018-06-01 RX ORDER — 0.9 % SODIUM CHLORIDE 0.9 %
5 VIAL (ML) INJECTION PRN
Status: CANCELLED | OUTPATIENT
Start: 2018-06-01

## 2018-06-01 RX ADMIN — CEFTRIAXONE 2 G: 2 INJECTION, POWDER, FOR SOLUTION INTRAMUSCULAR; INTRAVENOUS at 17:16

## 2018-06-01 ASSESSMENT — PAIN SCALES - GENERAL: PAINLEVEL_OUTOF10: 1

## 2018-06-02 ENCOUNTER — OUTPATIENT INFUSION SERVICES (OUTPATIENT)
Dept: ONCOLOGY | Facility: MEDICAL CENTER | Age: 73
End: 2018-06-02
Attending: INTERNAL MEDICINE
Payer: MEDICARE

## 2018-06-02 VITALS
HEART RATE: 65 BPM | WEIGHT: 158.95 LBS | BODY MASS INDEX: 22.18 KG/M2 | RESPIRATION RATE: 18 BRPM | TEMPERATURE: 98.3 F | SYSTOLIC BLOOD PRESSURE: 118 MMHG | OXYGEN SATURATION: 97 % | DIASTOLIC BLOOD PRESSURE: 76 MMHG

## 2018-06-02 DIAGNOSIS — R78.81 GRAM-POSITIVE BACTEREMIA: ICD-10-CM

## 2018-06-02 PROCEDURE — 700111 HCHG RX REV CODE 636 W/ 250 OVERRIDE (IP): Performed by: INTERNAL MEDICINE

## 2018-06-02 PROCEDURE — 700111 HCHG RX REV CODE 636 W/ 250 OVERRIDE (IP)

## 2018-06-02 PROCEDURE — 96374 THER/PROPH/DIAG INJ IV PUSH: CPT

## 2018-06-02 RX ORDER — 0.9 % SODIUM CHLORIDE 0.9 %
10-20 VIAL (ML) INJECTION PRN
Status: CANCELLED | OUTPATIENT
Start: 2018-06-02

## 2018-06-02 RX ORDER — 0.9 % SODIUM CHLORIDE 0.9 %
5 VIAL (ML) INJECTION PRN
Status: CANCELLED | OUTPATIENT
Start: 2018-06-02

## 2018-06-02 RX ADMIN — CEFTRIAXONE 2 G: 2 INJECTION, POWDER, FOR SOLUTION INTRAMUSCULAR; INTRAVENOUS at 18:02

## 2018-06-02 ASSESSMENT — PAIN SCALES - GENERAL: PAINLEVEL_OUTOF10: 3

## 2018-06-03 ENCOUNTER — OUTPATIENT INFUSION SERVICES (OUTPATIENT)
Dept: ONCOLOGY | Facility: MEDICAL CENTER | Age: 73
End: 2018-06-03
Attending: INTERNAL MEDICINE
Payer: MEDICARE

## 2018-06-03 VITALS
RESPIRATION RATE: 18 BRPM | SYSTOLIC BLOOD PRESSURE: 118 MMHG | OXYGEN SATURATION: 97 % | TEMPERATURE: 98.2 F | DIASTOLIC BLOOD PRESSURE: 72 MMHG | HEART RATE: 69 BPM

## 2018-06-03 DIAGNOSIS — R78.81 GRAM-POSITIVE BACTEREMIA: ICD-10-CM

## 2018-06-03 PROCEDURE — 700111 HCHG RX REV CODE 636 W/ 250 OVERRIDE (IP): Performed by: INTERNAL MEDICINE

## 2018-06-03 PROCEDURE — 700111 HCHG RX REV CODE 636 W/ 250 OVERRIDE (IP)

## 2018-06-03 PROCEDURE — 96374 THER/PROPH/DIAG INJ IV PUSH: CPT

## 2018-06-03 RX ORDER — 0.9 % SODIUM CHLORIDE 0.9 %
10-20 VIAL (ML) INJECTION PRN
Status: CANCELLED | OUTPATIENT
Start: 2018-06-03

## 2018-06-03 RX ORDER — 0.9 % SODIUM CHLORIDE 0.9 %
5 VIAL (ML) INJECTION PRN
Status: CANCELLED | OUTPATIENT
Start: 2018-06-03

## 2018-06-03 RX ADMIN — CEFTRIAXONE 2 G: 2 INJECTION, POWDER, FOR SOLUTION INTRAMUSCULAR; INTRAVENOUS at 17:49

## 2018-06-03 ASSESSMENT — PAIN SCALES - GENERAL: PAINLEVEL_OUTOF10: 2

## 2018-06-03 NOTE — PROGRESS NOTES
Pt presents ambulatory for Rocephin infusion. Ice pack provided to pt for back, pt reported effective relief. Pt reports no adverse side effects from previous infusions. PICC accessed and blood return noted. Medication given via slow IV push and PICC flushed with saline per protocol. PICC drsg changed with sterile technique and gauze with arm wrap applied. Pt to return tomorrow, appt confirmed with pt. Pt left ambulatory in no distress at 1815

## 2018-06-04 ENCOUNTER — OUTPATIENT INFUSION SERVICES (OUTPATIENT)
Dept: ONCOLOGY | Facility: MEDICAL CENTER | Age: 73
End: 2018-06-04
Attending: INTERNAL MEDICINE
Payer: MEDICARE

## 2018-06-04 VITALS
WEIGHT: 158.29 LBS | HEART RATE: 65 BPM | DIASTOLIC BLOOD PRESSURE: 59 MMHG | SYSTOLIC BLOOD PRESSURE: 130 MMHG | TEMPERATURE: 97 F | RESPIRATION RATE: 18 BRPM | OXYGEN SATURATION: 97 % | BODY MASS INDEX: 22.16 KG/M2 | HEIGHT: 71 IN

## 2018-06-04 DIAGNOSIS — R78.81 GRAM-POSITIVE BACTEREMIA: ICD-10-CM

## 2018-06-04 LAB
ALBUMIN SERPL BCP-MCNC: 3.7 G/DL (ref 3.2–4.9)
ALBUMIN/GLOB SERPL: 1.2 G/DL
ALP SERPL-CCNC: 86 U/L (ref 30–99)
ALT SERPL-CCNC: 13 U/L (ref 2–50)
ANION GAP SERPL CALC-SCNC: 8 MMOL/L (ref 0–11.9)
AST SERPL-CCNC: 14 U/L (ref 12–45)
BASOPHILS # BLD AUTO: 0.6 % (ref 0–1.8)
BASOPHILS # BLD: 0.03 K/UL (ref 0–0.12)
BILIRUB SERPL-MCNC: 0.3 MG/DL (ref 0.1–1.5)
BUN SERPL-MCNC: 24 MG/DL (ref 8–22)
CALCIUM SERPL-MCNC: 9.4 MG/DL (ref 8.5–10.5)
CHLORIDE SERPL-SCNC: 107 MMOL/L (ref 96–112)
CO2 SERPL-SCNC: 24 MMOL/L (ref 20–33)
CREAT SERPL-MCNC: 0.77 MG/DL (ref 0.5–1.4)
EOSINOPHIL # BLD AUTO: 0.48 K/UL (ref 0–0.51)
EOSINOPHIL NFR BLD: 10 % (ref 0–6.9)
ERYTHROCYTE [DISTWIDTH] IN BLOOD BY AUTOMATED COUNT: 43.2 FL (ref 35.9–50)
GLOBULIN SER CALC-MCNC: 3.2 G/DL (ref 1.9–3.5)
GLUCOSE SERPL-MCNC: 84 MG/DL (ref 65–99)
HCT VFR BLD AUTO: 36.8 % (ref 42–52)
HGB BLD-MCNC: 12.6 G/DL (ref 14–18)
IMM GRANULOCYTES # BLD AUTO: 0.01 K/UL (ref 0–0.11)
IMM GRANULOCYTES NFR BLD AUTO: 0.2 % (ref 0–0.9)
LYMPHOCYTES # BLD AUTO: 1.18 K/UL (ref 1–4.8)
LYMPHOCYTES NFR BLD: 24.5 % (ref 22–41)
MCH RBC QN AUTO: 32.3 PG (ref 27–33)
MCHC RBC AUTO-ENTMCNC: 34.2 G/DL (ref 33.7–35.3)
MCV RBC AUTO: 94.4 FL (ref 81.4–97.8)
MONOCYTES # BLD AUTO: 0.75 K/UL (ref 0–0.85)
MONOCYTES NFR BLD AUTO: 15.6 % (ref 0–13.4)
NEUTROPHILS # BLD AUTO: 2.37 K/UL (ref 1.82–7.42)
NEUTROPHILS NFR BLD: 49.1 % (ref 44–72)
NRBC # BLD AUTO: 0 K/UL
NRBC BLD-RTO: 0 /100 WBC
PLATELET # BLD AUTO: 187 K/UL (ref 164–446)
PMV BLD AUTO: 8.5 FL (ref 9–12.9)
POTASSIUM SERPL-SCNC: 4.1 MMOL/L (ref 3.6–5.5)
PROT SERPL-MCNC: 6.9 G/DL (ref 6–8.2)
RBC # BLD AUTO: 3.9 M/UL (ref 4.7–6.1)
SODIUM SERPL-SCNC: 139 MMOL/L (ref 135–145)
WBC # BLD AUTO: 4.8 K/UL (ref 4.8–10.8)

## 2018-06-04 PROCEDURE — 85025 COMPLETE CBC W/AUTO DIFF WBC: CPT

## 2018-06-04 PROCEDURE — 36592 COLLECT BLOOD FROM PICC: CPT

## 2018-06-04 PROCEDURE — 96374 THER/PROPH/DIAG INJ IV PUSH: CPT

## 2018-06-04 PROCEDURE — 700111 HCHG RX REV CODE 636 W/ 250 OVERRIDE (IP)

## 2018-06-04 PROCEDURE — 80053 COMPREHEN METABOLIC PANEL: CPT

## 2018-06-04 PROCEDURE — 700111 HCHG RX REV CODE 636 W/ 250 OVERRIDE (IP): Performed by: INTERNAL MEDICINE

## 2018-06-04 RX ORDER — 0.9 % SODIUM CHLORIDE 0.9 %
10-20 VIAL (ML) INJECTION PRN
Status: CANCELLED | OUTPATIENT
Start: 2018-06-04

## 2018-06-04 RX ORDER — 0.9 % SODIUM CHLORIDE 0.9 %
5 VIAL (ML) INJECTION PRN
Status: CANCELLED | OUTPATIENT
Start: 2018-06-04

## 2018-06-04 RX ADMIN — CEFTRIAXONE 2 G: 2 INJECTION, POWDER, FOR SOLUTION INTRAMUSCULAR; INTRAVENOUS at 16:59

## 2018-06-04 ASSESSMENT — PAIN SCALES - GENERAL: PAINLEVEL_OUTOF10: 2

## 2018-06-04 NOTE — PROGRESS NOTES
Patient arrived ambulatory to the [facility] for Rocephin. Reviewed vital signs, labs, and physician order. Patient denies S&S of infection, reports upcoming follow up with Neurology. Pt arrives with  PICC, visualized brisk blood return. Rocephin administered per MD order, no adverse reaction observed. IV flushed per protocol, 4X4 gauze and mesh sleeve placed for protection. Confirmed upcoming appointment date and time with patient. Patient left the [facility] ambulatory in no sign of distress.

## 2018-06-05 ENCOUNTER — OUTPATIENT INFUSION SERVICES (OUTPATIENT)
Dept: ONCOLOGY | Facility: MEDICAL CENTER | Age: 73
End: 2018-06-05
Attending: INTERNAL MEDICINE
Payer: MEDICARE

## 2018-06-05 ENCOUNTER — OFFICE VISIT (OUTPATIENT)
Dept: INFECTIOUS DISEASES | Facility: MEDICAL CENTER | Age: 73
End: 2018-06-05
Payer: MEDICARE

## 2018-06-05 VITALS
BODY MASS INDEX: 22.12 KG/M2 | OXYGEN SATURATION: 97 % | HEART RATE: 68 BPM | SYSTOLIC BLOOD PRESSURE: 118 MMHG | TEMPERATURE: 99.2 F | HEIGHT: 71 IN | WEIGHT: 158 LBS | DIASTOLIC BLOOD PRESSURE: 72 MMHG

## 2018-06-05 VITALS
HEART RATE: 66 BPM | DIASTOLIC BLOOD PRESSURE: 78 MMHG | TEMPERATURE: 98.6 F | RESPIRATION RATE: 18 BRPM | OXYGEN SATURATION: 96 % | SYSTOLIC BLOOD PRESSURE: 132 MMHG

## 2018-06-05 DIAGNOSIS — G06.1 SPINAL EPIDURAL ABSCESS: ICD-10-CM

## 2018-06-05 DIAGNOSIS — A49.1 STREPTOCOCCUS VIRIDANS INFECTION: ICD-10-CM

## 2018-06-05 DIAGNOSIS — R78.81 GRAM-POSITIVE BACTEREMIA: ICD-10-CM

## 2018-06-05 PROCEDURE — 700111 HCHG RX REV CODE 636 W/ 250 OVERRIDE (IP): Performed by: INTERNAL MEDICINE

## 2018-06-05 PROCEDURE — 99214 OFFICE O/P EST MOD 30 MIN: CPT | Performed by: NURSE PRACTITIONER

## 2018-06-05 PROCEDURE — 96374 THER/PROPH/DIAG INJ IV PUSH: CPT

## 2018-06-05 RX ORDER — 0.9 % SODIUM CHLORIDE 0.9 %
10-20 VIAL (ML) INJECTION PRN
Status: CANCELLED | OUTPATIENT
Start: 2018-06-05

## 2018-06-05 RX ORDER — CEFTRIAXONE 1 G/1
1 INJECTION, POWDER, FOR SOLUTION INTRAMUSCULAR; INTRAVENOUS DAILY
COMMUNITY
End: 2018-06-26

## 2018-06-05 RX ORDER — AMOXICILLIN AND CLAVULANATE POTASSIUM 875; 125 MG/1; MG/1
1 TABLET, FILM COATED ORAL 2 TIMES DAILY
Qty: 28 TAB | Refills: 0 | Status: SHIPPED | OUTPATIENT
Start: 2018-06-12 | End: 2018-06-26

## 2018-06-05 RX ORDER — 0.9 % SODIUM CHLORIDE 0.9 %
5 VIAL (ML) INJECTION PRN
Status: CANCELLED | OUTPATIENT
Start: 2018-06-05

## 2018-06-05 RX ADMIN — CEFTRIAXONE 2 G: 2 INJECTION, POWDER, FOR SOLUTION INTRAMUSCULAR; INTRAVENOUS at 17:03

## 2018-06-05 ASSESSMENT — PAIN SCALES - GENERAL: PAINLEVEL_OUTOF10: 1
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
## 2018-06-05 NOTE — PROGRESS NOTES
"Pt is here for his scheduled IVABX. Labs drawn. Fatigue - appetite not so great - discussed. \"Things just don't taste like they used to.\" Treatment completed without an incident. Discharged home to self care. Returns daily.  "

## 2018-06-06 ENCOUNTER — OUTPATIENT INFUSION SERVICES (OUTPATIENT)
Dept: ONCOLOGY | Facility: MEDICAL CENTER | Age: 73
End: 2018-06-06
Attending: INTERNAL MEDICINE
Payer: MEDICARE

## 2018-06-06 VITALS
HEART RATE: 66 BPM | RESPIRATION RATE: 18 BRPM | TEMPERATURE: 98.6 F | DIASTOLIC BLOOD PRESSURE: 84 MMHG | OXYGEN SATURATION: 98 % | SYSTOLIC BLOOD PRESSURE: 126 MMHG

## 2018-06-06 DIAGNOSIS — R78.81 GRAM-POSITIVE BACTEREMIA: ICD-10-CM

## 2018-06-06 PROCEDURE — 700111 HCHG RX REV CODE 636 W/ 250 OVERRIDE (IP): Performed by: INTERNAL MEDICINE

## 2018-06-06 PROCEDURE — 96374 THER/PROPH/DIAG INJ IV PUSH: CPT

## 2018-06-06 RX ORDER — 0.9 % SODIUM CHLORIDE 0.9 %
10-20 VIAL (ML) INJECTION PRN
Status: CANCELLED | OUTPATIENT
Start: 2018-06-06

## 2018-06-06 RX ORDER — 0.9 % SODIUM CHLORIDE 0.9 %
5 VIAL (ML) INJECTION PRN
Status: CANCELLED | OUTPATIENT
Start: 2018-06-06

## 2018-06-06 RX ADMIN — CEFTRIAXONE 2 G: 2 INJECTION, POWDER, FOR SOLUTION INTRAMUSCULAR; INTRAVENOUS at 17:06

## 2018-06-06 ASSESSMENT — PAIN SCALES - GENERAL
PAINLEVEL_OUTOF10: 2
PAINLEVEL: 2=MINIMAL-SLIGHT

## 2018-06-06 NOTE — PROGRESS NOTES
Patient arrived for daily Rocephin; denies any changes or concerns. PICC flushed and patent; Rocephin push given over 3 mins. Pt tolerated well. PICC flushed per protocol, new gauze dressing applied. Confirmed tomorrow's appt; discharged home in good spirits under no apparent distress.

## 2018-06-07 ENCOUNTER — OUTPATIENT INFUSION SERVICES (OUTPATIENT)
Dept: ONCOLOGY | Facility: MEDICAL CENTER | Age: 73
End: 2018-06-07
Attending: INTERNAL MEDICINE
Payer: MEDICARE

## 2018-06-07 ENCOUNTER — HOSPITAL ENCOUNTER (OUTPATIENT)
Dept: RADIOLOGY | Facility: MEDICAL CENTER | Age: 73
End: 2018-06-07
Attending: NURSE PRACTITIONER
Payer: MEDICARE

## 2018-06-07 VITALS
RESPIRATION RATE: 18 BRPM | OXYGEN SATURATION: 98 % | TEMPERATURE: 98.4 F | HEART RATE: 62 BPM | DIASTOLIC BLOOD PRESSURE: 83 MMHG | SYSTOLIC BLOOD PRESSURE: 121 MMHG

## 2018-06-07 DIAGNOSIS — R78.81 GRAM-POSITIVE BACTEREMIA: ICD-10-CM

## 2018-06-07 DIAGNOSIS — G06.1 SPINAL EPIDURAL ABSCESS: ICD-10-CM

## 2018-06-07 PROCEDURE — A9579 GAD-BASE MR CONTRAST NOS,1ML: HCPCS | Performed by: NURSE PRACTITIONER

## 2018-06-07 PROCEDURE — 700117 HCHG RX CONTRAST REV CODE 255: Performed by: NURSE PRACTITIONER

## 2018-06-07 PROCEDURE — 700111 HCHG RX REV CODE 636 W/ 250 OVERRIDE (IP): Performed by: INTERNAL MEDICINE

## 2018-06-07 PROCEDURE — 96374 THER/PROPH/DIAG INJ IV PUSH: CPT | Mod: XU

## 2018-06-07 PROCEDURE — 72158 MRI LUMBAR SPINE W/O & W/DYE: CPT

## 2018-06-07 RX ORDER — 0.9 % SODIUM CHLORIDE 0.9 %
5 VIAL (ML) INJECTION PRN
Status: CANCELLED | OUTPATIENT
Start: 2018-06-07

## 2018-06-07 RX ORDER — 0.9 % SODIUM CHLORIDE 0.9 %
10-20 VIAL (ML) INJECTION PRN
Status: CANCELLED | OUTPATIENT
Start: 2018-06-07

## 2018-06-07 RX ADMIN — GADODIAMIDE 15 ML: 287 INJECTION INTRAVENOUS at 18:23

## 2018-06-07 RX ADMIN — CEFTRIAXONE 2 G: 2 INJECTION, POWDER, FOR SOLUTION INTRAMUSCULAR; INTRAVENOUS at 17:27

## 2018-06-07 ASSESSMENT — PAIN SCALES - GENERAL: PAINLEVEL_OUTOF10: 1

## 2018-06-08 ENCOUNTER — OUTPATIENT INFUSION SERVICES (OUTPATIENT)
Dept: ONCOLOGY | Facility: MEDICAL CENTER | Age: 73
End: 2018-06-08
Attending: INTERNAL MEDICINE
Payer: MEDICARE

## 2018-06-08 VITALS
RESPIRATION RATE: 16 BRPM | HEART RATE: 69 BPM | OXYGEN SATURATION: 97 % | TEMPERATURE: 98.5 F | DIASTOLIC BLOOD PRESSURE: 74 MMHG | SYSTOLIC BLOOD PRESSURE: 107 MMHG

## 2018-06-08 DIAGNOSIS — R78.81 GRAM-POSITIVE BACTEREMIA: ICD-10-CM

## 2018-06-08 PROCEDURE — 96374 THER/PROPH/DIAG INJ IV PUSH: CPT

## 2018-06-08 PROCEDURE — 700111 HCHG RX REV CODE 636 W/ 250 OVERRIDE (IP): Performed by: INTERNAL MEDICINE

## 2018-06-08 RX ORDER — 0.9 % SODIUM CHLORIDE 0.9 %
10-20 VIAL (ML) INJECTION PRN
Status: CANCELLED | OUTPATIENT
Start: 2018-06-08

## 2018-06-08 RX ORDER — 0.9 % SODIUM CHLORIDE 0.9 %
5 VIAL (ML) INJECTION PRN
Status: CANCELLED | OUTPATIENT
Start: 2018-06-08

## 2018-06-08 RX ADMIN — CEFTRIAXONE 2 G: 2 INJECTION, POWDER, FOR SOLUTION INTRAMUSCULAR; INTRAVENOUS at 17:00

## 2018-06-08 ASSESSMENT — PAIN SCALES - GENERAL: PAINLEVEL: 1=MINIMAL PAIN

## 2018-06-08 NOTE — PROGRESS NOTES
Pt to infusion services ambulatory per self.  Pt here for scheduled daily IV abx.  Plan of care reviewed.  Pt verbalizes understanding.  Pt reports mild back discomfort today.  Pt tells me he will be having MRI following appointment today.  Pt denies any abdominal cramping or gastrointestinal upset.  Pt with PICC line in place to DERIC.  PICC flushed with normal saline.  PICC flushes easily +brisk blood return verified.  IV Rocephin administered per MD orders via slow IV push over 3 minutes.  Pt tolerated well.  PICC flushed with normal saline per policy; continued +blood return verified.  Line secured.  Pt released to self care in no apparent distress after completion of treatment, ambulatory.  Pt returns daily; next appointment scheduled.

## 2018-06-09 ENCOUNTER — OUTPATIENT INFUSION SERVICES (OUTPATIENT)
Dept: ONCOLOGY | Facility: MEDICAL CENTER | Age: 73
End: 2018-06-09
Attending: INTERNAL MEDICINE
Payer: MEDICARE

## 2018-06-09 VITALS
SYSTOLIC BLOOD PRESSURE: 131 MMHG | OXYGEN SATURATION: 98 % | DIASTOLIC BLOOD PRESSURE: 73 MMHG | RESPIRATION RATE: 18 BRPM | TEMPERATURE: 98.5 F

## 2018-06-09 DIAGNOSIS — R78.81 GRAM-POSITIVE BACTEREMIA: ICD-10-CM

## 2018-06-09 PROCEDURE — 96365 THER/PROPH/DIAG IV INF INIT: CPT

## 2018-06-09 PROCEDURE — 700105 HCHG RX REV CODE 258: Performed by: INTERNAL MEDICINE

## 2018-06-09 PROCEDURE — 700111 HCHG RX REV CODE 636 W/ 250 OVERRIDE (IP): Performed by: INTERNAL MEDICINE

## 2018-06-09 RX ORDER — 0.9 % SODIUM CHLORIDE 0.9 %
10-20 VIAL (ML) INJECTION PRN
Status: CANCELLED | OUTPATIENT
Start: 2018-06-09

## 2018-06-09 RX ORDER — 0.9 % SODIUM CHLORIDE 0.9 %
5 VIAL (ML) INJECTION PRN
Status: CANCELLED | OUTPATIENT
Start: 2018-06-09

## 2018-06-09 RX ADMIN — CEFTRIAXONE 2 G: 2 INJECTION, POWDER, FOR SOLUTION INTRAMUSCULAR; INTRAVENOUS at 16:57

## 2018-06-09 ASSESSMENT — PAIN SCALES - GENERAL: PAINLEVEL_OUTOF10: 1

## 2018-06-09 NOTE — PROGRESS NOTES
Patient here for Rocephin. Clave changed. Rocephin given as IV push over 3 minutes. Eric tolerated well and without incident. PICC line in good condition and has positive blood return. PICC line flushed with saline per protocol. Next appointment scheduled. Discharged to self care; no apparent distress noted.

## 2018-06-10 ENCOUNTER — OUTPATIENT INFUSION SERVICES (OUTPATIENT)
Dept: ONCOLOGY | Facility: MEDICAL CENTER | Age: 73
End: 2018-06-10
Attending: INTERNAL MEDICINE
Payer: MEDICARE

## 2018-06-10 VITALS
RESPIRATION RATE: 18 BRPM | OXYGEN SATURATION: 98 % | BODY MASS INDEX: 22.06 KG/M2 | DIASTOLIC BLOOD PRESSURE: 84 MMHG | WEIGHT: 158.07 LBS | SYSTOLIC BLOOD PRESSURE: 137 MMHG | TEMPERATURE: 98.9 F | HEART RATE: 65 BPM

## 2018-06-10 DIAGNOSIS — R78.81 GRAM-POSITIVE BACTEREMIA: ICD-10-CM

## 2018-06-10 PROCEDURE — 96365 THER/PROPH/DIAG IV INF INIT: CPT

## 2018-06-10 PROCEDURE — 700111 HCHG RX REV CODE 636 W/ 250 OVERRIDE (IP): Performed by: INTERNAL MEDICINE

## 2018-06-10 PROCEDURE — 700105 HCHG RX REV CODE 258: Performed by: INTERNAL MEDICINE

## 2018-06-10 RX ORDER — 0.9 % SODIUM CHLORIDE 0.9 %
10-20 VIAL (ML) INJECTION PRN
Status: CANCELLED | OUTPATIENT
Start: 2018-06-10

## 2018-06-10 RX ORDER — 0.9 % SODIUM CHLORIDE 0.9 %
5 VIAL (ML) INJECTION PRN
Status: CANCELLED | OUTPATIENT
Start: 2018-06-10

## 2018-06-10 RX ADMIN — CEFTRIAXONE 2 G: 2 INJECTION, POWDER, FOR SOLUTION INTRAMUSCULAR; INTRAVENOUS at 17:57

## 2018-06-10 ASSESSMENT — PAIN SCALES - GENERAL: PAINLEVEL_OUTOF10: 1

## 2018-06-10 NOTE — PROGRESS NOTES
Patient arrived ambulatory to the [facility] for Rocephin. Reviewed vital signs, labs, and physician order. Reviewed with pt antibiotic packaging changed, infusion to be administered over 16 min, now back in the pre-filled bag, pt verbalized understanding. Pt arrives with  PICC to E, visualized brisk blood return. Rocephin administered, no adverse reaction observed. PICC flushed per protocol, sterile dressing change conducted per policy, 4X4 gauze and mesh sleeve placed for protection. Confirmed upcoming appointment date and time with patient. Patient left the [facility] ambulatory in no sign of distress.

## 2018-06-11 ENCOUNTER — OUTPATIENT INFUSION SERVICES (OUTPATIENT)
Dept: ONCOLOGY | Facility: MEDICAL CENTER | Age: 73
End: 2018-06-11
Attending: INTERNAL MEDICINE
Payer: MEDICARE

## 2018-06-11 ENCOUNTER — TELEPHONE (OUTPATIENT)
Dept: INFECTIOUS DISEASES | Facility: MEDICAL CENTER | Age: 73
End: 2018-06-11

## 2018-06-11 VITALS
DIASTOLIC BLOOD PRESSURE: 85 MMHG | HEART RATE: 64 BPM | SYSTOLIC BLOOD PRESSURE: 125 MMHG | HEIGHT: 71 IN | TEMPERATURE: 99 F | BODY MASS INDEX: 22.31 KG/M2 | WEIGHT: 159.39 LBS | RESPIRATION RATE: 18 BRPM | OXYGEN SATURATION: 98 %

## 2018-06-11 DIAGNOSIS — R78.81 GRAM-POSITIVE BACTEREMIA: ICD-10-CM

## 2018-06-11 LAB
ALBUMIN SERPL BCP-MCNC: 3.6 G/DL (ref 3.2–4.9)
ALBUMIN/GLOB SERPL: 1.2 G/DL
ALP SERPL-CCNC: 81 U/L (ref 30–99)
ALT SERPL-CCNC: 12 U/L (ref 2–50)
ANION GAP SERPL CALC-SCNC: 7 MMOL/L (ref 0–11.9)
AST SERPL-CCNC: 15 U/L (ref 12–45)
BASOPHILS # BLD AUTO: 0.2 % (ref 0–1.8)
BASOPHILS # BLD: 0.01 K/UL (ref 0–0.12)
BILIRUB SERPL-MCNC: 0.3 MG/DL (ref 0.1–1.5)
BUN SERPL-MCNC: 25 MG/DL (ref 8–22)
CALCIUM SERPL-MCNC: 9.2 MG/DL (ref 8.5–10.5)
CHLORIDE SERPL-SCNC: 105 MMOL/L (ref 96–112)
CO2 SERPL-SCNC: 24 MMOL/L (ref 20–33)
CREAT SERPL-MCNC: 0.84 MG/DL (ref 0.5–1.4)
EOSINOPHIL # BLD AUTO: 0.51 K/UL (ref 0–0.51)
EOSINOPHIL NFR BLD: 11.9 % (ref 0–6.9)
ERYTHROCYTE [DISTWIDTH] IN BLOOD BY AUTOMATED COUNT: 42.5 FL (ref 35.9–50)
GLOBULIN SER CALC-MCNC: 3 G/DL (ref 1.9–3.5)
GLUCOSE SERPL-MCNC: 109 MG/DL (ref 65–99)
HCT VFR BLD AUTO: 35.3 % (ref 42–52)
HGB BLD-MCNC: 12.4 G/DL (ref 14–18)
IMM GRANULOCYTES # BLD AUTO: 0.01 K/UL (ref 0–0.11)
IMM GRANULOCYTES NFR BLD AUTO: 0.2 % (ref 0–0.9)
LYMPHOCYTES # BLD AUTO: 1.11 K/UL (ref 1–4.8)
LYMPHOCYTES NFR BLD: 26 % (ref 22–41)
MCH RBC QN AUTO: 32.5 PG (ref 27–33)
MCHC RBC AUTO-ENTMCNC: 35.1 G/DL (ref 33.7–35.3)
MCV RBC AUTO: 92.4 FL (ref 81.4–97.8)
MONOCYTES # BLD AUTO: 0.72 K/UL (ref 0–0.85)
MONOCYTES NFR BLD AUTO: 16.9 % (ref 0–13.4)
NEUTROPHILS # BLD AUTO: 1.91 K/UL (ref 1.82–7.42)
NEUTROPHILS NFR BLD: 44.8 % (ref 44–72)
NRBC # BLD AUTO: 0 K/UL
NRBC BLD-RTO: 0 /100 WBC
PLATELET # BLD AUTO: 217 K/UL (ref 164–446)
PMV BLD AUTO: 8.5 FL (ref 9–12.9)
POTASSIUM SERPL-SCNC: 4.1 MMOL/L (ref 3.6–5.5)
PROT SERPL-MCNC: 6.6 G/DL (ref 6–8.2)
RBC # BLD AUTO: 3.82 M/UL (ref 4.7–6.1)
SODIUM SERPL-SCNC: 136 MMOL/L (ref 135–145)
WBC # BLD AUTO: 4.3 K/UL (ref 4.8–10.8)

## 2018-06-11 PROCEDURE — 80053 COMPREHEN METABOLIC PANEL: CPT

## 2018-06-11 PROCEDURE — 700111 HCHG RX REV CODE 636 W/ 250 OVERRIDE (IP): Performed by: INTERNAL MEDICINE

## 2018-06-11 PROCEDURE — 36592 COLLECT BLOOD FROM PICC: CPT

## 2018-06-11 PROCEDURE — 85025 COMPLETE CBC W/AUTO DIFF WBC: CPT

## 2018-06-11 PROCEDURE — 700105 HCHG RX REV CODE 258: Performed by: INTERNAL MEDICINE

## 2018-06-11 PROCEDURE — 96365 THER/PROPH/DIAG IV INF INIT: CPT

## 2018-06-11 RX ORDER — 0.9 % SODIUM CHLORIDE 0.9 %
10-20 VIAL (ML) INJECTION PRN
Status: CANCELLED | OUTPATIENT
Start: 2018-06-11

## 2018-06-11 RX ORDER — 0.9 % SODIUM CHLORIDE 0.9 %
5 VIAL (ML) INJECTION PRN
Status: CANCELLED | OUTPATIENT
Start: 2018-06-11

## 2018-06-11 RX ADMIN — CEFTRIAXONE 2 G: 2 INJECTION, POWDER, FOR SOLUTION INTRAMUSCULAR; INTRAVENOUS at 17:06

## 2018-06-11 ASSESSMENT — PAIN SCALES - GENERAL: PAINLEVEL_OUTOF10: 0

## 2018-06-11 NOTE — PROGRESS NOTES
Patient in for continuation of IV rocephin therapy, no new concerns voiced at this time. Rocephin administered as ordered, via left upper arm PICC, and well tolerated; brisk blood return noted before and after infusion. Appointment confirmed for 6/11/18 at 1700, patient understands that following final antibiotic infusion PICC line is pulled and a 30 minute observation period follows. Discharged ambulatory in no apparent distress.

## 2018-06-12 NOTE — PROGRESS NOTES
Pt returns to infusion center for IV antibiotics.  PICC line in place, brisk blood return noted.  Labs drawn as ordered.  Pt tolerated infusion without incident.  PICC line flushed with saline per policy.  PICC line removed, pressure held to site.  Pressure dressing placed.  Pt monitored for 30 minutes without incident.  Pt left infusion center ambulatory and in good condition.  No further follow up at this time.

## 2018-06-24 LAB
MYCOBACTERIUM SPEC CULT: NORMAL
MYCOBACTERIUM SPEC CULT: NORMAL
RHODAMINE-AURAMINE STN SPEC: NORMAL
RHODAMINE-AURAMINE STN SPEC: NORMAL
SIGNIFICANT IND 70042: NORMAL
SIGNIFICANT IND 70042: NORMAL
SITE SITE: NORMAL
SITE SITE: NORMAL
SOURCE SOURCE: NORMAL
SOURCE SOURCE: NORMAL

## 2018-06-26 ENCOUNTER — OFFICE VISIT (OUTPATIENT)
Dept: INFECTIOUS DISEASES | Facility: MEDICAL CENTER | Age: 73
End: 2018-06-26
Payer: MEDICARE

## 2018-06-26 VITALS
BODY MASS INDEX: 22.68 KG/M2 | HEART RATE: 89 BPM | OXYGEN SATURATION: 98 % | WEIGHT: 162 LBS | SYSTOLIC BLOOD PRESSURE: 120 MMHG | HEIGHT: 71 IN | TEMPERATURE: 98.8 F | DIASTOLIC BLOOD PRESSURE: 70 MMHG

## 2018-06-26 DIAGNOSIS — A49.1 STREPTOCOCCUS VIRIDANS INFECTION: ICD-10-CM

## 2018-06-26 DIAGNOSIS — G06.1 SPINAL EPIDURAL ABSCESS: ICD-10-CM

## 2018-06-26 PROCEDURE — 99213 OFFICE O/P EST LOW 20 MIN: CPT | Performed by: NURSE PRACTITIONER

## 2018-06-26 RX ORDER — AMOXICILLIN AND CLAVULANATE POTASSIUM 875; 125 MG/1; MG/1
1 TABLET, FILM COATED ORAL 2 TIMES DAILY
Qty: 28 TAB | Refills: 0 | Status: SHIPPED | OUTPATIENT
Start: 2018-06-26 | End: 2018-07-10
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
## 2018-07-03 ENCOUNTER — APPOINTMENT (RX ONLY)
Dept: URBAN - METROPOLITAN AREA CLINIC 31 | Facility: CLINIC | Age: 73
Setting detail: DERMATOLOGY
End: 2018-07-03

## 2018-07-03 DIAGNOSIS — Z85.828 PERSONAL HISTORY OF OTHER MALIGNANT NEOPLASM OF SKIN: ICD-10-CM

## 2018-07-03 DIAGNOSIS — L57.0 ACTINIC KERATOSIS: ICD-10-CM

## 2018-07-03 PROBLEM — I10 ESSENTIAL (PRIMARY) HYPERTENSION: Status: ACTIVE | Noted: 2018-07-03

## 2018-07-03 PROBLEM — D48.5 NEOPLASM OF UNCERTAIN BEHAVIOR OF SKIN: Status: ACTIVE | Noted: 2018-07-03

## 2018-07-03 PROCEDURE — ? COUNSELING

## 2018-07-03 PROCEDURE — 99201: CPT

## 2018-07-03 PROCEDURE — ? DEFER

## 2018-07-03 PROCEDURE — ? ADDITIONAL NOTES

## 2018-07-03 NOTE — PROCEDURE: ADDITIONAL NOTES
Additional Notes: Patient would like to wait until next week for bx and ln2
Additional Notes: Multiple locations on face alone. Includes spot of concern.
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
## 2018-07-10 ENCOUNTER — OFFICE VISIT (OUTPATIENT)
Dept: INFECTIOUS DISEASES | Facility: MEDICAL CENTER | Age: 73
End: 2018-07-10
Payer: MEDICARE

## 2018-07-10 VITALS
HEIGHT: 71 IN | BODY MASS INDEX: 22.54 KG/M2 | TEMPERATURE: 98.2 F | DIASTOLIC BLOOD PRESSURE: 68 MMHG | HEART RATE: 80 BPM | OXYGEN SATURATION: 97 % | SYSTOLIC BLOOD PRESSURE: 100 MMHG | WEIGHT: 161 LBS

## 2018-07-10 DIAGNOSIS — G06.1 SPINAL EPIDURAL ABSCESS: ICD-10-CM

## 2018-07-10 DIAGNOSIS — A49.1 STREPTOCOCCUS VIRIDANS INFECTION: ICD-10-CM

## 2018-07-10 PROCEDURE — 99213 OFFICE O/P EST LOW 20 MIN: CPT | Performed by: NURSE PRACTITIONER
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
## 2018-11-08 ENCOUNTER — APPOINTMENT (RX ONLY)
Dept: URBAN - METROPOLITAN AREA CLINIC 31 | Facility: CLINIC | Age: 73
Setting detail: DERMATOLOGY
End: 2018-11-08

## 2018-11-08 DIAGNOSIS — Z85.828 PERSONAL HISTORY OF OTHER MALIGNANT NEOPLASM OF SKIN: ICD-10-CM

## 2018-11-08 DIAGNOSIS — I87.2 VENOUS INSUFFICIENCY (CHRONIC) (PERIPHERAL): ICD-10-CM

## 2018-11-08 DIAGNOSIS — L57.0 ACTINIC KERATOSIS: ICD-10-CM

## 2018-11-08 PROBLEM — I10 ESSENTIAL (PRIMARY) HYPERTENSION: Status: ACTIVE | Noted: 2018-11-08

## 2018-11-08 PROCEDURE — 99202 OFFICE O/P NEW SF 15 MIN: CPT | Mod: 25

## 2018-11-08 PROCEDURE — 17004 DESTROY PREMAL LESIONS 15/>: CPT

## 2018-11-08 PROCEDURE — ? LIQUID NITROGEN

## 2018-11-08 PROCEDURE — ? ADDITIONAL NOTES

## 2018-11-08 PROCEDURE — ? PRESCRIPTION

## 2018-11-08 PROCEDURE — ? MEDICATION COUNSELING

## 2018-11-08 PROCEDURE — ? COUNSELING

## 2018-11-08 RX ORDER — BETAMETHASONE DIPROPIONATE 0.5 MG/G
OINTMENT TOPICAL
Qty: 1 | Refills: 1 | COMMUNITY
Start: 2018-11-08

## 2018-11-08 RX ADMIN — BETAMETHASONE DIPROPIONATE 1: 0.5 OINTMENT TOPICAL at 00:00

## 2018-11-08 ASSESSMENT — LOCATION DETAILED DESCRIPTION DERM
LOCATION DETAILED: LEFT SUPERIOR MEDIAL FOREHEAD
LOCATION DETAILED: RIGHT LATERAL FOREHEAD
LOCATION DETAILED: LEFT SUPERIOR LATERAL FOREHEAD
LOCATION DETAILED: RIGHT INFERIOR LATERAL FOREHEAD
LOCATION DETAILED: RIGHT CENTRAL FRONTAL SCALP
LOCATION DETAILED: RIGHT FOREHEAD
LOCATION DETAILED: LEFT MEDIAL FRONTAL SCALP
LOCATION DETAILED: LEFT SUPERIOR FOREHEAD
LOCATION DETAILED: LEFT LATERAL FOREHEAD
LOCATION DETAILED: LEFT FOREHEAD
LOCATION DETAILED: LEFT CENTRAL FRONTAL SCALP
LOCATION DETAILED: SUPERIOR MID FOREHEAD
LOCATION DETAILED: LEFT MID TEMPLE
LOCATION DETAILED: LEFT ANTERIOR MEDIAL MALLEOLUS
LOCATION DETAILED: RIGHT MEDIAL FRONTAL SCALP

## 2018-11-08 ASSESSMENT — LOCATION SIMPLE DESCRIPTION DERM
LOCATION SIMPLE: LEFT SCALP
LOCATION SIMPLE: RIGHT FOREHEAD
LOCATION SIMPLE: RIGHT SCALP
LOCATION SIMPLE: SUPERIOR FOREHEAD
LOCATION SIMPLE: LEFT ANKLE
LOCATION SIMPLE: LEFT FOREHEAD
LOCATION SIMPLE: LEFT TEMPLE

## 2018-11-08 ASSESSMENT — LOCATION ZONE DERM
LOCATION ZONE: SCALP
LOCATION ZONE: FACE
LOCATION ZONE: LEG

## 2018-11-08 NOTE — THERAPY
"Occupational Therapy Treatment completed with focus on ADLs, ADL transfers and patient education.  Functional Status:  Pt continues to require max A for LB dressing (socks), SPV grooming standing at sink - brushed teeth washed face, CGA UB dressing, CGA functional transfers   Plan of Care: Will benefit from Occupational Therapy 3 times per week  Discharge Recommendations:  Equipment Will Continue to Assess for Equipment Needs. Post-acute therapy Discharge to a transitional care facility for continued skilled therapy services.    See \"Rehab Therapy-Acute\" Patient Summary Report for complete documentation.     Pt seen for Acute OT services, continues to be limited by spinal back pain and decreased activity tolerance. Pt. Motivated to perform functional mobility. Required encouragement to participate in BADLs. Pt expressed concerns about his 16 y/o son assisting him when he returns home. Recommend DC to transitional care facility for therapy 5x/wk to increase his confidence and safety w/ performing BADLs. Will continue to see for Acute OT services.   " Patient expressed no known problems or needs

## 2018-11-08 NOTE — PROCEDURE: LIQUID NITROGEN
Render Post-Care Instructions In Note?: no
Post-Care Instructions: I reviewed with the patient in detail post-care instructions. Patient is to wear sunprotection, and avoid picking at any of the treated lesions. Pt may apply Vaseline  or Aquaphor to crusted or scabbing areas.
Duration Of Freeze Thaw-Cycle (Seconds): 0
Detail Level: Detailed
Consent: The patient's consent was obtained including but not limited to risks of crusting, scabbing, blistering, scarring, darker or lighter pigmentary change, recurrence, incomplete removal and infection.

## 2018-11-08 NOTE — PROCEDURE: ADDITIONAL NOTES
Additional Notes: Will print out Rx for Betamethasone and give to Pt. To take to pharmacy.
Detail Level: Simple

## 2018-11-08 NOTE — PROCEDURE: MEDICATION COUNSELING
Odomzo Pregnancy And Lactation Text: This medication is Pregnancy Category X and is absolutely contraindicated during pregnancy. It is unknown if it is excreted in breast milk.
Ketoconazole Pregnancy And Lactation Text: This medication is Pregnancy Category C and it isn't know if it is safe during pregnancy. It is also excreted in breast milk and breast feeding isn't recommended.
Acitretin Pregnancy And Lactation Text: This medication is Pregnancy Category X and should not be given to women who are pregnant or may become pregnant in the future. This medication is excreted in breast milk.
Rifampin Pregnancy And Lactation Text: This medication is Pregnancy Category C and it isn't know if it is safe during pregnancy. It is also excreted in breast milk and should not be used if you are breast feeding.
Topical Sulfur Applications Pregnancy And Lactation Text: This medication is Pregnancy Category C and has an unknown safety profile during pregnancy. It is unknown if this topical medication is excreted in breast milk.
Cyclophosphamide Pregnancy And Lactation Text: This medication is Pregnancy Category D and it isn't considered safe during pregnancy. This medication is excreted in breast milk.
Picato Pregnancy And Lactation Text: This medication is Pregnancy Category C. It is unknown if this medication is excreted in breast milk.
Bactrim Pregnancy And Lactation Text: This medication is Pregnancy Category D and is known to cause fetal risk.  It is also excreted in breast milk.
Terbinafine Pregnancy And Lactation Text: This medication is Pregnancy Category B and is considered safe during pregnancy. It is also excreted in breast milk and breast feeding isn't recommended.
Itraconazole Pregnancy And Lactation Text: This medication is Pregnancy Category C and it isn't know if it is safe during pregnancy. It is also excreted in breast milk.
SSKI Counseling:  I discussed with the patient the risks of SSKI including but not limited to thyroid abnormalities, metallic taste, GI upset, fever, headache, acne, arthralgias, paraesthesias, lymphadenopathy, easy bleeding, arrhythmias, and allergic reaction.
Hydroxyzine Pregnancy And Lactation Text: This medication is not safe during pregnancy and should not be taken. It is also excreted in breast milk and breast feeding isn't recommended.
Humira Counseling:  I discussed with the patient the risks of adalimumab including but not limited to myelosuppression, immunosuppression, autoimmune hepatitis, demyelinating diseases, lymphoma, and serious infections.  The patient understands that monitoring is required including a PPD at baseline and must alert us or the primary physician if symptoms of infection or other concerning signs are noted.
Colchicine Pregnancy And Lactation Text: This medication is Pregnancy Category C and isn't considered safe during pregnancy. It is excreted in breast milk.
Use Enhanced Medication Counseling?: No
Simponi Pregnancy And Lactation Text: The risk during pregnancy and breastfeeding is uncertain with this medication.
Minoxidil Counseling: Minoxidil is a topical medication which can increase blood flow where it is applied. It is uncertain how this medication increases hair growth. Side effects are uncommon and include stinging and allergic reactions.
Elidel Counseling: Patient may experience a mild burning sensation during topical application. Elidel is not approved in children less than 2 years of age. There have been case reports of hematologic and skin malignancies in patients using topical calcineurin inhibitors although causality is questionable.
Doxycycline Pregnancy And Lactation Text: This medication is Pregnancy Category D and not consider safe during pregnancy. It is also excreted in breast milk but is considered safe for shorter treatment courses.
Xeljacez Pregnancy And Lactation Text: This medication is Pregnancy Category D and is not considered safe during pregnancy.  The risk during breast feeding is also uncertain.
Isotretinoin Counseling: Patient should get monthly blood tests, not donate blood, not drive at night if vision affected, not share medication, and not undergo elective surgery for 6 months after tx completed. Side effects reviewed, pt to contact office should one occur.
Albendazole Counseling:  I discussed with the patient the risks of albendazole including but not limited to cytopenia, kidney damage, nausea/vomiting and severe allergy.  The patient understands that this medication is being used in an off-label manner.
Valtrex Counseling: I discussed with the patient the risks of valacyclovir including but not limited to kidney damage, nausea, vomiting and severe allergy.  The patient understands that if the infection seems to be worsening or is not improving, they are to call.
High Dose Vitamin A Pregnancy And Lactation Text: High dose vitamin A therapy is contraindicated during pregnancy and breast feeding.
Griseofulvin Counseling:  I discussed with the patient the risks of griseofulvin including but not limited to photosensitivity, cytopenia, liver damage, nausea/vomiting and severe allergy.  The patient understands that this medication is best absorbed when taken with a fatty meal (e.g., ice cream or french fries).
Tetracycline Pregnancy And Lactation Text: This medication is Pregnancy Category D and not consider safe during pregnancy. It is also excreted in breast milk.
Hydroxyzine Counseling: Patient advised that the medication is sedating and not to drive a car after taking this medication.  Patient informed of potential adverse effects including but not limited to dry mouth, urinary retention, and blurry vision.  The patient verbalized understanding of the proper use and possible adverse effects of hydroxyzine.  All of the patient's questions and concerns were addressed.
Minoxidil Pregnancy And Lactation Text: This medication has not been assigned a Pregnancy Risk Category but animal studies failed to show danger with the topical medication. It is unknown if the medication is excreted in breast milk.
Simponi Counseling:  I discussed with the patient the risks of golimumab including but not limited to myelosuppression, immunosuppression, autoimmune hepatitis, demyelinating diseases, lymphoma, and serious infections.  The patient understands that monitoring is required including a PPD at baseline and must alert us or the primary physician if symptoms of infection or other concerning signs are noted.
Zyclara Counseling:  I discussed with the patient the risks of imiquimod including but not limited to erythema, scaling, itching, weeping, crusting, and pain.  Patient understands that the inflammatory response to imiquimod is variable from person to person and was educated regarded proper titration schedule.  If flu-like symptoms develop, patient knows to discontinue the medication and contact us.
Carac Pregnancy And Lactation Text: This medication is Pregnancy Category X and contraindicated in pregnancy and in women who may become pregnant. It is unknown if this medication is excreted in breast milk.
Rifampin Counseling: I discussed with the patient the risks of rifampin including but not limited to liver damage, kidney damage, red-orange body fluids, nausea/vomiting and severe allergy.
Eucrisa Counseling: Patient may experience a mild burning sensation during topical application. Eucrisa is not approved in children less than 2 years of age.
Azathioprine Pregnancy And Lactation Text: This medication is Pregnancy Category D and isn't considered safe during pregnancy. It is unknown if this medication is excreted in breast milk.
Gabapentin Counseling: I discussed with the patient the risks of gabapentin including but not limited to dizziness, somnolence, fatigue and ataxia.
Nsaids Counseling: NSAID Counseling: I discussed with the patient that NSAIDs should be taken with food. Prolonged use of NSAIDs can result in the development of stomach ulcers.  Patient advised to stop taking NSAIDs if abdominal pain occurs.  The patient verbalized understanding of the proper use and possible adverse effects of NSAIDs.  All of the patient's questions and concerns were addressed.
Cyclosporine Pregnancy And Lactation Text: This medication is Pregnancy Category C and it isn't know if it is safe during pregnancy. This medication is excreted in breast milk.
Hydroquinone Counseling:  Patient advised that medication may result in skin irritation, lightening (hypopigmentation), dryness, and burning.  In the event of skin irritation, the patient was advised to reduce the amount of the drug applied or use it less frequently.  Rarely, spots that are treated with hydroquinone can become darker (pseudoochronosis).  Should this occur, patient instructed to stop medication and call the office. The patient verbalized understanding of the proper use and possible adverse effects of hydroquinone.  All of the patient's questions and concerns were addressed.
Glycopyrrolate Pregnancy And Lactation Text: This medication is Pregnancy Category B and is considered safe during pregnancy. It is unknown if it is excreted breast milk.
Ketoconazole Counseling:   Patient counseled regarding improving absorption with orange juice.  Adverse effects include but are not limited to breast enlargement, headache, diarrhea, nausea, upset stomach, liver function test abnormalities, taste disturbance, and stomach pain.  There is a rare possibility of liver failure that can occur when taking ketoconazole. The patient understands that monitoring of LFTs may be required, especially at baseline. The patient verbalized understanding of the proper use and possible adverse effects of ketoconazole.  All of the patient's questions and concerns were addressed.
Detail Level: Zone
Metronidazole Counseling:  I discussed with the patient the risks of metronidazole including but not limited to seizures, nausea/vomiting, a metallic taste in the mouth, nausea/vomiting and severe allergy.
Methotrexate Pregnancy And Lactation Text: This medication is Pregnancy Category X and is known to cause fetal harm. This medication is excreted in breast milk.
Hydroxychloroquine Counseling:  I discussed with the patient that a baseline ophthalmologic exam is needed at the start of therapy and every year thereafter while on therapy. A CBC may also be warranted for monitoring.  The side effects of this medication were discussed with the patient, including but not limited to agranulocytosis, aplastic anemia, seizures, rashes, retinopathy, and liver toxicity. Patient instructed to call the office should any adverse effect occur.  The patient verbalized understanding of the proper use and possible adverse effects of Plaquenil.  All the patient's questions and concerns were addressed.
Protopic Pregnancy And Lactation Text: This medication is Pregnancy Category C. It is unknown if this medication is excreted in breast milk when applied topically.
Oxybutynin Pregnancy And Lactation Text: This medication is Pregnancy Category B and is considered safe during pregnancy. It is unknown if it is excreted in breast milk.
Erythromycin Counseling:  I discussed with the patient the risks of erythromycin including but not limited to GI upset, allergic reaction, drug rash, diarrhea, increase in liver enzymes, and yeast infections.
Rituxan Counseling:  I discussed with the patient the risks of Rituxan infusions. Side effects can include infusion reactions, severe drug rashes including mucocutaneous reactions, reactivation of latent hepatitis and other infections and rarely progressive multifocal leukoencephalopathy.  All of the patient's questions and concerns were addressed.
Prednisone Counseling:  I discussed with the patient the risks of prolonged use of prednisone including but not limited to weight gain, insomnia, osteoporosis, mood changes, diabetes, susceptibility to infection, glaucoma and high blood pressure.  In cases where prednisone use is prolonged, patients should be monitored with blood pressure checks, serum glucose levels and an eye exam.  Additionally, the patient may need to be placed on GI prophylaxis, PCP prophylaxis, and calcium and vitamin D supplementation and/or a bisphosphonate.  The patient verbalized understanding of the proper use and the possible adverse effects of prednisone.  All of the patient's questions and concerns were addressed.
Ivermectin Pregnancy And Lactation Text: This medication is Pregnancy Category C and it isn't known if it is safe during pregnancy. It is also excreted in breast milk.
Dapsone Pregnancy And Lactation Text: This medication is Pregnancy Category C and is not considered safe during pregnancy or breast feeding.
Doxycycline Counseling:  Patient counseled regarding possible photosensitivity and increased risk for sunburn.  Patient instructed to avoid sunlight, if possible.  When exposed to sunlight, patients should wear protective clothing, sunglasses, and sunscreen.  The patient was instructed to call the office immediately if the following severe adverse effects occur:  hearing changes, easy bruising/bleeding, severe headache, or vision changes.  The patient verbalized understanding of the proper use and possible adverse effects of doxycycline.  All of the patient's questions and concerns were addressed.
Quinolones Counseling:  I discussed with the patient the risks of fluoroquinolones including but not limited to GI upset, allergic reaction, drug rash, diarrhea, dizziness, photosensitivity, yeast infections, liver function test abnormalities, tendonitis/tendon rupture.
Picato Counseling:  I discussed with the patient the risks of Picato including but not limited to erythema, scaling, itching, weeping, crusting, and pain.
Cosentyx Counseling:  I discussed with the patient the risks of Cosentyx including but not limited to worsening of Crohn's disease, immunosuppression, allergic reactions and infections.  The patient understands that monitoring is required including a PPD at baseline and must alert us or the primary physician if symptoms of infection or other concerning signs are noted.
Clofazimine Counseling:  I discussed with the patient the risks of clofazimine including but not limited to skin and eye pigmentation, liver damage, nausea/vomiting, gastrointestinal bleeding and allergy.
Cosentyx Pregnancy And Lactation Text: This medication is Pregnancy Category B and is considered safe during pregnancy. It is unknown if this medication is excreted in breast milk.
Tetracycline Counseling: Patient counseled regarding possible photosensitivity and increased risk for sunburn.  Patient instructed to avoid sunlight, if possible.  When exposed to sunlight, patients should wear protective clothing, sunglasses, and sunscreen.  The patient was instructed to call the office immediately if the following severe adverse effects occur:  hearing changes, easy bruising/bleeding, severe headache, or vision changes.  The patient verbalized understanding of the proper use and possible adverse effects of tetracycline.  All of the patient's questions and concerns were addressed. Patient understands to avoid pregnancy while on therapy due to potential birth defects.
Cyclophosphamide Counseling:  I discussed with the patient the risks of cyclophosphamide including but not limited to hair loss, hormonal abnormalities, decreased fertility, abdominal pain, diarrhea, nausea and vomiting, bone marrow suppression and infection. The patient understands that monitoring is required while taking this medication.
Doxepin Pregnancy And Lactation Text: This medication is Pregnancy Category C and it isn't known if it is safe during pregnancy. It is also excreted in breast milk and breast feeding isn't recommended.
Griseofulvin Pregnancy And Lactation Text: This medication is Pregnancy Category X and is known to cause serious birth defects. It is unknown if this medication is excreted in breast milk but breast feeding should be avoided.
High Dose Vitamin A Counseling: Side effects reviewed, pt to contact office should one occur.
Cimzia Counseling:  I discussed with the patient the risks of Cimzia including but not limited to immunosuppression, allergic reactions and infections.  The patient understands that monitoring is required including a PPD at baseline and must alert us or the primary physician if symptoms of infection or other concerning signs are noted.
Topical Sulfur Applications Counseling: Topical Sulfur Counseling: Patient counseled that this medication may cause skin irritation or allergic reactions.  In the event of skin irritation, the patient was advised to reduce the amount of the drug applied or use it less frequently.   The patient verbalized understanding of the proper use and possible adverse effects of topical sulfur application.  All of the patient's questions and concerns were addressed.
Acitretin Counseling:  I discussed with the patient the risks of acitretin including but not limited to hair loss, dry lips/skin/eyes, liver damage, hyperlipidemia, depression/suicidal ideation, photosensitivity.  Serious rare side effects can include but are not limited to pancreatitis, pseudotumor cerebri, bony changes, clot formation/stroke/heart attack.  Patient understands that alcohol is contraindicated since it can result in liver toxicity and significantly prolong the elimination of the drug by many years.
Azithromycin Pregnancy And Lactation Text: This medication is considered safe during pregnancy and is also secreted in breast milk.
Arava Counseling:  Patient counseled regarding adverse effects of Arava including but not limited to nausea, vomiting, abnormalities in liver function tests. Patients may develop mouth sores, rash, diarrhea, and abnormalities in blood counts. The patient understands that monitoring is required including LFTs and blood counts.  There is a rare possibility of scarring of the liver and lung problems that can occur when taking methotrexate. Persistent nausea, loss of appetite, pale stools, dark urine, cough, and shortness of breath should be reported immediately. Patient advised to discontinue Arava treatment and consult with a physician prior to attempting conception. The patient will have to undergo a treatment to eliminate Arava from the body prior to conception.
Methotrexate Counseling:  Patient counseled regarding adverse effects of methotrexate including but not limited to nausea, vomiting, abnormalities in liver function tests. Patients may develop mouth sores, rash, diarrhea, and abnormalities in blood counts. The patient understands that monitoring is required including LFT's and blood counts.  There is a rare possibility of scarring of the liver and lung problems that can occur when taking methotrexate. Persistent nausea, loss of appetite, pale stools, dark urine, cough, and shortness of breath should be reported immediately. Patient advised to discontinue methotrexate treatment at least three months before attempting to become pregnant.  I discussed the need for folate supplements while taking methotrexate.  These supplements can decrease side effects during methotrexate treatment. The patient verbalized understanding of the proper use and possible adverse effects of methotrexate.  All of the patient's questions and concerns were addressed.
Rituxan Pregnancy And Lactation Text: This medication is Pregnancy Category C and it isn't know if it is safe during pregnancy. It is unknown if this medication is excreted in breast milk but similar antibodies are known to be excreted.
Protopic Counseling: Patient may experience a mild burning sensation during topical application. Protopic is not approved in children less than 2 years of age. There have been case reports of hematologic and skin malignancies in patients using topical calcineurin inhibitors although causality is questionable.
Spironolactone Counseling: Patient advised regarding risks of diarrhea, abdominal pain, hyperkalemia, birth defects (for female patients), liver toxicity and renal toxicity. The patient may need blood work to monitor liver and kidney function and potassium levels while on therapy. The patient verbalized understanding of the proper use and possible adverse effects of spironolactone.  All of the patient's questions and concerns were addressed.
Drysol Pregnancy And Lactation Text: This medication is considered safe during pregnancy and breast feeding.
Terbinafine Counseling: Patient counseling regarding adverse effects of terbinafine including but not limited to headache, diarrhea, rash, upset stomach, liver function test abnormalities, itching, taste/smell disturbance, nausea, abdominal pain, and flatulence.  There is a rare possibility of liver failure that can occur when taking terbinafine.  The patient understands that a baseline LFT and kidney function test may be required. The patient verbalized understanding of the proper use and possible adverse effects of terbinafine.  All of the patient's questions and concerns were addressed.
Bactrim Counseling:  I discussed with the patient the risks of sulfa antibiotics including but not limited to GI upset, allergic reaction, drug rash, diarrhea, dizziness, photosensitivity, and yeast infections.  Rarely, more serious reactions can occur including but not limited to aplastic anemia, agranulocytosis, methemoglobinemia, blood dyscrasias, liver or kidney failure, lung infiltrates or desquamative/blistering drug rashes.
Clindamycin Counseling: I counseled the patient regarding use of clindamycin as an antibiotic for prophylactic and/or therapeutic purposes. Clindamycin is active against numerous classes of bacteria, including skin bacteria. Side effects may include nausea, diarrhea, gastrointestinal upset, rash, hives, yeast infections, and in rare cases, colitis.
Bexarotene Counseling:  I discussed with the patient the risks of bexarotene including but not limited to hair loss, dry lips/skin/eyes, liver abnormalities, hyperlipidemia, pancreatitis, depression/suicidal ideation, photosensitivity, drug rash/allergic reactions, hypothyroidism, anemia, leukopenia, infection, cataracts, and teratogenicity.  Patient understands that they will need regular blood tests to check lipid profile, liver function tests, white blood cell count, thyroid function tests and pregnancy test if applicable.
Bexarotene Pregnancy And Lactation Text: This medication is Pregnancy Category X and should not be given to women who are pregnant or may become pregnant. This medication should not be used if you are breast feeding.
Clindamycin Pregnancy And Lactation Text: This medication can be used in pregnancy if certain situations. Clindamycin is also present in breast milk.
Oxybutynin Counseling:  I discussed with the patient the risks of oxybutynin including but not limited to skin rash, drowsiness, dry mouth, difficulty urinating, and blurred vision.
Metronidazole Pregnancy And Lactation Text: This medication is Pregnancy Category B and considered safe during pregnancy.  It is also excreted in breast milk.
Birth Control Pills Counseling: Birth Control Pill Counseling: I discussed with the patient the potential side effects of OCPs including but not limited to increased risk of stroke, heart attack, thrombophlebitis, deep venous thrombosis, hepatic adenomas, breast changes, GI upset, headaches, and depression.  The patient verbalized understanding of the proper use and possible adverse effects of OCPs. All of the patient's questions and concerns were addressed.
Thalidomide Counseling: I discussed with the patient the risks of thalidomide including but not limited to birth defects, anxiety, weakness, chest pain, dizziness, cough and severe allergy.
Itraconazole Counseling:  I discussed with the patient the risks of itraconazole including but not limited to liver damage, nausea/vomiting, neuropathy, and severe allergy.  The patient understands that this medication is best absorbed when taken with acidic beverages such as non-diet cola or ginger ale.  The patient understands that monitoring is required including baseline LFTs and repeat LFTs at intervals.  The patient understands that they are to contact us or the primary physician if concerning signs are noted.
Xeljanz Counseling: I discussed with the patient the risks of Xeljanz therapy including increased risk of infection, liver issues, headache, diarrhea, or cold symptoms. Live vaccines should be avoided. They were instructed to call if they have any problems.
Benzoyl Peroxide Counseling: Patient counseled that medicine may cause skin irritation and bleach clothing.  In the event of skin irritation, the patient was advised to reduce the amount of the drug applied or use it less frequently.   The patient verbalized understanding of the proper use and possible adverse effects of benzoyl peroxide.  All of the patient's questions and concerns were addressed.
Infliximab Counseling:  I discussed with the patient the risks of infliximab including but not limited to myelosuppression, immunosuppression, autoimmune hepatitis, demyelinating diseases, lymphoma, and serious infections.  The patient understands that monitoring is required including a PPD at baseline and must alert us or the primary physician if symptoms of infection or other concerning signs are noted.
Benzoyl Peroxide Pregnancy And Lactation Text: This medication is Pregnancy Category C. It is unknown if benzoyl peroxide is excreted in breast milk.
Nsaids Pregnancy And Lactation Text: These medications are considered safe up to 30 weeks gestation. It is excreted in breast milk.
Solaraze Pregnancy And Lactation Text: This medication is Pregnancy Category B and is considered safe. There is some data to suggest avoiding during the third trimester. It is unknown if this medication is excreted in breast milk.
Odomzo Counseling- I discussed with the patient the risks of Odomzo including but not limited to nausea, vomiting, diarrhea, constipation, weight loss, changes in the sense of taste, decreased appetite, muscle spasms, and hair loss.  The patient verbalized understanding of the proper use and possible adverse effects of Odomzo.  All of the patient's questions and concerns were addressed.
Tazorac Counseling:  Patient advised that medication is irritating and drying.  Patient may need to apply sparingly and wash off after an hour before eventually leaving it on overnight.  The patient verbalized understanding of the proper use and possible adverse effects of tazorac.  All of the patient's questions and concerns were addressed.
Cephalexin Counseling: I counseled the patient regarding use of cephalexin as an antibiotic for prophylactic and/or therapeutic purposes. Cephalexin (commonly prescribed under brand name Keflex) is a cephalosporin antibiotic which is active against numerous classes of bacteria, including most skin bacteria. Side effects may include nausea, diarrhea, gastrointestinal upset, rash, hives, yeast infections, and in rare cases, hepatitis, kidney disease, seizures, fever, confusion, neurologic symptoms, and others. Patients with severe allergies to penicillin medications are cautioned that there is about a 10% incidence of cross-reactivity with cephalosporins. When possible, patients with penicillin allergies should use alternatives to cephalosporins for antibiotic therapy.
Imiquimod Counseling:  I discussed with the patient the risks of imiquimod including but not limited to erythema, scaling, itching, weeping, crusting, and pain.  Patient understands that the inflammatory response to imiquimod is variable from person to person and was educated regarded proper titration schedule.  If flu-like symptoms develop, patient knows to discontinue the medication and contact us.
Tazorac Pregnancy And Lactation Text: This medication is not safe during pregnancy. It is unknown if this medication is excreted in breast milk.
Valtrex Pregnancy And Lactation Text: this medication is Pregnancy Category B and is considered safe during pregnancy. This medication is not directly found in breast milk but it's metabolite acyclovir is present.
Enbrel Counseling:  I discussed with the patient the risks of etanercept including but not limited to myelosuppression, immunosuppression, autoimmune hepatitis, demyelinating diseases, lymphoma, and infections.  The patient understands that monitoring is required including a PPD at baseline and must alert us or the primary physician if symptoms of infection or other concerning signs are noted.
Tremfya Counseling: I discussed with the patient the risks of guselkumab including but not limited to immunosuppression, serious infections, worsening of inflammatory bowel disease and drug reactions.  The patient understands that monitoring is required including a PPD at baseline and must alert us or the primary physician if symptoms of infection or other concerning signs are noted.
Cephalexin Pregnancy And Lactation Text: This medication is Pregnancy Category B and considered safe during pregnancy.  It is also excreted in breast milk but can be used safely for shorter doses.
Colchicine Counseling:  Patient counseled regarding adverse effects including but not limited to stomach upset (nausea, vomiting, stomach pain, or diarrhea).  Patient instructed to limit alcohol consumption while taking this medication.  Colchicine may reduce blood counts especially with prolonged use.  The patient understands that monitoring of kidney function and blood counts may be required, especially at baseline. The patient verbalized understanding of the proper use and possible adverse effects of colchicine.  All of the patient's questions and concerns were addressed.
Spironolactone Pregnancy And Lactation Text: This medication can cause feminization of the male fetus and should be avoided during pregnancy. The active metabolite is also found in breast milk.
Doxepin Counseling:  Patient advised that the medication is sedating and not to drive a car after taking this medication. Patient informed of potential adverse effects including but not limited to dry mouth, urinary retention, and blurry vision.  The patient verbalized understanding of the proper use and possible adverse effects of doxepin.  All of the patient's questions and concerns were addressed.
Isotretinoin Pregnancy And Lactation Text: This medication is Pregnancy Category X and is considered extremely dangerous during pregnancy. It is unknown if it is excreted in breast milk.
Glycopyrrolate Counseling:  I discussed with the patient the risks of glycopyrrolate including but not limited to skin rash, drowsiness, dry mouth, difficulty urinating, and blurred vision.
Dupixent Counseling: I discussed with the patient the risks of dupilumab including but not limited to eye infection and irritation, cold sores, injection site reactions, worsening of asthma, allergic reactions and increased risk of parasitic infection.  Live vaccines should be avoided while taking dupilumab. Dupilumab will also interact with certain medications such as warfarin and cyclosporine. The patient understands that monitoring is required and they must alert us or the primary physician if symptoms of infection or other concerning signs are noted.
Topical Clindamycin Counseling: Patient counseled that this medication may cause skin irritation or allergic reactions.  In the event of skin irritation, the patient was advised to reduce the amount of the drug applied or use it less frequently.   The patient verbalized understanding of the proper use and possible adverse effects of clindamycin.  All of the patient's questions and concerns were addressed.
Azathioprine Counseling:  I discussed with the patient the risks of azathioprine including but not limited to myelosuppression, immunosuppression, hepatotoxicity, lymphoma, and infections.  The patient understands that monitoring is required including baseline LFTs, Creatinine, possible TPMP genotyping and weekly CBCs for the first month and then every 2 weeks thereafter.  The patient verbalized understanding of the proper use and possible adverse effects of azathioprine.  All of the patient's questions and concerns were addressed.
Erivedge Counseling- I discussed with the patient the risks of Erivedge including but not limited to nausea, vomiting, diarrhea, constipation, weight loss, changes in the sense of taste, decreased appetite, muscle spasms, and hair loss.  The patient verbalized understanding of the proper use and possible adverse effects of Erivedge.  All of the patient's questions and concerns were addressed.
Dupixent Pregnancy And Lactation Text: This medication likely crosses the placenta but the risk for the fetus is uncertain. This medication is excreted in breast milk.
Birth Control Pills Pregnancy And Lactation Text: This medication should be avoided if pregnant and for the first 30 days post-partum.
Minocycline Counseling: Patient advised regarding possible photosensitivity and discoloration of the teeth, skin, lips, tongue and gums.  Patient instructed to avoid sunlight, if possible.  When exposed to sunlight, patients should wear protective clothing, sunglasses, and sunscreen.  The patient was instructed to call the office immediately if the following severe adverse effects occur:  hearing changes, easy bruising/bleeding, severe headache, or vision changes.  The patient verbalized understanding of the proper use and possible adverse effects of minocycline.  All of the patient's questions and concerns were addressed.
Siliq Counseling:  I discussed with the patient the risks of Siliq including but not limited to new or worsening depression, suicidal thoughts and behavior, immunosuppression, malignancy, posterior leukoencephalopathy syndrome, and serious infections.  The patient understands that monitoring is required including a PPD at baseline and must alert us or the primary physician if symptoms of infection or other concerning signs are noted. There is also a special program designed to monitor depression which is required with Siliq.
Xolair Pregnancy And Lactation Text: This medication is Pregnancy Category B and is considered safe during pregnancy. This medication is excreted in breast milk.
Ivermectin Counseling:  Patient instructed to take medication on an empty stomach with a full glass of water.  Patient informed of potential adverse effects including but not limited to nausea, diarrhea, dizziness, itching, and swelling of the extremities or lymph nodes.  The patient verbalized understanding of the proper use and possible adverse effects of ivermectin.  All of the patient's questions and concerns were addressed.
Otezla Pregnancy And Lactation Text: This medication is Pregnancy Category C and it isn't known if it is safe during pregnancy. It is unknown if it is excreted in breast milk.
Hydroxychloroquine Pregnancy And Lactation Text: This medication has been shown to cause fetal harm but it isn't assigned a Pregnancy Risk Category. There are small amounts excreted in breast milk.
Dapsone Counseling: I discussed with the patient the risks of dapsone including but not limited to hemolytic anemia, agranulocytosis, rashes, methemoglobinemia, kidney failure, peripheral neuropathy, headaches, GI upset, and liver toxicity.  Patients who start dapsone require monitoring including baseline LFTs and weekly CBCs for the first month, then every month thereafter.  The patient verbalized understanding of the proper use and possible adverse effects of dapsone.  All of the patient's questions and concerns were addressed.
Wartpeel Counseling:  I discussed with the patient the risks of Wartpeel including but not limited to erythema, scaling, itching, weeping, crusting, and pain.
Stelara Counseling:  I discussed with the patient the risks of ustekinumab including but not limited to immunosuppression, malignancy, posterior leukoencephalopathy syndrome, and serious infections.  The patient understands that monitoring is required including a PPD at baseline and must alert us or the primary physician if symptoms of infection or other concerning signs are noted.
5-Fu Counseling: 5-Fluorouracil Counseling:  I discussed with the patient the risks of 5-fluorouracil including but not limited to erythema, scaling, itching, weeping, crusting, and pain.
Otezla Counseling: The side effects of Otezla were discussed with the patient, including but not limited to worsening or new depression, weight loss, diarrhea, nausea, upper respiratory tract infection, and headache. Patient instructed to call the office should any adverse effect occur.  The patient verbalized understanding of the proper use and possible adverse effects of Otezla.  All the patient's questions and concerns were addressed.
Topical Retinoid counseling:  Patient advised to apply a pea-sized amount only at bedtime and wait 30 minutes after washing their face before applying.  If too drying, patient may add a non-comedogenic moisturizer. The patient verbalized understanding of the proper use and possible adverse effects of retinoids.  All of the patient's questions and concerns were addressed.
Cimetidine Counseling:  I discussed with the patient the risks of Cimetidine including but not limited to gynecomastia, headache, diarrhea, nausea, drowsiness, arrhythmias, pancreatitis, skin rashes, psychosis, bone marrow suppression and kidney toxicity.
Solaraze Counseling:  I discussed with the patient the risks of Solaraze including but not limited to erythema, scaling, itching, weeping, crusting, and pain.
Carac Counseling:  I discussed with the patient the risks of Carac including but not limited to erythema, scaling, itching, weeping, crusting, and pain.
Taltz Counseling: I discussed with the patient the risks of ixekizumab including but not limited to immunosuppression, serious infections, worsening of inflammatory bowel disease and drug reactions.  The patient understands that monitoring is required including a PPD at baseline and must alert us or the primary physician if symptoms of infection or other concerning signs are noted.
Cellcept Counseling:  I discussed with the patient the risks of mycophenolate mofetil including but not limited to infection/immunosuppression, GI upset, hypokalemia, hypercholesterolemia, bone marrow suppression, lymphoproliferative disorders, malignancy, GI ulceration/bleed/perforation, colitis, interstitial lung disease, kidney failure, progressive multifocal leukoencephalopathy, and birth defects.  The patient understands that monitoring is required including a baseline creatinine and regular CBC testing. In addition, patient must alert us immediately if symptoms of infection or other concerning signs are noted.
Cyclosporine Counseling:  I discussed with the patient the risks of cyclosporine including but not limited to hypertension, gingival hyperplasia,myelosuppression, immunosuppression, liver damage, kidney damage, neurotoxicity, lymphoma, and serious infections. The patient understands that monitoring is required including baseline blood pressure, CBC, CMP, lipid panel and uric acid, and then 1-2 times monthly CMP and blood pressure.
Sski Pregnancy And Lactation Text: This medication is Pregnancy Category D and isn't considered safe during pregnancy. It is excreted in breast milk.
Fluconazole Counseling:  Patient counseled regarding adverse effects of fluconazole including but not limited to headache, diarrhea, nausea, upset stomach, liver function test abnormalities, taste disturbance, and stomach pain.  There is a rare possibility of liver failure that can occur when taking fluconazole.  The patient understands that monitoring of LFTs and kidney function test may be required, especially at baseline. The patient verbalized understanding of the proper use and possible adverse effects of fluconazole.  All of the patient's questions and concerns were addressed.
Azithromycin Counseling:  I discussed with the patient the risks of azithromycin including but not limited to GI upset, allergic reaction, drug rash, diarrhea, and yeast infections.
Drysol Counseling:  I discussed with the patient the risks of drysol/aluminum chloride including but not limited to skin rash, itching, irritation, burning.
Xolair Counseling:  Patient informed of potential adverse effects including but not limited to fever, muscle aches, rash and allergic reactions.  The patient verbalized understanding of the proper use and possible adverse effects of Xolair.  All of the patient's questions and concerns were addressed.
Cimzia Pregnancy And Lactation Text: This medication crosses the placenta but can be considered safe in certain situations. Cimzia may be excreted in breast milk.
Erythromycin Pregnancy And Lactation Text: This medication is Pregnancy Category B and is considered safe during pregnancy. It is also excreted in breast milk.

## 2018-12-18 NOTE — ADDENDUM NOTE
Encounter addended by: Rowena Mckinney on: 12/18/2018  9:42 AM<BR>    Actions taken: Charge Capture section accepted

## 2018-12-26 ENCOUNTER — APPOINTMENT (OUTPATIENT)
Dept: RADIOLOGY | Facility: MEDICAL CENTER | Age: 73
End: 2018-12-26
Attending: EMERGENCY MEDICINE
Payer: MEDICARE

## 2018-12-26 ENCOUNTER — HOSPITAL ENCOUNTER (EMERGENCY)
Facility: MEDICAL CENTER | Age: 73
End: 2018-12-26
Attending: EMERGENCY MEDICINE
Payer: MEDICARE

## 2018-12-26 VITALS
WEIGHT: 174.82 LBS | TEMPERATURE: 97.5 F | RESPIRATION RATE: 20 BRPM | HEIGHT: 71 IN | OXYGEN SATURATION: 98 % | SYSTOLIC BLOOD PRESSURE: 141 MMHG | HEART RATE: 62 BPM | BODY MASS INDEX: 24.48 KG/M2 | DIASTOLIC BLOOD PRESSURE: 86 MMHG

## 2018-12-26 DIAGNOSIS — J06.9 VIRAL URI WITH COUGH: ICD-10-CM

## 2018-12-26 LAB
FLUAV RNA SPEC QL NAA+PROBE: NEGATIVE
FLUBV RNA SPEC QL NAA+PROBE: NEGATIVE

## 2018-12-26 PROCEDURE — 87502 INFLUENZA DNA AMP PROBE: CPT

## 2018-12-26 PROCEDURE — 71046 X-RAY EXAM CHEST 2 VIEWS: CPT

## 2018-12-26 PROCEDURE — 99284 EMERGENCY DEPT VISIT MOD MDM: CPT

## 2018-12-26 ASSESSMENT — PAIN SCALES - GENERAL: PAINLEVEL_OUTOF10: 0

## 2018-12-26 NOTE — ED PROVIDER NOTES
"ED Provider Note    CHIEF COMPLAINT  Cough    HPI  Yunier Denney is a 73 y.o. male who presents to the emergency department for evaluation of a cough.  The patient states that he has had some nasal congestion and a productive cough with green sputum.  He states that his symptoms have been going on for the last couple of days and have been about the same although potentially slightly worse today.  He denies any fevers with this.  He denies any chest pain or shortness of breath.  He states that he has a history of spinal epidural abscess which he received IV antibiotics, surgery, and a several week course of oral Augmentin which she finished about 6 months ago.  He denies any new back pain, malaise, myalgias, numbness or weakness.  He otherwise only takes an aspirin on a regular basis.    REVIEW OF SYSTEMS  See HPI for further details. All other systems are negative.     PAST MEDICAL HISTORY   has a past medical history of gastroesophageal reflux (GERD); Hypertension; and Inguinal hernia.    SOCIAL HISTORY  Social History     Social History Main Topics   • Smoking status: Never Smoker   • Smokeless tobacco: Never Used   • Alcohol use Yes   • Drug use: No   • Sexual activity: Not on file       SURGICAL HISTORY   has a past surgical history that includes lumbar laminectomy diskectomy (4/29/2018) and irrigation & debridement neuro (4/29/2018).    CURRENT MEDICATIONS  Home Medications    **Home medications have not yet been reviewed for this encounter**         ALLERGIES  No Known Allergies    PHYSICAL EXAM  VITAL SIGNS: /86   Pulse (!) 58   Temp 36.4 °C (97.5 °F) (Temporal)   Resp 20   Ht 1.803 m (5' 11\")   Wt 79.3 kg (174 lb 13.2 oz)   SpO2 99%   BMI 24.38 kg/m²    Constitutional: Alert and in no apparent distress.  HENT: Normocephalic atraumatic. Bilateral external ears normal. Nose normal. Mucous membranes are moist.  Eyes: Pupils are equal and reactive. Conjunctiva normal. Non-icteric sclera.   Neck: " Normal range of motion without tenderness. Supple. No meningeal signs.  Cardiovascular: Bradycardic rate and regular rhythm. No murmurs, gallops or rubs.  Thorax & Lungs: Breath sounds are clear to auscultation bilaterally. No wheezing, rhonchi or rales.  Abdomen: Soft, nontender and nondistended. No peritoneal signs noted.  Skin: Warm and dry. No rashes are noted.  Back: No bony tenderness, No CVA tenderness.  Well-healed surgical incision present in the midline lumbar spine.  Extremities: 2+ peripheral pulses. Cap refill is less than 2 seconds. No edema, cyanosis, or clubbing.  Musculoskeletal: Good range of motion in all major joints. No tenderness to palpation or major deformities noted.   Neurologic: Alert and oriented ×3. The patient moves all 4 extremities and follows commands.  Psychiatric: Affect is normal. Judgment appears to be intact.    DIAGNOSTIC STUDIES / PROCEDURES    LABS  Results for orders placed or performed during the hospital encounter of 12/26/18   Influenza A/B By PCR   Result Value Ref Range    Influenza virus A RNA Negative Negative    Influenza virus B, PCR Negative Negative     RADIOLOGY  DX-CHEST-2 VIEWS   Final Result      No acute cardiopulmonary disease.        COURSE & MEDICAL DECISION MAKING  Pertinent Labs & Imaging studies reviewed. (See chart for details)    This is a 73-year-old male presents emergency department for evaluation of a cough.  On initial of admission, the patient appeared well and in no acute distress.  Vital signs are normal.  Lung sounds were normal bilaterally and he did not demonstrate any evidence of respiratory distress.  Chest x-ray did not reveal any focal opacities, pleural effusion, pulmonary edema, widened mediastinum, or pneumothoraces.  Influenza was negative.  I do believe the patient's clinical presentation is consistent with a viral URI and subsequent cough.  I have very low clinical suspicion for pneumonia or other concerning signs or symptoms.  In  addition, given his history of osteomyelitis of the spine, I consider recurrence of this.  However, I have low suspicion given his lack of fever, new back pain, or other concerning signs or symptoms.  I do believe he stable for discharge and encouraged him to follow-up closely with his primary care physician.  I advised him to return to the ED immediately should he have any worsening signs or symptoms.    FINAL IMPRESSION  1. Viral URI with cough        PRESCRIPTIONS  Discharge Medication List as of 12/26/2018  2:06 PM          FOLLOW UP  [address] [address]  [phone]  Call in 1 day  To schedule a follow up appointment    [facility], Emergency Dept  [address] [facility] [id number] [phone]  Go to   If symptoms worsen, As needed        -DISCHARGE-        Electronically signed by: Carlita Huertas, 12/26/2018 12:50 PM

## 2018-12-26 NOTE — ED TRIAGE NOTES
"Pt to er with 1 week hx of \"cold\". Denies fever , states cough intermitt prod. States hx of spinal surgery 4/18 with infection and iv antibx x 1 month and told to be seen immediatley for cold s/s. In no distress in triage, mask applied. Sepsis score=1/5  "

## 2019-06-12 ENCOUNTER — APPOINTMENT (RX ONLY)
Dept: URBAN - METROPOLITAN AREA CLINIC 31 | Facility: CLINIC | Age: 74
Setting detail: DERMATOLOGY
End: 2019-06-12

## 2019-06-12 DIAGNOSIS — L82.1 OTHER SEBORRHEIC KERATOSIS: ICD-10-CM

## 2019-06-12 DIAGNOSIS — L57.0 ACTINIC KERATOSIS: ICD-10-CM

## 2019-06-12 PROBLEM — D48.5 NEOPLASM OF UNCERTAIN BEHAVIOR OF SKIN: Status: ACTIVE | Noted: 2019-06-12

## 2019-06-12 PROCEDURE — ? ADDITIONAL NOTES

## 2019-06-12 PROCEDURE — ? COUNSELING

## 2019-06-12 PROCEDURE — 99213 OFFICE O/P EST LOW 20 MIN: CPT | Mod: 25

## 2019-06-12 PROCEDURE — 11102 TANGNTL BX SKIN SINGLE LES: CPT

## 2019-06-12 PROCEDURE — ? BIOPSY BY SHAVE METHOD

## 2019-06-12 ASSESSMENT — LOCATION DETAILED DESCRIPTION DERM
LOCATION DETAILED: LEFT INFERIOR LATERAL MALAR CHEEK
LOCATION DETAILED: LEFT MID-UPPER BACK

## 2019-06-12 ASSESSMENT — LOCATION SIMPLE DESCRIPTION DERM
LOCATION SIMPLE: LEFT UPPER BACK
LOCATION SIMPLE: LEFT CHEEK

## 2019-06-12 ASSESSMENT — LOCATION ZONE DERM
LOCATION ZONE: FACE
LOCATION ZONE: TRUNK

## 2019-06-12 NOTE — PROCEDURE: BIOPSY BY SHAVE METHOD
Render Post-Care Instructions In Note?: yes
Size Of Lesion In Cm: 1.2
Bill [id number] For Specimen Handling/Conveyance To Laboratory?: no
Wound Care: Petrolatum
Depth Of Biopsy: dermis
Electrodesiccation Text: The wound bed was treated with electrodesiccation after the biopsy was performed.
X Size Of Lesion In Cm: 0
Billing Type: Third-Party Bill
Biopsy Type: H and E
Electrodesiccation And Curettage Text: The wound bed was treated with electrodesiccation and curettage after the biopsy was performed.
Type Of Destruction Used: Cryotherapy
Anesthesia Volume In Cc: 1
Post-Care Instructions: I reviewed with the patient in detail post-care instructions. Patient is to keep the biopsy site dry overnight, and then apply bacitracin/petroleum twice daily until healed.
Dressing: bandage
Silver Nitrate Text: The wound bed was treated with silver nitrate after the biopsy was performed.
Hemostasis: Aluminum Chloride
Biopsy Method: Personna blade
Notification Instructions: Patient will be notified of biopsy results. However, patient instructed to call the office if not contacted within 2 weeks.
Curettage Text: The wound bed was treated with curettage after the biopsy was performed.
Lab: [id number]
Consent: Written consent was obtained and risks were reviewed including but not limited to scarring, infection, bleeding, scabbing, incomplete removal, nerve damage and allergy to anesthesia.
Detail Level: Detailed
Anesthesia Type: 1% lidocaine with 1:100,000 epinephrine and a 1:10 solution of 8.4% sodium bicarbonate
Lab Facility: 
Cryotherapy Text: The wound bed was treated with cryotherapy after the biopsy was performed.

## 2019-07-31 ENCOUNTER — APPOINTMENT (RX ONLY)
Dept: URBAN - METROPOLITAN AREA CLINIC 31 | Facility: CLINIC | Age: 74
Setting detail: DERMATOLOGY
End: 2019-07-31

## 2020-09-25 NOTE — ASSESSMENT & PLAN NOTE
[facility] Hospitalists  Progress Note    Patient: Richa Sanchez Age: 60 y.o. : 1961 MR#: [id number] SSN: [id number]  Date: 2020     Subjective/24-hour events:     Seen in ED. No complaint currently - states, \"I told them to take the whole toe. \"    Assessment:   Diabetic foot ulcer, L 2nd toe with suspected underlying osteomyelitis  Fever, improved  SHYLA  Hyponatremia  Anemia  Uncontrolled DM 2 with hyperglycemia, A1c 12.1%  HTN  HLD  GERD  CAD with hx MI  Obesity  Sepsis, ruled out      Plan: To OR later today for anticipated 2nd toe amputation - timing per podiatry. Continue antibiotic therapy as ordered, follow cultures. Lantus/SSI, adjust as necessary to optimize glycemic control  Antihypertensive therapy as previously prescribed, adjust as necessary. Wound management per podiatry. Supportive care o/w. Follow. Case discussed with:  [x]Patient  []Family  [x]Nursing  []Case Management  DVT Prophylaxis:  []Lovenox  []Hep SQ  []SCDs  []Coumadin   []On Heparin gtt    Objective:   VS:   Visit Vitals  /70   Pulse 74   Temp 98.3 °F (36.8 °C)   Resp 18   Ht 5' 9\" (1.753 m)   Wt 111.1 kg (245 lb)   SpO2 95%   BMI 36.18 kg/m²      Tmax/24hrs: Temp (24hrs), Av.3 °F (36.8 °C), Min:98.3 °F (36.8 °C), Max:98.3 °F (36.8 °C)      Intake/Output Summary (Last 24 hours) at 2020 1528  Last data filed at 2020 2320  Gross per 24 hour   Intake    Output 350 ml   Net -350 ml       General:  In NAD. Nontoxic-appearing. Cardiovascular:  RRR. Pulmonary:  Lungs clear bilaterally, no wheezes. GI:  Abdomen soft, NTTP. Extremities:  Warm, no edema. L foot dressing intact. Neuro:  Awake and alert. Moves extremities spontaneously.     Labs:    Recent Results (from the past 24 hour(s))   EKG, 12 LEAD, INITIAL    Collection Time: 20  4:58 PM   Result Value Ref Range    Ventricular Rate 74 BPM    Atrial Rate 74 BPM    P-R Interval 216 ms    QRS Duration 80 Mild and stable   ms    Q-T Interval 398 ms    QTC Calculation (Bezet) 441 ms    Calculated P Axis 30 degrees    Calculated R Axis 8 degrees    Calculated T Axis 39 degrees    Diagnosis       Sinus rhythm with 1st degree AV block with premature atrial complexes  Low voltage QRS  Borderline ECG  When compared with ECG of 03-SEP-2020 15:41,  Sinus rhythm has replaced Atrial fibrillation  Vent. rate has increased BY  24 BPM  QT has lengthened     POC LACTIC ACID    Collection Time: 09/24/20  6:49 PM   Result Value Ref Range    Lactic Acid (POC) 1.03 0.40 - 2.00 mmol/L   GLUCOSE, POC    Collection Time: 09/24/20 11:13 PM   Result Value Ref Range    Glucose (POC) 219 (H) 70 - 006 mg/dL   METABOLIC PANEL, BASIC    Collection Time: 09/25/20  6:10 AM   Result Value Ref Range    Sodium 138 136 - 145 mmol/L    Potassium 3.6 3.5 - 5.5 mmol/L    Chloride 104 100 - 111 mmol/L    CO2 30 21 - 32 mmol/L    Anion gap 4 3.0 - 18 mmol/L    Glucose 116 (H) 74 - 99 mg/dL    BUN 11 7.0 - 18 MG/DL    Creatinine 1.12 0.6 - 1.3 MG/DL    BUN/Creatinine ratio 10 (L) 12 - 20      GFR est AA >60 >60 ml/min/1.73m2    GFR est non-AA >60 >60 ml/min/1.73m2    Calcium 8.0 (L) 8.5 - 10.1 MG/DL   CBC WITH AUTOMATED DIFF    Collection Time: 09/25/20  6:10 AM   Result Value Ref Range    WBC 9.6 4.6 - 13.2 K/uL    RBC 2.91 (L) 4.70 - 5.50 M/uL    HGB 8.2 (L) 13.0 - 16.0 g/dL    HCT 28.0 (L) 36.0 - 48.0 %    MCV 96.2 74.0 - 97.0 FL    MCH 28.2 24.0 - 34.0 PG    MCHC 29.3 (L) 31.0 - 37.0 g/dL    RDW 14.0 11.6 - 14.5 %    PLATELET 127 889 - 923 K/uL    MPV 10.9 9.2 - 11.8 FL    NEUTROPHILS 63 40 - 73 %    LYMPHOCYTES 23 21 - 52 %    MONOCYTES 8 3 - 10 %    EOSINOPHILS 6 (H) 0 - 5 %    BASOPHILS 0 0 - 2 %    ABS. NEUTROPHILS 6.0 1.8 - 8.0 K/UL    ABS. LYMPHOCYTES 2.2 0.9 - 3.6 K/UL    ABS. MONOCYTES 0.8 0.05 - 1.2 K/UL    ABS. EOSINOPHILS 0.5 (H) 0.0 - 0.4 K/UL    ABS.  BASOPHILS 0.0 0.0 - 0.1 K/UL    DF AUTOMATED     HEPATIC FUNCTION PANEL    Collection Time: 09/25/20  6:10 AM   Result Value Ref Range    Protein, total 7.0 6.4 - 8.2 g/dL    Albumin 2.4 (L) 3.4 - 5.0 g/dL    Globulin 4.6 (H) 2.0 - 4.0 g/dL    A-G Ratio 0.5 (L) 0.8 - 1.7      Bilirubin, total 0.5 0.2 - 1.0 MG/DL    Bilirubin, direct 0.2 0.0 - 0.2 MG/DL    Alk.  phosphatase 126 (H) 45 - 117 U/L    AST (SGOT) 38 10 - 38 U/L    ALT (SGPT) 34 16 - 61 U/L   GLUCOSE, POC    Collection Time: 09/25/20  8:10 AM   Result Value Ref Range    Glucose (POC) 132 (H) 70 - 110 mg/dL   GLUCOSE, POC    Collection Time: 09/25/20 11:49 AM   Result Value Ref Range    Glucose (POC) 126 (H) 70 - 110 mg/dL       Signed By: Buck Bravo MD     September 25, 2020

## 2022-01-20 ENCOUNTER — HOSPITAL ENCOUNTER (EMERGENCY)
Facility: MEDICAL CENTER | Age: 77
End: 2022-01-20
Attending: EMERGENCY MEDICINE
Payer: MEDICARE

## 2022-01-20 ENCOUNTER — APPOINTMENT (OUTPATIENT)
Dept: RADIOLOGY | Facility: MEDICAL CENTER | Age: 77
End: 2022-01-20
Attending: EMERGENCY MEDICINE
Payer: MEDICARE

## 2022-01-20 VITALS
WEIGHT: 171.3 LBS | OXYGEN SATURATION: 94 % | BODY MASS INDEX: 23.98 KG/M2 | RESPIRATION RATE: 16 BRPM | SYSTOLIC BLOOD PRESSURE: 105 MMHG | DIASTOLIC BLOOD PRESSURE: 64 MMHG | HEIGHT: 71 IN | HEART RATE: 62 BPM | TEMPERATURE: 99 F

## 2022-01-20 DIAGNOSIS — M54.50 ACUTE RIGHT-SIDED LOW BACK PAIN, UNSPECIFIED WHETHER SCIATICA PRESENT: ICD-10-CM

## 2022-01-20 DIAGNOSIS — U07.1 COVID-19: ICD-10-CM

## 2022-01-20 DIAGNOSIS — M54.16 LUMBAR RADICULOPATHY: ICD-10-CM

## 2022-01-20 LAB
ANION GAP SERPL CALC-SCNC: 14 MMOL/L (ref 7–16)
BASOPHILS # BLD AUTO: 0.3 % (ref 0–1.8)
BASOPHILS # BLD: 0.03 K/UL (ref 0–0.12)
BUN SERPL-MCNC: 26 MG/DL (ref 8–22)
CALCIUM SERPL-MCNC: 9 MG/DL (ref 8.4–10.2)
CHLORIDE SERPL-SCNC: 99 MMOL/L (ref 96–112)
CO2 SERPL-SCNC: 21 MMOL/L (ref 20–33)
CREAT SERPL-MCNC: 1.03 MG/DL (ref 0.5–1.4)
EOSINOPHIL # BLD AUTO: 0 K/UL (ref 0–0.51)
EOSINOPHIL NFR BLD: 0 % (ref 0–6.9)
ERYTHROCYTE [DISTWIDTH] IN BLOOD BY AUTOMATED COUNT: 52 FL (ref 35.9–50)
FLUAV RNA SPEC QL NAA+PROBE: NEGATIVE
FLUBV RNA SPEC QL NAA+PROBE: NEGATIVE
GLUCOSE SERPL-MCNC: 131 MG/DL (ref 65–99)
HCT VFR BLD AUTO: 37.9 % (ref 42–52)
HGB BLD-MCNC: 12.2 G/DL (ref 14–18)
IMM GRANULOCYTES # BLD AUTO: 0.04 K/UL (ref 0–0.11)
IMM GRANULOCYTES NFR BLD AUTO: 0.5 % (ref 0–0.9)
LYMPHOCYTES # BLD AUTO: 0.42 K/UL (ref 1–4.8)
LYMPHOCYTES NFR BLD: 4.9 % (ref 22–41)
MCH RBC QN AUTO: 27.6 PG (ref 27–33)
MCHC RBC AUTO-ENTMCNC: 32.2 G/DL (ref 33.7–35.3)
MCV RBC AUTO: 85.7 FL (ref 81.4–97.8)
MONOCYTES # BLD AUTO: 0.64 K/UL (ref 0–0.85)
MONOCYTES NFR BLD AUTO: 7.4 % (ref 0–13.4)
NEUTROPHILS # BLD AUTO: 7.48 K/UL (ref 1.82–7.42)
NEUTROPHILS NFR BLD: 86.9 % (ref 44–72)
NRBC # BLD AUTO: 0 K/UL
NRBC BLD-RTO: 0 /100 WBC
PLATELET # BLD AUTO: 191 K/UL (ref 164–446)
PMV BLD AUTO: 8.7 FL (ref 9–12.9)
POTASSIUM SERPL-SCNC: 3.8 MMOL/L (ref 3.6–5.5)
RBC # BLD AUTO: 4.42 M/UL (ref 4.7–6.1)
RSV RNA SPEC QL NAA+PROBE: NEGATIVE
SARS-COV-2 RNA RESP QL NAA+PROBE: DETECTED
SODIUM SERPL-SCNC: 134 MMOL/L (ref 135–145)
SPECIMEN SOURCE: ABNORMAL
WBC # BLD AUTO: 8.6 K/UL (ref 4.8–10.8)

## 2022-01-20 PROCEDURE — 72158 MRI LUMBAR SPINE W/O & W/DYE: CPT | Mod: MG

## 2022-01-20 PROCEDURE — 700117 HCHG RX CONTRAST REV CODE 255: Performed by: EMERGENCY MEDICINE

## 2022-01-20 PROCEDURE — C9803 HOPD COVID-19 SPEC COLLECT: HCPCS | Performed by: EMERGENCY MEDICINE

## 2022-01-20 PROCEDURE — 87077 CULTURE AEROBIC IDENTIFY: CPT

## 2022-01-20 PROCEDURE — 99285 EMERGENCY DEPT VISIT HI MDM: CPT

## 2022-01-20 PROCEDURE — 87186 SC STD MICRODIL/AGAR DIL: CPT

## 2022-01-20 PROCEDURE — 94760 N-INVAS EAR/PLS OXIMETRY 1: CPT

## 2022-01-20 PROCEDURE — 36415 COLL VENOUS BLD VENIPUNCTURE: CPT

## 2022-01-20 PROCEDURE — 0241U HCHG SARS-COV-2 COVID-19 NFCT DS RESP RNA 4 TRGT MIC: CPT

## 2022-01-20 PROCEDURE — 700111 HCHG RX REV CODE 636 W/ 250 OVERRIDE (IP): Performed by: EMERGENCY MEDICINE

## 2022-01-20 PROCEDURE — 85025 COMPLETE CBC W/AUTO DIFF WBC: CPT

## 2022-01-20 PROCEDURE — 96375 TX/PRO/DX INJ NEW DRUG ADDON: CPT | Mod: XU

## 2022-01-20 PROCEDURE — 87150 DNA/RNA AMPLIFIED PROBE: CPT | Mod: 91

## 2022-01-20 PROCEDURE — 96374 THER/PROPH/DIAG INJ IV PUSH: CPT | Mod: XU

## 2022-01-20 PROCEDURE — 80048 BASIC METABOLIC PNL TOTAL CA: CPT

## 2022-01-20 PROCEDURE — 87040 BLOOD CULTURE FOR BACTERIA: CPT

## 2022-01-20 PROCEDURE — A9576 INJ PROHANCE MULTIPACK: HCPCS | Performed by: EMERGENCY MEDICINE

## 2022-01-20 RX ORDER — ONDANSETRON 2 MG/ML
4 INJECTION INTRAMUSCULAR; INTRAVENOUS ONCE
Status: COMPLETED | OUTPATIENT
Start: 2022-01-20 | End: 2022-01-20

## 2022-01-20 RX ORDER — HYDROCODONE BITARTRATE AND ACETAMINOPHEN 5; 325 MG/1; MG/1
1 TABLET ORAL EVERY 6 HOURS PRN
Qty: 15 TABLET | Refills: 0 | Status: ON HOLD | OUTPATIENT
Start: 2022-01-20 | End: 2022-02-10

## 2022-01-20 RX ORDER — METHYLPREDNISOLONE 4 MG/1
TABLET ORAL
Qty: 1 EACH | Refills: 0 | Status: SHIPPED | OUTPATIENT
Start: 2022-01-20 | End: 2022-01-22

## 2022-01-20 RX ORDER — MORPHINE SULFATE 4 MG/ML
4 INJECTION INTRAVENOUS ONCE
Status: COMPLETED | OUTPATIENT
Start: 2022-01-20 | End: 2022-01-20

## 2022-01-20 RX ORDER — METHYLPREDNISOLONE 4 MG/1
TABLET ORAL
Qty: 1 EACH | Refills: 0 | Status: SHIPPED | OUTPATIENT
Start: 2022-01-20 | End: 2022-01-20 | Stop reason: SDUPTHER

## 2022-01-20 RX ADMIN — GADOTERIDOL 14 ML: 279.3 INJECTION, SOLUTION INTRAVENOUS at 14:16

## 2022-01-20 RX ADMIN — MORPHINE SULFATE 4 MG: 4 INJECTION INTRAVENOUS at 12:10

## 2022-01-20 RX ADMIN — ONDANSETRON 4 MG: 2 INJECTION INTRAMUSCULAR; INTRAVENOUS at 12:10

## 2022-01-20 NOTE — LETTER
1/21/2022               Yunier Denney  [id number]  [facility] [address]        Dear Yunier (MR#[id number])    As we have been unable to contact you by phone, this letter is sent in regards to your recent visit to the [facility] Emergency Department on 1/20/2022. During the visit, tests were performed to assist the physician in a medical diagnosis. A review of those tests requires that we notify you of the following:    Your blood culture and sensitivity was POSITIVE for a bacteria called Methicillin Sensitive Staphylococcus aureus, and further treatment may be necessary. PLEASE RETURN TO THE EMERGENCY DEPARTMENT AS SOON AS POSSIBLE.      Thank you for your cooperation in the matter.    Sincerely,  ED Culture Follow-Up Staff  Magda Santos, PharmD, BCPS   PGY2 Infectious Diseases Pharmacy Resident      [facility], Emergency Department  [address] [place] [id number] [phone] (Magda's Phone Number)  [phone] (ED Culture Line)

## 2022-01-20 NOTE — ED TRIAGE NOTES
"Pt to triage via w/c c/o low back pain radiates to RLE x2d after \"working on my car\", per pt. Pt has remote hx low back surgery x3y ago  "
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
## 2022-01-21 PROCEDURE — 99285 EMERGENCY DEPT VISIT HI MDM: CPT

## 2022-01-21 NOTE — ED NOTES
Spoke with Dr Irizarry about the positive blood culture of Gram Cocci clusters and patient needs to return to the ED. Updated Jack Charge nurse

## 2022-01-21 NOTE — ED NOTES
"ED Positive Culture Follow-up/Notification Note:    Date: 1/21/2022     Patient seen in the ED on 1/20/2022 for back pain .   1. Acute right-sided low back pain, unspecified whether sciatica present    2. COVID-19    3. Lumbar radiculopathy       Discharge Medication List as of 1/20/2022  4:45 PM      START taking these medications    Details   HYDROcodone-acetaminophen (NORCO) 5-325 MG Tab per tablet Take 1 Tablet by mouth every 6 hours as needed for up to 4 days., Disp-15 Tablet, R-0, Normal             Allergies: Patient has no known allergies.     Vitals:    01/20/22 1123 01/20/22 1620   BP: 124/75 105/64   Pulse: 75 62   Resp: 18 16   Temp: 37.1 °C (98.8 °F) 37.2 °C (99 °F)   TempSrc: Temporal Temporal   SpO2: 95% 94%   Weight: 77.7 kg (171 lb 4.8 oz)    Height: 1.803 m (5' 11\")        Final cultures:   Results     Procedure Component Value Units Date/Time    BLOOD CULTURE [[id number]]  (Abnormal) Collected: 01/20/22 1223    Order Status: Completed Specimen: Blood from Peripheral Updated: 01/21/22 1042     Significant Indicator POS     Source BLD     Site PERIPHERAL     Culture Result 01/21/2022  03:10  Growth detected by Bactec instrument.  01/21/2022  04:13  Growth detected by Bactec instrument.  Gram Stain: Gram positive cocci: Possible Staphylococcus sp.  Staphylococcus aureus (methicillin sensitive)  detected by PCR.  Susceptibility to follow.      Narrative:      CALL  [place]  [facility] tel. [phone],  CALLED  EDS tel. [phone] 01/21/2022, 03:20, RB PERF. RESULTS CALLED TO:  RN [id number]  Per Hospital Policy: Only change Specimen Src: to \"Line\" if  specified by physician order.  Left AC    BLOOD CULTURE [[id number]]  (Abnormal) Collected: 01/20/22 1205    Order Status: Completed Specimen: Blood from Peripheral Updated: 01/21/22 0323     Significant Indicator POS     Source BLD     Site PERIPHERAL     Culture Result 01/21/2022  03:21  Growth detected by Bactec instrument.  Gram Stain: Gram positive cocci: Possible " "Staphylococcus sp.      Narrative:      CALL  [place]  [facility] tel. [phone],  CALLED  [facility] tel. [phone] 01/21/2022, 03:22, RB PERF. RESULTS CALLED TO:  RN [id number]  Per Hospital Policy: Only change Specimen Src: to \"Line\" if  specified by physician order.  Left AC    COV-2, FLU A/B, AND RSV BY PCR (2-4 HOURS CEPHEID): Collect NP swab in VTM [[id number]]  (Abnormal) Collected: 01/20/22 1205    Order Status: Completed Specimen: Respirate from Nasopharyngeal Updated: 01/20/22 1259     Influenza virus A RNA Negative     Influenza virus B, PCR Negative     RSV, PCR Negative     SARS-CoV-2 by PCR DETECTED     Comment: PATIENTS: Important information regarding your results and instructions can  be found at [email or web address]   \"After your  Covid-19 Test\"    **The [employer] GeneXpert Xpress SARS-CoV-2 RT-PCR Test has been made  available for use under the Emergency Use Authorization (EUA) only.          SARS-CoV-2 Source NP Swab    Narrative:      Have you been in close contact with a person who is suspected  or known to be positive for COVID-19 within the last 30 days  (e.g. last seen that person < 30 days ago)->No          Plan:   Patient growing MSSA 2/2 in blood cultures. Attempted to call patient and spouse regarding culture results and to return to the ER for further evaluation. LVM. If patient returns would recommend:     Cefazolin 2g IV q8hr and an ID consult.     Will send letter to patient to return to the ER as well.     Magda Santos, PharmD, BCPS   PGY2 Infectious Diseases Pharmacy Resident      "

## 2022-01-21 NOTE — ED NOTES
Received a call from Nicol from Microbiology with a positive blood culture of Gram cocci clusters.      Updated Jack Charge Nurse.

## 2022-01-21 NOTE — ED PROVIDER NOTES
"ED Provider Note    Patient:Yunier Denney  Patient : 1945  Patient MRN: [id number]  Patient PCP: Pcp Pt States None    Admit Date: 2022  Discharge Date and Time: 22 4:39 PM  Discharge Diagnosis:   1. Acute right-sided low back pain, unspecified whether sciatica present  HYDROcodone-acetaminophen (NORCO) 5-325 MG Tab per tablet   2. COVID-19     3. Lumbar radiculopathy  methylPREDNISolone (MEDROL DOSEPAK) 4 MG Tablet Therapy Pack    DISCONTINUED: methylPREDNISolone (MEDROL DOSEPAK) 4 MG Tablet Therapy Pack       Discharge Attending: Lonnie Edgar M.D.  Discharge Service: ED Observation    ED Course  Yunier is a 77 y.o. male who was evaluated at [facility] for back pain.  Patient was initially evaluated by Dr. Carreno please refer to his dictation as above.  He was ordered for an MRI which was taking a significant amount of time for this to the patient was placed in observation status.  While in observation status the patient did require some pain medications IV.  MRI was obtained shows severe right L4 foraminal stenosis but otherwise no signs of abscess which was the biggest concern because the patient had a history of an epidural abscess in the past.  At this point because of continued discomfort I will start the patient on methylprednisolone/Medrol dose pack and Norco.  The patient has been seen by Dr. Herron in the past of recommended for her to follow-up with Dr. Herron for further outpatient treatment and care of this.    Discharge Exam:  /64   Pulse 62   Temp 37.2 °C (99 °F) (Temporal)   Resp 16   Ht 1.803 m (5' 11\")   Wt 77.7 kg (171 lb 4.8 oz)   SpO2 94%   BMI 23.89 kg/m² .    Constitutional: Awake and alert. Nontoxic  HENT:  Grossly normal  Eyes: Grossly normal  Neck: Normal range of motion  Cardiovascular: Normal heart rate   Thorax & Lungs: No respiratory distress  Abdomen: Nontender  Skin:  No pathologic rash.   Extremities: Well " perfused  Psychiatric: Affect normal    Labs  Results for orders placed or performed during the hospital encounter of 01/20/22   CBC WITH DIFFERENTIAL   Result Value Ref Range    WBC 8.6 4.8 - 10.8 K/uL    RBC 4.42 (L) 4.70 - 6.10 M/uL    Hemoglobin 12.2 (L) 14.0 - 18.0 g/dL    Hematocrit 37.9 (L) 42.0 - 52.0 %    MCV 85.7 81.4 - 97.8 fL    MCH 27.6 27.0 - 33.0 pg    MCHC 32.2 (L) 33.7 - 35.3 g/dL    RDW 52.0 (H) 35.9 - 50.0 fL    Platelet Count 191 164 - 446 K/uL    MPV 8.7 (L) 9.0 - 12.9 fL    Neutrophils-Polys 86.90 (H) 44.00 - 72.00 %    Lymphocytes 4.90 (L) 22.00 - 41.00 %    Monocytes 7.40 0.00 - 13.40 %    Eosinophils 0.00 0.00 - 6.90 %    Basophils 0.30 0.00 - 1.80 %    Immature Granulocytes 0.50 0.00 - 0.90 %    Nucleated RBC 0.00 /100 WBC    Neutrophils (Absolute) 7.48 (H) 1.82 - 7.42 K/uL    Lymphs (Absolute) 0.42 (L) 1.00 - 4.80 K/uL    Monos (Absolute) 0.64 0.00 - 0.85 K/uL    Eos (Absolute) 0.00 0.00 - 0.51 K/uL    Baso (Absolute) 0.03 0.00 - 0.12 K/uL    Immature Granulocytes (abs) 0.04 0.00 - 0.11 K/uL    NRBC (Absolute) 0.00 K/uL   BASIC METABOLIC PANEL   Result Value Ref Range    Sodium 134 (L) 135 - 145 mmol/L    Potassium 3.8 3.6 - 5.5 mmol/L    Chloride 99 96 - 112 mmol/L    Co2 21 20 - 33 mmol/L    Glucose 131 (H) 65 - 99 mg/dL    Bun 26 (H) 8 - 22 mg/dL    Creatinine 1.03 0.50 - 1.40 mg/dL    Calcium 9.0 8.4 - 10.2 mg/dL    Anion Gap 14.0 7.0 - 16.0   COV-2, FLU A/B, AND RSV BY PCR (2-4 HOURS CEPHEID): Collect NP swab in VTM    Specimen: Nasopharyngeal; Respirate   Result Value Ref Range    Influenza virus A RNA Negative Negative    Influenza virus B, PCR Negative Negative    RSV, PCR Negative Negative    SARS-CoV-2 by PCR DETECTED (AA)     SARS-CoV-2 Source NP Swab    ESTIMATED GFR   Result Value Ref Range    GFR If African American >60 >60 mL/min/1.73 m 2    GFR If Non African American >60 >60 mL/min/1.73 m 2       Radiology  MR-LUMBAR SPINE-WITH & W/O   Final Result      1.  There is no  evidence of lumbar spinal abscess.   2.  Degenerative scoliosis.   3.  Severe right L4 neural foraminal stenosis.          Disposition: Discharged in stable condition    Follow up: Dr. Herron neurosurgery    Medications:   Discharge Medication List as of 1/20/2022  4:45 PM      START taking these medications    Details   HYDROcodone-acetaminophen (NORCO) 5-325 MG Tab per tablet Take 1 Tablet by mouth every 6 hours as needed for up to 4 days., Disp-15 Tablet, R-0, Normal             Discharge Condition: Stable    Electronically signed by: Lonnie Edgar M.D., 1/20/2022 4:39 PM

## 2022-01-22 ENCOUNTER — HOSPITAL ENCOUNTER (INPATIENT)
Facility: MEDICAL CENTER | Age: 77
LOS: 19 days | DRG: 548 | End: 2022-02-10
Attending: EMERGENCY MEDICINE | Admitting: STUDENT IN AN ORGANIZED HEALTH CARE EDUCATION/TRAINING PROGRAM
Payer: MEDICARE

## 2022-01-22 DIAGNOSIS — K59.03 DRUG-INDUCED CONSTIPATION: ICD-10-CM

## 2022-01-22 DIAGNOSIS — R26.2 UNABLE TO WALK: ICD-10-CM

## 2022-01-22 DIAGNOSIS — M00.011 STAPHYLOCOCCAL ARTHRITIS OF RIGHT SHOULDER (HCC): ICD-10-CM

## 2022-01-22 DIAGNOSIS — G89.29 CHRONIC BILATERAL LOW BACK PAIN WITHOUT SCIATICA: ICD-10-CM

## 2022-01-22 DIAGNOSIS — M46.20 VERTEBRAL OSTEOMYELITIS (HCC): ICD-10-CM

## 2022-01-22 DIAGNOSIS — R78.81 MSSA BACTEREMIA: ICD-10-CM

## 2022-01-22 DIAGNOSIS — M79.10 MYALGIA: ICD-10-CM

## 2022-01-22 DIAGNOSIS — M54.50 CHRONIC BILATERAL LOW BACK PAIN WITHOUT SCIATICA: ICD-10-CM

## 2022-01-22 DIAGNOSIS — U07.1 LAB TEST POSITIVE FOR DETECTION OF COVID-19 VIRUS: ICD-10-CM

## 2022-01-22 DIAGNOSIS — F11.20 NARCOTIC DEPENDENCE (HCC): ICD-10-CM

## 2022-01-22 DIAGNOSIS — B95.61 MSSA BACTEREMIA: ICD-10-CM

## 2022-01-22 DIAGNOSIS — M25.511 ACUTE PAIN OF RIGHT SHOULDER: ICD-10-CM

## 2022-01-22 DIAGNOSIS — M25.562 LEFT KNEE PAIN, UNSPECIFIED CHRONICITY: ICD-10-CM

## 2022-01-22 DIAGNOSIS — I82.4Y2 ACUTE DEEP VEIN THROMBOSIS (DVT) OF PROXIMAL VEIN OF LEFT LOWER EXTREMITY (HCC): ICD-10-CM

## 2022-01-22 DIAGNOSIS — R53.1 WEAKNESS: ICD-10-CM

## 2022-01-22 PROBLEM — M48.061 SPINAL STENOSIS AT L4-L5 LEVEL: Status: ACTIVE | Noted: 2022-01-22

## 2022-01-22 LAB
ALBUMIN SERPL BCP-MCNC: 3.8 G/DL (ref 3.2–4.9)
ALBUMIN/GLOB SERPL: 1.1 G/DL
ALP SERPL-CCNC: 79 U/L (ref 30–99)
ALT SERPL-CCNC: 22 U/L (ref 2–50)
ANION GAP SERPL CALC-SCNC: 14 MMOL/L (ref 7–16)
AST SERPL-CCNC: 20 U/L (ref 12–45)
BASOPHILS # BLD AUTO: 0.1 % (ref 0–1.8)
BASOPHILS # BLD: 0.01 K/UL (ref 0–0.12)
BILIRUB SERPL-MCNC: 0.6 MG/DL (ref 0.1–1.5)
BUN SERPL-MCNC: 25 MG/DL (ref 8–22)
CALCIUM SERPL-MCNC: 8.9 MG/DL (ref 8.5–10.5)
CHLORIDE SERPL-SCNC: 98 MMOL/L (ref 96–112)
CO2 SERPL-SCNC: 21 MMOL/L (ref 20–33)
CREAT SERPL-MCNC: 0.9 MG/DL (ref 0.5–1.4)
CRP SERPL HS-MCNC: 198.9 MG/L (ref 0–3)
D DIMER PPP IA.FEU-MCNC: 6.73 UG/ML (FEU) (ref 0–0.5)
EOSINOPHIL # BLD AUTO: 0 K/UL (ref 0–0.51)
EOSINOPHIL NFR BLD: 0 % (ref 0–6.9)
ERYTHROCYTE [DISTWIDTH] IN BLOOD BY AUTOMATED COUNT: 51.8 FL (ref 35.9–50)
ERYTHROCYTE [SEDIMENTATION RATE] IN BLOOD BY WESTERGREN METHOD: 47 MM/HOUR (ref 0–20)
GLOBULIN SER CALC-MCNC: 3.5 G/DL (ref 1.9–3.5)
GLUCOSE SERPL-MCNC: 159 MG/DL (ref 65–99)
HCT VFR BLD AUTO: 35.5 % (ref 42–52)
HGB BLD-MCNC: 11.5 G/DL (ref 14–18)
IMM GRANULOCYTES # BLD AUTO: 0.06 K/UL (ref 0–0.11)
IMM GRANULOCYTES NFR BLD AUTO: 0.6 % (ref 0–0.9)
LYMPHOCYTES # BLD AUTO: 0.6 K/UL (ref 1–4.8)
LYMPHOCYTES NFR BLD: 5.7 % (ref 22–41)
MCH RBC QN AUTO: 27.5 PG (ref 27–33)
MCHC RBC AUTO-ENTMCNC: 32.4 G/DL (ref 33.7–35.3)
MCV RBC AUTO: 84.9 FL (ref 81.4–97.8)
MONOCYTES # BLD AUTO: 1.71 K/UL (ref 0–0.85)
MONOCYTES NFR BLD AUTO: 16.2 % (ref 0–13.4)
NEUTROPHILS # BLD AUTO: 8.15 K/UL (ref 1.82–7.42)
NEUTROPHILS NFR BLD: 77.4 % (ref 44–72)
NRBC # BLD AUTO: 0 K/UL
NRBC BLD-RTO: 0 /100 WBC
PLATELET # BLD AUTO: 171 K/UL (ref 164–446)
PMV BLD AUTO: 9.4 FL (ref 9–12.9)
POTASSIUM SERPL-SCNC: 4.1 MMOL/L (ref 3.6–5.5)
PROCALCITONIN SERPL-MCNC: 1.08 NG/ML
PROT SERPL-MCNC: 7.3 G/DL (ref 6–8.2)
RBC # BLD AUTO: 4.18 M/UL (ref 4.7–6.1)
SODIUM SERPL-SCNC: 133 MMOL/L (ref 135–145)
URATE SERPL-MCNC: 4.3 MG/DL (ref 2.5–8.3)
WBC # BLD AUTO: 10.5 K/UL (ref 4.8–10.8)

## 2022-01-22 PROCEDURE — 96372 THER/PROPH/DIAG INJ SC/IM: CPT

## 2022-01-22 PROCEDURE — 700111 HCHG RX REV CODE 636 W/ 250 OVERRIDE (IP): Performed by: HOSPITALIST

## 2022-01-22 PROCEDURE — 87040 BLOOD CULTURE FOR BACTERIA: CPT

## 2022-01-22 PROCEDURE — 700102 HCHG RX REV CODE 250 W/ 637 OVERRIDE(OP): Performed by: STUDENT IN AN ORGANIZED HEALTH CARE EDUCATION/TRAINING PROGRAM

## 2022-01-22 PROCEDURE — 87077 CULTURE AEROBIC IDENTIFY: CPT

## 2022-01-22 PROCEDURE — 700111 HCHG RX REV CODE 636 W/ 250 OVERRIDE (IP): Performed by: EMERGENCY MEDICINE

## 2022-01-22 PROCEDURE — 84550 ASSAY OF BLOOD/URIC ACID: CPT

## 2022-01-22 PROCEDURE — 85025 COMPLETE CBC W/AUTO DIFF WBC: CPT

## 2022-01-22 PROCEDURE — 80053 COMPREHEN METABOLIC PANEL: CPT

## 2022-01-22 PROCEDURE — 85379 FIBRIN DEGRADATION QUANT: CPT

## 2022-01-22 PROCEDURE — 700101 HCHG RX REV CODE 250: Performed by: HOSPITALIST

## 2022-01-22 PROCEDURE — 96375 TX/PRO/DX INJ NEW DRUG ADDON: CPT

## 2022-01-22 PROCEDURE — 700111 HCHG RX REV CODE 636 W/ 250 OVERRIDE (IP): Performed by: STUDENT IN AN ORGANIZED HEALTH CARE EDUCATION/TRAINING PROGRAM

## 2022-01-22 PROCEDURE — 96376 TX/PRO/DX INJ SAME DRUG ADON: CPT

## 2022-01-22 PROCEDURE — 85652 RBC SED RATE AUTOMATED: CPT

## 2022-01-22 PROCEDURE — 87186 SC STD MICRODIL/AGAR DIL: CPT

## 2022-01-22 PROCEDURE — 94760 N-INVAS EAR/PLS OXIMETRY 1: CPT

## 2022-01-22 PROCEDURE — A9270 NON-COVERED ITEM OR SERVICE: HCPCS | Performed by: HOSPITALIST

## 2022-01-22 PROCEDURE — 700102 HCHG RX REV CODE 250 W/ 637 OVERRIDE(OP): Performed by: HOSPITALIST

## 2022-01-22 PROCEDURE — 770001 HCHG ROOM/CARE - MED/SURG/GYN PRIV*

## 2022-01-22 PROCEDURE — A9270 NON-COVERED ITEM OR SERVICE: HCPCS | Performed by: STUDENT IN AN ORGANIZED HEALTH CARE EDUCATION/TRAINING PROGRAM

## 2022-01-22 PROCEDURE — 36415 COLL VENOUS BLD VENIPUNCTURE: CPT

## 2022-01-22 PROCEDURE — 87147 CULTURE TYPE IMMUNOLOGIC: CPT

## 2022-01-22 PROCEDURE — 99223 1ST HOSP IP/OBS HIGH 75: CPT | Mod: AI | Performed by: STUDENT IN AN ORGANIZED HEALTH CARE EDUCATION/TRAINING PROGRAM

## 2022-01-22 PROCEDURE — 84145 PROCALCITONIN (PCT): CPT

## 2022-01-22 PROCEDURE — 86141 C-REACTIVE PROTEIN HS: CPT

## 2022-01-22 PROCEDURE — 96365 THER/PROPH/DIAG IV INF INIT: CPT

## 2022-01-22 PROCEDURE — 700105 HCHG RX REV CODE 258: Performed by: STUDENT IN AN ORGANIZED HEALTH CARE EDUCATION/TRAINING PROGRAM

## 2022-01-22 RX ORDER — ACETAMINOPHEN 500 MG
1000 TABLET ORAL EVERY 6 HOURS PRN
Status: DISCONTINUED | OUTPATIENT
Start: 2022-01-22 | End: 2022-02-10 | Stop reason: HOSPADM

## 2022-01-22 RX ORDER — MORPHINE SULFATE 4 MG/ML
1 INJECTION INTRAVENOUS EVERY 4 HOURS PRN
Status: COMPLETED | OUTPATIENT
Start: 2022-01-22 | End: 2022-01-22

## 2022-01-22 RX ORDER — ONDANSETRON 2 MG/ML
4 INJECTION INTRAMUSCULAR; INTRAVENOUS EVERY 6 HOURS PRN
Status: DISCONTINUED | OUTPATIENT
Start: 2022-01-22 | End: 2022-02-10 | Stop reason: HOSPADM

## 2022-01-22 RX ORDER — MORPHINE SULFATE 4 MG/ML
4 INJECTION INTRAVENOUS ONCE
Status: COMPLETED | OUTPATIENT
Start: 2022-01-22 | End: 2022-01-22

## 2022-01-22 RX ORDER — OXYCODONE HYDROCHLORIDE 10 MG/1
10 TABLET ORAL EVERY 4 HOURS PRN
Status: DISCONTINUED | OUTPATIENT
Start: 2022-01-22 | End: 2022-02-10 | Stop reason: HOSPADM

## 2022-01-22 RX ORDER — BISACODYL 10 MG
10 SUPPOSITORY, RECTAL RECTAL DAILY
Status: DISCONTINUED | OUTPATIENT
Start: 2022-01-22 | End: 2022-02-10 | Stop reason: HOSPADM

## 2022-01-22 RX ORDER — ONDANSETRON 2 MG/ML
4 INJECTION INTRAMUSCULAR; INTRAVENOUS ONCE
Status: COMPLETED | OUTPATIENT
Start: 2022-01-22 | End: 2022-01-22

## 2022-01-22 RX ORDER — ONDANSETRON 4 MG/1
4 TABLET, ORALLY DISINTEGRATING ORAL EVERY 6 HOURS PRN
Status: DISCONTINUED | OUTPATIENT
Start: 2022-01-22 | End: 2022-02-10 | Stop reason: HOSPADM

## 2022-01-22 RX ORDER — OXYCODONE HYDROCHLORIDE 5 MG/1
5 TABLET ORAL EVERY 4 HOURS PRN
Status: DISCONTINUED | OUTPATIENT
Start: 2022-01-22 | End: 2022-02-10 | Stop reason: HOSPADM

## 2022-01-22 RX ORDER — ACETAMINOPHEN 325 MG/1
650 TABLET ORAL EVERY 4 HOURS PRN
Status: DISCONTINUED | OUTPATIENT
Start: 2022-01-22 | End: 2022-02-10 | Stop reason: HOSPADM

## 2022-01-22 RX ORDER — KETOROLAC TROMETHAMINE 30 MG/ML
30 INJECTION, SOLUTION INTRAMUSCULAR; INTRAVENOUS ONCE
Status: COMPLETED | OUTPATIENT
Start: 2022-01-22 | End: 2022-01-22

## 2022-01-22 RX ADMIN — WATER 2 G: 100 INJECTION, SOLUTION INTRAVENOUS at 20:39

## 2022-01-22 RX ADMIN — ONDANSETRON 4 MG: 2 INJECTION INTRAMUSCULAR; INTRAVENOUS at 02:16

## 2022-01-22 RX ADMIN — MORPHINE SULFATE 1 MG: 4 INJECTION INTRAVENOUS at 06:45

## 2022-01-22 RX ADMIN — ENOXAPARIN SODIUM 40 MG: 40 INJECTION SUBCUTANEOUS at 05:26

## 2022-01-22 RX ADMIN — ACETAMINOPHEN 1000 MG: 500 TABLET ORAL at 21:21

## 2022-01-22 RX ADMIN — MORPHINE SULFATE 4 MG: 4 INJECTION INTRAVENOUS at 02:16

## 2022-01-22 RX ADMIN — AMPICILLIN SODIUM AND SULBACTAM SODIUM 3 G: 2; 1 INJECTION, POWDER, FOR SOLUTION INTRAMUSCULAR; INTRAVENOUS at 12:08

## 2022-01-22 RX ADMIN — AMPICILLIN SODIUM AND SULBACTAM SODIUM 3 G: 2; 1 INJECTION, POWDER, FOR SOLUTION INTRAMUSCULAR; INTRAVENOUS at 04:50

## 2022-01-22 RX ADMIN — ACETAMINOPHEN 650 MG: 325 TABLET, FILM COATED ORAL at 12:04

## 2022-01-22 RX ADMIN — MORPHINE SULFATE 1 MG: 4 INJECTION INTRAVENOUS at 18:20

## 2022-01-22 RX ADMIN — OXYCODONE HYDROCHLORIDE 10 MG: 10 TABLET ORAL at 21:21

## 2022-01-22 RX ADMIN — KETOROLAC TROMETHAMINE 30 MG: 30 INJECTION, SOLUTION INTRAMUSCULAR at 02:16

## 2022-01-22 RX ADMIN — MORPHINE SULFATE 1 MG: 4 INJECTION INTRAVENOUS at 12:08

## 2022-01-22 ASSESSMENT — LIFESTYLE VARIABLES: SUBSTANCE_ABUSE: 0

## 2022-01-22 ASSESSMENT — ENCOUNTER SYMPTOMS
DIAPHORESIS: 0
WEIGHT LOSS: 0
FEVER: 0
BLURRED VISION: 0
NAUSEA: 1
VOMITING: 0
CARDIOVASCULAR NEGATIVE: 1
BRUISES/BLEEDS EASILY: 0
CHILLS: 0
NEUROLOGICAL NEGATIVE: 1
HEADACHES: 0
SHORTNESS OF BREATH: 0
VOMITING: 1
NAUSEA: 0
CHILLS: 1
FOCAL WEAKNESS: 0
RESPIRATORY NEGATIVE: 1
DEPRESSION: 0
WEAKNESS: 0
DIZZINESS: 0
DOUBLE VISION: 0
PALPITATIONS: 0
SORE THROAT: 0
HEMOPTYSIS: 0
MYALGIAS: 0
ABDOMINAL PAIN: 0
BACK PAIN: 1
GASTROINTESTINAL NEGATIVE: 1
EYES NEGATIVE: 1
COUGH: 0
HEARTBURN: 0
NECK PAIN: 0
PSYCHIATRIC NEGATIVE: 1

## 2022-01-22 ASSESSMENT — PAIN DESCRIPTION - PAIN TYPE
TYPE: ACUTE PAIN

## 2022-01-22 NOTE — ED NOTES
Med Rec complete per Pt at bedside.  Allergies reviewed.      Home Pharmacy:  [facility]

## 2022-01-22 NOTE — ED NOTES
"Pt complaining of pain in his leg and is rating it a 6/10. Pt requesting tylenol and morphine. Pt medicated per MAR.   Pt refusing suppository at this time and states \"I want to wait until I get a better room upstairs\". Pt has no further requests.  "

## 2022-01-22 NOTE — ASSESSMENT & PLAN NOTE
Patient has blood cultures x 2 positive for staph aureus that reflected at [facility] when he visited ER from 1/20/2022.  CT showed loculated small to moderate right glenohumeral joint effusion; several small intramuscular abscesses in the subscapularis and anterior head of the deltoid  S/p I+D shoulder joint, wound vac applied. Operative cultures negative.  Blood cultures from 1/20 and 1/22 positive for MSSA.  Blood cultures form 1/28 positive  Blood cultures negative 1/31, 1 of 2 bottles with coag neg staph contaminant.  JAZIEL ordered given recurrent fever/positive blood culture, however patient refuses JAZIEL.  MRI lumbar spine shows vertebral body osteomyelitis.  Ancef changed to nafcillin 2/2 per ID recommendations, will need 6 weeks antibiotics, end date 3/13  Can be on daptomycin if using [facility], or ancef q8h if going to [facility]  PICC line placed 2/7

## 2022-01-22 NOTE — ED TRIAGE NOTES
Yunier M Олег  77 y.o. M  Chief Complaint   Patient presents with   • Back Pain     x2 days, right lower back. Patient injured his back while working on a car. Patient reports he had an MRI 2 days ago that didn't show anything.    • Shoulder Pain     x 2 days, right   • Knee Pain     x 2 days , left   • Failure to Thrive     Patient lives with his son who takes care of him. Patient's son reports that the patient is unable to get out of bed and he is having trouble taking care of him.    • Flu Like Symptoms     covid+ 1/20      Vitals:    01/21/22 2208   BP: (Abnormal) 99/63   Pulse: 89   Resp: 16   Temp: 37.4 °C (99.4 °F)   SpO2: 92%     Triage process explained to patient, apologized for wait time, and returned to lobby.  Pt informed to notify staff of any change in condition.

## 2022-01-22 NOTE — ED PROVIDER NOTES
ED Provider Note     Scribed for Michelle Guevara D.O. by Alia Ortiz. 1/22/2022, 1:29 AM.     Primary care provider: Pcp Pt States None  Means of arrival: walk in         History obtained from: patient  History limited by: none noted    CHIEF COMPLAINT  Chief Complaint   Patient presents with   • Back Pain     x2 days, right lower back. Patient injured his back while working on a car. Patient reports he had an MRI 2 days ago that didn't show anything.    • Shoulder Pain     x 2 days, right   • Knee Pain     x 2 days , left   • Failure to Thrive     Patient lives with his son who takes care of him. Patient's son reports that the patient is unable to get out of bed and he is having trouble taking care of him.    • Flu Like Symptoms     covid+ 1/20        HPI  Yunier Denney is a 77 y.o. male who presents to the emergency Department with atraumatic back and bilateral knee pain onset 2 days ago. The patient states he was lifting heavy objects while working on his car on 1/17. The patient reports he visited [facility] ED on 1/20 for an MRI of his back, but did not find any abnormalities and was discharged with narcotics. His son reports the patient has been lying in bed for the past week and has been unable to ambulate on his own due to knee pain.  He also endorses right shoulder pain, weakness, and a 102 F fever today, but denies any shortness of breath. He tested positive for Covid on 1/20, and has not received his Covid vaccination. He also endorses constipation, but notes it is a side effect of the narcotics. Patient has history of epidural abscess 3 years ago treated surgically by Dr. Herron. He denies history of gout.     REVIEW OF SYSTEMS  Pertinent positives include back pain, bilateral knee pain, right shoulder pain, weakness, and fever. Pertinent negatives include no shortness of breath.   See HPI for further details. All other systems are negative.    PAST MEDICAL HISTORY  Past Medical History:    Diagnosis Date   • Hx of gastroesophageal reflux (GERD)    • Hypertension    • Inguinal hernia        FAMILY HISTORY  History reviewed. No pertinent family history.    SOCIAL HISTORY  Social History     Tobacco Use   • Smoking status: Never Smoker   • Smokeless tobacco: Never Used   Vaping Use   • Vaping Use: Never used   Substance Use Topics   • Alcohol use: Yes   • Drug use: No      Social History     Substance and Sexual Activity   Drug Use No       SURGICAL HISTORY  Past Surgical History:   Procedure Laterality Date   • LUMBAR LAMINECTOMY DISKECTOMY  4/29/2018    Procedure: LUMBAR LAMINECTOMY Y L4-S1;  Surgeon: Fercho Herron M.D.;  Location: SURGERY [facility];  Service: Neurosurgery   • IRRIGATION & DEBRIDEMENT NEURO  4/29/2018    Procedure: IRRIGATION & DEBRIDEMENT NEURO- epidural mass;  Surgeon: Fercho Herron M.D.;  Location: SURGERY [facility];  Service: Neurosurgery       CURRENT MEDICATIONS    Current Facility-Administered Medications:   •  ondansetron (ZOFRAN) syringe/vial injection 4 mg, 4 mg, Intravenous, Q6HRS PRN, Ramiro Romero M.D.  •  ondansetron (ZOFRAN ODT) dispertab 4 mg, 4 mg, Oral, Q6HRS PRN, Ramiro Romero M.D.  •  enoxaparin (LOVENOX) inj 40 mg, 40 mg, Subcutaneous, DAILY, Ramiro Romero M.D.  •  morphine 4 MG/ML injection 1 mg, 1 mg, Intravenous, Q4HRS PRN, Ramiro Romero M.D.  •  acetaminophen (Tylenol) tablet 650 mg, 650 mg, Oral, Q4HRS PRN, Ramiro Romero M.D.  •  ampicillin/sulbactam (UNASYN) 3 g in  mL IVPB, 3 g, Intravenous, Q6HRS, Ramiro Romero M.D.    Current Outpatient Medications:   •  HYDROcodone-acetaminophen (NORCO) 5-325 MG Tab per tablet, Take 1 Tablet by mouth every 6 hours as needed for up to 4 days., Disp: 15 Tablet, Rfl: 0  •  methylPREDNISolone (MEDROL DOSEPAK) 4 MG Tablet Therapy Pack, Please dispense a Medrol Dosepak with instructions, use as directed, Disp: 1 Each, Rfl: 0  •  losartan (COZAAR) 100 MG Tab, Take 100 mg by mouth every day.,  "Disp: , Rfl:   •  aspirin (ASA) 325 MG Tab, Take 325 mg by mouth 2 times a day as needed (Pain)., Disp: , Rfl:     ALLERGIES  No Known Allergies    PHYSICAL EXAM  VITAL SIGNS: BP (!) 166/77   Pulse 90   Temp 37.3 °C (99.1 °F) (Temporal)   Resp 16   Ht 1.803 m (5' 11\")   Wt 77.1 kg (170 lb)   SpO2 92%   BMI 23.71 kg/m²     Constitutional: Patient is well developed, well nourished. Non-toxic appearing. Moderate distress with any type of movement.   HENT: Normocephalic, atraumatic. Very poor dentition with multiple absent teeth. Nose normal with no mucosal edema or drainage. Oropharynx moist without erythema or exudates.  Eyes: PERRL, EOMI, Conjunctiva without erythema  Cardiovascular: Normal heart rate and Regular rhythm. No murmur  Thorax & Lungs: Clear and equal breath sounds with good excursion. No respiratory distress, no rhonchi, wheezing or rales.   Abdomen: Bowel sounds normal in all four quadrants. Soft,nontender  Skin: Warm, Dry, No erythema or warmth, No rashes.    Extremities: Extreme tenderness on palpation of left knee with no joint effusion or swelling, neurovascular intact. Extreme tenderness on palpation of right shoulder particularly over humoral head, no contusions, abrasions, rashes, or erythema, neurovascular intact. Limited range of motion secondary to pain.   Musculoskeletal: Tenderness midline in lumbar spine from L3 to S1. No signs of trauma, bony step-off, or deformities.   Neurologic: Alert & oriented x 3, Normal motor function, Normal sensory function, No lateralizing or focal deficits noted. DTR's 4/4 bilaterally.  Psychiatric: Affect normal, Judgment normal, Mood normal.     DIAGNOSTICS/PROCEDURES    LABS  Results for orders placed or performed during the hospital encounter of 01/22/22   CBC WITH DIFFERENTIAL   Result Value Ref Range    WBC 10.5 4.8 - 10.8 K/uL    RBC 4.18 (L) 4.70 - 6.10 M/uL    Hemoglobin 11.5 (L) 14.0 - 18.0 g/dL    Hematocrit 35.5 (L) 42.0 - 52.0 %    MCV 84.9 " 81.4 - 97.8 fL    MCH 27.5 27.0 - 33.0 pg    MCHC 32.4 (L) 33.7 - 35.3 g/dL    RDW 51.8 (H) 35.9 - 50.0 fL    Platelet Count 171 164 - 446 K/uL    MPV 9.4 9.0 - 12.9 fL    Neutrophils-Polys 77.40 (H) 44.00 - 72.00 %    Lymphocytes 5.70 (L) 22.00 - 41.00 %    Monocytes 16.20 (H) 0.00 - 13.40 %    Eosinophils 0.00 0.00 - 6.90 %    Basophils 0.10 0.00 - 1.80 %    Immature Granulocytes 0.60 0.00 - 0.90 %    Nucleated RBC 0.00 /100 WBC    Neutrophils (Absolute) 8.15 (H) 1.82 - 7.42 K/uL    Lymphs (Absolute) 0.60 (L) 1.00 - 4.80 K/uL    Monos (Absolute) 1.71 (H) 0.00 - 0.85 K/uL    Eos (Absolute) 0.00 0.00 - 0.51 K/uL    Baso (Absolute) 0.01 0.00 - 0.12 K/uL    Immature Granulocytes (abs) 0.06 0.00 - 0.11 K/uL    NRBC (Absolute) 0.00 K/uL   COMP METABOLIC PANEL   Result Value Ref Range    Sodium 133 (L) 135 - 145 mmol/L    Potassium 4.1 3.6 - 5.5 mmol/L    Chloride 98 96 - 112 mmol/L    Co2 21 20 - 33 mmol/L    Anion Gap 14.0 7.0 - 16.0    Glucose 159 (H) 65 - 99 mg/dL    Bun 25 (H) 8 - 22 mg/dL    Creatinine 0.90 0.50 - 1.40 mg/dL    Calcium 8.9 8.5 - 10.5 mg/dL    AST(SGOT) 20 12 - 45 U/L    ALT(SGPT) 22 2 - 50 U/L    Alkaline Phosphatase 79 30 - 99 U/L    Total Bilirubin 0.6 0.1 - 1.5 mg/dL    Albumin 3.8 3.2 - 4.9 g/dL    Total Protein 7.3 6.0 - 8.2 g/dL    Globulin 3.5 1.9 - 3.5 g/dL    A-G Ratio 1.1 g/dL   CRP HIGH SENSITIVE (CARDIAC)   Result Value Ref Range    C Reactive Protein High Sensitive 198.9 (H) 0.0 - 3.0 mg/L   Sed Rate   Result Value Ref Range    Sed Rate Westergren 47 (H) 0 - 20 mm/hour   URIC ACID   Result Value Ref Range    Uric Acid 4.3 2.5 - 8.3 mg/dL   ESTIMATED GFR   Result Value Ref Range    GFR If African American >60 >60 mL/min/1.73 m 2    GFR If Non African American >60 >60 mL/min/1.73 m 2       Labs reviewed by me    COURSE & MEDICAL DECISION MAKING  Pertinent Labs & Imaging studies reviewed. (See chart for details)    1:29 AM - Patient seen and evaluated at bedside. Ordered for CMP, CBC w/  Differential, Uric Acid, Sed Rate, and CRP High Sensitive to evaluate. Patient will be treated with Zofran 4 mg, morphine 4 mg, Toradol 30 mg, and Unasyn 3 g for his symptoms. Differential diagnoses include, but are not limited to, Covid related myalgias, Septic joint, Musculoskeletal etiology.   Patient's laboratories reveal a normal white blood cell count with a stable H&H.  His C-reactive protein was markedly elevated at 198.9, sed rate is 47, uric acid is normal at 5.3.  BUN is 25 with a normal creatinine.  Blood cultures x2 were drawn on the patient after it was noted that the patient's blood cultures from [facility] were positive for staph x2.  He was then treated with Zosyn 4.5 g IV piggyback.    3:32 AM - I discussed the patient's case and the above findings with Dr. Romero (Hospitalist) who agrees to hospitalize the patient for further evaluation and treatment. Recommended ordering Blood Culture. Plan of care was discussed with the patient and he was understanding and agreeable.         DISPOSITION:  Patient will be hospitalized by Dr. Romero in guarded condition.      FINAL IMPRESSION  1. Weakness    2. Left knee pain, unspecified chronicity    3. Myalgia    4. Lab test positive for detection of COVID-19 virus    5. Acute pain of right shoulder    6. Chronic bilateral low back pain without sciatica    7. Narcotic dependence (HCC)    8. Drug-induced constipation    9. Unable to walk    10. elevated CRP  11. positive blood cultures     Alia WA[facility] (Kennedy), am scribing for, and in the presence of, Michelle Guevara D.O..    Electronically signed by: Alia Ortiz (Kennedy), 1/22/2022    IMichelle D.O. personally performed the services described in this documentation, as scribed by Alia Ortiz in my presence, and it is both accurate and complete. C    The note accurately reflects work and decisions made by me.  Michelle Guevara D.O.  1/22/2022  5:21 AM

## 2022-01-22 NOTE — PROGRESS NOTES
Hospital Medicine Daily Progress Note    Date of Service  1/22/2022    Chief Complaint  Yunier Denney is a 77 y.o. male admitted 1/22/2022 with back pain    Hospital Course  Yunier Denney is a 77 y.o. male with past medical history of an epidural abscess. He  follows with Dr. Herron and presented to the ER on 1/22/22 with left leg pain, knee pain, viral-like symptoms including nausea, chills, malaise and fatigue.  Patient reports he was diagnosed with COVID-19 on 1/20.  He reports his leg pain is 5 out of 10, sharp, relieved with laying still exacerbated with movement.   Patient has elevated ESR at 47 and CRP of 198.9.  Blood cultures x2 returned positive for staph aureus. Patient will be hospitalized for IV Unasyn    Interval Problem Update  He reports he is feeling better, denies back pain this am, reports left left pain only, worse with movement, currently 5/10, denies numbness or tingling, no fevers or chills. Denies sob and is stable on room air, no cough. Constipation x 3 days, passing gas - suppository requested and ordered. ROS otherwise negative.     I have personally seen and examined the patient at bedside. I discussed the plan of care with patient, ID and nursing    Consultants/Specialty  ID    Code Status  Full Code    Disposition  Patient is not medically cleared for discharge.   Anticipate discharge to be determined    Review of Systems  Review of Systems   Constitutional: Positive for malaise/fatigue. Negative for chills, diaphoresis, fever and weight loss.   HENT: Negative.  Negative for sore throat.    Eyes: Negative.  Negative for blurred vision.   Respiratory: Negative.  Negative for cough and shortness of breath.    Cardiovascular: Negative.  Negative for chest pain, palpitations and leg swelling.   Gastrointestinal: Negative.  Negative for abdominal pain, nausea and vomiting.   Genitourinary: Negative.  Negative for dysuria.   Musculoskeletal: Positive for joint pain. Negative for myalgias.    Skin: Negative.  Negative for itching and rash.   Neurological: Negative.  Negative for dizziness, focal weakness, weakness and headaches.   Endo/Heme/Allergies: Negative.  Does not bruise/bleed easily.   Psychiatric/Behavioral: Negative.  Negative for depression, substance abuse and suicidal ideas.   All other systems reviewed and are negative.       Physical Exam  Temp:  [37.3 °C (99.1 °F)-37.4 °C (99.4 °F)] 37.3 °C (99.1 °F)  Pulse:  [62-90] 82  Resp:  [16-20] 20  BP: ()/(61-84) 130/68  SpO2:  [92 %-98 %] 97 %    Physical Exam  Vitals and nursing note reviewed. Exam conducted with a chaperone present.   Constitutional:       General: He is not in acute distress.     Appearance: Normal appearance. He is not diaphoretic.   HENT:      Head: Normocephalic.      Nose: Nose normal.      Mouth/Throat:      Mouth: Mucous membranes are moist.      Pharynx: No oropharyngeal exudate or posterior oropharyngeal erythema.   Eyes:      General: No scleral icterus.        Right eye: No discharge.         Left eye: No discharge.      Pupils: Pupils are equal, round, and reactive to light.   Neck:      Vascular: No carotid bruit.   Cardiovascular:      Rate and Rhythm: Normal rate and regular rhythm.      Pulses: Normal pulses.      Heart sounds: Normal heart sounds. No murmur heard.      Pulmonary:      Effort: Pulmonary effort is normal. No respiratory distress.      Breath sounds: Normal breath sounds. No stridor. No wheezing or rhonchi.   Abdominal:      General: Abdomen is flat. Bowel sounds are normal. There is no distension.      Palpations: Abdomen is soft. There is no mass.      Tenderness: There is no abdominal tenderness.      Hernia: No hernia is present.   Musculoskeletal:         General: No swelling or deformity. Normal range of motion.      Cervical back: No rigidity or tenderness.      Comments: Left leg pain and lower back pain with movement, no spinal pain to palpation   Lymphadenopathy:      Cervical:  No cervical adenopathy.   Skin:     General: Skin is warm and dry.      Capillary Refill: Capillary refill takes less than 2 seconds.      Coloration: Skin is not jaundiced or pale.      Findings: No bruising or erythema.   Neurological:      General: No focal deficit present.      Mental Status: He is alert and oriented to person, place, and time.      Cranial Nerves: No cranial nerve deficit.   Psychiatric:         Mood and Affect: Mood normal.         Behavior: Behavior normal.         Fluids    Intake/Output Summary (Last 24 hours) at 1/22/2022 1514  Last data filed at 1/22/2022 1238  Gross per 24 hour   Intake 100 ml   Output --   Net 100 ml       Laboratory  Recent Labs     01/20/22  1205 01/22/22  0209   WBC 8.6 10.5   RBC 4.42* 4.18*   HEMOGLOBIN 12.2* 11.5*   HEMATOCRIT 37.9* 35.5*   MCV 85.7 84.9   MCH 27.6 27.5   MCHC 32.2* 32.4*   RDW 52.0* 51.8*   PLATELETCT 191 171   MPV 8.7* 9.4     Recent Labs     01/20/22  1205 01/22/22  0209   SODIUM 134* 133*   POTASSIUM 3.8 4.1   CHLORIDE 99 98   CO2 21 21   GLUCOSE 131* 159*   BUN 26* 25*   CREATININE 1.03 0.90   CALCIUM 9.0 8.9                   Imaging  EC-ECHOCARDIOGRAM COMPLETE W/O CONT    (Results Pending)        Assessment/Plan  * Positive blood cultures- (present on admission)  Assessment & Plan  Patient has blood cultures x 2 positive for staph aureus that reflected at [facility] when he visited ER from 1/20/2022.  Repeat blood cultures x2  abx changed to ancef  Echo ordered  ID to eval     Spinal stenosis at L4-L5 level  Assessment & Plan  MRI showing severe L4 stenosis  Pain control  Consider neurosurgery consult    COVID-19  Assessment & Plan  Patient is currently not requiring oxygen hold steroids  Continue with enhanced precautions  Placed on oxygen with SPO2 below 90%      Hypertension- (present on admission)  Assessment & Plan  Blood pressures currently very well.  We will hold BP meds at this time.       VTE prophylaxis: enoxaparin ppx    I  have performed a physical exam and reviewed and updated ROS and Plan today (1/22/2022). In review of yesterday's note (1/21/2022), there are no changes except as documented above.

## 2022-01-22 NOTE — ED NOTES
Pt assisted to the wheelchair w/max assist to the restroom. Attempted to have a bowel movement but is unable to. States his last bowel movement was 3-4 days ago. Admitting MD made aware

## 2022-01-22 NOTE — H&P
Hospital Medicine History & Physical Note    Date of Service  1/22/2022    Primary Care Physician  Pcp Pt States None    Consultants  none     Specialist Names: none     Code Status  Full Code    Chief Complaint  Chief Complaint   Patient presents with   • Back Pain     x2 days, right lower back. Patient injured his back while working on a car. Patient reports he had an MRI 2 days ago that didn't show anything.    • Shoulder Pain     x 2 days, right   • Knee Pain     x 2 days , left   • Failure to Thrive     Patient lives with his son who takes care of him. Patient's son reports that the patient is unable to get out of bed and he is having trouble taking care of him.    • Flu Like Symptoms     covid+ 1/20        History of Presenting Illness  Yunier Denney is a 77 y.o. male past medical history of epidural abscess follows with Dr. Herron who presented 1/22/2022 with back pain, knee pain, viral-like symptoms including nausea, chills, malaise and fatigue.  Patient reports he was diagnosed with COVID-19 on 1/20.  He reports his back pain is 5 out of 10, sharp, relieved with laying still exacerbated with movement.  Son is present at bedside and stated that he cannot take care of him anymore.  Patient has elevated ESR at 47 and CRP of 198.9.  Blood cultures x2 returned positive for staph aureus.  Patient will be hospitalized for IV Unasyn.  We will repeat blood cultures.    I discussed the plan of care with patient.    Review of Systems  Review of Systems   Constitutional: Positive for chills and malaise/fatigue. Negative for fever.   HENT: Negative for hearing loss and tinnitus.    Eyes: Negative for blurred vision and double vision.   Respiratory: Negative for cough and hemoptysis.    Cardiovascular: Negative for chest pain and palpitations.   Gastrointestinal: Positive for nausea and vomiting. Negative for heartburn.   Genitourinary: Negative for dysuria and urgency.   Musculoskeletal: Positive for back pain. Negative  for myalgias and neck pain.   Skin: Negative for itching and rash.   Neurological: Negative for dizziness and headaches.   Endo/Heme/Allergies: Does not bruise/bleed easily.   Psychiatric/Behavioral: Negative for depression and suicidal ideas.       Past Medical History   has a past medical history of gastroesophageal reflux (GERD), Hypertension, and Inguinal hernia.    Surgical History   has a past surgical history that includes lumbar laminectomy diskectomy (4/29/2018) and irrigation & debridement neuro (4/29/2018).     Family History    Family history reviewed with patient. There is no family history that is pertinent to the chief complaint.     Social History   reports that he has never smoked. He has never used smokeless tobacco. He reports current alcohol use. He reports that he does not use drugs.    Allergies  No Known Allergies    Medications  Prior to Admission Medications   Prescriptions Last Dose Informant Patient Reported? Taking?   HYDROcodone-acetaminophen (NORCO) 5-325 MG Tab per tablet   No No   Sig: Take 1 Tablet by mouth every 6 hours as needed for up to 4 days.   aspirin (ASA) 325 MG Tab  Patient Yes No   Sig: Take 325 mg by mouth 2 times a day as needed (Pain).   losartan (COZAAR) 100 MG Tab  Patient Yes No   Sig: Take 100 mg by mouth every day.   methylPREDNISolone (MEDROL DOSEPAK) 4 MG Tablet Therapy Pack   No No   Sig: Please dispense a Medrol Dosepak with instructions, use as directed      Facility-Administered Medications: None       Physical Exam  Temp:  [37.3 °C (99.1 °F)-37.4 °C (99.4 °F)] 37.3 °C (99.1 °F)  Pulse:  [68-90] 71  Resp:  [16] 16  BP: ()/(61-77) 117/61  SpO2:  [92 %-96 %] 96 %  Blood Pressure : 117/61   Temperature: 37.3 °C (99.1 °F)   Pulse: 71   Respiration: 16   Pulse Oximetry: 96 %       Physical Exam  Vitals and nursing note reviewed.   Constitutional:       General: He is not in acute distress.     Appearance: Normal appearance. He is not ill-appearing.   HENT:       Head: Normocephalic and atraumatic.      Right Ear: Tympanic membrane normal.      Left Ear: Tympanic membrane normal.      Nose: Nose normal.      Mouth/Throat:      Mouth: Mucous membranes are moist.      Pharynx: Oropharynx is clear.   Eyes:      Extraocular Movements: Extraocular movements intact.      Pupils: Pupils are equal, round, and reactive to light.   Cardiovascular:      Rate and Rhythm: Normal rate and regular rhythm.      Pulses: Normal pulses.      Heart sounds: Normal heart sounds. No murmur heard.  No friction rub.   Pulmonary:      Effort: Pulmonary effort is normal. No respiratory distress.      Breath sounds: Normal breath sounds. No stridor. No wheezing or rhonchi.   Abdominal:      General: Bowel sounds are normal. There is no distension.      Palpations: Abdomen is soft. There is no mass.      Tenderness: There is no abdominal tenderness.      Hernia: No hernia is present.   Musculoskeletal:         General: No swelling, tenderness, deformity or signs of injury.      Cervical back: Neck supple.   Skin:     General: Skin is warm.      Capillary Refill: Capillary refill takes less than 2 seconds.      Coloration: Skin is not jaundiced or pale.      Findings: No bruising or erythema.   Neurological:      Mental Status: He is alert and oriented to person, place, and time.      Cranial Nerves: No cranial nerve deficit.      Sensory: No sensory deficit.      Motor: Weakness present.      Coordination: Coordination normal.   Psychiatric:         Mood and Affect: Mood normal.         Behavior: Behavior normal.         Laboratory:  Recent Labs     01/20/22  1205 01/22/22  0209   WBC 8.6 10.5   RBC 4.42* 4.18*   HEMOGLOBIN 12.2* 11.5*   HEMATOCRIT 37.9* 35.5*   MCV 85.7 84.9   MCH 27.6 27.5   MCHC 32.2* 32.4*   RDW 52.0* 51.8*   PLATELETCT 191 171   MPV 8.7* 9.4     Recent Labs     01/20/22  1205 01/22/22  0209   SODIUM 134* 133*   POTASSIUM 3.8 4.1   CHLORIDE 99 98   CO2 21 21   GLUCOSE 131* 159*    BUN 26* 25*   CREATININE 1.03 0.90   CALCIUM 9.0 8.9     Recent Labs     01/20/22  1205 01/22/22  0209   ALTSGPT  --  22   ASTSGOT  --  20   ALKPHOSPHAT  --  79   TBILIRUBIN  --  0.6   GLUCOSE 131* 159*     Imaging:  No orders to display       no X-Ray or EKG requiring interpretation    Assessment/Plan:  I anticipate this patient will require at least two midnights for appropriate medical management, necessitating inpatient admission.    * Positive blood cultures- (present on admission)  Assessment & Plan  Patient has blood cultures x 2 positive for staph aureus that reflected at [facility] when he visited ER from 1/20/2022.  Repeat blood cultures x2  Started on IV Unasyn    Spinal stenosis at L4-L5 level  Assessment & Plan  MRI showing severe L4 stenosis  Pain control  Consider neurosurgery consult in a.m.    COVID-19  Assessment & Plan  Patient is currently not requiring oxygen hold steroids  Continue with enhanced precautions  Placed on oxygen with SPO2 below 90%      Hypertension- (present on admission)  Assessment & Plan  Blood pressures currently very well.  We will hold BP meds at this time.      VTE prophylaxis: enoxaparin ppx

## 2022-01-22 NOTE — ASSESSMENT & PLAN NOTE
Patient is currently not requiring oxygen hold steroids  Continue with enhanced precautions  Placed on oxygen with SPO2 below 90%

## 2022-01-23 ENCOUNTER — APPOINTMENT (OUTPATIENT)
Dept: RADIOLOGY | Facility: MEDICAL CENTER | Age: 77
DRG: 548 | End: 2022-01-23
Attending: STUDENT IN AN ORGANIZED HEALTH CARE EDUCATION/TRAINING PROGRAM
Payer: MEDICARE

## 2022-01-23 ENCOUNTER — APPOINTMENT (OUTPATIENT)
Dept: CARDIOLOGY | Facility: MEDICAL CENTER | Age: 77
DRG: 548 | End: 2022-01-23
Attending: HOSPITALIST
Payer: MEDICARE

## 2022-01-23 PROBLEM — B95.61 MSSA BACTEREMIA: Status: ACTIVE | Noted: 2022-01-22

## 2022-01-23 PROBLEM — E87.1 HYPONATREMIA: Status: ACTIVE | Noted: 2022-01-23

## 2022-01-23 LAB
ALBUMIN SERPL BCP-MCNC: 2.9 G/DL (ref 3.2–4.9)
ALBUMIN/GLOB SERPL: 0.9 G/DL
ALP SERPL-CCNC: 67 U/L (ref 30–99)
ALT SERPL-CCNC: 23 U/L (ref 2–50)
ANION GAP SERPL CALC-SCNC: 10 MMOL/L (ref 7–16)
AST SERPL-CCNC: 38 U/L (ref 12–45)
BACTERIA BLD CULT: ABNORMAL
BASOPHILS # BLD AUTO: 0.1 % (ref 0–1.8)
BASOPHILS # BLD: 0.01 K/UL (ref 0–0.12)
BILIRUB SERPL-MCNC: 0.4 MG/DL (ref 0.1–1.5)
BUN SERPL-MCNC: 24 MG/DL (ref 8–22)
CALCIUM SERPL-MCNC: 8.5 MG/DL (ref 8.5–10.5)
CHLORIDE SERPL-SCNC: 98 MMOL/L (ref 96–112)
CO2 SERPL-SCNC: 23 MMOL/L (ref 20–33)
CREAT SERPL-MCNC: 0.66 MG/DL (ref 0.5–1.4)
EOSINOPHIL # BLD AUTO: 0 K/UL (ref 0–0.51)
EOSINOPHIL NFR BLD: 0 % (ref 0–6.9)
ERYTHROCYTE [DISTWIDTH] IN BLOOD BY AUTOMATED COUNT: 50.5 FL (ref 35.9–50)
GLOBULIN SER CALC-MCNC: 3.1 G/DL (ref 1.9–3.5)
GLUCOSE SERPL-MCNC: 121 MG/DL (ref 65–99)
HCT VFR BLD AUTO: 31.1 % (ref 42–52)
HGB BLD-MCNC: 10.4 G/DL (ref 14–18)
IMM GRANULOCYTES # BLD AUTO: 0.06 K/UL (ref 0–0.11)
IMM GRANULOCYTES NFR BLD AUTO: 0.6 % (ref 0–0.9)
LYMPHOCYTES # BLD AUTO: 0.84 K/UL (ref 1–4.8)
LYMPHOCYTES NFR BLD: 7.9 % (ref 22–41)
MCH RBC QN AUTO: 28 PG (ref 27–33)
MCHC RBC AUTO-ENTMCNC: 33.4 G/DL (ref 33.7–35.3)
MCV RBC AUTO: 83.6 FL (ref 81.4–97.8)
MONOCYTES # BLD AUTO: 1.05 K/UL (ref 0–0.85)
MONOCYTES NFR BLD AUTO: 9.9 % (ref 0–13.4)
NEUTROPHILS # BLD AUTO: 8.61 K/UL (ref 1.82–7.42)
NEUTROPHILS NFR BLD: 81.5 % (ref 44–72)
NRBC # BLD AUTO: 0 K/UL
NRBC BLD-RTO: 0 /100 WBC
PLATELET # BLD AUTO: 168 K/UL (ref 164–446)
PMV BLD AUTO: 9.1 FL (ref 9–12.9)
POTASSIUM SERPL-SCNC: 4.2 MMOL/L (ref 3.6–5.5)
PROT SERPL-MCNC: 6 G/DL (ref 6–8.2)
RBC # BLD AUTO: 3.72 M/UL (ref 4.7–6.1)
SIGNIFICANT IND 70042: ABNORMAL
SIGNIFICANT IND 70042: ABNORMAL
SITE SITE: ABNORMAL
SITE SITE: ABNORMAL
SODIUM SERPL-SCNC: 131 MMOL/L (ref 135–145)
SOURCE SOURCE: ABNORMAL
SOURCE SOURCE: ABNORMAL
WBC # BLD AUTO: 10.6 K/UL (ref 4.8–10.8)

## 2022-01-23 PROCEDURE — 700102 HCHG RX REV CODE 250 W/ 637 OVERRIDE(OP): Performed by: HOSPITALIST

## 2022-01-23 PROCEDURE — 73701 CT LOWER EXTREMITY W/DYE: CPT | Mod: LT,MG

## 2022-01-23 PROCEDURE — 700111 HCHG RX REV CODE 636 W/ 250 OVERRIDE (IP): Performed by: STUDENT IN AN ORGANIZED HEALTH CARE EDUCATION/TRAINING PROGRAM

## 2022-01-23 PROCEDURE — 700117 HCHG RX CONTRAST REV CODE 255: Performed by: STUDENT IN AN ORGANIZED HEALTH CARE EDUCATION/TRAINING PROGRAM

## 2022-01-23 PROCEDURE — 93308 TTE F-UP OR LMTD: CPT

## 2022-01-23 PROCEDURE — 770001 HCHG ROOM/CARE - MED/SURG/GYN PRIV*

## 2022-01-23 PROCEDURE — 99233 SBSQ HOSP IP/OBS HIGH 50: CPT | Performed by: STUDENT IN AN ORGANIZED HEALTH CARE EDUCATION/TRAINING PROGRAM

## 2022-01-23 PROCEDURE — 85025 COMPLETE CBC W/AUTO DIFF WBC: CPT

## 2022-01-23 PROCEDURE — 36415 COLL VENOUS BLD VENIPUNCTURE: CPT

## 2022-01-23 PROCEDURE — 87040 BLOOD CULTURE FOR BACTERIA: CPT

## 2022-01-23 PROCEDURE — A9270 NON-COVERED ITEM OR SERVICE: HCPCS | Performed by: HOSPITALIST

## 2022-01-23 PROCEDURE — 700111 HCHG RX REV CODE 636 W/ 250 OVERRIDE (IP): Performed by: NURSE PRACTITIONER

## 2022-01-23 PROCEDURE — 80053 COMPREHEN METABOLIC PANEL: CPT

## 2022-01-23 PROCEDURE — 71045 X-RAY EXAM CHEST 1 VIEW: CPT

## 2022-01-23 PROCEDURE — 87077 CULTURE AEROBIC IDENTIFY: CPT

## 2022-01-23 PROCEDURE — 99223 1ST HOSP IP/OBS HIGH 75: CPT | Performed by: INTERNAL MEDICINE

## 2022-01-23 PROCEDURE — 73201 CT UPPER EXTREMITY W/DYE: CPT | Mod: RT,MG

## 2022-01-23 PROCEDURE — 700111 HCHG RX REV CODE 636 W/ 250 OVERRIDE (IP): Performed by: HOSPITALIST

## 2022-01-23 PROCEDURE — 87186 SC STD MICRODIL/AGAR DIL: CPT

## 2022-01-23 RX ORDER — HYDROMORPHONE HYDROCHLORIDE 1 MG/ML
0.5 INJECTION, SOLUTION INTRAMUSCULAR; INTRAVENOUS; SUBCUTANEOUS ONCE
Status: COMPLETED | OUTPATIENT
Start: 2022-01-23 | End: 2022-01-23

## 2022-01-23 RX ORDER — KETOROLAC TROMETHAMINE 30 MG/ML
15 INJECTION, SOLUTION INTRAMUSCULAR; INTRAVENOUS EVERY 6 HOURS PRN
Status: DISPENSED | OUTPATIENT
Start: 2022-01-23 | End: 2022-01-28

## 2022-01-23 RX ORDER — KETOROLAC TROMETHAMINE 30 MG/ML
INJECTION, SOLUTION INTRAMUSCULAR; INTRAVENOUS
Status: ACTIVE
Start: 2022-01-23 | End: 2022-01-23

## 2022-01-23 RX ADMIN — OXYCODONE HYDROCHLORIDE 10 MG: 10 TABLET ORAL at 14:25

## 2022-01-23 RX ADMIN — BISACODYL 10 MG: 10 SUPPOSITORY RECTAL at 12:00

## 2022-01-23 RX ADMIN — WATER 2 G: 100 INJECTION, SOLUTION INTRAVENOUS at 14:25

## 2022-01-23 RX ADMIN — ACETAMINOPHEN 1000 MG: 500 TABLET ORAL at 21:04

## 2022-01-23 RX ADMIN — IOHEXOL 100 ML: 350 INJECTION, SOLUTION INTRAVENOUS at 21:45

## 2022-01-23 RX ADMIN — OXYCODONE HYDROCHLORIDE 10 MG: 10 TABLET ORAL at 03:03

## 2022-01-23 RX ADMIN — KETOROLAC TROMETHAMINE 15 MG: 30 INJECTION, SOLUTION INTRAMUSCULAR; INTRAVENOUS at 10:34

## 2022-01-23 RX ADMIN — WATER 2 G: 100 INJECTION, SOLUTION INTRAVENOUS at 22:58

## 2022-01-23 RX ADMIN — HYDROMORPHONE HYDROCHLORIDE 0.5 MG: 1 INJECTION, SOLUTION INTRAMUSCULAR; INTRAVENOUS; SUBCUTANEOUS at 06:53

## 2022-01-23 RX ADMIN — ACETAMINOPHEN 1000 MG: 500 TABLET ORAL at 10:35

## 2022-01-23 RX ADMIN — OXYCODONE HYDROCHLORIDE 10 MG: 10 TABLET ORAL at 23:03

## 2022-01-23 RX ADMIN — ENOXAPARIN SODIUM 40 MG: 40 INJECTION SUBCUTANEOUS at 05:06

## 2022-01-23 RX ADMIN — WATER 2 G: 100 INJECTION, SOLUTION INTRAVENOUS at 05:06

## 2022-01-23 RX ADMIN — OXYCODONE HYDROCHLORIDE 10 MG: 10 TABLET ORAL at 10:35

## 2022-01-23 ASSESSMENT — PATIENT HEALTH QUESTIONNAIRE - PHQ9
SUM OF ALL RESPONSES TO PHQ9 QUESTIONS 1 AND 2: 0
1. LITTLE INTEREST OR PLEASURE IN DOING THINGS: NOT AT ALL
2. FEELING DOWN, DEPRESSED, IRRITABLE, OR HOPELESS: NOT AT ALL
2. FEELING DOWN, DEPRESSED, IRRITABLE, OR HOPELESS: NOT AT ALL
1. LITTLE INTEREST OR PLEASURE IN DOING THINGS: NOT AT ALL
SUM OF ALL RESPONSES TO PHQ9 QUESTIONS 1 AND 2: 0
1. LITTLE INTEREST OR PLEASURE IN DOING THINGS: NOT AT ALL
2. FEELING DOWN, DEPRESSED, IRRITABLE, OR HOPELESS: NOT AT ALL
SUM OF ALL RESPONSES TO PHQ9 QUESTIONS 1 AND 2: 0

## 2022-01-23 ASSESSMENT — ENCOUNTER SYMPTOMS
VOMITING: 0
COUGH: 0
FEVER: 1
BACK PAIN: 1
EYES NEGATIVE: 1
CHILLS: 1
WEAKNESS: 1
SHORTNESS OF BREATH: 0
NAUSEA: 0

## 2022-01-23 ASSESSMENT — LIFESTYLE VARIABLES
TOTAL SCORE: 0
ALCOHOL_USE: NO
EVER HAD A DRINK FIRST THING IN THE MORNING TO STEADY YOUR NERVES TO GET RID OF A HANGOVER: NO
HOW MANY TIMES IN THE PAST YEAR HAVE YOU HAD 5 OR MORE DRINKS IN A DAY: 0
ON A TYPICAL DAY WHEN YOU DRINK ALCOHOL HOW MANY DRINKS DO YOU HAVE: 0
HAVE PEOPLE ANNOYED YOU BY CRITICIZING YOUR DRINKING: NO
TOTAL SCORE: 0
HAVE YOU EVER FELT YOU SHOULD CUT DOWN ON YOUR DRINKING: NO
TOTAL SCORE: 0
CONSUMPTION TOTAL: NEGATIVE
EVER FELT BAD OR GUILTY ABOUT YOUR DRINKING: NO
AVERAGE NUMBER OF DAYS PER WEEK YOU HAVE A DRINK CONTAINING ALCOHOL: 0

## 2022-01-23 ASSESSMENT — COGNITIVE AND FUNCTIONAL STATUS - GENERAL
DAILY ACTIVITIY SCORE: 20
DRESSING REGULAR UPPER BODY CLOTHING: A LITTLE
MOVING FROM LYING ON BACK TO SITTING ON SIDE OF FLAT BED: A LOT
CLIMB 3 TO 5 STEPS WITH RAILING: A LITTLE
SUGGESTED CMS G CODE MODIFIER DAILY ACTIVITY: CJ
STANDING UP FROM CHAIR USING ARMS: A LOT
MOVING TO AND FROM BED TO CHAIR: A LITTLE
HELP NEEDED FOR BATHING: A LITTLE
SUGGESTED CMS G CODE MODIFIER MOBILITY: CK
WALKING IN HOSPITAL ROOM: A LOT
MOBILITY SCORE: 15
TOILETING: A LITTLE
TURNING FROM BACK TO SIDE WHILE IN FLAT BAD: A LITTLE
DRESSING REGULAR LOWER BODY CLOTHING: A LITTLE

## 2022-01-23 ASSESSMENT — PAIN DESCRIPTION - PAIN TYPE
TYPE: ACUTE PAIN
TYPE: ACUTE PAIN

## 2022-01-23 NOTE — PROGRESS NOTES
Assessment complete.  A&O x 4. Patient calls appropriately.  Patient ambulates with x2 assist. Mobility limited by pain and muscle weakness.  Patient has 2/10 pain. Pain managed with prescribed medications.  Denies N&V. Tolerating reg diet.  + void, + flatus, - BM.  Patient denies SOB.  SCD's off.    Review plan with of care with patient. Call light and personal belongings within reach. Hourly rounding in place. All needs met at this time.
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
## 2022-01-23 NOTE — CARE PLAN
The patient is Watcher - Medium risk of patient condition declining or worsening    Shift Goals  Clinical Goals: Pain control, monitor for sepsis  Patient Goals: Pain control    Progress made toward(s) clinical / shift goals:     Problem: Pain - Standard  Goal: Alleviation of pain or a reduction in pain to the patient’s comfort goal  Outcome: Progressing     Problem: Knowledge Deficit - Standard  Goal: Patient and family/care givers will demonstrate understanding of plan of care, disease process/condition, diagnostic tests and medications  Outcome: Progressing

## 2022-01-23 NOTE — HOSPITAL COURSE
A 77-year-old man with h/o epidural abscess (follows with Dr. Herron) presented with right leg pain, knee pain, viral-like symptoms including nausea, chills, malaise and fatigue. Patient reports he was diagnosed with COVID-19 on 1/20.   Patient has elevated ESR at 47 and CRP of 198.9.  Blood cultures x2 returned positive for staph aureus. Patient hospitalized for IV Unasyn.

## 2022-01-23 NOTE — PROGRESS NOTES
Pt arrived on unit, stable condition, no skin issues. Some pain with transfer, ok when settled into bed.     Stable on 2L NC. Covid positive.

## 2022-01-23 NOTE — PROGRESS NOTES
Hospital Medicine Daily Progress Note    Date of Service  1/23/2022    Chief Complaint  Yunier Denney is a 77 y.o. male admitted 1/22/2022 with back, right leg pain    Hospital Course  A 77-year-old man with h/o epidural abscess (follows with Dr. Herron) presented with right leg pain, knee pain, viral-like symptoms including nausea, chills, malaise and fatigue. Patient reports he was diagnosed with COVID-19 on 1/20.   Patient has elevated ESR at 47 and CRP of 198.9.  Blood cultures x2 returned positive for staph aureus. Patient hospitalized for IV Unasyn.      Interval Problem Update  Patient reports lower back pain, right shoulder, left hip pain, chills. Had fever 38.1, hemodynamically stable. On room air.  Blood cultures from 1/20 and 1/22 positive for S.aureus. Repeat blood cultures positive for S.aureus.  ID consulted. Discussed with Dr. Vera  CXR showed borderline cardiomegaly.  CT right shoulder, left hip ordered    I have personally seen and examined the patient at bedside. I discussed the plan of care with patient and bedside RN.    Consultants/Specialty  ID    Code Status  Full Code    Disposition  Patient is not medically cleared for discharge.   Anticipate discharge to to home with close outpatient follow-up.  I have placed the appropriate orders for post-discharge needs.    Review of Systems  Review of Systems   Constitutional: Positive for chills, fever and malaise/fatigue.   HENT: Negative.    Eyes: Negative.    Respiratory: Negative for cough and shortness of breath.    Cardiovascular: Negative for chest pain and leg swelling.   Gastrointestinal: Negative for nausea and vomiting.   Genitourinary: Negative for dysuria.   Musculoskeletal: Positive for back pain and joint pain (right shoulder and left hip).   Skin: Negative for rash.   Neurological: Positive for weakness.        Physical Exam  Temp:  [36.4 °C (97.5 °F)-38.1 °C (100.6 °F)] 37.8 °C (100.1 °F)  Pulse:  [60-82] 66  Resp:  [16-20]  20  BP: (114-160)/(64-84) 120/71  SpO2:  [91 %-98 %] 91 %    Physical Exam  Vitals and nursing note reviewed.   Constitutional:       Appearance: He is ill-appearing.   HENT:      Head: Normocephalic and atraumatic.      Nose: Nose normal.      Mouth/Throat:      Mouth: Mucous membranes are moist.      Pharynx: Oropharynx is clear.   Eyes:      Pupils: Pupils are equal, round, and reactive to light.   Cardiovascular:      Rate and Rhythm: Normal rate and regular rhythm.   Pulmonary:      Effort: Pulmonary effort is normal. No respiratory distress.      Breath sounds: No wheezing or rales.   Abdominal:      General: Abdomen is flat. Bowel sounds are normal. There is no distension.      Tenderness: There is no abdominal tenderness. There is no guarding.      Hernia: A hernia (left inguinal, reduciable, non-tender) is present.   Musculoskeletal:         General: Tenderness (right shoulder and left hip) present. Normal range of motion.      Cervical back: Normal range of motion.      Right lower leg: No edema.      Left lower leg: No edema.   Skin:     General: Skin is warm.   Neurological:      General: No focal deficit present.      Mental Status: He is alert and oriented to person, place, and time.         Fluids    Intake/Output Summary (Last 24 hours) at 1/23/2022 1013  Last data filed at 1/22/2022 2039  Gross per 24 hour   Intake 120 ml   Output 100 ml   Net 20 ml       Laboratory  Recent Labs     01/20/22  1205 01/22/22  0209   WBC 8.6 10.5   RBC 4.42* 4.18*   HEMOGLOBIN 12.2* 11.5*   HEMATOCRIT 37.9* 35.5*   MCV 85.7 84.9   MCH 27.6 27.5   MCHC 32.2* 32.4*   RDW 52.0* 51.8*   PLATELETCT 191 171   MPV 8.7* 9.4     Recent Labs     01/20/22  1205 01/22/22  0209   SODIUM 134* 133*   POTASSIUM 3.8 4.1   CHLORIDE 99 98   CO2 21 21   GLUCOSE 131* 159*   BUN 26* 25*   CREATININE 1.03 0.90   CALCIUM 9.0 8.9                   Imaging  EC-ECHOCARDIOGRAM COMPLETE W/O CONT    (Results Pending)   DX-CHEST-PORTABLE (1 VIEW)     (Results Pending)        Assessment/Plan  * MSSA bacteremia- (present on admission)  Assessment & Plan  Patient has blood cultures x 2 positive for staph aureus that reflected at [facility] when he visited ER from 1/20/2022.  Repeat blood cultures x2  abx changed to ancef  Echo pending  CXR  ID consulted    COVID-19  Assessment & Plan  Patient is currently not requiring oxygen hold steroids  Continue with enhanced precautions  Placed on oxygen with SPO2 below 90%    Hyponatremia  Assessment & Plan  Mild   Monitor     Spinal stenosis at L4-L5 level  Assessment & Plan  MRI showing severe L4 stenosis  Pain control  Consider neurosurgery consult    Hypertension- (present on admission)  Assessment & Plan  Blood pressures currently very well.  We will hold BP meds at this time.       VTE prophylaxis: enoxaparin ppx    I have performed a physical exam and reviewed and updated ROS and Plan today (1/23/2022). In review of yesterday's note (1/22/2022), there are no changes except as documented above.

## 2022-01-23 NOTE — PROGRESS NOTES
4 Eyes Skin Assessment Completed by Yuki Molina, RN and Smita Delgado, DENISSE.    Head WDL  Ears WDL  Nose WDL  Mouth WDL  Neck WDL  Breast/Chest WDL  Shoulder Blades WDL  Spine WDL  (R) Arm/Elbow/Hand Pink & Blanching  (L) Arm/Elbow/Hand WDL  Abdomen WDL  Groin Swelling to scrotum  Scrotum/Coccyx/Buttocks Pink & Blanching  (R) Leg WDL  (L) Leg WDL  (R) Heel/Foot/Toe WDL  (L) Heel/Foot/Toe WDL          Devices In Places Pulse Ox      Interventions In Place Pillows and Pressure Redistribution Mattress    Possible Skin Injury No    Pictures Uploaded Into Epic N/A  Wound Consult Placed N/A  RN Wound Prevention Protocol Ordered No

## 2022-01-24 ENCOUNTER — APPOINTMENT (OUTPATIENT)
Dept: RADIOLOGY | Facility: MEDICAL CENTER | Age: 77
DRG: 548 | End: 2022-01-24
Attending: PHYSICIAN ASSISTANT
Payer: MEDICARE

## 2022-01-24 ENCOUNTER — APPOINTMENT (OUTPATIENT)
Dept: RADIOLOGY | Facility: MEDICAL CENTER | Age: 77
DRG: 548 | End: 2022-01-24
Attending: STUDENT IN AN ORGANIZED HEALTH CARE EDUCATION/TRAINING PROGRAM
Payer: MEDICARE

## 2022-01-24 PROBLEM — M00.9 PYOGENIC ARTHRITIS OF RIGHT SHOULDER REGION (HCC): Status: ACTIVE | Noted: 2022-01-24

## 2022-01-24 LAB
ANION GAP SERPL CALC-SCNC: 10 MMOL/L (ref 7–16)
APTT PPP: 38.8 SEC (ref 24.7–36)
BACTERIA BLD CULT: ABNORMAL
BUN SERPL-MCNC: 25 MG/DL (ref 8–22)
CALCIUM SERPL-MCNC: 8.5 MG/DL (ref 8.5–10.5)
CHLORIDE SERPL-SCNC: 97 MMOL/L (ref 96–112)
CO2 SERPL-SCNC: 24 MMOL/L (ref 20–33)
CREAT SERPL-MCNC: 0.82 MG/DL (ref 0.5–1.4)
ERYTHROCYTE [DISTWIDTH] IN BLOOD BY AUTOMATED COUNT: 51.5 FL (ref 35.9–50)
GLUCOSE SERPL-MCNC: 124 MG/DL (ref 65–99)
HCT VFR BLD AUTO: 32.4 % (ref 42–52)
HGB BLD-MCNC: 10.7 G/DL (ref 14–18)
INR PPP: 1.11 (ref 0.87–1.13)
LV EJECT FRACT  99904: 55
LV EJECT FRACT MOD 2C 99903: 61.55
LV EJECT FRACT MOD 4C 99902: 53.92
LV EJECT FRACT MOD BP 99901: 57.34
MCH RBC QN AUTO: 27.9 PG (ref 27–33)
MCHC RBC AUTO-ENTMCNC: 33 G/DL (ref 33.7–35.3)
MCV RBC AUTO: 84.4 FL (ref 81.4–97.8)
PLATELET # BLD AUTO: 203 K/UL (ref 164–446)
PMV BLD AUTO: 9.4 FL (ref 9–12.9)
POTASSIUM SERPL-SCNC: 4 MMOL/L (ref 3.6–5.5)
PROTHROMBIN TIME: 14 SEC (ref 12–14.6)
RBC # BLD AUTO: 3.84 M/UL (ref 4.7–6.1)
SIGNIFICANT IND 70042: ABNORMAL
SIGNIFICANT IND 70042: ABNORMAL
SITE SITE: ABNORMAL
SITE SITE: ABNORMAL
SODIUM SERPL-SCNC: 131 MMOL/L (ref 135–145)
SOURCE SOURCE: ABNORMAL
SOURCE SOURCE: ABNORMAL
WBC # BLD AUTO: 10.1 K/UL (ref 4.8–10.8)

## 2022-01-24 PROCEDURE — 36415 COLL VENOUS BLD VENIPUNCTURE: CPT

## 2022-01-24 PROCEDURE — 700101 HCHG RX REV CODE 250: Performed by: HOSPITALIST

## 2022-01-24 PROCEDURE — 99233 SBSQ HOSP IP/OBS HIGH 50: CPT | Performed by: INTERNAL MEDICINE

## 2022-01-24 PROCEDURE — 85610 PROTHROMBIN TIME: CPT

## 2022-01-24 PROCEDURE — 99232 SBSQ HOSP IP/OBS MODERATE 35: CPT | Performed by: STUDENT IN AN ORGANIZED HEALTH CARE EDUCATION/TRAINING PROGRAM

## 2022-01-24 PROCEDURE — 80048 BASIC METABOLIC PNL TOTAL CA: CPT

## 2022-01-24 PROCEDURE — 700111 HCHG RX REV CODE 636 W/ 250 OVERRIDE (IP): Performed by: STUDENT IN AN ORGANIZED HEALTH CARE EDUCATION/TRAINING PROGRAM

## 2022-01-24 PROCEDURE — 87040 BLOOD CULTURE FOR BACTERIA: CPT

## 2022-01-24 PROCEDURE — 93325 DOPPLER ECHO COLOR FLOW MAPG: CPT | Mod: 26 | Performed by: INTERNAL MEDICINE

## 2022-01-24 PROCEDURE — 93321 DOPPLER ECHO F-UP/LMTD STD: CPT | Mod: 26 | Performed by: INTERNAL MEDICINE

## 2022-01-24 PROCEDURE — 85730 THROMBOPLASTIN TIME PARTIAL: CPT

## 2022-01-24 PROCEDURE — 770001 HCHG ROOM/CARE - MED/SURG/GYN PRIV*

## 2022-01-24 PROCEDURE — 700105 HCHG RX REV CODE 258: Performed by: STUDENT IN AN ORGANIZED HEALTH CARE EDUCATION/TRAINING PROGRAM

## 2022-01-24 PROCEDURE — 700111 HCHG RX REV CODE 636 W/ 250 OVERRIDE (IP): Performed by: HOSPITALIST

## 2022-01-24 PROCEDURE — A9270 NON-COVERED ITEM OR SERVICE: HCPCS | Performed by: HOSPITALIST

## 2022-01-24 PROCEDURE — 700102 HCHG RX REV CODE 250 W/ 637 OVERRIDE(OP): Performed by: HOSPITALIST

## 2022-01-24 PROCEDURE — 93308 TTE F-UP OR LMTD: CPT | Mod: 26 | Performed by: INTERNAL MEDICINE

## 2022-01-24 PROCEDURE — 85027 COMPLETE CBC AUTOMATED: CPT

## 2022-01-24 RX ORDER — SODIUM CHLORIDE 9 MG/ML
INJECTION, SOLUTION INTRAVENOUS CONTINUOUS
Status: DISCONTINUED | OUTPATIENT
Start: 2022-01-24 | End: 2022-02-01

## 2022-01-24 RX ADMIN — OXYCODONE HYDROCHLORIDE 10 MG: 10 TABLET ORAL at 10:27

## 2022-01-24 RX ADMIN — WATER 2 G: 100 INJECTION, SOLUTION INTRAVENOUS at 21:12

## 2022-01-24 RX ADMIN — WATER 2 G: 100 INJECTION, SOLUTION INTRAVENOUS at 16:24

## 2022-01-24 RX ADMIN — KETOROLAC TROMETHAMINE 15 MG: 30 INJECTION, SOLUTION INTRAMUSCULAR; INTRAVENOUS at 10:17

## 2022-01-24 RX ADMIN — OXYCODONE HYDROCHLORIDE 10 MG: 10 TABLET ORAL at 06:44

## 2022-01-24 RX ADMIN — SODIUM CHLORIDE: 9 INJECTION, SOLUTION INTRAVENOUS at 11:32

## 2022-01-24 RX ADMIN — KETOROLAC TROMETHAMINE 15 MG: 30 INJECTION, SOLUTION INTRAMUSCULAR; INTRAVENOUS at 21:52

## 2022-01-24 RX ADMIN — ACETAMINOPHEN 1000 MG: 500 TABLET ORAL at 16:20

## 2022-01-24 RX ADMIN — WATER 2 G: 100 INJECTION, SOLUTION INTRAVENOUS at 06:42

## 2022-01-24 RX ADMIN — ENOXAPARIN SODIUM 40 MG: 40 INJECTION SUBCUTANEOUS at 06:42

## 2022-01-24 RX ADMIN — OXYCODONE HYDROCHLORIDE 10 MG: 10 TABLET ORAL at 16:20

## 2022-01-24 ASSESSMENT — ENCOUNTER SYMPTOMS
VOMITING: 0
NAUSEA: 0

## 2022-01-24 NOTE — CARE PLAN
Problem: Pain - Standard  Goal: Alleviation of pain or a reduction in pain to the patient’s comfort goal  Outcome: Progressing  Note: Pain managed well with PRN pain medications     Problem: Knowledge Deficit - Standard  Goal: Patient and family/care givers will demonstrate understanding of plan of care, disease process/condition, diagnostic tests and medications  Outcome: Progressing  Note: Education provided regarding disease process and POC   The patient is Watcher - Medium risk of patient condition declining or worsening    Shift Goals  Clinical Goals: Pain managemnet  Patient Goals: Pain management    Progress made toward(s) clinical / shift goals:  Pain  well managed    Patient is not progressing towards the following goals:

## 2022-01-24 NOTE — CARE PLAN
Problem: Knowledge Deficit - Standard  Goal: Patient and family/care givers will demonstrate understanding of plan of care, disease process/condition, diagnostic tests and medications  Outcome: Progressing  Note: Education provided regarding disease process and POC     Problem: Fall Risk  Goal: Patient will remain free from falls  Note: Free from falls and injuries.    The patient is Watcher - Medium risk of patient condition declining or worsening    Shift Goals  Clinical Goals: Pain mangement  Patient Goals: Pain management    Progress made toward(s) clinical / shift goals:  Pain managed with PRN pain medication    Patient is not progressing towards the following goals:

## 2022-01-24 NOTE — DOCUMENTATION QUERY
"                                                                         Counts include 234 beds at the [facility]                                                                       Query Response Note      PATIENT:               NATALYA NAVARRO  ACCT #:                  [id number]  MRN:                     [id number]  :                      1945  ADMIT DATE:       2022 12:50 AM  DISCH DATE:          RESPONDING  PROVIDER #:        [id number]           QUERY TEXT:    MSSA Bacteremia and Sepsis have been documented in the Progress Notes. Can a clarification be provided?    NOTE:  If an appropriate response is not listed below, please respond with a new note.      The patient's Clinical Indicators include:  \"Sepsis, MSSA Bacteremia\" is documented in the ID Consult on . \"MSSA Bacteremia\" and \"Appearance: He is ill-appearing\" are documented in the Progress Notes on  . Blood Cxs on admission positive x2 for S. aureus. Procalcitonin: 1.08 on admission. 0/4 SIRS criteria present on admission.    Treatments include: Ancef and Unasyn.    Risk factors include: dx MSSA Bacteremia and Advanced Age.     Thank you,  Ezra Rushing RN, BSN  Clinical   Connect via [employer]  Options provided:   -- MSSA Bacteremia is rule in and sepsis is ruled out   -- MSSA Sepsis is ruled in, Please provide additional clinical indicators to support this diagnosis   -- Other Explanation for findings   -- Unable to determine      Query created by: Ezra Rushing on 2022 9:25 AM    RESPONSE TEXT:    MSSA Bacteremia is rule in and sepsis is ruled out          Electronically signed by:  JANETTE HENRY MD 2022 10:16 AM              "

## 2022-01-24 NOTE — CARE PLAN
The patient is Watcher - Medium risk of patient condition declining or worsening    Shift Goals  Clinical Goals: Pain managemnet  Patient Goals: Pain management    Progress made toward(s) clinical / shift goals:    Problem: Pain - Standard  Goal: Alleviation of pain or a reduction in pain to the patient’s comfort goal  Outcome: Progressing     Problem: Knowledge Deficit - Standard  Goal: Patient and family/care givers will demonstrate understanding of plan of care, disease process/condition, diagnostic tests and medications  Outcome: Progressing       Patient is not progressing towards the following goals:

## 2022-01-24 NOTE — PROGRESS NOTES
[facility] Medicine Daily Progress Note    Date of Service  1/24/2022    Chief Complaint  Yunier Denney is a 77 y.o. male admitted 1/22/2022 with back, leg pain.    Hospital Course  A 77-year-old man with h/o epidural abscess (follows with Dr. Herron) presented with right leg pain, knee pain, viral-like symptoms including nausea, chills, malaise and fatigue. Patient reports he was diagnosed with COVID-19 on 1/20.   Patient has elevated ESR at 47 and CRP of 198.9.  Blood cultures x2 returned positive for staph aureus. Patient hospitalized for IV Unasyn.      Interval Problem Update  1/23: Patient reports lower back pain, right shoulder, left hip pain, chills. Had fever 38.1, hemodynamically stable. On room air.  Blood cultures from 1/20 and 1/22 positive for S.aureus. Repeat blood cultures positive for S.aureus.  ID consulted. Discussed with Dr. Vera  CXR showed borderline cardiomegaly.  CT right shoulder, left hip ordered    1/24: Patient reports right shoulder and left hip pain 10/10. He had purulent discharge from his left thumb 1 week ago. Had fever 38.6, hemodynamically stable.  Echo showed normal left ventricular systolic function; mild aortic insufficiency.   CT showed loculated small to moderate right glenohumeral joint effusion; it may be infected and represent septic arthritis; several small intramuscular abscesses in the subscapularis and anterior head of the deltoid; partially visualized small right pleural effusion and associated atelectasis.  CT showed small left hip effusion, which may be sterile or infected.  Orthopedic surgery consulted.  IR consulted for drainage.  Blood cultures from 1/20 and 1/22 positive  Blood cultures form 1/23 negative to date.    I have personally seen and examined the patient at bedside. I discussed the plan of care with patient, bedside RN, charge RN, , pharmacy and orthopedics.    Consultants/Specialty  infectious disease, orthopedics and IR    Code  Status  Full Code    Disposition  Patient is not medically cleared for discharge.   Anticipate discharge to TBD.  I have placed the appropriate orders for post-discharge needs.    Review of Systems  Constitutional: Positive for chills, fever and malaise/fatigue.   HENT: Negative.    Eyes: Negative.    Respiratory: Negative for cough and shortness of breath.    Cardiovascular: Negative for chest pain and leg swelling.   Gastrointestinal: Negative for nausea and vomiting.   Genitourinary: Negative for dysuria.   Musculoskeletal: Positive for right shoulder, left hip pain.   Skin: Negative for rash.   Neurological: Positive for weakness.     Physical Exam  Temp:  [37.2 °C (99 °F)-38.6 °C (101.5 °F)] 37.3 °C (99.2 °F)  Pulse:  [60-83] 83  Resp:  [16-19] 16  BP: (103-142)/(66-87) 138/74  SpO2:  [91 %-96 %] 91 %    Physical Exam  Vitals and nursing note reviewed.   Constitutional:       Appearance: He is ill-appearing.   HENT:      Head: Normocephalic and atraumatic.      Nose: Nose normal.      Mouth/Throat:      Mouth: Mucous membranes are moist.      Pharynx: Oropharynx is clear.   Eyes:      Pupils: Pupils are equal, round, and reactive to light.   Cardiovascular:      Rate and Rhythm: Normal rate and regular rhythm.   Pulmonary:      Effort: Pulmonary effort is normal. No respiratory distress.      Breath sounds: No wheezing or rales.   Abdominal:      General: Abdomen is flat. Bowel sounds are normal. There is no distension.      Tenderness: There is no abdominal tenderness. There is no guarding.      Hernia: A hernia (left inguinal, reduciable, non-tender) is present.   Musculoskeletal:         General: right shoulder swollen, tender; left hip tender     Cervical back: Normal range of motion.      Right lower leg: No edema.      Left lower leg: No edema.   Skin:     General: Skin is warm.   Neurological:      General: No focal deficit present.      Mental Status: He is alert and oriented to person, place, and time.      Fluids    Intake/Output Summary (Last 24 hours) at 1/24/2022 1223  Last data filed at 1/24/2022 0642  Gross per 24 hour   Intake --   Output 850 ml   Net -850 ml       Laboratory  Recent Labs     01/22/22  0209 01/23/22  1103 01/24/22  0822   WBC 10.5 10.6 10.1   RBC 4.18* 3.72* 3.84*   HEMOGLOBIN 11.5* 10.4* 10.7*   HEMATOCRIT 35.5* 31.1* 32.4*   MCV 84.9 83.6 84.4   MCH 27.5 28.0 27.9   MCHC 32.4* 33.4* 33.0*   RDW 51.8* 50.5* 51.5*   PLATELETCT 171 168 203   MPV 9.4 9.1 9.4     Recent Labs     01/22/22  0209 01/23/22  1103 01/24/22  0822   SODIUM 133* 131* 131*   POTASSIUM 4.1 4.2 4.0   CHLORIDE 98 98 97   CO2 21 23 24   GLUCOSE 159* 121* 124*   BUN 25* 24* 25*   CREATININE 0.90 0.66 0.82   CALCIUM 8.9 8.5 8.5                   Imaging  CT-HIP WITH PLUS RECONS LEFT   Final Result         1. Small left hip effusion, which may be sterile or infected.   2. No intramuscular subcutaneous fluid collections. No evidence of osteomyelitis.   3. Large, 13 cm left inguinal hernia containing nonobstructed sigmoid colon.      CT-SHOULDER WITH PLUS RECONS RIGHT   Final Result         1. Loculated small to moderate glenohumeral joint effusion. It may be infected and represent septic arthritis.   2. Several small intramuscular abscesses in the subscapularis and anterior head of the deltoid.   3. No evidence of osteomyelitis.   4. No fracture or dislocation.   5 Partially visualized small right pleural effusion and associated atelectasis.      EC-ECHOCARDIOGRAM LTD W/O CONT   Final Result      DX-CHEST-PORTABLE (1 VIEW)   Final Result      Borderline cardiomegaly.      DX-INJECT OR ASPIRATE W US GUIDANCE-LARGE JOINT RIGHT    (Results Pending)   DX-INJECT OR ASPIRATE W/O US GUIDANCE-LARGE JOINT LEFT    (Results Pending)        Assessment/Plan  * Pyogenic arthritis of right shoulder region (HCC)  Assessment & Plan  CT showed loculated small to moderate right glenohumeral joint effusion; it may be infected and represent septic  arthritis; several small intramuscular abscesses in the subscapularis and anterior head of the deltoid; partially visualized small right pleural effusion and associated atelectasis.  CT showed small left hip effusion, which may be sterile or infected.  Orthopedic surgery consulted.  IR consulted for drainage.    COVID-19  Assessment & Plan  Patient is currently not requiring oxygen hold steroids  Continue with enhanced precautions  Placed on oxygen with SPO2 below 90%    MSSA bacteremia- (present on admission)  Assessment & Plan  Patient has blood cultures x 2 positive for staph aureus that reflected at [facility] when he visited ER from 1/20/2022.  Echo showed normal left ventricular systolic function; mild aortic insufficiency.   CT showed loculated small to moderate right glenohumeral joint effusion; it may be infected and represent septic arthritis; several small intramuscular abscesses in the subscapularis and anterior head of the deltoid; partially visualized small right pleural effusion and associated atelectasis.  CT showed small left hip effusion, which may be sterile or infected.  Orthopedic surgery consulted.  IR consulted for drainage.  Blood cultures from 1/20 and 1/22 positive  Blood cultures form 1/23 negative to date.    Hyponatremia  Assessment & Plan  Mild   Monitor     Spinal stenosis at L4-L5 level  Assessment & Plan  MRI showing severe L4 stenosis  Pain control  Consider neurosurgery consult    Hypertension- (present on admission)  Assessment & Plan  Blood pressures currently very well.  We will hold BP meds at this time.       VTE prophylaxis: enoxaparin ppx    I have performed a physical exam and reviewed and updated ROS and Plan today (1/24/2022). In review of yesterday's note (1/23/2022), there are no changes except as documented above.

## 2022-01-24 NOTE — PROGRESS NOTES
Consultation requested by Dr. Justin  Suspicious findings CTs LEFT hip and RIGHT shoulder in setting of Covid infection and SA Bacteremia. Chart reviewed with surgeon, recommend IR-guided drainage for evaluation of contents, continue serial labs incl CBC/CMP/CRP/ESR.  Formal consultation pending, will follow.    Addendum 1800 pt refusing IR per RN, will try again tomorrow

## 2022-01-24 NOTE — ASSESSMENT & PLAN NOTE
CT showed loculated small to moderate right glenohumeral joint effusion; it may be infected and represent septic arthritis; several small intramuscular abscesses in the subscapularis and anterior head of the deltoid; partially visualized small right pleural effusion and associated atelectasis.  CT showed small left hip effusion, which may be sterile or infected; repeat CT shows smaller effusion size  S/p drainage of shoulder abscess, wound vac in place
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
## 2022-01-25 ENCOUNTER — ANESTHESIA (OUTPATIENT)
Dept: SURGERY | Facility: MEDICAL CENTER | Age: 77
DRG: 548 | End: 2022-01-25
Payer: MEDICARE

## 2022-01-25 ENCOUNTER — ANESTHESIA EVENT (OUTPATIENT)
Dept: SURGERY | Facility: MEDICAL CENTER | Age: 77
DRG: 548 | End: 2022-01-25
Payer: MEDICARE

## 2022-01-25 ENCOUNTER — APPOINTMENT (OUTPATIENT)
Dept: RADIOLOGY | Facility: MEDICAL CENTER | Age: 77
DRG: 548 | End: 2022-01-25
Attending: STUDENT IN AN ORGANIZED HEALTH CARE EDUCATION/TRAINING PROGRAM
Payer: MEDICARE

## 2022-01-25 LAB
ALBUMIN SERPL BCP-MCNC: 2.6 G/DL (ref 3.2–4.9)
ALBUMIN/GLOB SERPL: 0.8 G/DL
ALP SERPL-CCNC: 70 U/L (ref 30–99)
ALT SERPL-CCNC: 19 U/L (ref 2–50)
ANION GAP SERPL CALC-SCNC: 11 MMOL/L (ref 7–16)
AST SERPL-CCNC: 36 U/L (ref 12–45)
BASOPHILS # BLD AUTO: 0.1 % (ref 0–1.8)
BASOPHILS # BLD: 0.01 K/UL (ref 0–0.12)
BILIRUB SERPL-MCNC: 0.3 MG/DL (ref 0.1–1.5)
BUN SERPL-MCNC: 31 MG/DL (ref 8–22)
CALCIUM SERPL-MCNC: 8.2 MG/DL (ref 8.5–10.5)
CHLORIDE SERPL-SCNC: 100 MMOL/L (ref 96–112)
CO2 SERPL-SCNC: 22 MMOL/L (ref 20–33)
CREAT SERPL-MCNC: 0.86 MG/DL (ref 0.5–1.4)
CRP SERPL HS-MCNC: 22.06 MG/DL (ref 0–0.75)
EOSINOPHIL # BLD AUTO: 0.01 K/UL (ref 0–0.51)
EOSINOPHIL NFR BLD: 0.1 % (ref 0–6.9)
ERYTHROCYTE [DISTWIDTH] IN BLOOD BY AUTOMATED COUNT: 50.8 FL (ref 35.9–50)
ERYTHROCYTE [SEDIMENTATION RATE] IN BLOOD BY WESTERGREN METHOD: 93 MM/HOUR (ref 0–20)
GLOBULIN SER CALC-MCNC: 3.1 G/DL (ref 1.9–3.5)
GLUCOSE SERPL-MCNC: 102 MG/DL (ref 65–99)
HCT VFR BLD AUTO: 29.8 % (ref 42–52)
HGB BLD-MCNC: 10.2 G/DL (ref 14–18)
IMM GRANULOCYTES # BLD AUTO: 0.04 K/UL (ref 0–0.11)
IMM GRANULOCYTES NFR BLD AUTO: 0.5 % (ref 0–0.9)
LYMPHOCYTES # BLD AUTO: 0.8 K/UL (ref 1–4.8)
LYMPHOCYTES NFR BLD: 9.9 % (ref 22–41)
MCH RBC QN AUTO: 28.5 PG (ref 27–33)
MCHC RBC AUTO-ENTMCNC: 34.2 G/DL (ref 33.7–35.3)
MCV RBC AUTO: 83.2 FL (ref 81.4–97.8)
MONOCYTES # BLD AUTO: 0.62 K/UL (ref 0–0.85)
MONOCYTES NFR BLD AUTO: 7.7 % (ref 0–13.4)
NEUTROPHILS # BLD AUTO: 6.61 K/UL (ref 1.82–7.42)
NEUTROPHILS NFR BLD: 81.7 % (ref 44–72)
NRBC # BLD AUTO: 0 K/UL
NRBC BLD-RTO: 0 /100 WBC
PLATELET # BLD AUTO: 226 K/UL (ref 164–446)
PMV BLD AUTO: 9.2 FL (ref 9–12.9)
POTASSIUM SERPL-SCNC: 3.8 MMOL/L (ref 3.6–5.5)
PROT SERPL-MCNC: 5.7 G/DL (ref 6–8.2)
RBC # BLD AUTO: 3.58 M/UL (ref 4.7–6.1)
SODIUM SERPL-SCNC: 133 MMOL/L (ref 135–145)
WBC # BLD AUTO: 8.1 K/UL (ref 4.8–10.8)

## 2022-01-25 PROCEDURE — 99221 1ST HOSP IP/OBS SF/LOW 40: CPT | Mod: 25 | Performed by: ORTHOPAEDIC SURGERY

## 2022-01-25 PROCEDURE — 700111 HCHG RX REV CODE 636 W/ 250 OVERRIDE (IP): Performed by: STUDENT IN AN ORGANIZED HEALTH CARE EDUCATION/TRAINING PROGRAM

## 2022-01-25 PROCEDURE — 700102 HCHG RX REV CODE 250 W/ 637 OVERRIDE(OP): Performed by: HOSPITALIST

## 2022-01-25 PROCEDURE — 160009 HCHG ANES TIME/MIN: Performed by: ORTHOPAEDIC SURGERY

## 2022-01-25 PROCEDURE — 36415 COLL VENOUS BLD VENIPUNCTURE: CPT

## 2022-01-25 PROCEDURE — 87075 CULTR BACTERIA EXCEPT BLOOD: CPT | Mod: 91

## 2022-01-25 PROCEDURE — 700111 HCHG RX REV CODE 636 W/ 250 OVERRIDE (IP): Performed by: HOSPITALIST

## 2022-01-25 PROCEDURE — 87205 SMEAR GRAM STAIN: CPT | Mod: 91

## 2022-01-25 PROCEDURE — 87070 CULTURE OTHR SPECIMN AEROBIC: CPT | Mod: 91

## 2022-01-25 PROCEDURE — 500881 HCHG PACK, EXTREMITY: Performed by: ORTHOPAEDIC SURGERY

## 2022-01-25 PROCEDURE — 8968 PR NO CHARGE - PROCEDURE: Performed by: SPECIALIST/TECHNOLOGIST, OTHER

## 2022-01-25 PROCEDURE — 700101 HCHG RX REV CODE 250: Performed by: STUDENT IN AN ORGANIZED HEALTH CARE EDUCATION/TRAINING PROGRAM

## 2022-01-25 PROCEDURE — 80053 COMPREHEN METABOLIC PANEL: CPT

## 2022-01-25 PROCEDURE — 87015 SPECIMEN INFECT AGNT CONCNTJ: CPT

## 2022-01-25 PROCEDURE — 770001 HCHG ROOM/CARE - MED/SURG/GYN PRIV*

## 2022-01-25 PROCEDURE — 85652 RBC SED RATE AUTOMATED: CPT

## 2022-01-25 PROCEDURE — 160038 HCHG SURGERY MINUTES - EA ADDL 1 MIN LEVEL 2: Performed by: ORTHOPAEDIC SURGERY

## 2022-01-25 PROCEDURE — 99233 SBSQ HOSP IP/OBS HIGH 50: CPT | Performed by: INTERNAL MEDICINE

## 2022-01-25 PROCEDURE — 99232 SBSQ HOSP IP/OBS MODERATE 35: CPT | Performed by: HOSPITALIST

## 2022-01-25 PROCEDURE — 160002 HCHG RECOVERY MINUTES (STAT): Performed by: ORTHOPAEDIC SURGERY

## 2022-01-25 PROCEDURE — 700101 HCHG RX REV CODE 250: Performed by: ORTHOPAEDIC SURGERY

## 2022-01-25 PROCEDURE — 85025 COMPLETE CBC W/AUTO DIFF WBC: CPT

## 2022-01-25 PROCEDURE — 160048 HCHG OR STATISTICAL LEVEL 1-5: Performed by: ORTHOPAEDIC SURGERY

## 2022-01-25 PROCEDURE — 160035 HCHG PACU - 1ST 60 MINS PHASE I: Performed by: ORTHOPAEDIC SURGERY

## 2022-01-25 PROCEDURE — 501838 HCHG SUTURE GENERAL: Performed by: ORTHOPAEDIC SURGERY

## 2022-01-25 PROCEDURE — 700105 HCHG RX REV CODE 258: Performed by: STUDENT IN AN ORGANIZED HEALTH CARE EDUCATION/TRAINING PROGRAM

## 2022-01-25 PROCEDURE — 11044 DBRDMT BONE 1ST 20 SQ CM/<: CPT | Mod: RT | Performed by: ORTHOPAEDIC SURGERY

## 2022-01-25 PROCEDURE — 160027 HCHG SURGERY MINUTES - 1ST 30 MINS LEVEL 2: Performed by: ORTHOPAEDIC SURGERY

## 2022-01-25 PROCEDURE — A9270 NON-COVERED ITEM OR SERVICE: HCPCS | Performed by: HOSPITALIST

## 2022-01-25 PROCEDURE — 86140 C-REACTIVE PROTEIN: CPT

## 2022-01-25 PROCEDURE — 0R9J0ZX DRAINAGE OF RIGHT SHOULDER JOINT, OPEN APPROACH, DIAGNOSTIC: ICD-10-PCS | Performed by: ORTHOPAEDIC SURGERY

## 2022-01-25 PROCEDURE — 87040 BLOOD CULTURE FOR BACTERIA: CPT | Mod: 91

## 2022-01-25 PROCEDURE — 97605 NEG PRS WND THER DME<=50SQCM: CPT | Performed by: ORTHOPAEDIC SURGERY

## 2022-01-25 RX ORDER — HYDROMORPHONE HYDROCHLORIDE 1 MG/ML
0.1 INJECTION, SOLUTION INTRAMUSCULAR; INTRAVENOUS; SUBCUTANEOUS
Status: DISCONTINUED | OUTPATIENT
Start: 2022-01-25 | End: 2022-01-25 | Stop reason: HOSPADM

## 2022-01-25 RX ORDER — ROCURONIUM BROMIDE 10 MG/ML
INJECTION, SOLUTION INTRAVENOUS PRN
Status: DISCONTINUED | OUTPATIENT
Start: 2022-01-25 | End: 2022-01-25 | Stop reason: SURG

## 2022-01-25 RX ORDER — HALOPERIDOL 5 MG/ML
1 INJECTION INTRAMUSCULAR
Status: DISCONTINUED | OUTPATIENT
Start: 2022-01-25 | End: 2022-01-25 | Stop reason: HOSPADM

## 2022-01-25 RX ORDER — OXYCODONE HCL 5 MG/5 ML
5 SOLUTION, ORAL ORAL
Status: DISCONTINUED | OUTPATIENT
Start: 2022-01-25 | End: 2022-01-25 | Stop reason: HOSPADM

## 2022-01-25 RX ORDER — SODIUM CHLORIDE, SODIUM LACTATE, POTASSIUM CHLORIDE, CALCIUM CHLORIDE 600; 310; 30; 20 MG/100ML; MG/100ML; MG/100ML; MG/100ML
INJECTION, SOLUTION INTRAVENOUS CONTINUOUS
Status: DISCONTINUED | OUTPATIENT
Start: 2022-01-25 | End: 2022-01-25 | Stop reason: HOSPADM

## 2022-01-25 RX ORDER — OXYCODONE HCL 5 MG/5 ML
10 SOLUTION, ORAL ORAL
Status: DISCONTINUED | OUTPATIENT
Start: 2022-01-25 | End: 2022-01-25 | Stop reason: HOSPADM

## 2022-01-25 RX ORDER — ONDANSETRON 2 MG/ML
4 INJECTION INTRAMUSCULAR; INTRAVENOUS
Status: DISCONTINUED | OUTPATIENT
Start: 2022-01-25 | End: 2022-01-25 | Stop reason: HOSPADM

## 2022-01-25 RX ORDER — DEXAMETHASONE SODIUM PHOSPHATE 4 MG/ML
INJECTION, SOLUTION INTRA-ARTICULAR; INTRALESIONAL; INTRAMUSCULAR; INTRAVENOUS; SOFT TISSUE PRN
Status: DISCONTINUED | OUTPATIENT
Start: 2022-01-25 | End: 2022-01-25 | Stop reason: SURG

## 2022-01-25 RX ORDER — LIDOCAINE HYDROCHLORIDE 20 MG/ML
INJECTION, SOLUTION EPIDURAL; INFILTRATION; INTRACAUDAL; PERINEURAL PRN
Status: DISCONTINUED | OUTPATIENT
Start: 2022-01-25 | End: 2022-01-25 | Stop reason: SURG

## 2022-01-25 RX ORDER — HYDROMORPHONE HYDROCHLORIDE 1 MG/ML
0.2 INJECTION, SOLUTION INTRAMUSCULAR; INTRAVENOUS; SUBCUTANEOUS
Status: DISCONTINUED | OUTPATIENT
Start: 2022-01-25 | End: 2022-01-25 | Stop reason: HOSPADM

## 2022-01-25 RX ORDER — HYDROMORPHONE HYDROCHLORIDE 1 MG/ML
0.4 INJECTION, SOLUTION INTRAMUSCULAR; INTRAVENOUS; SUBCUTANEOUS
Status: DISCONTINUED | OUTPATIENT
Start: 2022-01-25 | End: 2022-01-25 | Stop reason: HOSPADM

## 2022-01-25 RX ORDER — MAGNESIUM HYDROXIDE 1200 MG/15ML
LIQUID ORAL
Status: COMPLETED | OUTPATIENT
Start: 2022-01-25 | End: 2022-01-25

## 2022-01-25 RX ADMIN — FENTANYL CITRATE 100 MCG: 50 INJECTION, SOLUTION INTRAMUSCULAR; INTRAVENOUS at 15:38

## 2022-01-25 RX ADMIN — OXYCODONE HYDROCHLORIDE 10 MG: 10 TABLET ORAL at 12:54

## 2022-01-25 RX ADMIN — LIDOCAINE HYDROCHLORIDE 80 MG: 20 INJECTION, SOLUTION EPIDURAL; INFILTRATION; INTRACAUDAL at 15:40

## 2022-01-25 RX ADMIN — FENTANYL CITRATE 50 MCG: 50 INJECTION, SOLUTION INTRAMUSCULAR; INTRAVENOUS at 16:03

## 2022-01-25 RX ADMIN — WATER 2 G: 100 INJECTION, SOLUTION INTRAVENOUS at 04:43

## 2022-01-25 RX ADMIN — OXYCODONE HYDROCHLORIDE 10 MG: 10 TABLET ORAL at 17:34

## 2022-01-25 RX ADMIN — SODIUM CHLORIDE: 9 INJECTION, SOLUTION INTRAVENOUS at 21:13

## 2022-01-25 RX ADMIN — PROPOFOL 160 MG: 10 INJECTION, EMULSION INTRAVENOUS at 15:40

## 2022-01-25 RX ADMIN — KETOROLAC TROMETHAMINE 15 MG: 30 INJECTION, SOLUTION INTRAMUSCULAR; INTRAVENOUS at 04:43

## 2022-01-25 RX ADMIN — ROCURONIUM BROMIDE 50 MG: 10 INJECTION, SOLUTION INTRAVENOUS at 15:40

## 2022-01-25 RX ADMIN — DEXAMETHASONE SODIUM PHOSPHATE 4 MG: 4 INJECTION, SOLUTION INTRA-ARTICULAR; INTRALESIONAL; INTRAMUSCULAR; INTRAVENOUS; SOFT TISSUE at 15:53

## 2022-01-25 RX ADMIN — FENTANYL CITRATE 50 MCG: 50 INJECTION, SOLUTION INTRAMUSCULAR; INTRAVENOUS at 16:08

## 2022-01-25 RX ADMIN — SUGAMMADEX 200 MG: 100 INJECTION, SOLUTION INTRAVENOUS at 16:03

## 2022-01-25 RX ADMIN — SODIUM CHLORIDE: 9 INJECTION, SOLUTION INTRAVENOUS at 13:35

## 2022-01-25 RX ADMIN — ONDANSETRON 4 MG: 2 INJECTION INTRAMUSCULAR; INTRAVENOUS at 15:53

## 2022-01-25 RX ADMIN — WATER 2 G: 100 INJECTION, SOLUTION INTRAVENOUS at 21:07

## 2022-01-25 RX ADMIN — WATER 2 G: 100 INJECTION, SOLUTION INTRAVENOUS at 14:26

## 2022-01-25 RX ADMIN — FENTANYL CITRATE 50 MCG: 50 INJECTION, SOLUTION INTRAMUSCULAR; INTRAVENOUS at 15:49

## 2022-01-25 ASSESSMENT — ENCOUNTER SYMPTOMS
NAUSEA: 0
FEVER: 0
VOMITING: 0
FEVER: 1

## 2022-01-25 ASSESSMENT — PAIN DESCRIPTION - PAIN TYPE
TYPE: SURGICAL PAIN
TYPE: ACUTE PAIN
TYPE: SURGICAL PAIN
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
## 2022-01-25 NOTE — ANESTHESIA PROCEDURE NOTES
Airway    Date/Time: 1/25/2022 3:42 PM  Performed by: Jasbir Soriano M.D.  Authorized by: Jasbir Soriano M.D.     Location:  OR  Urgency:  Elective  Indications for Airway Management:  Anesthesia      Spontaneous Ventilation: absent    Sedation Level:  Deep  Preoxygenated: Yes    Patient Position:  Sniffing  Final Airway Type:  Endotracheal airway  Final Endotracheal Airway:  ETT  Cuffed: Yes    Technique Used for Successful ETT Placement:  Direct laryngoscopy    Insertion Site:  Oral  Blade Type:  Laura  Laryngoscope Blade/Videolaryngoscope Blade Size:  3  ETT Size (mm):  7.5  Measured from:  Teeth  ETT to Teeth (cm):  25  Placement Verified by: auscultation and capnometry    Cormack-Lehane Classification:  Grade I - full view of glottis  Number of Attempts at Approach:  1

## 2022-01-25 NOTE — ANESTHESIA PREPROCEDURE EVALUATION
Case: [id number] Date/Time: 01/25/22 1420    Procedure: IRRIGATION AND DEBRIDEMENT, WOUND (Right Shoulder)    Location: [facility] OR 08 / SURGERY [facility]    Surgeons: Larry Aldridge M.D.      +Covid 1/20    Bacteremia    Relevant Problems   CARDIAC   (positive) Hypertension      Other   (positive) Pyogenic arthritis of right shoulder region (HCC)       Physical Exam    Airway   Mallampati: II  TM distance: >3 FB  Neck ROM: full       Cardiovascular - normal exam  Rhythm: regular  Rate: normal  (-) murmur     Dental - normal exam      Very poor dentition   Pulmonary - normal exam  Breath sounds clear to auscultation     Abdominal    Neurological - normal exam                 Anesthesia Plan    ASA 3       Plan - general       Airway plan will be ETT          Induction: intravenous    Postoperative Plan: Postoperative administration of opioids is intended.    Pertinent diagnostic labs and testing reviewed    Informed Consent:    Anesthetic plan and risks discussed with patient.    Use of blood products discussed with: patient whom consented to blood products.

## 2022-01-25 NOTE — CARE PLAN
The patient is Stable - Low risk of patient condition declining or worsening    Shift Goals  Clinical Goals: Pain management  Patient Goals: pain management  Family Goals: no family present    Progress made toward(s) clinical / shift goals:  patient c/o abd pain overnight and gas pains. No new events overnight.     Problem: Pain - Standard  Goal: Alleviation of pain or a reduction in pain to the patient’s comfort goal  Outcome: Progressing     Problem: Knowledge Deficit - Standard  Goal: Patient and family/care givers will demonstrate understanding of plan of care, disease process/condition, diagnostic tests and medications  Outcome: Progressing     Problem: Fall Risk  Goal: Patient will remain free from falls  Outcome: Progressing       Patient is not progressing towards the following goals:

## 2022-01-25 NOTE — CARE PLAN
The patient is Watcher - Medium risk of patient condition declining or worsening    Shift Goals  Clinical Goals: pain mangement   Patient Goals: pain mangement   Family Goals: n/a     Progress made toward(s) clinical / shift goals:  ***    Problem: Pain - Standard  Goal: Alleviation of pain or a reduction in pain to the patient’s comfort goal  Note: Pt having pain this morning 10/10. Prescribed medication as directed. See MAR      Problem: Fall Risk  Goal: Patient will remain free from falls  Note: Belonging within reach, Bed in lowest position and locked. Pt calls appropriately      Problem: Knowledge Deficit - Standard  Goal: Patient and family/care givers will demonstrate understanding of plan of care, disease process/condition, diagnostic tests and medications  Outcome: Progressing       Patient is not progressing towards the following goals:

## 2022-01-25 NOTE — CONSULTS
1/25/2    HPI: Yunier Denney is a 77 y.o. male who presents with complaints of pain to right shoulder.  COVID positive has been worked up on medicine service with suspicious right shoulder findings on CT the left hip is not as concerning clinically or by CT for infection. ID has recommended aspirate, IF these aspirates come by positive then he will require ID hip joint    Past Medical History:   Diagnosis Date   • Hx of gastroesophageal reflux (GERD)    • Hypertension    • Inguinal hernia        Past Surgical History:   Procedure Laterality Date   • LUMBAR LAMINECTOMY DISKECTOMY  4/29/2018    Procedure: LUMBAR LAMINECTOMY Y L4-S1;  Surgeon: Ferhco Herron M.D.;  Location: SURGERY [facility];  Service: Neurosurgery   • IRRIGATION & DEBRIDEMENT NEURO  4/29/2018    Procedure: IRRIGATION & DEBRIDEMENT NEURO- epidural mass;  Surgeon: Fercho Herron M.D.;  Location: SURGERY [facility];  Service: Neurosurgery       Medications  No current facility-administered medications on file prior to encounter.     Current Outpatient Medications on File Prior to Encounter   Medication Sig Dispense Refill   • VITAMIN D PO Take 1 Tablet by mouth every day.     • Multiple Vitamins-Minerals (EMERGEN-C IMMUNE PO) Take 1 Dose by mouth every day.     • losartan (COZAAR) 100 MG Tab Take 100 mg by mouth every day.     • aspirin (ASA) 325 MG Tab Take 325 mg by mouth 2 times a day as needed (Pain).         Allergies  Patient has no known allergies.    ROS   All other systems were reviewed and found to be negative    History reviewed. No pertinent family history.    Social History     Socioeconomic History   • Marital status: Single     Spouse name: Not on file   • Number of children: Not on file   • Years of education: Not on file   • Highest education level: Not on file   Occupational History   • Not on file   Tobacco Use   • Smoking status: Never Smoker   • Smokeless tobacco: Never Used   Vaping Use   • Vaping Use: Never used  "  Substance and Sexual Activity   • Alcohol use: Yes   • Drug use: No   • Sexual activity: Not on file   Other Topics Concern   • Not on file   Social History Narrative    ** Merged History Encounter **          Social Determinants of Health     Financial Resource Strain:    • Difficulty of Paying Living Expenses: Not on file   Food Insecurity:    • Worried About Running Out of Food in the Last Year: Not on file   • Ran Out of Food in the Last Year: Not on file   Transportation Needs:    • Lack of Transportation (Medical): Not on file   • Lack of Transportation (Non-Medical): Not on file   Physical Activity:    • Days of Exercise per Week: Not on file   • Minutes of Exercise per Session: Not on file   Stress:    • Feeling of Stress : Not on file   Social Connections:    • Frequency of Communication with Friends and Family: Not on file   • Frequency of Social Gatherings with Friends and Family: Not on file   • Attends Mandaen Services: Not on file   • Active Member of Clubs or Organizations: Not on file   • Attends Club or Organization Meetings: Not on file   • Marital Status: Not on file   Intimate Partner Violence:    • Fear of Current or Ex-Partner: Not on file   • Emotionally Abused: Not on file   • Physically Abused: Not on file   • Sexually Abused: Not on file   Housing Stability:    • Unable to Pay for Housing in the Last Year: Not on file   • Number of Places Lived in the Last Year: Not on file   • Unstable Housing in the Last Year: Not on file       Physical Exam  Vitals  /63   Pulse 62   Temp 37 °C (98.6 °F) (Temporal)   Resp 18   Ht 1.803 m (5' 11\")   Wt 77.1 kg (170 lb)   SpO2 92%   General: Well Developed, Well Nourished, Age appropriate appearance  HEENT: Normocephalic, atraumatic  Psych: Normal mood and affect  Neck: Supple, nontender, no masses  Lungs: Breathing unlabored, No audible wheezing  Heart: Regular heart rate and rhythm  Abdomen: Soft, NT, ND  Neuro: Sensation grossly intact to " BUE and BLE, moving all four extremities  Skin: Intact, no open wounds  RIGHT SHOULDER IS PAINFUL TO ANY RANGE OF MOTION, ELBOW AND HAND NOT AS SORE  LEFT HIP HAS SOME PAIN WITH RANGE NV OK HIP AND ARM  NO OTHER REAL ORTHO FINDINGS      Radiographs:  CT-HIP WITH PLUS RECONS LEFT   Final Result         1. Small left hip effusion, which may be sterile or infected.   2. No intramuscular subcutaneous fluid collections. No evidence of osteomyelitis.   3. Large, 13 cm left inguinal hernia containing nonobstructed sigmoid colon.      CT-SHOULDER WITH PLUS RECONS RIGHT   Final Result         1. Loculated small to moderate glenohumeral joint effusion. It may be infected and represent septic arthritis.   2. Several small intramuscular abscesses in the subscapularis and anterior head of the deltoid.   3. No evidence of osteomyelitis.   4. No fracture or dislocation.   5 Partially visualized small right pleural effusion and associated atelectasis.      EC-ECHOCARDIOGRAM LTD W/O CONT   Final Result      DX-CHEST-PORTABLE (1 VIEW)   Final Result      Borderline cardiomegaly.      DX-INJECT OR ASPIRATE W US GUIDANCE-LARGE JOINT RIGHT    (Results Pending)   DX-INJECT OR ASPIRATE W/O US GUIDANCE-LARGE JOINT LEFT    (Results Pending)       Laboratory Values  Recent Labs     01/23/22  1103 01/24/22  0822 01/25/22  0521   WBC 10.6 10.1 8.1   RBC 3.72* 3.84* 3.58*   HEMOGLOBIN 10.4* 10.7* 10.2*   HEMATOCRIT 31.1* 32.4* 29.8*   MCV 83.6 84.4 83.2   MCH 28.0 27.9 28.5   MCHC 33.4* 33.0* 34.2   RDW 50.5* 51.5* 50.8*   PLATELETCT 168 203 226   MPV 9.1 9.4 9.2     Recent Labs     01/23/22  1103 01/24/22  0822 01/25/22  0521   SODIUM 131* 131* 133*   POTASSIUM 4.2 4.0 3.8   CHLORIDE 98 97 100   CO2 23 24 22   GLUCOSE 121* 124* 102*   BUN 24* 25* 31*     Recent Labs     01/24/22  1257   APTT 38.8*   INR 1.11         Impression:INFECTED RIGHT SHOULDER JOINT AND SMALL EFFUSION LEFT HIP    Plan  :ID VAC CULTURES RIGHT SHOULDER   RIGHT SHOULDER  FOLLOW LEFT HIP   IF CULTURES COME BACK FROM ASPIRATE HP THEN WILL REQUIRE id, CURRENTLY STABLE  rba ALL EXPLAINED  Bar.

## 2022-01-26 ENCOUNTER — APPOINTMENT (OUTPATIENT)
Dept: RADIOLOGY | Facility: MEDICAL CENTER | Age: 77
DRG: 548 | End: 2022-01-26
Attending: STUDENT IN AN ORGANIZED HEALTH CARE EDUCATION/TRAINING PROGRAM
Payer: MEDICARE

## 2022-01-26 LAB
GRAM STN SPEC: NORMAL
SIGNIFICANT IND 70042: NORMAL
SITE SITE: NORMAL
SOURCE SOURCE: NORMAL

## 2022-01-26 PROCEDURE — 700111 HCHG RX REV CODE 636 W/ 250 OVERRIDE (IP): Performed by: STUDENT IN AN ORGANIZED HEALTH CARE EDUCATION/TRAINING PROGRAM

## 2022-01-26 PROCEDURE — 87077 CULTURE AEROBIC IDENTIFY: CPT

## 2022-01-26 PROCEDURE — 700105 HCHG RX REV CODE 258: Performed by: STUDENT IN AN ORGANIZED HEALTH CARE EDUCATION/TRAINING PROGRAM

## 2022-01-26 PROCEDURE — 700101 HCHG RX REV CODE 250: Performed by: HOSPITALIST

## 2022-01-26 PROCEDURE — 99232 SBSQ HOSP IP/OBS MODERATE 35: CPT | Performed by: INTERNAL MEDICINE

## 2022-01-26 PROCEDURE — 700102 HCHG RX REV CODE 250 W/ 637 OVERRIDE(OP): Performed by: HOSPITALIST

## 2022-01-26 PROCEDURE — 87040 BLOOD CULTURE FOR BACTERIA: CPT | Mod: 91

## 2022-01-26 PROCEDURE — 770001 HCHG ROOM/CARE - MED/SURG/GYN PRIV*

## 2022-01-26 PROCEDURE — 87150 DNA/RNA AMPLIFIED PROBE: CPT

## 2022-01-26 PROCEDURE — 99232 SBSQ HOSP IP/OBS MODERATE 35: CPT | Performed by: HOSPITALIST

## 2022-01-26 PROCEDURE — A9270 NON-COVERED ITEM OR SERVICE: HCPCS | Performed by: HOSPITALIST

## 2022-01-26 PROCEDURE — 700111 HCHG RX REV CODE 636 W/ 250 OVERRIDE (IP): Performed by: HOSPITALIST

## 2022-01-26 PROCEDURE — 36415 COLL VENOUS BLD VENIPUNCTURE: CPT

## 2022-01-26 RX ADMIN — OXYCODONE HYDROCHLORIDE 10 MG: 10 TABLET ORAL at 22:25

## 2022-01-26 RX ADMIN — SODIUM CHLORIDE: 9 INJECTION, SOLUTION INTRAVENOUS at 23:32

## 2022-01-26 RX ADMIN — KETOROLAC TROMETHAMINE 15 MG: 30 INJECTION, SOLUTION INTRAMUSCULAR; INTRAVENOUS at 11:17

## 2022-01-26 RX ADMIN — SODIUM CHLORIDE: 9 INJECTION, SOLUTION INTRAVENOUS at 10:03

## 2022-01-26 RX ADMIN — OXYCODONE HYDROCHLORIDE 10 MG: 10 TABLET ORAL at 14:12

## 2022-01-26 RX ADMIN — OXYCODONE HYDROCHLORIDE 10 MG: 10 TABLET ORAL at 18:32

## 2022-01-26 RX ADMIN — WATER 2 G: 100 INJECTION, SOLUTION INTRAVENOUS at 13:51

## 2022-01-26 RX ADMIN — WATER 2 G: 100 INJECTION, SOLUTION INTRAVENOUS at 04:36

## 2022-01-26 RX ADMIN — OXYCODONE HYDROCHLORIDE 10 MG: 10 TABLET ORAL at 07:23

## 2022-01-26 RX ADMIN — WATER 2 G: 100 INJECTION, SOLUTION INTRAVENOUS at 22:26

## 2022-01-26 ASSESSMENT — PAIN DESCRIPTION - PAIN TYPE
TYPE: ACUTE PAIN

## 2022-01-26 ASSESSMENT — ENCOUNTER SYMPTOMS
VOMITING: 0
NAUSEA: 0
FEVER: 0

## 2022-01-26 NOTE — PROGRESS NOTES
Report received. Assumed care. Pt in bed awake. A/O x4. VSS. Responds appropriately. C/O pain, medicated per MAR, no SOB. Assessment complete. Wound vac to right shoulder in place, intact with scant serosanguaneous output. Discussed POC, pt verbalizes understanding. Explained importance of calling before getting OOB. Call light and belongings within reach. Bed alarm on. Bed in the lowest position. Treaded socks in place. Hourly rounding in progress. Will continue to monitor .

## 2022-01-26 NOTE — DISCHARGE PLANNING
Anticipated Discharge Disposition:   SNF vs Home, with wound vac    Action:   Discussed discharge planning needs during rounds. Per chart review, pt s/p wound I&D, cultures and wound vac application. Per MD, pt is not medically cleared at this time. Discussed possible need for PT/OT eval. MD to place PT/OT order.    RN VALORIE spoke to pt. Per pt, he lives with his son in a one story house. Pt denies use of home O2 or other DMEs. Preferred pharmacy is [facility], [place]. Per pt, his son will provide transportation if he discharges home.     Pt's physical address:  [address] [place] [id number]      Barriers to Discharge:   Medical clearance  PT/OT eval and recommendations  Wound vac set up if will discharge home    Plan:   Hospital Care Management will continue to follow and assist with discharge planning needs.                Care Transition Team Assessment    Information Source  Orientation Level: Oriented X4  Information Given By: Patient  Informant's Name: Yunier Denney       Elopement Risk  Legal Hold: No  Ambulatory or Self Mobile in Wheelchair: Yes  Disoriented: No  Psychiatric Symptoms: None  History of Wandering: No  Elopement this Admit: No  Vocalizing Wanting to Leave: No  Displays Behaviors, Body Language Wanting to Leave: No-Not at Risk for Elopement  Elopement Risk: Not at Risk for Elopement    Interdisciplinary Discharge Planning  Primary Care Physician:  ([facility])  Lives with - Patient's Self Care Capacity: Adult Children  Patient or legal guardian wants to designate a caregiver: No  Support Systems: Children  Housing / Facility: 1 Story House  Assistance Needed: Unknown at this Time  Durable Medical Equipment: Not Applicable    Discharge Preparedness  What is your plan after discharge?: Uncertain - pending medical team collaboration  What are your discharge supports?: Child  Prior Functional Level: Independent with Activities of Daily Living,Independent with Medication  Management    Functional Assesment  Prior Functional Level: Independent with Activities of Daily Living,Independent with Medication Management    Finances  Financial Barriers to Discharge: No  Prescription Coverage: Yes      Advance Directive  Advance Directive?: None  Advance Directive offered?: AD Booklet refused    Domestic Abuse  Have you ever been the victim of abuse or violence?: No  Physical Abuse or Sexual Abuse: No  Verbal Abuse or Emotional Abuse: No  Possible Abuse/Neglect Reported to:: Not Applicable         Discharge Risks or Barriers  Discharge risks or barriers?: Complex medical needs  Patient risk factors: Complex medical needs    Anticipated Discharge Information  Discharge Disposition: D/T to SNF with Medicare cert in anticipation of skilled care (03)

## 2022-01-26 NOTE — OR SURGEON
Immediate Post OP Note    PreOp Diagnosis: pyarthrosis right glenohumeral joint      PostOp Diagnosis: same      Procedure(s):  IRRIGATION AND DEBRIDEMENT, WOUND  MULTIPLE CULTURES  APPLICATION OF WOUND VAC    Surgeon(s):  Larry Aldridge M.D.    Anesthesiologist/Type of Anesthesia:  Anesthesiologist: Jasbir Soriano M.D./* No anesthesia type entered *    Surgical Staff:  Circulator: Lucy Martell R.N.  Scrub Person: Melissa Mayorga    Specimens removed if any:  * No specimens in log *    Estimated Blood Loss: MINIMAL    Findings: AS DESCRIBED    Complications: NONE        1/25/2022 4:07 PM Larry Aldridge M.D.

## 2022-01-26 NOTE — PROGRESS NOTES
Pt presents to fluoro. Pt was consented by MD at bedside, confirmed by this RN and consent at bedside. Pt transferred to fluoro table in supine position. Patient came to fluoro for Left hip aspiration by Dr. Craig. Procedure site was marked by MD and verified using imaging guidance. However, upon imaging pt has a hernia that is right over the femoral head and Dr. Craig is unable to safely get to joint to aspirate due to bowel obstructing path.  Non-sedation case.         Time out performed: 1557   Procedure Complete: 1601 - procedure unsuccessful d/t bowel from hernia obstructing path to hip joint. Not safe to proceed. Pt sent back to room.

## 2022-01-26 NOTE — CARE PLAN
The patient is Stable - Low risk of patient condition declining or worsening    Shift Goals  Clinical Goals: pain control, abx  Patient Goals: pain control  Family Goals: n/a    Progress made toward(s) clinical / shift goals:          Problem: Pain - Standard  Goal: Alleviation of pain or a reduction in pain to the patient’s comfort goal  Outcome: Progressing     Problem: Knowledge Deficit - Standard  Goal: Patient and family/care givers will demonstrate understanding of plan of care, disease process/condition, diagnostic tests and medications  Outcome: Progressing     Problem: Fall Risk  Goal: Patient will remain free from falls  Outcome: Progressing       Patient is not progressing towards the following goals:
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
## 2022-01-26 NOTE — PROGRESS NOTES
[facility] Medicine Daily Progress Note    Date of Service  1/25/2022    Chief Complaint  Yunier Denney is a 77 y.o. male admitted 1/22/2022 with back, leg pain.    Hospital Course  A 77-year-old man with h/o epidural abscess (follows with Dr. Herron) presented with right leg pain, knee pain, viral-like symptoms including nausea, chills, malaise and fatigue. Patient reports he was diagnosed with COVID-19 on 1/20.   Patient has elevated ESR at 47 and CRP of 198.9.  Blood cultures x2 returned positive for staph aureus. Patient hospitalized for IV Unasyn.      Interval Problem Update  1/23: Patient reports lower back pain, right shoulder, left hip pain, chills. Had fever 38.1, hemodynamically stable. On room air.  Blood cultures from 1/20 and 1/22 positive for S.aureus. Repeat blood cultures positive for S.aureus.  ID consulted. Discussed with Dr. Vera  CXR showed borderline cardiomegaly.  CT right shoulder, left hip ordered    1/25: To OR today, discussed with ortho.     I have personally seen and examined the patient at bedside. I discussed the plan of care with patient and bedside RN.    Consultants/Specialty  infectious disease, orthopedics and IR    Code Status  Full Code    Disposition  Patient is not medically cleared for discharge.   Anticipate discharge to TBD.  I have placed the appropriate orders for post-discharge needs.    Review of Systems  Constitutional: Positive for chills, fever and malaise/fatigue.   HENT: Negative.    Eyes: Negative.    Respiratory: Negative for cough and shortness of breath.    Cardiovascular: Negative for chest pain and leg swelling.   Gastrointestinal: Negative for nausea and vomiting.   Genitourinary: Negative for dysuria.   Musculoskeletal: Positive for right shoulder, left hip pain.   Skin: Negative for rash.   Neurological: Positive for weakness.     Physical Exam  Temp:  [37 °C (98.6 °F)-37.8 °C (100.1 °F)] 37.1 °C (98.8 °F)  Pulse:  [62-98] 97  Resp:  [16-18] 17  BP:  (117-160)/(63-82) 158/72  SpO2:  [92 %-99 %] 99 %    Physical Exam  Vitals and nursing note reviewed.   Constitutional:       Appearance: He is ill-appearing.   HENT:      Head: Normocephalic and atraumatic.      Nose: Nose normal.      Mouth/Throat:      Mouth: Mucous membranes are moist.      Pharynx: Oropharynx is clear.   Eyes:      Pupils: Pupils are equal, round, and reactive to light.   Cardiovascular:      Rate and Rhythm: Normal rate and regular rhythm.   Pulmonary:      Effort: Pulmonary effort is normal. No respiratory distress.      Breath sounds: No wheezing or rales.   Abdominal:      General: Abdomen is flat. Bowel sounds are normal. There is no distension.      Tenderness: There is no abdominal tenderness. There is no guarding.      Hernia: A hernia (left inguinal, reduciable, non-tender) is present.   Musculoskeletal:         General: right shoulder swollen, tender; left hip tender     Cervical back: Normal range of motion.      Right lower leg: No edema.      Left lower leg: No edema.   Skin:     General: Skin is warm.   Neurological:      General: No focal deficit present.      Mental Status: He is alert and oriented to person, place, and time.     Fluids    Intake/Output Summary (Last 24 hours) at 1/25/2022 1708  Last data filed at 1/25/2022 1617  Gross per 24 hour   Intake 500 ml   Output 545 ml   Net -45 ml       Laboratory  Recent Labs     01/23/22  1103 01/24/22  0822 01/25/22  0521   WBC 10.6 10.1 8.1   RBC 3.72* 3.84* 3.58*   HEMOGLOBIN 10.4* 10.7* 10.2*   HEMATOCRIT 31.1* 32.4* 29.8*   MCV 83.6 84.4 83.2   MCH 28.0 27.9 28.5   MCHC 33.4* 33.0* 34.2   RDW 50.5* 51.5* 50.8*   PLATELETCT 168 203 226   MPV 9.1 9.4 9.2     Recent Labs     01/23/22  1103 01/24/22  0822 01/25/22  0521   SODIUM 131* 131* 133*   POTASSIUM 4.2 4.0 3.8   CHLORIDE 98 97 100   CO2 23 24 22   GLUCOSE 121* 124* 102*   BUN 24* 25* 31*   CREATININE 0.66 0.82 0.86   CALCIUM 8.5 8.5 8.2*     Recent Labs     01/24/22  1257    APTT 38.8*   INR 1.11               Imaging  CT-HIP WITH PLUS RECONS LEFT   Final Result         1. Small left hip effusion, which may be sterile or infected.   2. No intramuscular subcutaneous fluid collections. No evidence of osteomyelitis.   3. Large, 13 cm left inguinal hernia containing nonobstructed sigmoid colon.      CT-SHOULDER WITH PLUS RECONS RIGHT   Final Result         1. Loculated small to moderate glenohumeral joint effusion. It may be infected and represent septic arthritis.   2. Several small intramuscular abscesses in the subscapularis and anterior head of the deltoid.   3. No evidence of osteomyelitis.   4. No fracture or dislocation.   5 Partially visualized small right pleural effusion and associated atelectasis.      EC-ECHOCARDIOGRAM LTD W/O CONT   Final Result      DX-CHEST-PORTABLE (1 VIEW)   Final Result      Borderline cardiomegaly.      DX-INJECT OR ASPIRATE W US GUIDANCE-LARGE JOINT RIGHT    (Results Pending)   DX-INJECT OR ASPIRATE W/O US GUIDANCE-LARGE JOINT LEFT    (Results Pending)        Assessment/Plan  * Pyogenic arthritis of right shoulder region (HCC)  Assessment & Plan  CT showed loculated small to moderate right glenohumeral joint effusion; it may be infected and represent septic arthritis; several small intramuscular abscesses in the subscapularis and anterior head of the deltoid; partially visualized small right pleural effusion and associated atelectasis.  CT showed small left hip effusion, which may be sterile or infected.  Orthopedic surgery consulted.  IR consulted for drainage.    Hyponatremia  Assessment & Plan  Mild   Monitor     Spinal stenosis at L4-L5 level  Assessment & Plan  MRI showing severe L4 stenosis  Pain control  Consider neurosurgery consult    COVID-19  Assessment & Plan  Patient is currently not requiring oxygen hold steroids  Continue with enhanced precautions  Placed on oxygen with SPO2 below 90%    MSSA bacteremia- (present on  admission)  Assessment & Plan  Patient has blood cultures x 2 positive for staph aureus that reflected at [facility] when he visited ER from 1/20/2022.  Echo showed normal left ventricular systolic function; mild aortic insufficiency.   CT showed loculated small to moderate right glenohumeral joint effusion; it may be infected and represent septic arthritis; several small intramuscular abscesses in the subscapularis and anterior head of the deltoid; partially visualized small right pleural effusion and associated atelectasis.  CT showed small left hip effusion, which may be sterile or infected.  Orthopedic surgery consulted.  IR consulted for drainage.  Blood cultures from 1/20 and 1/22 positive  Blood cultures form 1/23 negative to date.    Hypertension- (present on admission)  Assessment & Plan  Blood pressures currently very well.  We will hold BP meds at this time.       VTE prophylaxis: enoxaparin ppx    I have performed a physical exam and reviewed and updated ROS and Plan today (1/25/2022). In review of yesterday's note (1/24/2022), there are no changes except as documented above.

## 2022-01-26 NOTE — ANESTHESIA TIME REPORT
Anesthesia Start and Stop Event Times     Date Time Event    1/25/2022 1451 Ready for Procedure     1531 Anesthesia Start     1617 Anesthesia Stop        Responsible Staff  01/25/22    Name Role Begin End    Jasbir Soriano M.D. Anesth 1531 1617        Preop Diagnosis (Free Text):  Pre-op Diagnosis             Preop Diagnosis (Codes):    Premium Reason  A. 3PM - 7AM    Comments:

## 2022-01-26 NOTE — OP REPORT
DATE OF SERVICE:  01/25/2022     PREOPERATIVE DIAGNOSIS:  Pyarthrosis, right shoulder joint.     POSTOPERATIVE DIAGNOSIS:  Pyarthrosis, right shoulder joint.     OPERATIONS PERFORMED:  1.  Irrigation and debridement, right glenohumeral joint.  2.  Multiple cultures.  3.  Application of wound VAC.     SURGEON:  Larry Aldridge MD     ASSISTANT:  Tracy Mercado CFA     INDICATIONS FOR THE OPERATION:  Positive CT scan pain and clinical evidence of   sepsis.     PREOPERATIVE CONSENT AND FAMILY CONFERENCE:  The patient was seen today   preoperatively.  Risks, benefits and alternatives were explained.  The patient   has had full surgical undertaking, answered all of his questions for right   shoulder arthrotomy with cultures and debridement.     DESCRIPTION OF PROCEDURE:  The patient brought to the operating room awake,   alert, placed on the operating table in supine position.  A bump was placed   under the right shoulder.  The right shoulder was then shaved, scrubbed,   prepped and draped in normal sterile routine fashion.  Timeout and the   operation began.  A straight incision over the anterior aspect of the acromion   extending over the deltoid anteriorly and we simply split the deltoid muscle   to the joint and then made a small incision in the joint capsule.  Immediately   upon entering the joint capsule, there was gross pus.  This was cultured.  We   took a biopsy of the synovium, sent for culture as well as fluid.  There was   also fluid that looked like infection in the subacromial space.     At this point, we simply irrigated with a liter of dilute Betadine and then 3   liters of saline.  At the completion of the procedure, there was no additional   evidence of infection.     The joint was left opened.  I simply put PDS in the deltoid split and then   applied a wound VAC in standard fashion.  The patient was taken to recovery   room in stable condition.  No intraoperative or immediate postoperative    complications.  The patient tolerated the procedure well.        ______________________________  MD ALISSON Vieira/PILLO/MELISSA    DD:  01/25/2022 16:09  DT:  01/25/2022 17:01    Job#:  [id number]

## 2022-01-27 LAB
BACTERIA BLD CULT: ABNORMAL
SIGNIFICANT IND 70042: ABNORMAL
SIGNIFICANT IND 70042: ABNORMAL
SITE SITE: ABNORMAL
SITE SITE: ABNORMAL
SOURCE SOURCE: ABNORMAL
SOURCE SOURCE: ABNORMAL

## 2022-01-27 PROCEDURE — 700111 HCHG RX REV CODE 636 W/ 250 OVERRIDE (IP): Performed by: HOSPITALIST

## 2022-01-27 PROCEDURE — 700101 HCHG RX REV CODE 250: Performed by: HOSPITALIST

## 2022-01-27 PROCEDURE — A9270 NON-COVERED ITEM OR SERVICE: HCPCS | Performed by: HOSPITALIST

## 2022-01-27 PROCEDURE — 99232 SBSQ HOSP IP/OBS MODERATE 35: CPT | Performed by: HOSPITALIST

## 2022-01-27 PROCEDURE — 700102 HCHG RX REV CODE 250 W/ 637 OVERRIDE(OP): Performed by: HOSPITALIST

## 2022-01-27 PROCEDURE — 700101 HCHG RX REV CODE 250: Performed by: INTERNAL MEDICINE

## 2022-01-27 PROCEDURE — 99233 SBSQ HOSP IP/OBS HIGH 50: CPT | Performed by: INTERNAL MEDICINE

## 2022-01-27 PROCEDURE — 700111 HCHG RX REV CODE 636 W/ 250 OVERRIDE (IP): Performed by: STUDENT IN AN ORGANIZED HEALTH CARE EDUCATION/TRAINING PROGRAM

## 2022-01-27 PROCEDURE — 97162 PT EVAL MOD COMPLEX 30 MIN: CPT

## 2022-01-27 PROCEDURE — 770001 HCHG ROOM/CARE - MED/SURG/GYN PRIV*

## 2022-01-27 PROCEDURE — 700111 HCHG RX REV CODE 636 W/ 250 OVERRIDE (IP): Performed by: INTERNAL MEDICINE

## 2022-01-27 PROCEDURE — 97165 OT EVAL LOW COMPLEX 30 MIN: CPT

## 2022-01-27 RX ADMIN — WATER 2 G: 100 INJECTION, SOLUTION INTRAVENOUS at 13:55

## 2022-01-27 RX ADMIN — OXYCODONE HYDROCHLORIDE 10 MG: 10 TABLET ORAL at 12:02

## 2022-01-27 RX ADMIN — OXYCODONE HYDROCHLORIDE 10 MG: 10 TABLET ORAL at 05:30

## 2022-01-27 RX ADMIN — WATER 2 G: 100 INJECTION, SOLUTION INTRAVENOUS at 05:25

## 2022-01-27 RX ADMIN — KETOROLAC TROMETHAMINE 15 MG: 30 INJECTION, SOLUTION INTRAMUSCULAR; INTRAVENOUS at 13:09

## 2022-01-27 RX ADMIN — WATER 2 G: 100 INJECTION, SOLUTION INTRAVENOUS at 20:43

## 2022-01-27 ASSESSMENT — COGNITIVE AND FUNCTIONAL STATUS - GENERAL
CLIMB 3 TO 5 STEPS WITH RAILING: TOTAL
TOILETING: A LOT
TURNING FROM BACK TO SIDE WHILE IN FLAT BAD: UNABLE
STANDING UP FROM CHAIR USING ARMS: A LOT
MOBILITY SCORE: 7
MOVING TO AND FROM BED TO CHAIR: UNABLE
MOVING FROM LYING ON BACK TO SITTING ON SIDE OF FLAT BED: UNABLE
WALKING IN HOSPITAL ROOM: TOTAL
HELP NEEDED FOR BATHING: A LOT
DRESSING REGULAR UPPER BODY CLOTHING: A LITTLE
SUGGESTED CMS G CODE MODIFIER MOBILITY: CM

## 2022-01-27 ASSESSMENT — ENCOUNTER SYMPTOMS
VOMITING: 0
NAUSEA: 0
FEVER: 0

## 2022-01-27 ASSESSMENT — PAIN DESCRIPTION - PAIN TYPE
TYPE: ACUTE PAIN

## 2022-01-27 ASSESSMENT — ACTIVITIES OF DAILY LIVING (ADL): TOILETING: INDEPENDENT

## 2022-01-27 ASSESSMENT — GAIT ASSESSMENTS: GAIT LEVEL OF ASSIST: UNABLE TO PARTICIPATE
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
## 2022-01-27 NOTE — THERAPY
Physical Therapy   Initial Evaluation     Patient Name: Yunier Denney  Age:  77 y.o., Sex:  male  Medical Record #: [id number]  Today's Date: 1/27/2022     Precautions  Precautions: (P) Fall Risk  Comments: (P) WBAT right shoulder, wound vac; left inguinal hernia;     Assessment  Pt presents with impaired activity tolerance, pain avoidance, cognition and functional ROM/strength associated with c/c of right shoulder pain and left hip pain as well as flu like symptoms, found to be COVID positive with septic right shoulder joint requiring wound vac, IR unable to tap left hip 2/2 left inguinal hernia. Pt is most limited by anticipation of pain, very resistant to any mobility or ROM initially, appeared to improve throughout session but resistant to education for gentle ROM to assist in pain control and position change to improve upright function; reports he has been through this before and all he needs is antibiotics; today required max cues for initiation and sequencing as well as max a for mobility; resisted anterior translation of weight for standing activities and resisted standing attempts; will follow, needs placement.     Plan    Recommend Physical Therapy 3 times per week until therapy goals are met for the following treatments:  Bed Mobility, Equipment, Gait Training, Manual Therapy, Neuro Re-Education / Balance, Self Care/Home Evaluation, Sensory Integration Techniques, Stair Training, Therapeutic Activities and Therapeutic Exercises    DC Equipment Recommendations: Unable to determine at this time  Discharge Recommendations: Recommend post-acute placement for additional physical therapy services prior to discharge home       Abridged Subjective/Objective     01/27/22 1205   Prior Living Situation   Prior Services Intermittent Physical Support for ADL Per Family   Housing / Facility 1 Story House   Steps Into Home 1   Equipment Owned Front-Wheel Walker   Lives with - Patient's Self Care Capacity Adult Children    Comments denies falls; agitated with PLOF questions; son assist but does work limited hours    Prior Level of Functional Mobility   Bed Mobility Independent   Transfer Status Independent   Ambulation Independent   Distance Ambulation (Feet)   (to tolerance)   Assistive Devices Used Front-Wheel Walker  (vague re: need, changed answers during questioning )   Cognition    Cognition / Consciousness X   Level of Consciousness Alert   New Learning Impaired   Initiation Impaired   Comments resistant and agitated; needing max cues for any completion of task; very focused on water initially despite having 3 cups of liquid at bedside; needing redireciton to task and completion of task and reason for therapist involvement, 'this is all bullshit' perseverative on need for antibiotics, reports he has had 'this' happen before    Passive ROM Lower Body   Passive ROM Lower Body X   Comments functional range only of left hip, empty end feel, very painful with touch at knee but reports issue is at hip    Strength Lower Body   Lower Body Strength  X   Comments cannot muscle grade appears to move ankles well and right knee against gravity, screams with touch of left LE; pushing posteriorly into therapist    Sensation Lower Body   Lower Extremity Sensation   X   Comments denies t/n   Balance Assessment   Sitting Balance (Static) Poor +   Sitting Balance (Dynamic) Poor   Weight Shift Sitting Poor   Comments strong posterior lean with attempts at standing, resistining therapist with placement of Ues for support    Gait Analysis   Gait Level Of Assist Unable to Participate   Bed Mobility    Supine to Sit Maximal Assist  (of 2 )   Sit to Supine Maximal Assist  (of 2 )   Functional Mobility   Sit to Stand Unable to Participate   Comments attempted standing with use of FWW, leaning posteriorly away from thearpist    Edema / Skin Assessment   Edema / Skin  X   Comments wound vac attached without leak pre/post; left groin not visualized     Patient / Family Goals    Patient / Family Goal #1 to improve pain/activity tolerance    Short Term Goals    Short Term Goal # 1 Pt will perform supine<>sit from flat HOB/no railing with min A within 6 visits to progress back to baseline.    Short Term Goal # 2 Pt will perform sit<>stand with fWW and min A wtihin 6 visits to ensure independent mobility at home.    Short Term Goal # 3 Pt will ambulate x 150ft with fWW and supervision wtihin 6 visits to progress to baseline.    Short Term Goal # 4 Pt will ascend/descend 1 step with B UE support and min A within 6 visits to ensure independent mobility ath ome.    Education Group   Role of Physical Therapist Patient Response Patient;Nonacceptance;Refuses;Explanation;Verbal Demonstration;Action Demonstration;Reinforcement Needed;Teaching Refused;No Learning Evidence   Exercises - Supine Patient Response Patient;Refuses;Nonacceptance;Explanation;Demonstration;Verbal Demonstration;No Learning Evidence;Teaching Refused   Additional Comments ice vs heat; gentle ROM for shoulder/hips, declines and complaining of positioning at end of session (supported right arm in scaption; agitated with air cushion placemnet

## 2022-01-27 NOTE — THERAPY
Occupational Therapy   Initial Evaluation     Patient Name: Yunier Denney  Age:  77 y.o., Sex:  male  Medical Record #: [id number]  Today's Date: 1/27/2022     Precautions  Precautions: (P) Fall Risk  Comments: (P) WBAT right shoulder, wound vac, left inguinal hernia    Assessment  Patient is 77 y.o. male presents with decreased independence in ADL and functional mobility. Patient max A for bed mobility, decreased sitting balance and activity tolerance. Patient guarding RUE AROM. Patient would benefit from OT to increase independence in ADL and functional mobility.      Plan    Recommend Occupational Therapy 3 times per week until therapy goals are met for the following treatments:  Neuro Re-Education / Balance, Self Care/Activities of Daily Living, Therapeutic Activities and Therapeutic Exercises.       Discharge Recommendations: (P) Recommend post-acute placement for additional occupational therapy services prior to discharge home     Subjective    Patient alert during session. Requiring mod/max coaxing to participate. Nurse river occupational therapist to work with patient     Objective       01/27/22 1210   Total Time Spent   Total Time Spent (Mins) 30   Charge Group   OT Evaluation OT Evaluation Low   Initial Contact Note    Initial Contact Note Order Received and Verified, Occupational Therapy Evaluation in Progress with Full Report to Follow.   Prior Living Situation   Prior Services Intermittent Physical Support for ADL Per Family   Housing / Facility 1 Story House   Steps Into Home 1   Equipment Owned Front-Wheel Walker   Lives with - Patient's Self Care Capacity Adult Children   Comments denies falls; agitated with PLOF questions; son assist but does work limited hours    Prior Level of ADL Function   Self Feeding Independent   Grooming / Hygiene Independent   Bathing Independent   Dressing Independent   Toileting Independent   Precautions   Precautions Fall Risk   Comments WBAT right shoulder, wound vac,  left inguinal hernia   Pain 0 - 10 Group   Location Arm   Location Orientation Right   Pain Rating Scale (NPRS) 6   Description Aching   Therapist Pain Assessment Nurse Notified   Cognition    Comments Required mod/max coaxing to participate in session. Easily agitated when given direction or safety cueing   Active ROM Upper Body   Active ROM Upper Body  WDL   Dominant Hand Right   Comments Guarding RUE  (allowed OT to put arm on pillow at 80 deg humeral flexion)   Strength Upper Body   Upper Body Strength  WDL   Comments Guarding RUE    Sensation Upper Body   Comments Per patient BUE WFL light touch sensation    Coordination Upper Body   Comments Decreased BUE gross motor coordination due to guarding of  RUE    Bed Mobility    Supine to Sit Maximal Assist   Sit to Supine Maximal Assist   Comments Patient with mod A/max A for sitting balace posterior pushing at times, despite cueing    How much help from another person does the patient currently need...   Bathing (including washing, rinsing, and drying)? 2   Toileting, which includes using a toilet, bedpan, or urinal? 2   Putting on and taking off regular upper body clothing? 3   Taking care of personal grooming such as brushing teeth? 4   Eating meals? 4   Functional Mobility   Sit to Stand Unable to Participate   Activity Tolerance   Sitting Edge of Bed ~6 minutes    Patient / Family Goals   Patient / Family Goal #1 Decrease pain    Short Term Goals   Short Term Goal # 1 Patient will perform toileting with setup    Short Term Goal # 2 Patient will perform UB/LB dressing with min A    Short Term Goal # 3 Patient will perform 3/3 simple grooming task standing setup   Education Group   Additional Comments Patient edcuated on importance of OOB activity.    Problem List   Problem List Decreased Active Daily Living Skills;Decreased Activity Tolerance;Decreased Functional Mobility   Anticipated Discharge Equipment and Recommendations   Discharge Recommendations Recommend  post-acute placement for additional occupational therapy services prior to discharge home   Interdisciplinary Plan of Care Collaboration   IDT Collaboration with  Nursing   Collaboration Comments SBAR nsg functional mobility and ADL status    Session Information   Date / Session Number  1/27 #1 (1/3 2/2)   Priority 3     ADL:  Patient with  Max A for LB dressing donning socks, Mod A for donning gown UB dressing sitting EOB, Patient setup for simple grooming of washing face with face towel sitting EOB with LUE.

## 2022-01-27 NOTE — ANESTHESIA POSTPROCEDURE EVALUATION
Patient: Yunier Denney    Procedure Summary     Date: 01/25/22 Room / Location: [facility] 08 / SURGERY [facility]    Anesthesia Start: 1531 Anesthesia Stop: 1617    Procedures:       IRRIGATION AND DEBRIDEMENT, WOUND  , MULTIPLE CULTURES (Right Shoulder)      APPLICATION WOUND VAC (Right Shoulder) Diagnosis: (Pyarthrosis Right Glenohumeral joint)    Surgeons: Larry Aldridge M.D. Responsible Provider: Jasbir Soriano M.D.    Anesthesia Type: general ASA Status: 3          Final Anesthesia Type: general  Last vitals  BP   Blood Pressure : 140/76    Temp   (!) 38.1 °C (100.5 °F)    Pulse   73   Resp   20    SpO2   93 %      Anesthesia Post Evaluation    Patient location during evaluation: PACU  Patient participation: complete - patient participated  Level of consciousness: awake and alert    Airway patency: patent  Anesthetic complications: no  Cardiovascular status: hemodynamically stable  Respiratory status: acceptable  Hydration status: euvolemic    PONV: none          No complications documented.     Nurse Pain Score: 6 (NPRS)

## 2022-01-27 NOTE — PROGRESS NOTES
Lab called about a blood culture result from 1/23/22 that resulted gram positive cocci with possible staf. Attending doctor and nurse notified.

## 2022-01-27 NOTE — PROGRESS NOTES
[facility] Medicine Daily Progress Note    Date of Service  1/26/2022    Chief Complaint  Yunier Denney is a 77 y.o. male admitted 1/22/2022 with back, leg pain.    Hospital Course  A 77-year-old man with h/o epidural abscess (follows with Dr. Herron) presented with right leg pain, knee pain, viral-like symptoms including nausea, chills, malaise and fatigue. Patient reports he was diagnosed with COVID-19 on 1/20.   Patient has elevated ESR at 47 and CRP of 198.9.  Blood cultures x2 returned positive for staph aureus. Patient hospitalized for IV Unasyn.      Interval Problem Update  1/23: Patient reports lower back pain, right shoulder, left hip pain, chills. Had fever 38.1, hemodynamically stable. On room air.  Blood cultures from 1/20 and 1/22 positive for S.aureus. Repeat blood cultures positive for S.aureus.  ID consulted. Discussed with Dr. Vera  CXR showed borderline cardiomegaly.  CT right shoulder, left hip ordered    1/26: To IR Today    I have personally seen and examined the patient at bedside. I discussed the plan of care with patient and bedside RN.    Consultants/Specialty  infectious disease, orthopedics and IR    Code Status  Full Code    Disposition  Patient is not medically cleared for discharge.   Anticipate discharge to TBD.  I have placed the appropriate orders for post-discharge needs.    Review of Systems  Constitutional: Positive for chills, fever and malaise/fatigue.   HENT: Negative.    Eyes: Negative.    Respiratory: Negative for cough and shortness of breath.    Cardiovascular: Negative for chest pain and leg swelling.   Gastrointestinal: Negative for nausea and vomiting.   Genitourinary: Negative for dysuria.   Musculoskeletal: Positive for right shoulder, left hip pain.   Skin: Negative for rash.   Neurological: Positive for weakness.     Physical Exam  Temp:  [36.6 °C (97.8 °F)-37.2 °C (99 °F)] 36.6 °C (97.9 °F)  Pulse:  [60-98] 60  Resp:  [16-20] 18  BP: (100-160)/(68-93)  143/75  SpO2:  [88 %-99 %] 93 %    Physical Exam  Vitals and nursing note reviewed.   Constitutional:       Appearance: He is ill-appearing.   HENT:      Head: Normocephalic and atraumatic.      Nose: Nose normal.      Mouth/Throat:      Mouth: Mucous membranes are moist.      Pharynx: Oropharynx is clear.   Eyes:      Pupils: Pupils are equal, round, and reactive to light.   Cardiovascular:      Rate and Rhythm: Normal rate and regular rhythm.   Pulmonary:      Effort: Pulmonary effort is normal. No respiratory distress.      Breath sounds: No wheezing or rales.   Abdominal:      General: Abdomen is flat. Bowel sounds are normal. There is no distension.      Tenderness: There is no abdominal tenderness. There is no guarding.      Hernia: A hernia (left inguinal, reduciable, non-tender) is present.   Musculoskeletal:         General: right shoulder swollen, tender; left hip tender     Cervical back: Normal range of motion.      Right lower leg: No edema.      Left lower leg: No edema.   Skin:     General: Skin is warm.   Neurological:      General: No focal deficit present.      Mental Status: He is alert and oriented to person, place, and time.     Fluids    Intake/Output Summary (Last 24 hours) at 1/26/2022 1609  Last data filed at 1/26/2022 1122  Gross per 24 hour   Intake 1220 ml   Output 820 ml   Net 400 ml       Laboratory  Recent Labs     01/24/22  0822 01/25/22  0521   WBC 10.1 8.1   RBC 3.84* 3.58*   HEMOGLOBIN 10.7* 10.2*   HEMATOCRIT 32.4* 29.8*   MCV 84.4 83.2   MCH 27.9 28.5   MCHC 33.0* 34.2   RDW 51.5* 50.8*   PLATELETCT 203 226   MPV 9.4 9.2     Recent Labs     01/24/22  0822 01/25/22  0521   SODIUM 131* 133*   POTASSIUM 4.0 3.8   CHLORIDE 97 100   CO2 24 22   GLUCOSE 124* 102*   BUN 25* 31*   CREATININE 0.82 0.86   CALCIUM 8.5 8.2*     Recent Labs     01/24/22  1257   APTT 38.8*   INR 1.11               Imaging  CT-HIP WITH PLUS RECONS LEFT   Final Result         1. Small left hip effusion, which  may be sterile or infected.   2. No intramuscular subcutaneous fluid collections. No evidence of osteomyelitis.   3. Large, 13 cm left inguinal hernia containing nonobstructed sigmoid colon.      CT-SHOULDER WITH PLUS RECONS RIGHT   Final Result         1. Loculated small to moderate glenohumeral joint effusion. It may be infected and represent septic arthritis.   2. Several small intramuscular abscesses in the subscapularis and anterior head of the deltoid.   3. No evidence of osteomyelitis.   4. No fracture or dislocation.   5 Partially visualized small right pleural effusion and associated atelectasis.      EC-ECHOCARDIOGRAM LTD W/O CONT   Final Result      DX-CHEST-PORTABLE (1 VIEW)   Final Result      Borderline cardiomegaly.      DX-INJECT OR ASPIRATE W/O US GUIDANCE-LARGE JOINT LEFT    (Results Pending)        Assessment/Plan  * Pyogenic arthritis of right shoulder region (HCC)  Assessment & Plan  CT showed loculated small to moderate right glenohumeral joint effusion; it may be infected and represent septic arthritis; several small intramuscular abscesses in the subscapularis and anterior head of the deltoid; partially visualized small right pleural effusion and associated atelectasis.  CT showed small left hip effusion, which may be sterile or infected.  Orthopedic surgery consulted.  IR consulted for drainage.    Hyponatremia  Assessment & Plan  Mild   Monitor     Spinal stenosis at L4-L5 level  Assessment & Plan  MRI showing severe L4 stenosis  Pain control  Consider neurosurgery consult    COVID-19  Assessment & Plan  Patient is currently not requiring oxygen hold steroids  Continue with enhanced precautions  Placed on oxygen with SPO2 below 90%    MSSA bacteremia- (present on admission)  Assessment & Plan  Patient has blood cultures x 2 positive for staph aureus that reflected at [facility] when he visited ER from 1/20/2022.  Echo showed normal left ventricular systolic function; mild aortic  insufficiency.   CT showed loculated small to moderate right glenohumeral joint effusion; it may be infected and represent septic arthritis; several small intramuscular abscesses in the subscapularis and anterior head of the deltoid; partially visualized small right pleural effusion and associated atelectasis.  CT showed small left hip effusion, which may be sterile or infected.  Orthopedic surgery consulted.  IR consulted for drainage.  Blood cultures from 1/20 and 1/22 positive  Blood cultures form 1/23 negative to date.    Hypertension- (present on admission)  Assessment & Plan  Blood pressures currently very well.  We will hold BP meds at this time.       VTE prophylaxis: enoxaparin ppx    I have performed a physical exam and reviewed and updated ROS and Plan today (1/26/2022). In review of yesterday's note (1/25/2022), there are no changes except as documented above.

## 2022-01-27 NOTE — CARE PLAN
Problem: Pain - Standard  Goal: Alleviation of pain or a reduction in pain to the patient’s comfort goal  Outcome: Progressing     Problem: Knowledge Deficit - Standard  Goal: Patient and family/care givers will demonstrate understanding of plan of care, disease process/condition, diagnostic tests and medications  Outcome: Progressing       The patient is Stable - Low risk of patient condition declining or worsening    Shift Goals  Clinical Goals: comfort, safety  Patient Goals: rest  Family Goals: n/a    Progress made toward(s) clinical / shift goals:  Pain management     Patient is not progressing towards the following goals:

## 2022-01-27 NOTE — PROGRESS NOTES
"   Orthopaedic Progress Note    Interval changes:  Patient doing well  To IR for hip tap today  R shoulder cultures negative- no further intervention by ortho as long as remain neg  Vac in place without leak    ROS - Patient denies any new issues.  Pain well controlled.    /75   Pulse 60   Temp 36.6 °C (97.9 °F) (Temporal)   Resp 18   Ht 1.803 m (5' 11\")   Wt 77.1 kg (170 lb)   SpO2 93%       Patient seen and examined  No acute distress  Breathing non labored  RRR  Right shoulder vac dressing CDI without leak, CDI, moves all toes, cap refill <2 sec.      Recent Labs     01/24/22  0822 01/25/22  0521   WBC 10.1 8.1   RBC 3.84* 3.58*   HEMOGLOBIN 10.7* 10.2*   HEMATOCRIT 32.4* 29.8*   MCV 84.4 83.2   MCH 27.9 28.5   MCHC 33.0* 34.2   RDW 51.5* 50.8*   PLATELETCT 203 226   MPV 9.4 9.2       Active Hospital Problems    Diagnosis    • Pyogenic arthritis of right shoulder region (HCC) [M00.9]    • Hyponatremia [E87.1]    • MSSA bacteremia [R78.81, B95.61]    • COVID-19 [U07.1]    • Spinal stenosis at L4-L5 level [M48.061]    • Hypertension [I10]        Assessment/Plan:  Patient doing well  To IR for hip tap today  R shoulder cultures negative- no further intervention by ortho as long as remain neg  Vac in place without leak  POD#1 S/P   1.  Irrigation and debridement, right glenohumeral joint.  2.  Multiple cultures.  3.  Application of wound VAC.  Wt bearing status - WBAT  Wound care/Drains - Vac dressings to be left in place  Future Procedures - none planned   Sutures/Staples out- 14 days post operatively  PT/OT-initiated  Antibiotics: ancef 2g IVQ8  DVT Prophylaxis- TEDS/SCDs/Foot pumps  Gunter-none  Case Coordination for Discharge Planning - Disposition pending abx needs    "

## 2022-01-27 NOTE — CARE PLAN
Problem: Pain - Standard  Goal: Alleviation of pain or a reduction in pain to the patient’s comfort goal  Outcome: Progressing  Note: Patient c/o pain at rt shoulder with good relief from prn pain med, wound vac on, intact with minimal output.     Problem: Fall Risk  Goal: Patient will remain free from falls  Outcome: Progressing  Note: Safety and fall precautions.   The patient is Stable - Low risk of patient condition declining or worsening    Shift Goals  Clinical Goals: comfort, safety  Patient Goals: rest  Family Goals: n/a    Progress made toward(s) clinical / shift goals:  Patient sleeping most of the shift, prn oxycodone given for pain with relief, wound vac intact, voiding.    Patient is not progressing towards the following goals:

## 2022-01-27 NOTE — PROGRESS NOTES
Param from Lab called with critical result of + blood culture gram + cocci possible staph at 1812. Critical lab result read back to Param.   Dr. Carrizales notified of critical lab result at 1814.  Critical lab result read back by Dr. Carrizales.

## 2022-01-27 NOTE — OR SURGEON
Immediate Post- Operative Note        Findings: Suspected L hip effusion. Bowel containing L inguinal hernia overlying L femoral head/neck.       Procedure(s): Unable to perform hip aspiration due to overlying bowel from large L inguinal hernia.       Estimated Blood Loss: NA        Complications: None            1/26/2022     4:07 PM     Robert Craig M.D.

## 2022-01-27 NOTE — WOUND TEAM
Wound vac applied in OR by Dr. Aldridge on 1/25/22. This RN contacted Dr. Aldridge. Per MD yung to begin vac changes on Friday 1/28/22. Orders updated. First vac change on Friday by wound team.

## 2022-01-27 NOTE — PROGRESS NOTES
Pt back from IR procedure. Denies any pain at this time. No further needs, hourly rounding in progress,

## 2022-01-27 NOTE — PROGRESS NOTES
[facility] Medicine Daily Progress Note    Date of Service  1/27/2022    Chief Complaint  Yunier Denney is a 77 y.o. male admitted 1/22/2022 with back, leg pain.    Hospital Course  A 77-year-old man with h/o epidural abscess (follows with Dr. Herron) presented with right leg pain, knee pain, viral-like symptoms including nausea, chills, malaise and fatigue. Patient reports he was diagnosed with COVID-19 on 1/20.   Patient has elevated ESR at 47 and CRP of 198.9.  Blood cultures x2 returned positive for staph aureus. Patient hospitalized for IV Unasyn.      Interval Problem Update  1/23: Patient reports lower back pain, right shoulder, left hip pain, chills. Had fever 38.1, hemodynamically stable. On room air.  Blood cultures from 1/20 and 1/22 positive for S.aureus. Repeat blood cultures positive for S.aureus.  ID consulted.   Will likely require antibiosis through 4 weeks from most recent negative blood cultures, at this point that would be 3/2/2022.  Continue to monitor.  Synovial culture is pending at this time.  May impact duration of therapy.  CXR showed borderline cardiomegaly.  CT right shoulder, left hip ordered    1/26: To IR Today    I have personally seen and examined the patient at bedside. I discussed the plan of care with patient and bedside RN.    Consultants/Specialty  infectious disease, orthopedics and IR    Code Status  Full Code    Disposition  Patient is not medically cleared for discharge.   Anticipate discharge to TBD.  I have placed the appropriate orders for post-discharge needs.    Review of Systems  Constitutional: Positive for chills, fever and malaise/fatigue.   HENT: Negative.    Eyes: Negative.    Respiratory: Negative for cough and shortness of breath.    Cardiovascular: Negative for chest pain and leg swelling.   Gastrointestinal: Negative for nausea and vomiting.   Genitourinary: Negative for dysuria.   Musculoskeletal: Positive for right shoulder, left hip pain.   Skin: Negative for  rash.   Neurological: Positive for weakness.     Physical Exam  Temp:  [36.4 °C (97.5 °F)-38.1 °C (100.5 °F)] 38.1 °C (100.5 °F)  Pulse:  [60-73] 73  Resp:  [18-20] 20  BP: (111-153)/(63-76) 140/76  SpO2:  [93 %-97 %] 93 %    Physical Exam  Vitals and nursing note reviewed.   Constitutional:       Appearance: He is ill-appearing.   HENT:      Head: Normocephalic and atraumatic.      Nose: Nose normal.      Mouth/Throat:      Mouth: Mucous membranes are moist.      Pharynx: Oropharynx is clear.   Eyes:      Pupils: Pupils are equal, round, and reactive to light.   Cardiovascular:      Rate and Rhythm: Normal rate and regular rhythm.   Pulmonary:      Effort: Pulmonary effort is normal. No respiratory distress.      Breath sounds: No wheezing or rales.   Abdominal:      General: Abdomen is flat. Bowel sounds are normal. There is no distension.      Tenderness: There is no abdominal tenderness. There is no guarding.      Hernia: A hernia (left inguinal, reduciable, non-tender) is present.   Musculoskeletal:         General: right shoulder swollen, tender; left hip tender     Cervical back: Normal range of motion.      Right lower leg: No edema.      Left lower leg: No edema.   Skin:     General: Skin is warm.   Neurological:      General: No focal deficit present.      Mental Status: He is alert and oriented to person, place, and time.     Fluids    Intake/Output Summary (Last 24 hours) at 1/27/2022 1503  Last data filed at 1/27/2022 1315  Gross per 24 hour   Intake --   Output 1750 ml   Net -1750 ml       Laboratory  Recent Labs     01/25/22  0521   WBC 8.1   RBC 3.58*   HEMOGLOBIN 10.2*   HEMATOCRIT 29.8*   MCV 83.2   MCH 28.5   MCHC 34.2   RDW 50.8*   PLATELETCT 226   MPV 9.2     Recent Labs     01/25/22  0521   SODIUM 133*   POTASSIUM 3.8   CHLORIDE 100   CO2 22   GLUCOSE 102*   BUN 31*   CREATININE 0.86   CALCIUM 8.2*                   Imaging  CT-HIP WITH PLUS RECONS LEFT   Final Result         1. Small left hip  effusion, which may be sterile or infected.   2. No intramuscular subcutaneous fluid collections. No evidence of osteomyelitis.   3. Large, 13 cm left inguinal hernia containing nonobstructed sigmoid colon.      CT-SHOULDER WITH PLUS RECONS RIGHT   Final Result         1. Loculated small to moderate glenohumeral joint effusion. It may be infected and represent septic arthritis.   2. Several small intramuscular abscesses in the subscapularis and anterior head of the deltoid.   3. No evidence of osteomyelitis.   4. No fracture or dislocation.   5 Partially visualized small right pleural effusion and associated atelectasis.      EC-ECHOCARDIOGRAM LTD W/O CONT   Final Result      DX-CHEST-PORTABLE (1 VIEW)   Final Result      Borderline cardiomegaly.      DX-ABORTED DX PROCEDURE    (Results Pending)        Assessment/Plan  * Pyogenic arthritis of right shoulder region (HCC)  Assessment & Plan  CT showed loculated small to moderate right glenohumeral joint effusion; it may be infected and represent septic arthritis; several small intramuscular abscesses in the subscapularis and anterior head of the deltoid; partially visualized small right pleural effusion and associated atelectasis.  CT showed small left hip effusion, which may be sterile or infected.  Orthopedic surgery consulted.  IR consulted for drainage.    Hyponatremia  Assessment & Plan  Mild   Monitor     Spinal stenosis at L4-L5 level  Assessment & Plan  MRI showing severe L4 stenosis  Pain control  Consider neurosurgery consult    COVID-19  Assessment & Plan  Patient is currently not requiring oxygen hold steroids  Continue with enhanced precautions  Placed on oxygen with SPO2 below 90%    MSSA bacteremia- (present on admission)  Assessment & Plan  Patient has blood cultures x 2 positive for staph aureus that reflected at [facility] when he visited ER from 1/20/2022.  Echo showed normal left ventricular systolic function; mild aortic insufficiency.   CT  showed loculated small to moderate right glenohumeral joint effusion; it may be infected and represent septic arthritis; several small intramuscular abscesses in the subscapularis and anterior head of the deltoid; partially visualized small right pleural effusion and associated atelectasis.  CT showed small left hip effusion, which may be sterile or infected.  Orthopedic surgery consulted.  IR consulted for drainage.  Blood cultures from 1/20 and 1/22 positive  Blood cultures form 1/23 negative to date.    Hypertension- (present on admission)  Assessment & Plan  Blood pressures currently very well.  We will hold BP meds at this time.       VTE prophylaxis: enoxaparin ppx    I have performed a physical exam and reviewed and updated ROS and Plan today (1/27/2022). In review of yesterday's note (1/26/2022), there are no changes except as documented above.

## 2022-01-28 LAB
ANION GAP SERPL CALC-SCNC: 10 MMOL/L (ref 7–16)
BACTERIA FLD AEROBE CULT: NORMAL
BACTERIA TISS AEROBE CULT: NORMAL
BACTERIA WND AEROBE CULT: NORMAL
BACTERIA WND AEROBE CULT: NORMAL
BUN SERPL-MCNC: 20 MG/DL (ref 8–22)
CALCIUM SERPL-MCNC: 7.9 MG/DL (ref 8.5–10.5)
CHLORIDE SERPL-SCNC: 103 MMOL/L (ref 96–112)
CO2 SERPL-SCNC: 21 MMOL/L (ref 20–33)
CREAT SERPL-MCNC: 0.64 MG/DL (ref 0.5–1.4)
ERYTHROCYTE [DISTWIDTH] IN BLOOD BY AUTOMATED COUNT: 50.3 FL (ref 35.9–50)
ERYTHROCYTE [SEDIMENTATION RATE] IN BLOOD BY WESTERGREN METHOD: 65 MM/HOUR (ref 0–20)
GLUCOSE SERPL-MCNC: 110 MG/DL (ref 65–99)
GRAM STN SPEC: NORMAL
HCT VFR BLD AUTO: 30.1 % (ref 42–52)
HGB BLD-MCNC: 9.9 G/DL (ref 14–18)
MCH RBC QN AUTO: 27.4 PG (ref 27–33)
MCHC RBC AUTO-ENTMCNC: 32.9 G/DL (ref 33.7–35.3)
MCV RBC AUTO: 83.4 FL (ref 81.4–97.8)
PLATELET # BLD AUTO: 290 K/UL (ref 164–446)
PMV BLD AUTO: 9.2 FL (ref 9–12.9)
POTASSIUM SERPL-SCNC: 4.1 MMOL/L (ref 3.6–5.5)
RBC # BLD AUTO: 3.61 M/UL (ref 4.7–6.1)
SIGNIFICANT IND 70042: NORMAL
SITE SITE: NORMAL
SODIUM SERPL-SCNC: 134 MMOL/L (ref 135–145)
SOURCE SOURCE: NORMAL
WBC # BLD AUTO: 7.5 K/UL (ref 4.8–10.8)

## 2022-01-28 PROCEDURE — 700101 HCHG RX REV CODE 250: Performed by: HOSPITALIST

## 2022-01-28 PROCEDURE — 770001 HCHG ROOM/CARE - MED/SURG/GYN PRIV*

## 2022-01-28 PROCEDURE — 87040 BLOOD CULTURE FOR BACTERIA: CPT | Mod: 91

## 2022-01-28 PROCEDURE — 80048 BASIC METABOLIC PNL TOTAL CA: CPT

## 2022-01-28 PROCEDURE — 99233 SBSQ HOSP IP/OBS HIGH 50: CPT | Performed by: INTERNAL MEDICINE

## 2022-01-28 PROCEDURE — 87077 CULTURE AEROBIC IDENTIFY: CPT

## 2022-01-28 PROCEDURE — 87186 SC STD MICRODIL/AGAR DIL: CPT

## 2022-01-28 PROCEDURE — 36415 COLL VENOUS BLD VENIPUNCTURE: CPT

## 2022-01-28 PROCEDURE — 85652 RBC SED RATE AUTOMATED: CPT

## 2022-01-28 PROCEDURE — 700102 HCHG RX REV CODE 250 W/ 637 OVERRIDE(OP): Performed by: HOSPITALIST

## 2022-01-28 PROCEDURE — 700111 HCHG RX REV CODE 636 W/ 250 OVERRIDE (IP): Performed by: INTERNAL MEDICINE

## 2022-01-28 PROCEDURE — 302098 PASTE RING (FLAT): Performed by: HOSPITALIST

## 2022-01-28 PROCEDURE — 99233 SBSQ HOSP IP/OBS HIGH 50: CPT | Performed by: HOSPITALIST

## 2022-01-28 PROCEDURE — 85027 COMPLETE CBC AUTOMATED: CPT

## 2022-01-28 PROCEDURE — 700111 HCHG RX REV CODE 636 W/ 250 OVERRIDE (IP): Performed by: HOSPITALIST

## 2022-01-28 PROCEDURE — A9270 NON-COVERED ITEM OR SERVICE: HCPCS | Performed by: HOSPITALIST

## 2022-01-28 RX ADMIN — WATER 2 G: 100 INJECTION, SOLUTION INTRAVENOUS at 16:02

## 2022-01-28 RX ADMIN — OXYCODONE HYDROCHLORIDE 10 MG: 10 TABLET ORAL at 20:06

## 2022-01-28 RX ADMIN — WATER 2 G: 100 INJECTION, SOLUTION INTRAVENOUS at 05:14

## 2022-01-28 RX ADMIN — OXYCODONE HYDROCHLORIDE 10 MG: 10 TABLET ORAL at 13:24

## 2022-01-28 RX ADMIN — WATER 2 G: 100 INJECTION, SOLUTION INTRAVENOUS at 21:30

## 2022-01-28 RX ADMIN — OXYCODONE HYDROCHLORIDE 10 MG: 10 TABLET ORAL at 09:14

## 2022-01-28 RX ADMIN — OXYCODONE HYDROCHLORIDE 10 MG: 10 TABLET ORAL at 05:14

## 2022-01-28 ASSESSMENT — ENCOUNTER SYMPTOMS
FEVER: 0
SHORTNESS OF BREATH: 0

## 2022-01-28 ASSESSMENT — PAIN DESCRIPTION - PAIN TYPE
TYPE: ACUTE PAIN

## 2022-01-28 NOTE — WOUND TEAM
[facility]  Inpatient Services  Initial Wound and Skin Care Evaluation    Admission Date: 1/22/2022     No order of IP CONSULT TO WOUND CARE is found.     HPI, PMH, SH: Reviewed    Past Surgical History:   Procedure Laterality Date   • IRRIGATION & DEBRIDEMENT GENERAL Right 1/25/2022    Procedure: IRRIGATION AND DEBRIDEMENT, WOUND  , MULTIPLE CULTURES;  Surgeon: Larry Aldridge M.D.;  Location: SURGERY [facility];  Service: Orthopedics   • APPLICATION OR REPLACEMENT, WOUND VAC Right 1/25/2022    Procedure: APPLICATION WOUND VAC;  Surgeon: Larry Aldridge M.D.;  Location: SURGERY [facility];  Service: Orthopedics   • LUMBAR LAMINECTOMY DISKECTOMY  4/29/2018    Procedure: LUMBAR LAMINECTOMY Y L4-S1;  Surgeon: Fercho Herron M.D.;  Location: SURGERY [facility];  Service: Neurosurgery   • IRRIGATION & DEBRIDEMENT NEURO  4/29/2018    Procedure: IRRIGATION & DEBRIDEMENT NEURO- epidural mass;  Surgeon: Fercho Herron M.D.;  Location: SURGERY [facility];  Service: Neurosurgery     Social History     Tobacco Use   • Smoking status: Never Smoker   • Smokeless tobacco: Never Used   Substance Use Topics   • Alcohol use: Yes     Chief Complaint   Patient presents with   • Back Pain     x2 days, right lower back. Patient injured his back while working on a car. Patient reports he had an MRI 2 days ago that didn't show anything.    • Shoulder Pain     x 2 days, right   • Knee Pain     x 2 days , left   • Failure to Thrive     Patient lives with his son who takes care of him. Patient's son reports that the patient is unable to get out of bed and he is having trouble taking care of him.    • Flu Like Symptoms     covid+ 1/20      Diagnosis: Positive blood cultures [R78.81]    Unit where seen by Wound Team: S141/00     WOUND CONSULT/FOLLOW UP RELATED TO:  I&D of R shoulder with WV placement.     WOUND HISTORY:  77y.o male admitted for COVID-19 and back pain. Diagnosed with R shoulder joint pyarthrosis  and underwent I&D of R glenohumeral joint with W[place] application on 1/25 with Dr. Aldridge. Pertinent history of HTN. Pertinent home med of medrol.    WOUND ASSESSMENT/LDA    Negative Pressure Wound Therapy 01/25/22 Surgical Shoulder Anterior Right (Active)   NPWT Pump Mode / Pressure Setting Ulta;Continuous;125 mmHg    Dressing Type Black Foam (Veraflo)    Number of Foam Pieces Used 2    Canister Changed No    NEXT Dressing Change/Treatment Date 01/31/22    VAC VeraFlo Irrigant Normal Saline    VAC VeraFlo Soak Time (mins) 10    VAC VeraFlo Instill Volume (ml) 10    VAC VeraFlo - Therapy Time (hrs) 3.5    VAC VeraFlo Pressure (mm/Hg) Continuous;125 mmHg    WOUND NURSE ONLY - Time Spent with Patient (mins) 45            Wound 01/25/22 Incision Shoulder Right WOUND VAC (Active)   Wound Image      Site Assessment DENISSE    Periwound Assessment Red;Pink    Margins Unattached edges    Closure Secondary intention    Drainage Amount Small    Drainage Description Serosanguineous    Treatments Cleansed;Site care;Topical Lidocaine    Wound Cleansing Approved Wound Cleanser    Periwound Protectant Skin Protectant Wipes to Periwound;Drape    Dressing Cleansing/Solutions Normal Saline    Dressing Options Wound Vac    Dressing Changed Observed    Dressing Status Clean;Dry;Intact    Dressing Change/Treatment Frequency Monday, Wednesday, Friday, and As Needed    NEXT Dressing Change/Treatment Date 01/31/22    NEXT Weekly Photo (Inpatient Only) 02/04/22    Number of Staples Removed 4    Wound Length (cm) 8.9 cm    Wound Width (cm) 2.9 cm    Wound Depth (cm) 1 cm    Wound Surface Area (cm^2) 25.81 cm^2    Wound Volume (cm^3) 25.81 cm^3    Undermining (cm) 0.5 cm    Undermining of Wound, 1st Location From 12 o'clock;To 2 o'clock    Undermining (cm) - 2nd location 1 cm    Undermining of Wound, 2nd Location From 9 o'clock;To 11 o'clock    Shape Oval    Wound Odor None    Pulses N/A    Exposed Structures Muscle;Adipose    WOUND NURSE ONLY - Time  Spent with Patient (mins) 45        Vascular:    ISABEL:   No results found.    Lab Values:    Lab Results   Component Value Date/Time    WBC 7.5 01/28/2022 12:51 AM    RBC 3.61 (L) 01/28/2022 12:51 AM    HEMOGLOBIN 9.9 (L) 01/28/2022 12:51 AM    HEMATOCRIT 30.1 (L) 01/28/2022 12:51 AM    CREACTPROT 22.06 (H) 01/25/2022 10:11 AM    SEDRATEWES 65 (H) 01/28/2022 12:51 AM        Culture Results show:  Recent Results (from the past 720 hour(s))   CULTURE WOUND W/ GRAM STAIN    Collection Time: 01/25/22  3:52 PM    Specimen: Wound   Result Value Ref Range    Significant Indicator NEG     Source WND     Site Right shoulder     Culture Result No growth at 72 hours.     Gram Stain Result Few WBCs.  No organisms seen.          Pain Level/Medicated: PO oxycodone, topical lidocaine    INTERVENTIONS BY WOUND TEAM:  Chart and images reviewed. Discussed with bedside RN. All areas of concern (based on picture review, LDA review and discussion with bedside RN) have been thoroughly assessed. Documentation of areas based on significant findings. This RN in to assess patient. Performed standard wound care which includes appropriate positioning, dressing removal and non-selective debridement. Pictures and measurements obtained weekly if/when required.  Preparation for Dressing removal: Dressing soaked with NS  Non-selectively Debrided with:  cleanser and gauze.  Sharp debridement: N/A  Triny wound: Cleansed with cleanser, Prepped with drape and no sting barrier  Primary Dressing: Two half thick pieces of veraflo black foam  Secondary (Outer) Dressing: Button, drape, and trackpad    Interdisciplinary consultation: Patient, Bedside RN, Wound RN (Bessy)    EVALUATION / RATIONALE FOR TREATMENT:  Most Recent Date:  1/28/22: Muscle and adipose tissue exposed. Undermining noted from 9-11 o'clock and 12-2 o'clock. Wound bed clean with scant serosang drainage. Veraflo initiated to keep underlying structures moist and to mechanically debride wound  bed. Patient sleeping for entirety of procedure- recommend PO pre-med only. Recommend ongoing NPWT and F/U with outpatient wound vs home health if patient continues to refuse SNF placement.     Goals: Steady decrease in wound area and depth weekly.    WOUND TEAM PLAN OF CARE ([X] for frequency of wound follow up,):  Nursing to follow orders written for wound care. Contact wound team if area fails to progress, deteriorates or with any questions/concerns  Dressing changes by wound team:                   Follow up 3 times weekly:                NPWT change 3 times weekly:   X  Follow up 1-2 times weekly:      Follow up Bi-Monthly:                   Follow up as needed:     Other (explain):     NURSING PLAN OF CARE ORDERS (X):  Dressing changes: See Dressing Care orders: X  Skin care: See Skin Care orders: X  RN Prevention Protocol: X  Rectal tube care: See Rectal Tube Care orders:   Other orders:    RSKIN:   CURRENTLY IN PLACE (X), APPLIED THIS VISIT (A), ORDERED (O):   Q shift Jensen:  X  Q shift pressure point assessments:  X    Surface/Positioning   Pressure redistribution mattress X           Low Airloss          Bariatric foam      Bariatric SAM     Waffle cushion        Waffle Overlay      X    Reposition q 2 hours      TAPs Turning system     Z Ash Pillow     Offloading/Redistribution   Sacral Mepilex (Silicone dressing)     Heel Mepilex (Silicone dressing)         Heel float boots (Prevalon boot)             Float Heels off Bed with Pillows           Respiratory   Silicone O2 tubing         Gray Foam Ear protectors     Cannula fixation Device (Tender )          High flow offloading Clip    Elastic head band offloading device      Anchorfast                                                         Trach with Optifoam split foam             Containment/Moisture Prevention     Rectal tube or BMS    Purwick/Condom Cath        Gunter Catheter    Barrier wipes           Barrier paste       Antifungal tx       Interdry      Urinal X     Mobilization   Up to chair        Ambulate      PT/OT  X    Nutrition   Dietician        Diabetes Education      PO X     TF     TPN     NPO   # days     Anticipated discharge plans:   LTACH:        SNF/Rehab:                  Home Health Care:    X    Vs SNF  Outpatient Wound Center:            Self/Family Care:        Other:                  Vac Discharge Needs:  Not Applicable Pt not on a wound vac:       Regular Vac while inpatient, alternative dressing at DC:        Regular Vac in use and continued at DC:            Reg. Vac w/ Skin Sub/Biologic in use. Will need to be changed 2x wkly:      Veraflo Vac while inpatient, ok to transition to Regular Vac on Discharge:      X     Veraflo Vac while inpatient, will need to remain on Veraflo Vac upon discharge:

## 2022-01-28 NOTE — PROGRESS NOTES
Report received. Assumed care. Pt in bed awake. A/O x 4. VSS. Responds appropriately. Denies pain, denies SOB on RA. COVID-19 isolation precautions in place. Wound vac to R shoulder in place. Assessment complete. Discussed POC, pt verbalizes understanding. Explained importance of calling before getting OOB. Call light and belongings within reach. Bed in the lowest position. Treaded socks in place. Hourly rounding in progress. Will continue to monitor.

## 2022-01-28 NOTE — PROGRESS NOTES
Lab called with critical result of positive blood cultures. Notified MD. No new orders received. Will continue to monitor.

## 2022-01-28 NOTE — DISCHARGE PLANNING
Anticipated Discharge Disposition:   SNF vs Home with Home Health, wound vac    Action:   Discussed discharge planning needs during rounds. PT/OT recommended post acute placement. Wound vac in place. Per MD, pending culture results.     RN VALORIE spoke to pt, Discussed discharge planning needs, placement. Pt refused placement, saying he wants to go home with his son when he is medically cleared. Pt refused for RN VALORIE to call his son to discuss discharge planning. MD and bedside RN updated via Voalte.    Per MD, pt is not medically cleared at this time. If pt still continues to refuse placement, pt will need home wound vac to be set up, home health, and PCP.     Barriers to Discharge:   Medical clearance.  Pt refusing placement.  Home wound vac set up as needed.     Plan:   Hospital Care Management will continue to follow and assist with discharge planning needs.

## 2022-01-28 NOTE — PROGRESS NOTES
Hospital Medicine Daily Progress Note    Date of Service  1/28/2022    Chief Complaint  Yunier Denney is a 77 y.o. male admitted 1/22/2022 with back, leg pain.    Hospital Course  A 77-year-old man with h/o epidural abscess (follows with Dr. Herron) presented with right leg pain, knee pain, viral-like symptoms including nausea, chills, malaise and fatigue. Patient reports he was diagnosed with COVID-19 on 1/20.   Patient has elevated ESR at 47 and CRP of 198.9.  Blood cultures x2 returned positive for staph aureus. Patient hospitalized for IV Unasyn.      Interval Problem Update  1/23: Patient reports lower back pain, right shoulder, left hip pain, chills. Had fever 38.1, hemodynamically stable. On room air.  Blood cultures from 1/20 and 1/22 positive for S.aureus. Repeat blood cultures positive for S.aureus.  ID consulted.   Will likely require antibiosis through 4 weeks from most recent negative blood cultures, at this point that would be 3/2/2022.  Continue to monitor.  Synovial culture is pending at this time.  May impact duration of therapy.  CXR showed borderline cardiomegaly.  CT right shoulder, left hip ordered    1/26: To IR Today  1/27: No acute overnight events.  Unable to aspirate hip joint secondary to massive hernia.  1/28: Blood cultures from 1/26 have returned positive today, repeat screening cultures will be obtained today.  Clinically stable, no acute overnight events.  Case discussed with orthopedics.    I have personally seen and examined the patient at bedside. I discussed the plan of care with patient and bedside RN.    Consultants/Specialty  infectious disease, orthopedics and IR    Code Status  Full Code    Disposition  Patient is not medically cleared for discharge.   Anticipate discharge to TBD.  I have placed the appropriate orders for post-discharge needs.    Review of Systems  Constitutional: Positive for chills, fever and malaise/fatigue.   HENT: Negative.    Eyes: Negative.     Respiratory: Negative for cough and shortness of breath.    Cardiovascular: Negative for chest pain and leg swelling.   Gastrointestinal: Negative for nausea and vomiting.   Genitourinary: Negative for dysuria.   Musculoskeletal: Positive for right shoulder, left hip pain.   Skin: Negative for rash.   Neurological: Positive for weakness.     Physical Exam  Temp:  [36.9 °C (98.5 °F)-37.4 °C (99.3 °F)] 37.3 °C (99.1 °F)  Pulse:  [63-69] 69  Resp:  [18] 18  BP: (133-147)/(68-80) 133/75  SpO2:  [92 %-95 %] 92 %    Physical Exam  Vitals and nursing note reviewed.   Constitutional:       Appearance: He is ill-appearing.   HENT:      Head: Normocephalic and atraumatic.      Nose: Nose normal.      Mouth/Throat:      Mouth: Mucous membranes are moist.      Pharynx: Oropharynx is clear.   Eyes:      Pupils: Pupils are equal, round, and reactive to light.   Cardiovascular:      Rate and Rhythm: Normal rate and regular rhythm.   Pulmonary:      Effort: Pulmonary effort is normal. No respiratory distress.      Breath sounds: No wheezing or rales.   Abdominal:      General: Abdomen is flat. Bowel sounds are normal. There is no distension.      Tenderness: There is no abdominal tenderness. There is no guarding.      Hernia: A hernia (left inguinal, reduciable, non-tender) is present.   Musculoskeletal:         General: right shoulder swollen, tender; left hip tender     Cervical back: Normal range of motion.      Right lower leg: No edema.      Left lower leg: No edema.   Skin:     General: Skin is warm.   Neurological:      General: No focal deficit present.      Mental Status: He is alert and oriented to person, place, and time.     Fluids    Intake/Output Summary (Last 24 hours) at 1/28/2022 1448  Last data filed at 1/28/2022 0914  Gross per 24 hour   Intake 300 ml   Output 1400 ml   Net -1100 ml       Laboratory  Recent Labs     01/28/22  0051   WBC 7.5   RBC 3.61*   HEMOGLOBIN 9.9*   HEMATOCRIT 30.1*   MCV 83.4   MCH 27.4    MCHC 32.9*   RDW 50.3*   PLATELETCT 290   MPV 9.2     Recent Labs     01/28/22  0051   SODIUM 134*   POTASSIUM 4.1   CHLORIDE 103   CO2 21   GLUCOSE 110*   BUN 20   CREATININE 0.64   CALCIUM 7.9*                   Imaging  DX-ABORTED DX PROCEDURE   Final Result      Aborted fluoroscopic guided left hip aspiration due to overlying bowel contained within the inguinal hernia. Findings were discussed with Dr. Cespedes. If necessary, joint aspiration could be performed under CT guidance.      CT-HIP WITH PLUS RECONS LEFT   Final Result         1. Small left hip effusion, which may be sterile or infected.   2. No intramuscular subcutaneous fluid collections. No evidence of osteomyelitis.   3. Large, 13 cm left inguinal hernia containing nonobstructed sigmoid colon.      CT-SHOULDER WITH PLUS RECONS RIGHT   Final Result         1. Loculated small to moderate glenohumeral joint effusion. It may be infected and represent septic arthritis.   2. Several small intramuscular abscesses in the subscapularis and anterior head of the deltoid.   3. No evidence of osteomyelitis.   4. No fracture or dislocation.   5 Partially visualized small right pleural effusion and associated atelectasis.      EC-ECHOCARDIOGRAM LTD W/O CONT   Final Result      DX-CHEST-PORTABLE (1 VIEW)   Final Result      Borderline cardiomegaly.           Assessment/Plan  * Pyogenic arthritis of right shoulder region (HCC)  Assessment & Plan  CT showed loculated small to moderate right glenohumeral joint effusion; it may be infected and represent septic arthritis; several small intramuscular abscesses in the subscapularis and anterior head of the deltoid; partially visualized small right pleural effusion and associated atelectasis.  CT showed small left hip effusion, which may be sterile or infected.  Orthopedic surgery consulted.  IR consulted for drainage.    Hyponatremia  Assessment & Plan  Mild   Monitor     Spinal stenosis at L4-L5 level  Assessment &  Plan  MRI showing severe L4 stenosis  Pain control  Consider neurosurgery consult    COVID-19  Assessment & Plan  Patient is currently not requiring oxygen hold steroids  Continue with enhanced precautions  Placed on oxygen with SPO2 below 90%    MSSA bacteremia- (present on admission)  Assessment & Plan  Patient has blood cultures x 2 positive for staph aureus that reflected at [facility] when he visited ER from 1/20/2022.  Echo showed normal left ventricular systolic function; mild aortic insufficiency.   CT showed loculated small to moderate right glenohumeral joint effusion; it may be infected and represent septic arthritis; several small intramuscular abscesses in the subscapularis and anterior head of the deltoid; partially visualized small right pleural effusion and associated atelectasis.  CT showed small left hip effusion, which may be sterile or infected.  Orthopedic surgery consulted.  IR consulted for drainage.  Blood cultures from 1/20 and 1/22 positive  Blood cultures form 1/23 negative to date.    Hypertension- (present on admission)  Assessment & Plan  Blood pressures currently very well.  We will hold BP meds at this time.       VTE prophylaxis: enoxaparin ppx    I have performed a physical exam and reviewed and updated ROS and Plan today (1/28/2022). In review of yesterday's note (1/27/2022), there are no changes except as documented above.
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
## 2022-01-28 NOTE — CARE PLAN
The patient is Watcher - Medium risk of patient condition declining or worsening    Shift Goals  Clinical Goals: pain control, rest, safety   Patient Goals: rest  Family Goals: n/a    Progress made toward(s) clinical / shift goals:    Problem: Pain - Standard  Goal: Alleviation of pain or a reduction in pain to the patient’s comfort goal  Outcome: Progressing     Problem: Knowledge Deficit - Standard  Goal: Patient and family/care givers will demonstrate understanding of plan of care, disease process/condition, diagnostic tests and medications  Outcome: Progressing     Problem: Fall Risk  Goal: Patient will remain free from falls  Outcome: Progressing       Patient is not progressing towards the following goals:

## 2022-01-28 NOTE — CARE PLAN
The patient is Watcher - Medium risk of patient condition declining or worsening    Shift Goals  Clinical Goals: pain control, remain free from falls  Patient Goals: rest and move comfortably   Family Goals: n/a    Progress made toward(s) clinical / shift goals:  all      Patient is not progressing towards the following goals:

## 2022-01-28 NOTE — PROGRESS NOTES
"   Orthopaedic Progress Note    Interval changes:  Patient doing well  Cultures form right shoulder with NGTD at 48hrs  Vac in place without leak    ROS - Patient denies any new issues.  Pain well controlled.    /75   Pulse 69   Temp 37.3 °C (99.1 °F) (Temporal)   Resp 18   Ht 1.803 m (5' 11\")   Wt 77.1 kg (170 lb)   SpO2 92%       Patient seen and examined  No acute distress  Breathing non labored  RRR  Right shoulder vac dressing CDI without leak, CDI, moves all toes, cap refill <2 sec.      Recent Labs     01/28/22  0051   WBC 7.5   RBC 3.61*   HEMOGLOBIN 9.9*   HEMATOCRIT 30.1*   MCV 83.4   MCH 27.4   MCHC 32.9*   RDW 50.3*   PLATELETCT 290   MPV 9.2       Active Hospital Problems    Diagnosis    • Pyogenic arthritis of right shoulder region (HCC) [M00.9]    • Hyponatremia [E87.1]    • MSSA bacteremia [R78.81, B95.61]    • COVID-19 [U07.1]    • Spinal stenosis at L4-L5 level [M48.061]    • Hypertension [I10]        Assessment/Plan:  Patient doing well  Cultures form right shoulder with NGTD at 48hrs  POD#2 S/P   1.  Irrigation and debridement, right glenohumeral joint.  2.  Multiple cultures.  3.  Application of wound VAC.  Wt bearing status - WBAT  Wound care/Drains - Vac dressings to be left in place  Future Procedures - none planned   Sutures/Staples out- 14 days post operatively  PT/OT-initiated  Antibiotics: ancef 2g IVQ8  DVT Prophylaxis- TEDS/SCDs/Foot pumps  Gunter-none  Case Coordination for Discharge Planning - Disposition pending abx needs      I have performed a physical exam and reviewed and updated ROS and Plan today (1/27). In review of yesterday's note (1/26), there are no changes except as documented above.   "

## 2022-01-29 ENCOUNTER — APPOINTMENT (OUTPATIENT)
Dept: RADIOLOGY | Facility: MEDICAL CENTER | Age: 77
DRG: 548 | End: 2022-01-29
Attending: HOSPITALIST
Payer: MEDICARE

## 2022-01-29 LAB
BACTERIA BLD CULT: ABNORMAL
BACTERIA BLD CULT: NORMAL
SCCMEC + MECA PNL NOSE NAA+PROBE: NEGATIVE
SIGNIFICANT IND 70042: ABNORMAL
SIGNIFICANT IND 70042: ABNORMAL
SIGNIFICANT IND 70042: NORMAL
SITE SITE: ABNORMAL
SITE SITE: ABNORMAL
SITE SITE: NORMAL
SOURCE SOURCE: ABNORMAL
SOURCE SOURCE: ABNORMAL
SOURCE SOURCE: NORMAL

## 2022-01-29 PROCEDURE — 700111 HCHG RX REV CODE 636 W/ 250 OVERRIDE (IP): Performed by: HOSPITALIST

## 2022-01-29 PROCEDURE — 700102 HCHG RX REV CODE 250 W/ 637 OVERRIDE(OP): Performed by: HOSPITALIST

## 2022-01-29 PROCEDURE — 700117 HCHG RX CONTRAST REV CODE 255: Performed by: HOSPITALIST

## 2022-01-29 PROCEDURE — A9270 NON-COVERED ITEM OR SERVICE: HCPCS | Performed by: HOSPITALIST

## 2022-01-29 PROCEDURE — 99232 SBSQ HOSP IP/OBS MODERATE 35: CPT | Performed by: HOSPITALIST

## 2022-01-29 PROCEDURE — 87641 MR-STAPH DNA AMP PROBE: CPT

## 2022-01-29 PROCEDURE — 97605 NEG PRS WND THER DME<=50SQCM: CPT

## 2022-01-29 PROCEDURE — 99233 SBSQ HOSP IP/OBS HIGH 50: CPT | Performed by: INTERNAL MEDICINE

## 2022-01-29 PROCEDURE — 770001 HCHG ROOM/CARE - MED/SURG/GYN PRIV*

## 2022-01-29 PROCEDURE — 700101 HCHG RX REV CODE 250: Performed by: HOSPITALIST

## 2022-01-29 PROCEDURE — 71260 CT THORAX DX C+: CPT | Mod: MG

## 2022-01-29 RX ORDER — SENNOSIDES A AND B 8.6 MG/1
17.2 TABLET, FILM COATED ORAL
Status: DISCONTINUED | OUTPATIENT
Start: 2022-01-29 | End: 2022-02-10 | Stop reason: HOSPADM

## 2022-01-29 RX ORDER — POLYETHYLENE GLYCOL 3350 17 G/17G
1 POWDER, FOR SOLUTION ORAL DAILY
Status: DISCONTINUED | OUTPATIENT
Start: 2022-01-29 | End: 2022-02-10 | Stop reason: HOSPADM

## 2022-01-29 RX ORDER — DOCUSATE CALCIUM 240 MG
240 CAPSULE ORAL DAILY
Status: DISCONTINUED | OUTPATIENT
Start: 2022-01-29 | End: 2022-02-10 | Stop reason: HOSPADM

## 2022-01-29 RX ADMIN — IOHEXOL 100 ML: 350 INJECTION, SOLUTION INTRAVENOUS at 19:00

## 2022-01-29 RX ADMIN — IOHEXOL 25 ML: 240 INJECTION, SOLUTION INTRATHECAL; INTRAVASCULAR; INTRAVENOUS; ORAL at 19:00

## 2022-01-29 RX ADMIN — OXYCODONE HYDROCHLORIDE 10 MG: 10 TABLET ORAL at 19:16

## 2022-01-29 RX ADMIN — WATER 2 G: 100 INJECTION, SOLUTION INTRAVENOUS at 13:40

## 2022-01-29 RX ADMIN — OXYCODONE HYDROCHLORIDE 10 MG: 10 TABLET ORAL at 04:40

## 2022-01-29 RX ADMIN — WATER 2 G: 100 INJECTION, SOLUTION INTRAVENOUS at 20:14

## 2022-01-29 RX ADMIN — WATER 2 G: 100 INJECTION, SOLUTION INTRAVENOUS at 05:58

## 2022-01-29 RX ADMIN — OXYCODONE HYDROCHLORIDE 10 MG: 10 TABLET ORAL at 11:53

## 2022-01-29 RX ADMIN — DOCUSATE CALCIUM 240 MG: 240 CAPSULE, LIQUID FILLED ORAL at 09:06

## 2022-01-29 RX ADMIN — POLYETHYLENE GLYCOL 3350 1 PACKET: 17 POWDER, FOR SOLUTION ORAL at 09:06

## 2022-01-29 ASSESSMENT — ENCOUNTER SYMPTOMS
FEVER: 0
SHORTNESS OF BREATH: 0

## 2022-01-29 ASSESSMENT — PAIN DESCRIPTION - PAIN TYPE
TYPE: ACUTE PAIN

## 2022-01-29 NOTE — CARE PLAN
The patient is Watcher - Medium risk of patient condition declining or worsening    Shift Goals  Clinical Goals: pain control, safety, rest   Patient Goals: rest  Family Goals: n/a    Progress made toward(s) clinical / shift goals:    Problem: Pain - Standard  Goal: Alleviation of pain or a reduction in pain to the patient’s comfort goal  Outcome: Progressing     Problem: Knowledge Deficit - Standard  Goal: Patient and family/care givers will demonstrate understanding of plan of care, disease process/condition, diagnostic tests and medications  Outcome: Progressing     Problem: Fall Risk  Goal: Patient will remain free from falls  Outcome: Progressing       Patient is not progressing towards the following goals:

## 2022-01-29 NOTE — PROGRESS NOTES
Hospital Medicine Daily Progress Note    Date of Service  1/29/2022    Chief Complaint  Yunier Denney is a 77 y.o. male admitted 1/22/2022 with back, leg pain.    Hospital Course  A 77-year-old man with h/o epidural abscess (follows with Dr. Herron) presented with right leg pain, knee pain, viral-like symptoms including nausea, chills, malaise and fatigue. Patient reports he was diagnosed with COVID-19 on 1/20.   Patient has elevated ESR at 47 and CRP of 198.9.  Blood cultures x2 returned positive for staph aureus. Patient hospitalized for IV Unasyn.      Interval Problem Update  1/23: Patient reports lower back pain, right shoulder, left hip pain, chills. Had fever 38.1, hemodynamically stable. On room air.  Blood cultures from 1/20 and 1/22 positive for S.aureus. Repeat blood cultures positive for S.aureus.  ID consulted.   Will likely require antibiosis through 4 weeks from most recent negative blood cultures, at this point that would be 3/2/2022.  Continue to monitor.  Synovial culture is pending at this time.  May impact duration of therapy.  CXR showed borderline cardiomegaly.  CT right shoulder, left hip ordered    1/26: To IR Today  1/27: No acute overnight events.  Unable to aspirate hip joint secondary to massive hernia.  1/28: Blood cultures from 1/26 have returned positive today, repeat screening cultures will be obtained today.  Clinically stable, no acute overnight events.  Case discussed with orthopedics.  1/29: Screening cultures from 1/28 no growth to date, growth noted and 126 cultures.  Case discussed with infectious disease.  Patient declines IR guided CT-guided hip aspiration.  Is willing to undergo imaging.    I have personally seen and examined the patient at bedside. I discussed the plan of care with patient and bedside RN.    Consultants/Specialty  infectious disease, orthopedics and IR    Code Status  Full Code    Disposition  Patient is not medically cleared for discharge.   Anticipate  discharge to D.  I have placed the appropriate orders for post-discharge needs.    Review of Systems  Constitutional: Positive for chills, fever and malaise/fatigue.   HENT: Negative.    Eyes: Negative.    Respiratory: Negative for cough and shortness of breath.    Cardiovascular: Negative for chest pain and leg swelling.   Gastrointestinal: Negative for nausea and vomiting.   Genitourinary: Negative for dysuria.   Musculoskeletal: Positive for right shoulder, left hip pain.   Skin: Negative for rash.   Neurological: Positive for weakness.     Physical Exam  Temp:  [36.8 °C (98.3 °F)-38 °C (100.4 °F)] 36.8 °C (98.3 °F)  Pulse:  [66-85] 66  Resp:  [16-18] 16  BP: (120-129)/(63-75) 120/74  SpO2:  [91 %-94 %] 93 %    Physical Exam  Vitals and nursing note reviewed.   Constitutional:       Appearance: He is ill-appearing.   HENT:      Head: Normocephalic and atraumatic.      Nose: Nose normal.      Mouth/Throat:      Mouth: Mucous membranes are moist.      Pharynx: Oropharynx is clear.   Eyes:      Pupils: Pupils are equal, round, and reactive to light.   Cardiovascular:      Rate and Rhythm: Normal rate and regular rhythm.   Pulmonary:      Effort: Pulmonary effort is normal. No respiratory distress.      Breath sounds: No wheezing or rales.   Abdominal:      General: Abdomen is flat. Bowel sounds are normal. There is no distension.      Tenderness: There is no abdominal tenderness. There is no guarding.      Hernia: A hernia (left inguinal, reduciable, non-tender) is present.   Musculoskeletal:         General: right shoulder swollen, tender; left hip tender     Cervical back: Normal range of motion.      Right lower leg: No edema.      Left lower leg: No edema.   Skin:     General: Skin is warm.   Neurological:      General: No focal deficit present.      Mental Status: He is alert and oriented to person, place, and time.     Fluids    Intake/Output Summary (Last 24 hours) at 1/29/2022 1257  Last data filed at  1/29/2022 0600  Gross per 24 hour   Intake 240 ml   Output 850 ml   Net -610 ml       Laboratory  Recent Labs     01/28/22  0051   WBC 7.5   RBC 3.61*   HEMOGLOBIN 9.9*   HEMATOCRIT 30.1*   MCV 83.4   MCH 27.4   MCHC 32.9*   RDW 50.3*   PLATELETCT 290   MPV 9.2     Recent Labs     01/28/22  0051   SODIUM 134*   POTASSIUM 4.1   CHLORIDE 103   CO2 21   GLUCOSE 110*   BUN 20   CREATININE 0.64   CALCIUM 7.9*                   Imaging  DX-ABORTED DX PROCEDURE   Final Result      Aborted fluoroscopic guided left hip aspiration due to overlying bowel contained within the inguinal hernia. Findings were discussed with Dr. Cespedes. If necessary, joint aspiration could be performed under CT guidance.      CT-HIP WITH PLUS RECONS LEFT   Final Result         1. Small left hip effusion, which may be sterile or infected.   2. No intramuscular subcutaneous fluid collections. No evidence of osteomyelitis.   3. Large, 13 cm left inguinal hernia containing nonobstructed sigmoid colon.      CT-SHOULDER WITH PLUS RECONS RIGHT   Final Result         1. Loculated small to moderate glenohumeral joint effusion. It may be infected and represent septic arthritis.   2. Several small intramuscular abscesses in the subscapularis and anterior head of the deltoid.   3. No evidence of osteomyelitis.   4. No fracture or dislocation.   5 Partially visualized small right pleural effusion and associated atelectasis.      EC-ECHOCARDIOGRAM LTD W/O CONT   Final Result      DX-CHEST-PORTABLE (1 VIEW)   Final Result      Borderline cardiomegaly.           Assessment/Plan  * Pyogenic arthritis of right shoulder region (HCC)  Assessment & Plan  CT showed loculated small to moderate right glenohumeral joint effusion; it may be infected and represent septic arthritis; several small intramuscular abscesses in the subscapularis and anterior head of the deltoid; partially visualized small right pleural effusion and associated atelectasis.  CT showed small left hip  effusion, which may be sterile or infected.  Orthopedic surgery consulted.  IR consulted for drainage.    Hyponatremia  Assessment & Plan  Mild   Monitor     Spinal stenosis at L4-L5 level  Assessment & Plan  MRI showing severe L4 stenosis  Pain control  Consider neurosurgery consult    COVID-19  Assessment & Plan  Patient is currently not requiring oxygen hold steroids  Continue with enhanced precautions  Placed on oxygen with SPO2 below 90%    MSSA bacteremia- (present on admission)  Assessment & Plan  Patient has blood cultures x 2 positive for staph aureus that reflected at [facility] when he visited ER from 1/20/2022.  Echo showed normal left ventricular systolic function; mild aortic insufficiency.   CT showed loculated small to moderate right glenohumeral joint effusion; it may be infected and represent septic arthritis; several small intramuscular abscesses in the subscapularis and anterior head of the deltoid; partially visualized small right pleural effusion and associated atelectasis.  CT showed small left hip effusion, which may be sterile or infected.  Orthopedic surgery consulted.  IR consulted for drainage.  Blood cultures from 1/20 and 1/22 positive  Blood cultures form 1/23 negative to date.    Hypertension- (present on admission)  Assessment & Plan  Blood pressures currently very well.  We will hold BP meds at this time.       VTE prophylaxis: enoxaparin ppx    I have performed a physical exam and reviewed and updated ROS and Plan today (1/29/2022). In review of yesterday's note (1/28/2022), there are no changes except as documented above.

## 2022-01-29 NOTE — PROGRESS NOTES
Lab called with critical results of positive blood cultures. Notified MD. New orders received. Will continue to monitor.
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
## 2022-01-29 NOTE — PROGRESS NOTES
"   Orthopaedic Progress Note    Interval changes:  Patient doing well  Cultures form right shoulder with NGTD at 72hrs  Since no growth no further ortho intervention planned     ROS - Patient denies any new issues.  Pain well controlled.    /74   Pulse 66   Temp 36.8 °C (98.3 °F) (Temporal)   Resp 16   Ht 1.803 m (5' 11\")   Wt 77.1 kg (170 lb)   SpO2 93%       Patient seen and examined  No acute distress  Breathing non labored  RRR  Right shoulder vac dressing CDI without leak, CDI, moves all toes, cap refill <2 sec.      Recent Labs     01/28/22  0051   WBC 7.5   RBC 3.61*   HEMOGLOBIN 9.9*   HEMATOCRIT 30.1*   MCV 83.4   MCH 27.4   MCHC 32.9*   RDW 50.3*   PLATELETCT 290   MPV 9.2       Active Hospital Problems    Diagnosis    • Pyogenic arthritis of right shoulder region (HCC) [M00.9]    • Hyponatremia [E87.1]    • MSSA bacteremia [R78.81, B95.61]    • COVID-19 [U07.1]    • Spinal stenosis at L4-L5 level [M48.061]    • Hypertension [I10]        Assessment/Plan:  Patient doing well  Cultures form right shoulder with NGTD at 72hrs  Since no growth no further ortho intervention planned   POD#3 S/P   1.  Irrigation and debridement, right glenohumeral joint.  2.  Multiple cultures.  3.  Application of wound VAC.  Wt bearing status - WBAT  Wound care/Drains - Vac dressings to be left in place  Future Procedures - none planned   Sutures/Staples out- 14 days post operatively  PT/OT-initiated  Antibiotics: ancef 2g IVQ8  DVT Prophylaxis- TEDS/SCDs/Foot pumps  Gunter-none  Case Coordination for Discharge Planning - Disposition pending abx needs      I have performed a physical exam and reviewed and updated ROS and Plan today (1/28). In review of yesterday's note (1/27), there are no changes except as documented above.   "

## 2022-01-29 NOTE — CARE PLAN
The patient is Watcher - Medium risk of patient condition declining or worsening    Shift Goals  Clinical Goals: pain control, increase mobility   Patient Goals: rest comfortably  Family Goals: n/a    Progress made toward(s) clinical / shift goals:  all    Patient is not progressing towards the following goals:

## 2022-01-29 NOTE — PROGRESS NOTES
Report received. Assumed care. Pt in bed awake, eating dinner. A/O x4. VSS. Responds appropriately. C/O pain, medicated per MAR, denies SOB on RA. Wound vac to R shoulder. COVID-19 isolation precautions in place. Assessment complete. Discussed POC, pt verbalizes understanding. Explained importance of calling before getting OOB. Call light and belongings within reach. Bed alarm on. Bed in the lowest position. Treaded socks in place. Hourly rounding in progress. Will continue to monitor.

## 2022-01-30 ENCOUNTER — APPOINTMENT (OUTPATIENT)
Dept: RADIOLOGY | Facility: MEDICAL CENTER | Age: 77
DRG: 548 | End: 2022-01-30
Attending: HOSPITALIST
Payer: MEDICARE

## 2022-01-30 PROBLEM — I82.409 DVT (DEEP VENOUS THROMBOSIS) (HCC): Status: ACTIVE | Noted: 2022-01-30

## 2022-01-30 LAB
BACTERIA BLD CULT: ABNORMAL
BACTERIA BLD CULT: ABNORMAL
BACTERIA BLD CULT: NORMAL
BACTERIA BLD CULT: NORMAL
BACTERIA SPEC ANAEROBE CULT: NORMAL
SIGNIFICANT IND 70042: ABNORMAL
SIGNIFICANT IND 70042: NORMAL
SITE SITE: ABNORMAL
SITE SITE: NORMAL
SOURCE SOURCE: ABNORMAL
SOURCE SOURCE: NORMAL

## 2022-01-30 PROCEDURE — A9270 NON-COVERED ITEM OR SERVICE: HCPCS | Performed by: HOSPITALIST

## 2022-01-30 PROCEDURE — 93971 EXTREMITY STUDY: CPT | Mod: 26,LT | Performed by: INTERNAL MEDICINE

## 2022-01-30 PROCEDURE — 99232 SBSQ HOSP IP/OBS MODERATE 35: CPT | Performed by: HOSPITALIST

## 2022-01-30 PROCEDURE — 700111 HCHG RX REV CODE 636 W/ 250 OVERRIDE (IP): Performed by: HOSPITALIST

## 2022-01-30 PROCEDURE — 700101 HCHG RX REV CODE 250: Performed by: HOSPITALIST

## 2022-01-30 PROCEDURE — 770001 HCHG ROOM/CARE - MED/SURG/GYN PRIV*

## 2022-01-30 PROCEDURE — 93971 EXTREMITY STUDY: CPT | Mod: LT

## 2022-01-30 PROCEDURE — 700102 HCHG RX REV CODE 250 W/ 637 OVERRIDE(OP): Performed by: HOSPITALIST

## 2022-01-30 PROCEDURE — 700105 HCHG RX REV CODE 258: Performed by: STUDENT IN AN ORGANIZED HEALTH CARE EDUCATION/TRAINING PROGRAM

## 2022-01-30 RX ADMIN — SODIUM CHLORIDE: 9 INJECTION, SOLUTION INTRAVENOUS at 21:24

## 2022-01-30 RX ADMIN — OXYCODONE HYDROCHLORIDE 10 MG: 10 TABLET ORAL at 13:11

## 2022-01-30 RX ADMIN — OXYCODONE HYDROCHLORIDE 10 MG: 10 TABLET ORAL at 17:35

## 2022-01-30 RX ADMIN — WATER 2 G: 100 INJECTION, SOLUTION INTRAVENOUS at 21:24

## 2022-01-30 RX ADMIN — OXYCODONE HYDROCHLORIDE 10 MG: 10 TABLET ORAL at 05:26

## 2022-01-30 RX ADMIN — WATER 2 G: 100 INJECTION, SOLUTION INTRAVENOUS at 05:16

## 2022-01-30 RX ADMIN — ENOXAPARIN SODIUM 80 MG: 80 INJECTION SUBCUTANEOUS at 17:36

## 2022-01-30 RX ADMIN — SENNOSIDES 17.2 MG: 8.6 TABLET, FILM COATED ORAL at 21:23

## 2022-01-30 RX ADMIN — WATER 2 G: 100 INJECTION, SOLUTION INTRAVENOUS at 13:08

## 2022-01-30 ASSESSMENT — PAIN DESCRIPTION - PAIN TYPE
TYPE: ACUTE PAIN

## 2022-01-30 NOTE — PROGRESS NOTES
"   Orthopaedic Progress Note    Interval changes:  Patient doing well  Cultures form right shoulder with no growth  No further ortho intervention planned     ROS - Patient denies any new issues.  Pain well controlled.    /63   Pulse 76   Temp 36.9 °C (98.5 °F) (Temporal)   Resp 18   Ht 1.803 m (5' 11\")   Wt 77.1 kg (170 lb)   SpO2 93%       Patient seen and examined  No acute distress  Breathing non labored  RRR  Right shoulder vac dressing CDI without leak, CDI, moves all toes, cap refill <2 sec.      Recent Labs     01/28/22  0051   WBC 7.5   RBC 3.61*   HEMOGLOBIN 9.9*   HEMATOCRIT 30.1*   MCV 83.4   MCH 27.4   MCHC 32.9*   RDW 50.3*   PLATELETCT 290   MPV 9.2       Active Hospital Problems    Diagnosis    • Pyogenic arthritis of right shoulder region (HCC) [M00.9]    • Hyponatremia [E87.1]    • MSSA bacteremia [R78.81, B95.61]    • COVID-19 [U07.1]    • Spinal stenosis at L4-L5 level [M48.061]    • Hypertension [I10]        Assessment/Plan:  Patient doing well  Cultures form right shoulder with no growth  No further ortho intervention planned   POD#4 S/P   1.  Irrigation and debridement, right glenohumeral joint.  2.  Multiple cultures.  3.  Application of wound VAC.  Wt bearing status - WBAT  Wound care/Drains - Vac dressings to be left in place  Future Procedures - none planned   Sutures/Staples out- 14 days post operatively  PT/OT-initiated  Antibiotics: ancef 2g IVQ8  DVT Prophylaxis- TEDS/SCDs/Foot pumps  Gunter-none  Case Coordination for Discharge Planning - Disposition pending abx needs      I have performed a physical exam and reviewed and updated ROS and Plan today (1/29). In review of yesterday's note (1/28), there are no changes except as documented above.   "

## 2022-01-30 NOTE — PROGRESS NOTES
Hospital Medicine Daily Progress Note    Date of Service  1/30/2022    Chief Complaint  Yunier Denney is a 77 y.o. male admitted 1/22/2022 with back, leg pain.    Hospital Course  A 77-year-old man with h/o epidural abscess (follows with Dr. Herron) presented with right leg pain, knee pain, viral-like symptoms including nausea, chills, malaise and fatigue. Patient reports he was diagnosed with COVID-19 on 1/20.   Patient has elevated ESR at 47 and CRP of 198.9.  Blood cultures x2 returned positive for staph aureus. Patient hospitalized for IV Unasyn.      Interval Problem Update  1/23: Patient reports lower back pain, right shoulder, left hip pain, chills. Had fever 38.1, hemodynamically stable. On room air.  Blood cultures from 1/20 and 1/22 positive for S.aureus. Repeat blood cultures positive for S.aureus.  ID consulted.   Will likely require antibiosis through 4 weeks from most recent negative blood cultures, at this point that would be 3/2/2022.  Continue to monitor.  Synovial culture is pending at this time.  May impact duration of therapy.  CXR showed borderline cardiomegaly.  CT right shoulder, left hip ordered    1/26: To IR Today  1/27: No acute overnight events.  Unable to aspirate hip joint secondary to massive hernia.  1/28: Blood cultures from 1/26 have returned positive today, repeat screening cultures will be obtained today.  Clinically stable, no acute overnight events.  Case discussed with orthopedics.  1/29: Screening cultures from 1/28 no growth to date, growth noted and 126 cultures.  Case discussed with infectious disease.  Patient declines IR guided CT-guided hip aspiration.  Is willing to undergo imaging.  1/30: 1/28 cultures positive in 1/2 bottles.  CT AP repeated yesterday, results per below.  F/u US doppler to eval for DVT.      I have personally seen and examined the patient at bedside. I discussed the plan of care with patient and bedside RN.    Consultants/Specialty  infectious  disease, orthopedics and IR    Code Status  Full Code    Disposition  Patient is not medically cleared for discharge.   Anticipate discharge to TBD.  I have placed the appropriate orders for post-discharge needs.    Review of Systems  Constitutional: Positive for chills, fever and malaise/fatigue.   HENT: Negative.    Eyes: Negative.    Respiratory: Negative for cough and shortness of breath.    Cardiovascular: Negative for chest pain and leg swelling.   Gastrointestinal: Negative for nausea and vomiting.   Genitourinary: Negative for dysuria.   Musculoskeletal: Positive for right shoulder, left hip pain.   Skin: Negative for rash.   Neurological: Positive for weakness.     Physical Exam  Temp:  [36.9 °C (98.5 °F)-37.3 °C (99.1 °F)] 37.3 °C (99.1 °F)  Pulse:  [69-76] 71  Resp:  [17-18] 18  BP: (103-112)/(61-66) 112/66  SpO2:  [90 %-94 %] 90 %    Physical Exam  Vitals and nursing note reviewed.   Constitutional:       Appearance: He is ill-appearing.   HENT:      Head: Normocephalic and atraumatic.      Nose: Nose normal.      Mouth/Throat:      Mouth: Mucous membranes are moist.      Pharynx: Oropharynx is clear.   Eyes:      Pupils: Pupils are equal, round, and reactive to light.   Cardiovascular:      Rate and Rhythm: Normal rate and regular rhythm.   Pulmonary:      Effort: Pulmonary effort is normal. No respiratory distress.      Breath sounds: No wheezing or rales.   Abdominal:      General: Abdomen is flat. Bowel sounds are normal. There is no distension.      Tenderness: There is no abdominal tenderness. There is no guarding.      Hernia: A hernia (left inguinal, reduciable, non-tender) is present.   Musculoskeletal:         General: right shoulder swollen, tender; left hip tender     Cervical back: Normal range of motion.      Right lower leg: No edema.      Left lower leg: No edema.   Skin:     General: Skin is warm.   Neurological:      General: No focal deficit present.      Mental Status: He is alert and  oriented to person, place, and time.     Fluids    Intake/Output Summary (Last 24 hours) at 1/30/2022 1222  Last data filed at 1/30/2022 0930  Gross per 24 hour   Intake 720 ml   Output 200 ml   Net 520 ml       Laboratory  Recent Labs     01/28/22  0051   WBC 7.5   RBC 3.61*   HEMOGLOBIN 9.9*   HEMATOCRIT 30.1*   MCV 83.4   MCH 27.4   MCHC 32.9*   RDW 50.3*   PLATELETCT 290   MPV 9.2     Recent Labs     01/28/22  0051   SODIUM 134*   POTASSIUM 4.1   CHLORIDE 103   CO2 21   GLUCOSE 110*   BUN 20   CREATININE 0.64   CALCIUM 7.9*                   Imaging  CT-CHEST,ABDOMEN,PELVIS WITH   Final Result      1.  No evidence of acute inflammatory process in the chest, abdomen or pelvis.   2.  Left hip effusion has nearly resolved.   3.  Cardiomegaly.   4.  Cholelithiasis versus gallbladder sludge. No cholecystitis.   5.  Moderate to large hiatal hernia, containing 70-80% of organoaxially rotated stomach.   6.  Left inguinal hernia smaller, and again contains nonobstructed sigmoid colon.   7.  Nonocclusive filling defect in the left common femoral vein may represent mixing artifact or a nonocclusive DVT. Recommend further evaluation with venous duplex ultrasound.      DX-ABORTED DX PROCEDURE   Final Result      Aborted fluoroscopic guided left hip aspiration due to overlying bowel contained within the inguinal hernia. Findings were discussed with Dr. Cespedes. If necessary, joint aspiration could be performed under CT guidance.      CT-HIP WITH PLUS RECONS LEFT   Final Result         1. Small left hip effusion, which may be sterile or infected.   2. No intramuscular subcutaneous fluid collections. No evidence of osteomyelitis.   3. Large, 13 cm left inguinal hernia containing nonobstructed sigmoid colon.      CT-SHOULDER WITH PLUS RECONS RIGHT   Final Result         1. Loculated small to moderate glenohumeral joint effusion. It may be infected and represent septic arthritis.   2. Several small intramuscular abscesses in the  subscapularis and anterior head of the deltoid.   3. No evidence of osteomyelitis.   4. No fracture or dislocation.   5 Partially visualized small right pleural effusion and associated atelectasis.      EC-ECHOCARDIOGRAM LTD W/O CONT   Final Result      DX-CHEST-PORTABLE (1 VIEW)   Final Result      Borderline cardiomegaly.      US-EXTREMITY VENOUS LOWER UNILAT LEFT    (Results Pending)        Assessment/Plan  * Pyogenic arthritis of right shoulder region ([facility])  Assessment & Plan  CT showed loculated small to moderate right glenohumeral joint effusion; it may be infected and represent septic arthritis; several small intramuscular abscesses in the subscapularis and anterior head of the deltoid; partially visualized small right pleural effusion and associated atelectasis.  CT showed small left hip effusion, which may be sterile or infected.  Orthopedic surgery consulted.  IR consulted for drainage.    DVT (deep venous thrombosis) ([facility])  Assessment & Plan  Mixing artifact vs clot on CTAP, f/u US pending.     Hyponatremia  Assessment & Plan  Mild   Monitor     Spinal stenosis at L4-L5 level  Assessment & Plan  MRI showing severe L4 stenosis  Pain control  Consider neurosurgery consult    COVID-19  Assessment & Plan  Patient is currently not requiring oxygen hold steroids  Continue with enhanced precautions  Placed on oxygen with SPO2 below 90%    MSSA bacteremia- (present on admission)  Assessment & Plan  Patient has blood cultures x 2 positive for staph aureus that reflected at [facility] when he visited ER from 1/20/2022.  Echo showed normal left ventricular systolic function; mild aortic insufficiency.   CT showed loculated small to moderate right glenohumeral joint effusion; it may be infected and represent septic arthritis; several small intramuscular abscesses in the subscapularis and anterior head of the deltoid; partially visualized small right pleural effusion and associated atelectasis.  CT showed small  left hip effusion, which may be sterile or infected.  Orthopedic surgery consulted.  IR consulted for drainage.  Blood cultures from 1/20 and 1/22 positive  Blood cultures form 1/23 negative to date.    Hypertension- (present on admission)  Assessment & Plan  Blood pressures currently very well.  We will hold BP meds at this time.       VTE prophylaxis: enoxaparin ppx    I have performed a physical exam and reviewed and updated ROS and Plan today (1/30/2022). In review of yesterday's note (1/29/2022), there are no changes except as documented above.

## 2022-01-30 NOTE — ASSESSMENT & PLAN NOTE
Mixing artifact vs clot on CTAP, f/u US shows DVT  Started on lovenox BID, transitioned to eliquis 2/1

## 2022-01-31 PROCEDURE — 700111 HCHG RX REV CODE 636 W/ 250 OVERRIDE (IP): Performed by: HOSPITALIST

## 2022-01-31 PROCEDURE — 700102 HCHG RX REV CODE 250 W/ 637 OVERRIDE(OP): Performed by: HOSPITALIST

## 2022-01-31 PROCEDURE — 770001 HCHG ROOM/CARE - MED/SURG/GYN PRIV*

## 2022-01-31 PROCEDURE — 99233 SBSQ HOSP IP/OBS HIGH 50: CPT | Performed by: INTERNAL MEDICINE

## 2022-01-31 PROCEDURE — 87077 CULTURE AEROBIC IDENTIFY: CPT

## 2022-01-31 PROCEDURE — 99232 SBSQ HOSP IP/OBS MODERATE 35: CPT | Performed by: HOSPITALIST

## 2022-01-31 PROCEDURE — 700105 HCHG RX REV CODE 258: Performed by: STUDENT IN AN ORGANIZED HEALTH CARE EDUCATION/TRAINING PROGRAM

## 2022-01-31 PROCEDURE — 302098 PASTE RING (FLAT): Performed by: HOSPITALIST

## 2022-01-31 PROCEDURE — 700101 HCHG RX REV CODE 250: Performed by: HOSPITALIST

## 2022-01-31 PROCEDURE — A9270 NON-COVERED ITEM OR SERVICE: HCPCS | Performed by: HOSPITALIST

## 2022-01-31 PROCEDURE — 87040 BLOOD CULTURE FOR BACTERIA: CPT

## 2022-01-31 PROCEDURE — 97607 NEG PRS WND THR NDME<=50SQCM: CPT

## 2022-01-31 PROCEDURE — 36415 COLL VENOUS BLD VENIPUNCTURE: CPT

## 2022-01-31 RX ADMIN — OXYCODONE HYDROCHLORIDE 10 MG: 10 TABLET ORAL at 01:06

## 2022-01-31 RX ADMIN — SODIUM CHLORIDE: 9 INJECTION, SOLUTION INTRAVENOUS at 23:28

## 2022-01-31 RX ADMIN — OXYCODONE HYDROCHLORIDE 10 MG: 10 TABLET ORAL at 23:33

## 2022-01-31 RX ADMIN — WATER 2 G: 100 INJECTION, SOLUTION INTRAVENOUS at 21:42

## 2022-01-31 RX ADMIN — WATER 2 G: 100 INJECTION, SOLUTION INTRAVENOUS at 13:45

## 2022-01-31 RX ADMIN — ENOXAPARIN SODIUM 80 MG: 80 INJECTION SUBCUTANEOUS at 04:35

## 2022-01-31 RX ADMIN — DOCUSATE CALCIUM 240 MG: 240 CAPSULE, LIQUID FILLED ORAL at 04:34

## 2022-01-31 RX ADMIN — OXYCODONE HYDROCHLORIDE 10 MG: 10 TABLET ORAL at 08:41

## 2022-01-31 RX ADMIN — OXYCODONE HYDROCHLORIDE 10 MG: 10 TABLET ORAL at 16:55

## 2022-01-31 RX ADMIN — ENOXAPARIN SODIUM 80 MG: 80 INJECTION SUBCUTANEOUS at 17:59

## 2022-01-31 RX ADMIN — POLYETHYLENE GLYCOL 3350 1 PACKET: 17 POWDER, FOR SOLUTION ORAL at 04:34

## 2022-01-31 RX ADMIN — WATER 2 G: 100 INJECTION, SOLUTION INTRAVENOUS at 04:35

## 2022-01-31 ASSESSMENT — PAIN DESCRIPTION - PAIN TYPE
TYPE: ACUTE PAIN
TYPE: ACUTE PAIN
TYPE: ACUTE PAIN;CHRONIC PAIN
TYPE: ACUTE PAIN

## 2022-01-31 ASSESSMENT — ENCOUNTER SYMPTOMS
FEVER: 0
SHORTNESS OF BREATH: 0

## 2022-01-31 ASSESSMENT — PATIENT HEALTH QUESTIONNAIRE - PHQ9
2. FEELING DOWN, DEPRESSED, IRRITABLE, OR HOPELESS: NOT AT ALL
1. LITTLE INTEREST OR PLEASURE IN DOING THINGS: NOT AT ALL
SUM OF ALL RESPONSES TO PHQ9 QUESTIONS 1 AND 2: 0

## 2022-01-31 NOTE — CARE PLAN
The patient is Stable - Low risk of patient condition declining or worsening    Shift Goals  Clinical Goals: (P) maintain skin integrity; pain control  Patient Goals: (P) pain control; rest  Family Goals: (P) na    Progress made toward(s) clinical / shift goals:    Problem: Pain - Standard  Goal: Alleviation of pain or a reduction in pain to the patient’s comfort goal  Outcome: Progressing  Note: Pt claims his pain is manageable with oxycodone and repositioning of right arm and  left leg     Problem: Skin Integrity  Goal: Skin integrity is maintained or improved  Outcome: Progressing  Note: Waffle overlay in place.  Turning every 2 hrs and educating pt on importance of turning in bed

## 2022-01-31 NOTE — WOUND TEAM
[facility]  Inpatient Services  l Wound and Skin Care Evaluation    Admission Date: 1/22/2022     No order of IP CONSULT TO WOUND CARE is found.     HPI, PMH, SH: Reviewed    Past Surgical History:   Procedure Laterality Date   • IRRIGATION & DEBRIDEMENT GENERAL Right 1/25/2022    Procedure: IRRIGATION AND DEBRIDEMENT, WOUND  , MULTIPLE CULTURES;  Surgeon: Larry Aldridge M.D.;  Location: SURGERY [facility];  Service: Orthopedics   • APPLICATION OR REPLACEMENT, WOUND VAC Right 1/25/2022    Procedure: APPLICATION WOUND VAC;  Surgeon: Larry Aldridge M.D.;  Location: SURGERY [facility];  Service: Orthopedics   • LUMBAR LAMINECTOMY DISKECTOMY  4/29/2018    Procedure: LUMBAR LAMINECTOMY Y L4-S1;  Surgeon: Fercho Herron M.D.;  Location: SURGERY [facility];  Service: Neurosurgery   • IRRIGATION & DEBRIDEMENT NEURO  4/29/2018    Procedure: IRRIGATION & DEBRIDEMENT NEURO- epidural mass;  Surgeon: Fercho Herron M.D.;  Location: SURGERY [facility];  Service: Neurosurgery     Social History     Tobacco Use   • Smoking status: Never Smoker   • Smokeless tobacco: Never Used   Substance Use Topics   • Alcohol use: Yes     Chief Complaint   Patient presents with   • Back Pain     x2 days, right lower back. Patient injured his back while working on a car. Patient reports he had an MRI 2 days ago that didn't show anything.    • Shoulder Pain     x 2 days, right   • Knee Pain     x 2 days , left   • Failure to Thrive     Patient lives with his son who takes care of him. Patient's son reports that the patient is unable to get out of bed and he is having trouble taking care of him.    • Flu Like Symptoms     covid+ 1/20      Diagnosis: Positive blood cultures [R78.81]    Unit where seen by Wound Team: [id number]     WOUND CONSULT/FOLLOW UP RELATED TO:  I&D of R shoulder with WV placement.     WOUND HISTORY:  77y.o male admitted for COVID-19 and back pain. Diagnosed with R shoulder joint pyarthrosis and  underwent I&D of R glenohumeral joint with WV application on 1/25 with Dr. Aldridge. Pertinent history of HTN. Pertinent home med of medrol.    WOUND ASSESSMENT/LDA   Negative Pressure Wound Therapy 01/25/22 Surgical Shoulder Anterior Right (Active)   NPWT Pump Mode / Pressure Setting Ulta;Continuous;125 mmHg 01/31/22 1000   Dressing Type Black Foam (Veraflo) 01/31/22 1000   Number of Foam Pieces Used 2 01/31/22 1000   Canister Changed No 01/31/22 1000   NEXT Dressing Change/Treatment Date 02/02/22 01/31/22 1000   VAC VeraFlo Irrigant Normal Saline 01/31/22 1000   VAC VeraFlo Soak Time (mins) 5 01/31/22 1000   VAC VeraFlo Instill Volume (ml) 10 01/31/22 1000   VAC VeraFlo - Therapy Time (hrs) 2 01/31/22 1000   VAC VeraFlo Pressure (mm/Hg) Intermittent;125 mmHg 01/31/22 1000   WOUND NURSE ONLY - Time Spent with Patient (mins) 45 01/28/22 1412           Wound 01/25/22 Incision Shoulder Right WOUND VAC (Active)   Wound Image   01/28/22 1412   Site Assessment Pink;Red;Yellow 01/31/22 1000   Periwound Assessment [place] 01/31/22 1000   Margins Defined edges;Unattached edges 01/31/22 1000   Closure Secondary intention 01/31/22 1000   Drainage Amount Small 01/31/22 1000   Drainage Description Serosanguineous 01/31/22 1000   Treatments Cleansed;Site care 01/31/22 1000   Wound Cleansing Approved Wound Cleanser 01/31/22 1000   Periwound Protectant Skin Protectant Wipes to Periwound;Paste Ring 01/31/22 1000   Dressing Cleansing/Solutions Normal Saline 01/31/22 1000   Dressing Options Wound Vac 01/31/22 1000   Dressing Changed Changed 01/31/22 1000   Dressing Status Clean;Dry;Intact 01/31/22 1000   Dressing Change/Treatment Frequency Monday, Wednesday, Friday, and As Needed 01/31/22 1000   NEXT Dressing Change/Treatment Date 02/02/22 01/31/22 1000   NEXT Weekly Photo (Inpatient Only) 02/04/22 01/31/22 1000   Number of Staples Removed 0 01/31/22 1000   Wound Length (cm) 8.9 cm 01/28/22 1412   Wound Width (cm) 2.9 cm 01/28/22 1412    Wound Depth (cm) 1 cm 01/28/22 1412   Wound Surface Area (cm^2) 25.81 cm^2 01/28/22 1412   Wound Volume (cm^3) 25.81 cm^3 01/28/22 1412   Undermining (cm) 0.5 cm 01/28/22 1412   Undermining of Wound, 1st Location From 12 o'clock;To 2 o'clock 01/28/22 1412   Undermining (cm) - 2nd location 1 cm 01/28/22 1412   Undermining of Wound, 2nd Location From 9 o'clock;To 11 o'clock 01/28/22 1412   Shape oval 01/31/22 1000   Wound Odor None 01/31/22 1000   Pulses N/A 01/31/22 1000   Exposed Structures Muscle 01/31/22 1000   WOUND NURSE ONLY - Time Spent with Patient (mins) 60 01/31/22 1000              Vascular:    ISABEL:   No results found.    Lab Values:    Lab Results   Component Value Date/Time    WBC 7.5 01/28/2022 12:51 AM    RBC 3.61 (L) 01/28/2022 12:51 AM    HEMOGLOBIN 9.9 (L) 01/28/2022 12:51 AM    HEMATOCRIT 30.1 (L) 01/28/2022 12:51 AM    CREACTPROT 22.06 (H) 01/25/2022 10:11 AM    SEDRATEWES 65 (H) 01/28/2022 12:51 AM        Culture Results show:  Recent Results (from the past 720 hour(s))   CULTURE WOUND W/ GRAM STAIN    Collection Time: 01/25/22  3:52 PM    Specimen: Wound   Result Value Ref Range    Significant Indicator NEG     Source WND     Site Right shoulder     Culture Result No growth at 72 hours.     Gram Stain Result Few WBCs.  No organisms seen.          Pain Level/Medicated: PO oxycodone,     INTERVENTIONS BY WOUND TEAM:  Chart and images reviewed. Discussed with bedside RN. All areas of concern (based on picture review, LDA review and discussion with bedside RN) have been thoroughly assessed. Documentation of areas based on significant findings. This RN in to assess patient. Performed standard wound care which includes appropriate positioning, dressing removal and non-selective debridement. Pictures and measurements obtained weekly if/when required.  Preparation for Dressing removal: Dressing soaked with NS  Non-selectively Debrided with:  cleanser and gauze.  Sharp debridement: N/A  Triny wound:  Cleansed with cleanser, Prepped with drape and no sting barrier  Primary Dressing: Two half thick pieces of veraflo black foam  Secondary (Outer) Dressing: Button, drape, and trackpad    Interdisciplinary consultation: Patient, Bedside RN,     EVALUATION / RATIONALE FOR TREATMENT:  Most Recent Date:  1/31/22: Muscle visible less adipose tissue, undermining still present.  Changed veraflo settings, continue POC  1/28/22: Muscle and adipose tissue exposed. Undermining noted from 9-11 o'clock and 12-2 o'clock. Wound bed clean with scant serosang drainage. Veraflo initiated to keep underlying structures moist and to mechanically debride wound bed. Patient sleeping for entirety of procedure- recommend PO pre-med only. Recommend ongoing NPWT and F/U with outpatient wound vs home health if patient continues to refuse SNF placement.     Goals: Steady decrease in wound area and depth weekly.    WOUND TEAM PLAN OF CARE ([X] for frequency of wound follow up,):  Nursing to follow orders written for wound care. Contact wound team if area fails to progress, deteriorates or with any questions/concerns  Dressing changes by wound team:                   Follow up 3 times weekly:                NPWT change 3 times weekly:   X  Follow up 1-2 times weekly:      Follow up Bi-Monthly:                   Follow up as needed:     Other (explain):     NURSING PLAN OF CARE ORDERS (X):  Dressing changes: See Dressing Care orders: X  Skin care: See Skin Care orders: X  RN Prevention Protocol: X  Rectal tube care: See Rectal Tube Care orders:   Other orders:    RSKIN:   CURRENTLY IN PLACE (X), APPLIED THIS VISIT (A), ORDERED (O):   Q shift Jensen:  X  Q shift pressure point assessments:  X    Surface/Positioning   Pressure redistribution mattress X           Low Airloss          Bariatric foam      Bariatric SAM     Waffle cushion        Waffle Overlay      X    Reposition q 2 hours      TAPs Turning system     Z Ash Pillow      Offloading/Redistribution   Sacral Mepilex (Silicone dressing)     Heel Mepilex (Silicone dressing)         Heel float boots (Prevalon boot)             Float Heels off Bed with Pillows           Respiratory   Silicone O2 tubing         Gray Foam Ear protectors     Cannula fixation Device (Tender )          High flow offloading Clip    Elastic head band offloading device      Anchorfast                                                         Trach with Optifoam split foam             Containment/Moisture Prevention     Rectal tube or BMS    Purwick/Condom Cath        Gunter Catheter    Barrier wipes           Barrier paste       Antifungal tx      Interdry      Urinal X     Mobilization   Up to chair        Ambulate      PT/OT  X    Nutrition   Dietician        Diabetes Education      PO X     TF     TPN     NPO   # days     Anticipated discharge plans:   LTACH:        SNF/Rehab:                  Home Health Care:    X    Vs SNF  Outpatient Wound Center:            Self/Family Care:        Other:                  Vac Discharge Needs:  Not Applicable Pt not on a wound vac:       Regular Vac while inpatient, alternative dressing at DC:        Regular Vac in use and continued at DC:            Reg. Vac w/ Skin Sub/Biologic in use. Will need to be changed 2x wkly:      Veraflo Vac while inpatient, ok to transition to Regular Vac on Discharge:      X     Veraflo Vac while inpatient, will need to remain on Veraflo Vac upon discharge:

## 2022-01-31 NOTE — CARE PLAN
The patient is Stable - Low risk of patient condition declining or worsening    Shift Goals  Clinical Goals: Maintain skin integrity  Patient Goals: sleep  Family Goals: DENISSE    Progress made toward(s) clinical / shift goals:    Problem: Pain - Standard  Goal: Alleviation of pain or a reduction in pain to the patient’s comfort goal  Outcome: Progressing     Problem: Fall Risk  Goal: Patient will remain free from falls  Outcome: Progressing     Problem: Skin Integrity  Goal: Skin integrity is maintained or improved  Outcome: Progressing       Patient is not progressing towards the following goals:

## 2022-01-31 NOTE — PROGRESS NOTES
"   Orthopaedic Progress Note    Interval changes:  Patient doing well  Cultures form right shoulder with no growth  No further ortho intervention planned     ROS - Patient denies any new issues.  Pain well controlled.    /74   Pulse 84   Temp 37.6 °C (99.6 °F) (Temporal)   Resp 18   Ht 1.803 m (5' 11\")   Wt 77.1 kg (170 lb)   SpO2 93%       Patient seen and examined  No acute distress  Breathing non labored  RRR  Right shoulder vac dressing CDI without leak, CDI, moves all toes, cap refill <2 sec.      Recent Labs     01/28/22  0051   WBC 7.5   RBC 3.61*   HEMOGLOBIN 9.9*   HEMATOCRIT 30.1*   MCV 83.4   MCH 27.4   MCHC 32.9*   RDW 50.3*   PLATELETCT 290   MPV 9.2       Active Hospital Problems    Diagnosis    • DVT (deep venous thrombosis) ([facility]) [I82.409]    • Pyogenic arthritis of right shoulder region ([facility]) [M00.9]    • Hyponatremia [E87.1]    • MSSA bacteremia [R78.81, B95.61]    • COVID-19 [U07.1]    • Spinal stenosis at L4-L5 level [M48.061]    • Hypertension [I10]        Assessment/Plan:  Patient doing well  Cultures form right shoulder with no growth  No further ortho intervention planned   POD#5 S/P   1.  Irrigation and debridement, right glenohumeral joint.  2.  Multiple cultures.  3.  Application of wound VAC.  Wt bearing status - WBAT  Wound care/Drains - Vac dressings to be left in place  Future Procedures - none planned   Sutures/Staples out- 14 days post operatively  PT/OT-initiated  Antibiotics: ancef 2g IVQ8  DVT Prophylaxis- TEDS/SCDs/Foot pumps  Gunter-none  Case Coordination for Discharge Planning - Disposition pending abx needs      I have performed a physical exam and reviewed and updated ROS and Plan today (1/30). In review of yesterday's note (1/29), there are no changes except as documented above.   "

## 2022-01-31 NOTE — PROGRESS NOTES
Infectious Disease Progress Note    Author: Xiang Vera M.D. Date & Time of service: 2022  9:49 AM    Chief Complaint:  MSSA bacteremia  Joint pain    Interval History:   Temp 101.5 WBC 10.1 seen by Ortho-recommended IR aspiration right shoulder and left hip   Tmax 100.4 WBC 8.1 No procedures done as yet   AF s/p OR yesterday-op note reviewed +purulence seen   AF (100.5) blood cxs from  showing staph   AF WBC 7.5  blood cxs from  now also +SA   Tmax 100.4, O2 room air Blood cultures  prelim neg   T-max 99.6 yesterday afternoon, culture results as below.  Tolerating Ancef    Labs Reviewed, Medications Reviewed     Review of Systems:  Review of Systems   Constitutional: Negative for fever.   Respiratory: Negative for shortness of breath.    Musculoskeletal: Positive for joint pain.   All other systems reviewed and are negative.      Hemodynamics:  Temp (24hrs), Av.2 °C (99 °F), Min:37 °C (98.6 °F), Max:37.6 °C (99.6 °F)  Temperature: 37 °C (98.6 °F)  Pulse  Av.3  Min: 60  Max: 98   Blood Pressure : 128/56       Physical Exam:  Physical Exam  Vitals and nursing note reviewed.   Constitutional:       General: He is not in acute distress.     Appearance: He is ill-appearing. He is not toxic-appearing or diaphoretic.   HENT:      Mouth/Throat:      Pharynx: No oropharyngeal exudate.   Eyes:      General: No scleral icterus.        Right eye: No discharge.         Left eye: No discharge.      Conjunctiva/sclera: Conjunctivae normal.   Cardiovascular:      Rate and Rhythm: Normal rate.   Pulmonary:      Effort: Pulmonary effort is normal. No respiratory distress.      Breath sounds: No stridor.   Abdominal:      General: There is no distension.      Tenderness: There is no abdominal tenderness.   Musculoskeletal:         General: Swelling and tenderness present.      Right lower leg: No edema.      Left lower leg: No edema.      Comments: Right shoulder with wound  VAC in place, swelling, no active discharge.  Pain of left hip on passive movements improved, however significant left lower extremity, especially leg pain   Skin:     Coloration: Skin is pale. Skin is not jaundiced.   Neurological:      General: No focal deficit present.      Comments: Left lower extremity weakness but per patient this is secondary to pain.   Psychiatric:         Behavior: Behavior normal.         Meds:    Current Facility-Administered Medications:   •  NICU/PEDS enoxaparin (LOVENOX) injection  •  docusate calcium  •  polyethylene glycol/lytes  •  sennosides  •  ceFAZolin  •  NS  •  ondansetron  •  ondansetron  •  acetaminophen  •  bisacodyl  •  acetaminophen  •  oxyCODONE immediate-release **OR** oxyCODONE immediate-release    Labs:  No results for input(s): WBC, RBC, HEMOGLOBIN, HEMATOCRIT, MCV, MCH, RDW, PLATELETCT, MPV, NEUTSPOLYS, LYMPHOCYTES, MONOCYTES, EOSINOPHILS, BASOPHILS, RBCMORPHOLO in the last 72 hours.  No results for input(s): SODIUM, POTASSIUM, CHLORIDE, CO2, GLUCOSE, BUN, CPKTOTAL in the last 72 hours.  No results for input(s): ALBUMIN, TBILIRUBIN, ALKPHOSPHAT, TOTPROTEIN, ALTSGPT, ASTSGOT, CREATININE in the last 72 hours.    Imaging:  CT-HIP WITH PLUS RECONS LEFT    Result Date: 1/24/2022 1/23/2022 9:25 PM HISTORY/REASON FOR EXAM:  hip pain, bacteremia. TECHNIQUE/ EXAM DESCRIPTION AND NUMBER OF VIEWS:  CT scan of the pelvis/hip with contrast and including reconstructions. Thin-section helical images were obtained from the iliac crests through the lesser trochanters with contrast. Coronal reconstructions were generated from the axial images. 100 mL of Omnipaque 350 nonionic contrast was utilized. Low dose optimization technique was utilized for this CT exam including automated exposure control and adjustment of the mA and/or kV according to patient size. COMPARISON: None. FINDINGS: Small left hip effusion. No fracture or dislocation. 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  No osseous destruction to suggest osteoarthritis. Atherosclerosis. No pelvic lymphadenopathy or pelvic mass lesions. Large left inguinal hernia containing a portion of nonobstructed sigmoid colon. The hernia measures approximately 13 x 7 cm. Spondylosis.     1. Small left hip effusion, which may be sterile or infected. 2. No intramuscular subcutaneous fluid collections. No evidence of osteomyelitis. 3. Large, 13 cm left inguinal hernia containing nonobstructed sigmoid colon.    CT-SHOULDER WITH PLUS RECONS RIGHT    Result Date: 1/24/2022 1/23/2022 9:25 PM HISTORY/REASON FOR EXAM:  Right shoulder pain, weakness, bacteremia. TECHNIQUE/ EXAM DESCRIPTION AND NUMBER OF VIEWS:  CT scan of the RIGHT shoulder with contrast with reconstructions. Axial spiral CT images were obtained of the upper extremity from the shoulder through the proximal third of the humerus. Images were reviewed at soft tissue and bone windows with administration of 100 mL of Omnipaque 350 nonionic contrast without complication. Sagittal reformations were then generated. Up to date radiation dose reduction adjustments have been utilized to meet ALARA standards for radiation dose reduction. COMPARISON: None. FINDINGS: Loculated right glenohumeral joint effusion, small-to-moderate, with some synovial hyperenhancement. The fluid extends into the bicipital groove, along the biceps tendon. Several intramuscular pockets of fluid in the subscapularis muscle measure up to 1.8 x 1.8 cm. Pocket of fluid in the anterior deltoid muscle measures 1.8 x 1.8 cm. No cortical destruction to suggest osteomyelitis. Mild to moderate glenohumeral and acromioclavicular osteoarthrosis. No fracture or dislocation. Partially visualized small right pleural effusion and associated atelectasis. Visualized mediastinum shows no acute abnormality. Partially visualized hiatal hernia.     1. Loculated small to moderate glenohumeral joint effusion. It may be infected and represent  septic arthritis. 2. Several small intramuscular abscesses in the subscapularis and anterior head of the deltoid. 3. No evidence of osteomyelitis. 4. No fracture or dislocation. 5 Partially visualized small right pleural effusion and associated atelectasis.    DX-CHEST-PORTABLE (1 VIEW)    Result Date: 1/23/2022 1/23/2022 10:45 AM HISTORY/REASON FOR EXAM: Bacteremia. Covid 19 infection. TECHNIQUE/EXAM DESCRIPTION AND NUMBER OF VIEWS: Single portable view of the chest. COMPARISON: None FINDINGS: There is no evidence of focal consolidation or evidence of pulmonary edema. There is no pleural effusion. The heart is borderline enlarged. There is a hiatal hernia.     Borderline cardiomegaly.    MR-LUMBAR SPINE-WITH & W/O    Result Date: 1/20/2022 1/20/2022 1:58 PM HISTORY/REASON FOR EXAM:  Extradural or subdural abscess; low back pain, h/o spinal abscess. TECHNIQUE/EXAM DESCRIPTION: MRI of the lumbar spine without and with contrast. The study was performed on a "[employer] Signa 1.5 Sobia MRI scanner. T1 sagittal, T2 fast spin-echo sagittal, and T2 axial images were obtained of the lumbar spine. T1 post-contrast fat-suppressed sagittal images were obtained. mL ProHance contrast was administered intravenously. COMPARISON:  None. FINDINGS: There is S-shaped lumbar scoliosis. There is minimal anterolisthesis of L5 on S1 and L3 on 4. There is no pathologic marrow infiltration. There is no abnormal contrast enhancement. There is no perivertebral abnormality. There are postsurgical changes as evidenced by L5-S1 laminectomy. There is mild chronic compression deformity at L1. At the level of L5-S1,there is mild diffuse disc bulge. Bilateral facet joint arthropathy is seen. There is mild right neural foraminal stenosis. At the level of L4-5,there is diffuse disc bulge. Bilateral facet joint arthropathy is seen. There is no central canal stenosis. There is severe right neural foraminal stenosis. At the level of L3-4,there is mild  central canal and neural foraminal stenosis. At the level of L2-3,there is minimal asymmetric disc bulge without significant spinal or neural foraminal stenosis. At the level of L1-2,there is no spinal or neural foraminal stenosis. The conus terminates at the level of L1. The visualized lower thoracic spinal cord is unremarkable. The visualized pre-and paraspinal soft tissues appear normal. There is no abnormal contrast enhancement.     1.  There is no evidence of lumbar spinal abscess. 2.  Degenerative scoliosis. 3.  Severe right L4 neural foraminal stenosis.    EC-ECHOCARDIOGRAM LTD W/O CONT    Result Date: 2022  Transthoracic Echo Report Echocardiography Laboratory CONCLUSIONS Limited study per Covid19 protocol. Normal left ventricular systolic function. Mild aortic insufficiency. Right ventricular systolic pressure is estimated to be 45 mmHg. NAVARRONATALYA Exam Date:         2022                    14:01 Exam Location:     Inpatient Priority:          Routine Ordering Physician:        SALIMA EPPS Referring Physician: Sonographer:               Meggan VARELA Age:    77     Gender:    M MRN:    [id number] :    1945 BSA:    1.97   Ht (in):    71     Wt (lb):    170 Exam Type:     Limited Indications:     Fever ICD Codes:       780.6 CPT Codes:       78916 BP:   130    /   68     HR:   55 Technical Quality:       Fair MEASUREMENTS  (Male / Female) Normal Values 2D ECHO LV Diastolic Diameter PLAX        5.4 cm                4.2 - 5.9 / 3.9 - 5.3 cm LV Systolic Diameter PLAX         4.1 cm                2.1 - 4.0 cm IVS Diastolic Thickness           0.85 cm               LVPW Diastolic Thickness          0.95 cm               Estimated LV Ejection Fraction    55 %                  LV Ejection Fraction MOD BP       57.3 %                >= 55  % LV Ejection Fraction MOD 4C       53.9 %                LV Ejection Fraction MOD 2C       61.6 %                IVC Diameter                      0.88  cm               DOPPLER AV Peak Velocity                  1.8 m/s               AV Peak Gradient                  12.7 mmHg             AI Pressure Half Time             827 ms                TR Peak Velocity                  298 cm/s              * Indicates values subject to auto-interpretation LV EF:  55    % FINDINGS Left Ventricle Normal left ventricular size, thickness, and systolic function. False tendon seen in the apex. The left ventricular ejection fraction is visually estimated to be 55%. Right Ventricle The right ventricle is dilated. Normal right ventricular systolic function. Right Atrium Normal right atrial size. Normal inferior vena cava size and inspiratory collapse. Left Atrium Mitral Valve Structurally normal mitral valve without significant stenosis. Trace mitral regurgitation. Aortic Valve Tricuspid aortic valve. Aortic valve sclerosis without significant stenosis. Mild aortic insufficiency. Tricuspid Valve Structurally normal tricuspid valve without significant stenosis. Mild tricuspid regurgitation. Right ventricular systolic pressure is estimated to be 45 mmHg. Pulmonic Valve Structurally normal pulmonic valve without significant stenosis or regurgitation. Pericardium Normal pericardium without effusion. Aorta Pete-Catrachita N To (Electronically Signed) Final Date:     24 January 2022                 00:44      Micro:  Results     Procedure Component Value Units Date/Time    BLOOD CULTURE [[id number]]  (Abnormal) Collected: 01/28/22 1048    Order Status: Completed Specimen: Blood from Peripheral Updated: 01/31/22 0838     Significant Indicator POS     Source BLD     Site PERIPHERAL     Culture Result Growth detected by Bactec instrument. 01/30/2022  03:24      Staphylococcus aureus    Narrative:      CALL  [place]  [facility] tel. [phone],  CALLED  [facility] tel. [phone] 01/30/2022, 03:24, RB PERF. RESULTS CALLED TO: RN  [phone]  Enhanced Droplet, Contact, and Eye Protection  Per Hospital Policy: Only  "change Specimen Src: to \"Line\" if  specified by physician order.  Right AC    Anaerobic Culture [[id number]] Collected: 01/25/22 1553    Order Status: Completed Specimen: Synovial Updated: 01/30/22 1402     Significant Indicator NEG     Source SYNO     Site Right shoulder     Culture Result No Anaerobes isolated.    Narrative:      Surgery Specimen    FLUID CULTURE W/GRAM STAIN [[id number]] Collected: 01/25/22 1553    Order Status: Completed Specimen: Synovial Updated: 01/30/22 1402     Significant Indicator NEG     Source SYNO     Site Right shoulder     Culture Result No growth at 72 hours.     Gram Stain Result Moderate WBCs.  No organisms seen.      Narrative:      Surgery Specimen    CULTURE TISSUE W/ GRM STAIN [[id number]] Collected: 01/25/22 1552    Order Status: Completed Specimen: Tissue Updated: 01/30/22 1401     Significant Indicator NEG     Source TISS     Site Right Shoulder Tissue     Culture Result No growth at 72 hours.     Gram Stain Result No organisms seen.    Narrative:      Surgery Specimen    Anaerobic Culture [[id number]] Collected: 01/25/22 1552    Order Status: Completed Specimen: Tissue Updated: 01/30/22 1401     Significant Indicator NEG     Source TISS     Site Right Shoulder Tissue     Culture Result No Anaerobes isolated.    Narrative:      Surgery Specimen    CULTURE WOUND W/ GRAM STAIN [[id number]] Collected: 01/25/22 1552    Order Status: Completed Specimen: Wound Updated: 01/30/22 1400     Significant Indicator NEG     Source WND     Site Right shoulder     Culture Result No growth at 72 hours.     Gram Stain Result Few WBCs.  No organisms seen.      Narrative:      Surgery - swabs received    Anaerobic Culture [[id number]] Collected: 01/25/22 1552    Order Status: Completed Specimen: Wound Updated: 01/30/22 1400     Significant Indicator NEG     Source WND     Site Right shoulder     Culture Result No Anaerobes isolated.    Narrative:      Surgery - swabs received    CULTURE WOUND W/ " "GRAM STAIN [[id number]] Collected: 01/25/22 1552    Order Status: Completed Specimen: Wound Updated: 01/30/22 1400     Significant Indicator NEG     Source WND     Site Right Shoulder     Culture Result No growth at 72 hours.     Gram Stain Result Rare WBCs.  No organisms seen.      Narrative:      Surgery - swabs received    Anaerobic Culture [[id number]] Collected: 01/25/22 1552    Order Status: Completed Specimen: Wound Updated: 01/30/22 1400     Significant Indicator NEG     Source WND     Site Right Shoulder     Culture Result No Anaerobes isolated.    Narrative:      Surgery - swabs received    BLOOD CULTURE [[id number]]  (Abnormal) Collected: 01/26/22 1901    Order Status: Completed Specimen: Blood from Peripheral Updated: 01/30/22 0937     Significant Indicator POS     Source BLD     Site PERIPHERAL     Culture Result Growth detected by Bactec instrument. 01/29/2022  04:00      Staphylococcus aureus  See previous culture for sensitivity report.      Narrative:      CALL  [place]  [facility] tel. [phone],  CALLED  [facility] tel. [phone] 01/29/2022, 04:01, RB PERF. RESULTS CALLED TO: RN  [phone]  Enhanced Droplet, Contact, and Eye Protection  Per Hospital Policy: Only change Specimen Src: to \"Line\" if  specified by physician order.  Right Forearm/Arm    BLOOD CULTURE [[id number]] Collected: 01/25/22 0521    Order Status: Completed Specimen: Blood from Peripheral Updated: 01/30/22 0700     Significant Indicator NEG     Source BLD     Site PERIPHERAL     Culture Result No growth after 5 days of incubation.    Narrative:      Enhanced Droplet, Contact, and Eye Protection  Per Hospital Policy: Only change Specimen Src: to \"Line\" if  specified by physician order.  Left AC    BLOOD CULTURE [[id number]] Collected: 01/25/22 0521    Order Status: Completed Specimen: Blood from Peripheral Updated: 01/30/22 0700     Significant Indicator NEG     Source BLD     Site PERIPHERAL     Culture Result No growth after 5 days of incubation.    " "Narrative:      Enhanced Droplet, Contact, and Eye Protection  Per Hospital Policy: Only change Specimen Src: to \"Line\" if  specified by physician order.  Right AC    BLOOD CULTURE [[id number]]  (Abnormal) Collected: 01/26/22 1901    Order Status: Completed Specimen: Blood from Peripheral Updated: 01/29/22 1201     Significant Indicator POS     Source BLD     Site PERIPHERAL     Culture Result 01/28/2022  00:02  Growth detected by Bactec instrument.      Staphylococcus aureus  See previous culture for sensitivity report.      Narrative:      CALL  [place]  [facility] tel. [phone],  CALLED  [facility] tel. [phone] 01/28/2022, 00:05, RB PERF. RESULTS CALLED TO: RN  [phone]  Enhanced Droplet, Contact, and Eye Protection  Per Hospital Policy: Only change Specimen Src: to \"Line\" if  specified by physician order.  Right AC    BLOOD CULTURE [[id number]]  (Abnormal)  (Susceptibility) Collected: 01/23/22 1103    Order Status: Completed Specimen: Blood from Peripheral Updated: 01/29/22 1019     Significant Indicator POS     Source BLD     Site PERIPHERAL     Culture Result Growth detected by Bactec instrument. 01/27/2022  11:29      Staphylococcus aureus    Narrative:      CALL  [place]  [facility] tel. [phone],  CALLED  [facility] tel. [phone] 01/27/2022, 11:32, RB PERF. RESULTS CALLED  TO:[phone]  Enhanced Droplet, Contact, and Eye Protection  Per Hospital Policy: Only change Specimen Src: to \"Line\" if  specified by physician order.  Right AC    Susceptibility     Staphylococcus aureus (1)     Antibiotic Interpretation Microscan   Method Status    Azithromycin Sensitive <=2 mcg/mL TEMI Final    Clindamycin Sensitive <=0.25 mcg/mL TEMI Final    Cefazolin Sensitive <=8 mcg/mL TEMI Final    Cefepime Sensitive <=4 mcg/mL TEMI Final    Ceftaroline Sensitive <=0.5 mcg/mL TEMI Final    Daptomycin Sensitive 1 mcg/mL TEMI Final    Erythromycin Sensitive <=0.25 mcg/mL TEMI Final    Ampicillin/sulbactam Sensitive <=8/4 mcg/mL TEMI Final    Vancomycin Sensitive 1 " "mcg/mL TEMI Final    Oxacillin Sensitive <=0.25 mcg/mL TEMI Final    Pip/Tazobactam Sensitive <=8 mcg/mL TEMI Final    Trimeth/Sulfa Sensitive <=0.5/9.5 mcg/mL TEMI Final    Tetracycline Sensitive <=4 mcg/mL TEMI Final                   BLOOD CULTURE [[id number]] Collected: 01/24/22 0822    Order Status: Completed Specimen: Blood from Peripheral Updated: 01/29/22 0900     Significant Indicator NEG     Source BLD     Site PERIPHERAL     Culture Result No growth after 5 days of incubation.    Narrative:      Enhanced Droplet, Contact, and Eye Protection  Per Hospital Policy: Only change Specimen Src: to \"Line\" if  specified by physician order.  Repeat Cultures are needed  Left AC    BLOOD CULTURE [[id number]] Collected: 01/28/22 1048    Order Status: Completed Specimen: Blood from Peripheral Updated: 01/29/22 0825     Significant Indicator NEG     Source BLD     Site PERIPHERAL     Culture Result No Growth  Note: Blood cultures are incubated for 5 days and  are monitored continuously.Positive blood cultures  are called to the RN and reported as soon as  they are identified.      Narrative:      Enhanced Droplet, Contact, and Eye Protection  Per Hospital Policy: Only change Specimen Src: to \"Line\" if  specified by physician order.  Left Hand    MRSA By PCR (Amp) [[id number]] Collected: 01/29/22 0436    Order Status: Completed Specimen: Respirate from Nares Updated: 01/29/22 0716     MRSA by PCR Negative    Narrative:      Enhanced Droplet, Contact, and Eye Protection  Collected By: [id number] JUANCARLOS TRINH    BLOOD CULTURE [[id number]] Collected: 01/28/22 0000    Order Status: Canceled Specimen: Other from Peripheral     BLOOD CULTURE [[id number]] Collected: 01/28/22 0000    Order Status: Canceled Specimen: Other from Peripheral     BLOOD CULTURE [[id number]]  (Abnormal) Collected: 01/24/22 0822    Order Status: Completed Specimen: Blood from Peripheral Updated: 01/27/22 1022     Significant Indicator POS     Source BLD     " "Site PERIPHERAL     Culture Result Growth detected by Bactec instrument. 01/26/2022  18:11      Staphylococcus aureus  See previous culture for sensitivity report.      Narrative:      CALL  [place]  [facility] tel. [phone],  CALLED  [facility] tel. [phone] 01/26/2022, 18:13, RB PERF. RESULTS CALLED  TO:Machelle Delos Reyes, RN  Enhanced Droplet, Contact, and Eye Protection  Per Hospital Policy: Only change Specimen Src: to \"Line\" if  specified by physician order.  Right AC    BLOOD CULTURE [[id number]]  (Abnormal) Collected: 01/23/22 1103    Order Status: Completed Specimen: Blood from Peripheral Updated: 01/27/22 1021     Significant Indicator POS     Source BLD     Site PERIPHERAL     Culture Result Growth detected by Bactec instrument. 01/26/2022  05:51      Staphylococcus aureus  Methicillin sensitive Staphylococcus aureus by screening  method.  See previous culture for sensitivity report.      Narrative:      CALL  [place]  [facility] tel. [phone],  CALLED  [facility] tel. [phone] 01/26/2022, 05:53, RB PERF. RESULTS CALLED  TO:Norma Hoffman Rn  Enhanced Droplet, Contact, and Eye Protection  Per Hospital Policy: Only change Specimen Src: to \"Line\" if  specified by physician order.  Left Forearm/Arm    GRAM STAIN [[id number]] Collected: 01/25/22 1552    Order Status: Completed Specimen: Tissue Updated: 01/26/22 1257     Significant Indicator .     Source TISS     Site Right Shoulder Tissue     Gram Stain Result No organisms seen.    Narrative:      Surgery Specimen    GRAM STAIN [[id number]] Collected: 01/25/22 1552    Order Status: Completed Specimen: Wound Updated: 01/26/22 0943     Significant Indicator .     Source WND     Site Right shoulder     Gram Stain Result Few WBCs.  No organisms seen.      Narrative:      Surgery - swabs received    GRAM STAIN [[id number]] Collected: 01/25/22 1552    Order Status: Completed Specimen: Wound Updated: 01/26/22 0943     Significant Indicator .     Source WND     Site Right Shoulder     Gram " "Stain Result Rare WBCs.  No organisms seen.      Narrative:      Surgery - swabs received    GRAM STAIN [[id number]] Collected: 01/25/22 1553    Order Status: Completed Specimen: Synovial Updated: 01/26/22 0739     Significant Indicator .     Source SYNO     Site Right shoulder     Gram Stain Result Moderate WBCs.  No organisms seen.      Narrative:      Surgery Specimen    FLUID CULTURE W/GRAM STAIN [[id number]]     Order Status: No result Specimen: Body Fluid from Synovial Fluid     FLUID CULTURE W/GRAM STAIN [[id number]]     Order Status: No result Specimen: Body Fluid from Synovial Fluid     BLOOD CULTURE [[id number]]  (Abnormal) Collected: 01/22/22 0445    Order Status: Completed Specimen: Blood from Peripheral Updated: 01/24/22 1122     Significant Indicator POS     Source BLD     Site PERIPHERAL     Culture Result Growth detected by Bactec instrument. 01/22/2022  18:06      Staphylococcus aureus  See previous culture for sensitivity report.      Narrative:      CALL  [place]  [facility] tel. [phone],  CALLED  [facility] tel. [phone] 01/22/2022, 18:11, RB PERF. RESULTS CALLED  TO:[phone]  Enhanced Droplet, Contact, and Eye Protection  Per Hospital Policy: Only change Specimen Src: to \"Line\" if  specified by physician order.  Right AC    BLOOD CULTURE [[id number]]  (Abnormal)  (Susceptibility) Collected: 01/22/22 0445    Order Status: Completed Specimen: Blood from Peripheral Updated: 01/24/22 1121     Significant Indicator POS     Source BLD     Site PERIPHERAL     Culture Result Growth detected by Bactec instrument. 01/22/2022  18:05      Staphylococcus aureus    Narrative:      CALL  [place]  [facility] tel. [phone],  CALLED  [facility] tel. [phone] 01/22/2022, 18:11, RB PERF. RESULTS CALLED  TO:[phone]  Enhanced Droplet, Contact, and Eye Protection  Per Hospital Policy: Only change Specimen Src: to \"Line\" if  specified by physician order.  Left AC    Susceptibility     Staphylococcus aureus (1)     Antibiotic Interpretation Microscan "   Method Status    Azithromycin Sensitive <=2 mcg/mL TEMI Final    Clindamycin Sensitive <=0.25 mcg/mL TEMI Final    Cefazolin Sensitive <=8 mcg/mL TEMI Final    Cefepime Sensitive <=4 mcg/mL TEMI Final    Ceftaroline Sensitive <=0.5 mcg/mL TEMI Final    Daptomycin Sensitive 1 mcg/mL TEMI Final    Erythromycin Sensitive <=0.25 mcg/mL TEMI Final    Ampicillin/sulbactam Sensitive <=8/4 mcg/mL TEMI Final    Vancomycin Sensitive 2 mcg/mL TEMI Final    Oxacillin Sensitive <=0.25 mcg/mL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

## 2022-01-31 NOTE — DISCHARGE PLANNING
Anticipated Discharge Disposition:   SNF vs Home with Home Health, wound vac    Action:   Discussed discharge planning needs during rounds. Per MD, plan for JAZIEL, ID following, wound vac in place, pending blood cultures.     Barriers to Discharge:   Medical clearance.  Pt's decision on SNF vs home.  Wound vac set up if pt decides to go home.  Home health if pt decides to go home.    Plan:   Hospital Care Management will continue to follow and assist with discharge planning needs.

## 2022-01-31 NOTE — PROGRESS NOTES
"   Orthopaedic Progress Note    Interval changes:  Patient doing well  Cultures form right shoulder with no growth  No further ortho intervention planned   Continue wound care till closed    ROS - Patient denies any new issues.  Pain well controlled.    /56   Pulse 74   Temp 37 °C (98.6 °F) (Temporal)   Resp 18   Ht 1.803 m (5' 11\")   Wt 77.1 kg (170 lb)   SpO2 94%       Patient seen and examined  No acute distress  Breathing non labored  RRR  Right shoulder vac dressing CDI without leak, CDI, moves all toes, cap refill <2 sec.            Active Hospital Problems    Diagnosis    • DVT (deep venous thrombosis) ([facility]) [I82.409]    • Pyogenic arthritis of right shoulder region ([facility]) [M00.9]    • Hyponatremia [E87.1]    • MSSA bacteremia [R78.81, B95.61]    • COVID-19 [U07.1]    • Spinal stenosis at L4-L5 level [M48.061]    • Hypertension [I10]        Assessment/Plan:  Patient doing well  Cultures form right shoulder with no growth  No further ortho intervention planned   POD#6 S/P   1.  Irrigation and debridement, right glenohumeral joint.  2.  Multiple cultures.  3.  Application of wound VAC.  Wt bearing status - WBAT  Wound care/Drains - Vac dressings to be left in place  Future Procedures - none planned   Sutures/Staples out- 14 days post operatively  PT/OT-initiated  Antibiotics: ancef 2g IVQ8  DVT Prophylaxis- TEDS/SCDs/Foot pumps  Gunter-none  Case Coordination for Discharge Planning - Disposition pending abx needs      I have performed a physical exam and reviewed and updated ROS and Plan today (1/31). In review of yesterday's note (1/30), there are no changes except as documented above.   "

## 2022-01-31 NOTE — PROGRESS NOTES
Assumed care of pt at 1900. Pt alert and oriented.  Reports mild/moderate back and shoulder pain.  Discussed importance of mobility for healing and bowel function.  Bed low & locked, alarm on.  Reviewed plan for evening.  Hourly rounding continues.

## 2022-02-01 LAB
BACTERIA BLD CULT: ABNORMAL
BACTERIA BLD CULT: ABNORMAL
SIGNIFICANT IND 70042: ABNORMAL
SITE SITE: ABNORMAL
SOURCE SOURCE: ABNORMAL

## 2022-02-01 PROCEDURE — 99233 SBSQ HOSP IP/OBS HIGH 50: CPT | Performed by: STUDENT IN AN ORGANIZED HEALTH CARE EDUCATION/TRAINING PROGRAM

## 2022-02-01 PROCEDURE — A9270 NON-COVERED ITEM OR SERVICE: HCPCS | Performed by: HOSPITALIST

## 2022-02-01 PROCEDURE — 700101 HCHG RX REV CODE 250: Performed by: HOSPITALIST

## 2022-02-01 PROCEDURE — 700102 HCHG RX REV CODE 250 W/ 637 OVERRIDE(OP): Performed by: HOSPITALIST

## 2022-02-01 PROCEDURE — 770001 HCHG ROOM/CARE - MED/SURG/GYN PRIV*

## 2022-02-01 PROCEDURE — 97116 GAIT TRAINING THERAPY: CPT | Mod: CQ

## 2022-02-01 PROCEDURE — A9270 NON-COVERED ITEM OR SERVICE: HCPCS | Performed by: STUDENT IN AN ORGANIZED HEALTH CARE EDUCATION/TRAINING PROGRAM

## 2022-02-01 PROCEDURE — 700102 HCHG RX REV CODE 250 W/ 637 OVERRIDE(OP): Performed by: STUDENT IN AN ORGANIZED HEALTH CARE EDUCATION/TRAINING PROGRAM

## 2022-02-01 PROCEDURE — 97530 THERAPEUTIC ACTIVITIES: CPT | Mod: CQ

## 2022-02-01 PROCEDURE — 700111 HCHG RX REV CODE 636 W/ 250 OVERRIDE (IP): Performed by: HOSPITALIST

## 2022-02-01 PROCEDURE — 99232 SBSQ HOSP IP/OBS MODERATE 35: CPT | Performed by: INTERNAL MEDICINE

## 2022-02-01 RX ADMIN — APIXABAN 10 MG: 5 TABLET, FILM COATED ORAL at 17:08

## 2022-02-01 RX ADMIN — WATER 2 G: 100 INJECTION, SOLUTION INTRAVENOUS at 05:15

## 2022-02-01 RX ADMIN — WATER 2 G: 100 INJECTION, SOLUTION INTRAVENOUS at 13:29

## 2022-02-01 RX ADMIN — WATER 2 G: 100 INJECTION, SOLUTION INTRAVENOUS at 21:19

## 2022-02-01 RX ADMIN — OXYCODONE HYDROCHLORIDE 10 MG: 10 TABLET ORAL at 12:25

## 2022-02-01 RX ADMIN — OXYCODONE HYDROCHLORIDE 10 MG: 10 TABLET ORAL at 17:08

## 2022-02-01 RX ADMIN — ENOXAPARIN SODIUM 80 MG: 80 INJECTION SUBCUTANEOUS at 05:15

## 2022-02-01 RX ADMIN — OXYCODONE HYDROCHLORIDE 10 MG: 10 TABLET ORAL at 21:19

## 2022-02-01 RX ADMIN — OXYCODONE HYDROCHLORIDE 10 MG: 10 TABLET ORAL at 08:30

## 2022-02-01 RX ADMIN — SENNOSIDES 17.2 MG: 8.6 TABLET, FILM COATED ORAL at 21:19

## 2022-02-01 ASSESSMENT — COGNITIVE AND FUNCTIONAL STATUS - GENERAL
TOILETING: A LOT
PERSONAL GROOMING: A LITTLE
SUGGESTED CMS G CODE MODIFIER MOBILITY: CK
SUGGESTED CMS G CODE MODIFIER DAILY ACTIVITY: CK
STANDING UP FROM CHAIR USING ARMS: A LOT
WALKING IN HOSPITAL ROOM: A LITTLE
HELP NEEDED FOR BATHING: A LOT
MOVING TO AND FROM BED TO CHAIR: A LOT
WALKING IN HOSPITAL ROOM: A LITTLE
DRESSING REGULAR LOWER BODY CLOTHING: A LOT
MOVING FROM LYING ON BACK TO SITTING ON SIDE OF FLAT BED: A LITTLE
TURNING FROM BACK TO SIDE WHILE IN FLAT BAD: A LOT
MOBILITY SCORE: 14
CLIMB 3 TO 5 STEPS WITH RAILING: A LOT
SUGGESTED CMS G CODE MODIFIER MOBILITY: CL
MOVING FROM LYING ON BACK TO SITTING ON SIDE OF FLAT BED: A LITTLE
STANDING UP FROM CHAIR USING ARMS: A LOT
EATING MEALS: A LITTLE
DRESSING REGULAR UPPER BODY CLOTHING: A LOT
DAILY ACTIVITIY SCORE: 14
MOBILITY SCORE: 16
CLIMB 3 TO 5 STEPS WITH RAILING: A LITTLE
MOVING TO AND FROM BED TO CHAIR: A LITTLE
TURNING FROM BACK TO SIDE WHILE IN FLAT BAD: A LOT

## 2022-02-01 ASSESSMENT — GAIT ASSESSMENTS
GAIT LEVEL OF ASSIST: MINIMAL ASSIST
ASSISTIVE DEVICE: FRONT WHEEL WALKER
DISTANCE (FEET): 3

## 2022-02-01 ASSESSMENT — ENCOUNTER SYMPTOMS
SHORTNESS OF BREATH: 0
FEVER: 0

## 2022-02-01 ASSESSMENT — PAIN DESCRIPTION - PAIN TYPE
TYPE: ACUTE PAIN

## 2022-02-01 NOTE — PROGRESS NOTES
Received in bed, AAOX4, c/o right shoulder pain, hvac dressing CDI, POC discussed, JAZIEL ordered yesterday, needs attended.

## 2022-02-01 NOTE — PROGRESS NOTES
Hospital Medicine Daily Progress Note    Date of Service  1/31/2022    Chief Complaint  Yunier Denney is a 77 y.o. male admitted 1/22/2022 with back, leg pain.    Hospital Course  A 77-year-old man with h/o epidural abscess (follows with Dr. Herron) presented with right leg pain, knee pain, viral-like symptoms including nausea, chills, malaise and fatigue. Patient reports he was diagnosed with COVID-19 on 1/20.   Patient has elevated ESR at 47 and CRP of 198.9.  Blood cultures x2 returned positive for staph aureus. Patient hospitalized for IV Unasyn.      Interval Problem Update  1/23: Patient reports lower back pain, right shoulder, left hip pain, chills. Had fever 38.1, hemodynamically stable. On room air.  Blood cultures from 1/20 and 1/22 positive for S.aureus. Repeat blood cultures positive for S.aureus.  ID consulted.   Will likely require antibiosis through 4 weeks from most recent negative blood cultures, at this point that would be 3/2/2022.  Continue to monitor.  Synovial culture is pending at this time.  May impact duration of therapy.  CXR showed borderline cardiomegaly.  CT right shoulder, left hip ordered    1/26: To IR Today  1/27: No acute overnight events.  Unable to aspirate hip joint secondary to massive hernia.  1/28: Blood cultures from 1/26 have returned positive today, repeat screening cultures will be obtained today.  Clinically stable, no acute overnight events.  Case discussed with orthopedics.  1/29: Screening cultures from 1/28 no growth to date, growth noted and 126 cultures.  Case discussed with infectious disease.  Patient declines IR guided CT-guided hip aspiration.  Is willing to undergo imaging.  1/30: 1/28 cultures positive in 1/2 bottles.  CT AP repeated yesterday, results per below.  F/u US doppler to eval for DVT.    1/31: Ultrasound with occlusive thrombus noted, anticoagulation initiated on 1/30. Continue to monitor. We'll check a transesophageal ultrasound of the heart at the  recommendations of infectious disease given his persistent positive cultures.    I have personally seen and examined the patient at bedside. I discussed the plan of care with patient and bedside RN.    Consultants/Specialty  infectious disease, orthopedics and IR    Code Status  Full Code    Disposition  Patient is not medically cleared for discharge.   Anticipate discharge to D.  I have placed the appropriate orders for post-discharge needs.    Review of Systems  Constitutional: Positive for chills, fever and malaise/fatigue.   HENT: Negative.    Eyes: Negative.    Respiratory: Negative for cough and shortness of breath.    Cardiovascular: Negative for chest pain and leg swelling.   Gastrointestinal: Negative for nausea and vomiting.   Genitourinary: Negative for dysuria.   Musculoskeletal: Positive for right shoulder, left hip pain.   Skin: Negative for rash.   Neurological: Positive for weakness.     Physical Exam  Temp:  [36.9 °C (98.4 °F)-37.6 °C (99.6 °F)] 36.9 °C (98.4 °F)  Pulse:  [73-95] 95  Resp:  [16-18] 18  BP: (112-128)/(56-74) 123/66  SpO2:  [91 %-95 %] 95 %    Physical Exam  Vitals and nursing note reviewed.   Constitutional:       Appearance: He is ill-appearing.   HENT:      Head: Normocephalic and atraumatic.      Nose: Nose normal.      Mouth/Throat:      Mouth: Mucous membranes are moist.      Pharynx: Oropharynx is clear.   Eyes:      Pupils: Pupils are equal, round, and reactive to light.   Cardiovascular:      Rate and Rhythm: Normal rate and regular rhythm.   Pulmonary:      Effort: Pulmonary effort is normal. No respiratory distress.      Breath sounds: No wheezing or rales.   Abdominal:      General: Abdomen is flat. Bowel sounds are normal. There is no distension.      Tenderness: There is no abdominal tenderness. There is no guarding.      Hernia: A hernia (left inguinal, reduciable, non-tender) is present.   Musculoskeletal:         General: right shoulder swollen, tender; left hip  tender     Cervical back: Normal range of motion.      Right lower leg: No edema.      Left lower leg: No edema.   Skin:     General: Skin is warm.   Neurological:      General: No focal deficit present.      Mental Status: He is alert and oriented to person, place, and time.     Fluids    Intake/Output Summary (Last 24 hours) at 1/31/2022 1622  Last data filed at 1/31/2022 1619  Gross per 24 hour   Intake 2040 ml   Output 1050 ml   Net 990 ml       Laboratory                        Imaging  US-EXTREMITY VENOUS LOWER UNILAT LEFT   Final Result      CT-CHEST,ABDOMEN,PELVIS WITH   Final Result      1.  No evidence of acute inflammatory process in the chest, abdomen or pelvis.   2.  Left hip effusion has nearly resolved.   3.  Cardiomegaly.   4.  Cholelithiasis versus gallbladder sludge. No cholecystitis.   5.  Moderate to large hiatal hernia, containing 70-80% of organoaxially rotated stomach.   6.  Left inguinal hernia smaller, and again contains nonobstructed sigmoid colon.   7.  Nonocclusive filling defect in the left common femoral vein may represent mixing artifact or a nonocclusive DVT. Recommend further evaluation with venous duplex ultrasound.      DX-ABORTED DX PROCEDURE   Final Result      Aborted fluoroscopic guided left hip aspiration due to overlying bowel contained within the inguinal hernia. Findings were discussed with Dr. Cespedes. If necessary, joint aspiration could be performed under CT guidance.      CT-HIP WITH PLUS RECONS LEFT   Final Result         1. Small left hip effusion, which may be sterile or infected.   2. No intramuscular subcutaneous fluid collections. No evidence of osteomyelitis.   3. Large, 13 cm left inguinal hernia containing nonobstructed sigmoid colon.      CT-SHOULDER WITH PLUS RECONS RIGHT   Final Result         1. Loculated small to moderate glenohumeral joint effusion. It may be infected and represent septic arthritis.   2. Several small intramuscular abscesses in the  subscapularis and anterior head of the deltoid.   3. No evidence of osteomyelitis.   4. No fracture or dislocation.   5 Partially visualized small right pleural effusion and associated atelectasis.      EC-ECHOCARDIOGRAM LTD W/O CONT   Final Result      DX-CHEST-PORTABLE (1 VIEW)   Final Result      Borderline cardiomegaly.      EC-JAZIEL W/ CONT    (Results Pending)        Assessment/Plan  * Pyogenic arthritis of right shoulder region ([facility])  Assessment & Plan  CT showed loculated small to moderate right glenohumeral joint effusion; it may be infected and represent septic arthritis; several small intramuscular abscesses in the subscapularis and anterior head of the deltoid; partially visualized small right pleural effusion and associated atelectasis.  CT showed small left hip effusion, which may be sterile or infected.  Orthopedic surgery consulted.  IR consulted for drainage.    DVT (deep venous thrombosis) ([facility])  Assessment & Plan  Mixing artifact vs clot on CTAP, f/u US pending.     Hyponatremia  Assessment & Plan  Mild   Monitor     Spinal stenosis at L4-L5 level  Assessment & Plan  MRI showing severe L4 stenosis  Pain control  Consider neurosurgery consult    COVID-19  Assessment & Plan  Patient is currently not requiring oxygen hold steroids  Continue with enhanced precautions  Placed on oxygen with SPO2 below 90%    MSSA bacteremia- (present on admission)  Assessment & Plan  Patient has blood cultures x 2 positive for staph aureus that reflected at [facility] when he visited ER from 1/20/2022.  Echo showed normal left ventricular systolic function; mild aortic insufficiency.   CT showed loculated small to moderate right glenohumeral joint effusion; it may be infected and represent septic arthritis; several small intramuscular abscesses in the subscapularis and anterior head of the deltoid; partially visualized small right pleural effusion and associated atelectasis.  CT showed small left hip effusion, which  may be sterile or infected.  Orthopedic surgery consulted.  IR consulted for drainage.  Blood cultures from 1/20 and 1/22 positive  Blood cultures form 1/23 negative to date.    Hypertension- (present on admission)  Assessment & Plan  Blood pressures currently very well.  We will hold BP meds at this time.       VTE prophylaxis: enoxaparin ppx    I have performed a physical exam and reviewed and updated ROS and Plan today (1/31/2022). In review of yesterday's note (1/30/2022), there are no changes except as documented above.

## 2022-02-01 NOTE — PROGRESS NOTES
[facility] Hospital Medicine Daily Progress Note    Date of Service  2/1/2022    Chief Complaint  Yunier Denney is a 77 y.o. male admitted 1/22/2022 with back, leg pain.    Hospital Course  A 77-year-old man with h/o epidural abscess (follows with Dr. Herron) presented with right leg pain, knee pain, viral-like symptoms including nausea, chills, malaise and fatigue. Patient reports he was diagnosed with COVID-19 on 1/20.   Patient has elevated ESR at 47 and CRP of 198.9.  Blood cultures x2 returned positive for staph aureus. Patient hospitalized for IV Unasyn.      Interval Problem Update  No acute events overnight.  Patient refuses JAZIEL, states his throat closes up and he had terrible experience with prior anesthesia for a procedure.  Will need minimum 4+ weeks IV antibiotics, likely 6 weeks.  ID recommend PICC line after 72 hours from last negative blood culture 1/31.  Wound vac to right shoulder.  On ancef q8h per ID. Appreciate final antibiotic recommendations.  Transition to eliquis, stop lovenox BID.  Disposition planning underway. PT/OT evaluation.  May need SNF for PT/OT, antibiotics, wound care. Will follow up therapy evals and recommendations.    I have personally seen and examined the patient at bedside. I discussed the plan of care with patient and bedside RN.    Consultants/Specialty  infectious disease, orthopedics and IR    Code Status  Full Code    Disposition  Patient is not medically cleared for discharge.   Anticipate discharge to TBD.  I have placed the appropriate orders for post-discharge needs.    Review of Systems  Constitutional: Positive for chills, fever and malaise/fatigue.   HENT: Negative.    Eyes: Negative.    Respiratory: Negative for cough and shortness of breath.    Cardiovascular: Negative for chest pain and leg swelling.   Gastrointestinal: Negative for nausea and vomiting.   Genitourinary: Negative for dysuria.   Musculoskeletal: Positive for right shoulder, left hip pain.   Skin: Negative  for rash.   Neurological: Positive for weakness.     Physical Exam  Temp:  [36.8 °C (98.2 °F)-36.9 °C (98.5 °F)] 36.9 °C (98.5 °F)  Pulse:  [61-96] 61  Resp:  [18] 18  BP: (120-137)/(63-66) 137/63  SpO2:  [95 %-96 %] 96 %    Physical Exam  Vitals and nursing note reviewed.   Constitutional:       Appearance: He is ill-appearing.   HENT:      Head: Normocephalic and atraumatic.      Nose: Nose normal.      Mouth/Throat:      Mouth: Mucous membranes are moist.      Pharynx: Oropharynx is clear.   Eyes:      Pupils: Pupils are equal, round, and reactive to light.   Cardiovascular:      Rate and Rhythm: Normal rate and regular rhythm.   Pulmonary:      Effort: Pulmonary effort is normal. No respiratory distress.      Breath sounds: No wheezing or rales.   Abdominal:      General: Abdomen is flat. Bowel sounds are normal. There is no distension.      Tenderness: There is no abdominal tenderness. There is no guarding.      Hernia: A hernia (left inguinal, reduciable, non-tender) is present.   Musculoskeletal:         General: right shoulder swollen, tender; left hip tender     Cervical back: Normal range of motion.      Right lower leg: No edema.      Left lower leg: No edema.   Skin:     General: Skin is warm.   Neurological:      General: No focal deficit present.      Mental Status: He is alert and oriented to person, place, and time.     Fluids    Intake/Output Summary (Last 24 hours) at 2/1/2022 1522  Last data filed at 2/1/2022 0515  Gross per 24 hour   Intake 835 ml   Output 900 ml   Net -65 ml       Laboratory                        Imaging  US-EXTREMITY VENOUS LOWER UNILAT LEFT   Final Result      CT-CHEST,ABDOMEN,PELVIS WITH   Final Result      1.  No evidence of acute inflammatory process in the chest, abdomen or pelvis.   2.  Left hip effusion has nearly resolved.   3.  Cardiomegaly.   4.  Cholelithiasis versus gallbladder sludge. No cholecystitis.   5.  Moderate to large hiatal hernia, containing 70-80% of  organoaxially rotated stomach.   6.  Left inguinal hernia smaller, and again contains nonobstructed sigmoid colon.   7.  Nonocclusive filling defect in the left common femoral vein may represent mixing artifact or a nonocclusive DVT. Recommend further evaluation with venous duplex ultrasound.      DX-ABORTED DX PROCEDURE   Final Result      Aborted fluoroscopic guided left hip aspiration due to overlying bowel contained within the inguinal hernia. Findings were discussed with Dr. Cespedes. If necessary, joint aspiration could be performed under CT guidance.      CT-HIP WITH PLUS RECONS LEFT   Final Result         1. Small left hip effusion, which may be sterile or infected.   2. No intramuscular subcutaneous fluid collections. No evidence of osteomyelitis.   3. Large, 13 cm left inguinal hernia containing nonobstructed sigmoid colon.      CT-SHOULDER WITH PLUS RECONS RIGHT   Final Result         1. Loculated small to moderate glenohumeral joint effusion. It may be infected and represent septic arthritis.   2. Several small intramuscular abscesses in the subscapularis and anterior head of the deltoid.   3. No evidence of osteomyelitis.   4. No fracture or dislocation.   5 Partially visualized small right pleural effusion and associated atelectasis.      EC-ECHOCARDIOGRAM LTD W/O CONT   Final Result      DX-CHEST-PORTABLE (1 VIEW)   Final Result      Borderline cardiomegaly.           Assessment/Plan  * Pyogenic arthritis of right shoulder region ([facility])  Assessment & Plan  CT showed loculated small to moderate right glenohumeral joint effusion; it may be infected and represent septic arthritis; several small intramuscular abscesses in the subscapularis and anterior head of the deltoid; partially visualized small right pleural effusion and associated atelectasis.  CT showed small left hip effusion, which may be sterile or infected; repeat CT shows smaller effusion size  S/p drainage of shoulder abscess, wound vac in  place    DVT (deep venous thrombosis) ([facility])  Assessment & Plan  Mixing artifact vs clot on CTAP, f/u US shows DVT  Started on lovenox BID, transitioned to eliquis 2/1    Hyponatremia  Assessment & Plan  Mild   Monitor     Spinal stenosis at L4-L5 level  Assessment & Plan  MRI showing severe L4 stenosis  Pain control    COVID-19  Assessment & Plan  Patient is currently not requiring oxygen hold steroids  Continue with enhanced precautions  Placed on oxygen with SPO2 below 90%    MSSA bacteremia- (present on admission)  Assessment & Plan  Patient has blood cultures x 2 positive for staph aureus that reflected at [facility] when he visited ER from 1/20/2022.  Echo showed normal left ventricular systolic function; mild aortic insufficiency.   CT showed loculated small to moderate right glenohumeral joint effusion; it may be infected and represent septic arthritis; several small intramuscular abscesses in the subscapularis and anterior head of the deltoid; partially visualized small right pleural effusion and associated atelectasis.  CT showed small left hip effusion, which may be sterile or infected.  Orthopedic surgery consulted.  IR consulted for drainage.  Blood cultures from 1/20 and 1/22 positive  Blood cultures form 1/28 positive, 1/31 remains no growth to date.  JAZIEL ordered given recurrent fever/positive blood culture, however patient refuses JAZIEL.  Continue ancef, likely 6 weeks total IV antibiotics. Appreciate ID recommendations    Hypertension- (present on admission)  Assessment & Plan  Blood pressures currently very well.  We will hold BP meds at this time.       VTE prophylaxis: eliquis    I have performed a physical exam and reviewed and updated ROS and Plan today (2/1/2022). In review of yesterday's note (1/31/2022), there are no changes except as documented above.

## 2022-02-01 NOTE — THERAPY
Physical Therapy   Daily Treatment     Patient Name: Yunier Denney  Age:  77 y.o., Sex:  male  Medical Record #: [id number]  Today's Date: 2/1/2022     Precautions  Precautions: Fall Risk  Comments: WBAT right shoulder, wound vac     Assessment    Pt was pleasant with therapist, required extra time to complete tasks but with less assist today. He reached EOB with modA for BLE and trunk, using therapist arm to pull himself to EOB. He was able to scoot and laterally scoot along EOB with SBA and extra effort. He is not utilizing RUE at this time and resistant to ROM. With standing he was able to use RUE for balance. He required Taye for lift off  But stood with improved posture and decreased posterior lean. He was able to complete side stepping along side of bed with Taye and fww management. He fatigues quickly at this time. Will continue to follow while in house.     Plan    Continue current treatment plan.    DC Equipment Recommendations: Unable to determine at this time  Discharge Recommendations: Recommend post-acute placement for additional physical therapy services prior to discharge home         02/01/22 1031   Balance   Sitting Balance (Static) Poor +   Sitting Balance (Dynamic) Poor   Standing Balance (Static) Fair -   Standing Balance (Dynamic) Poor +   Weight Shift Sitting Poor   Weight Shift Standing Fair   Skilled Intervention Verbal Cuing;Sequencing   Comments standing with fww, Taye for safety, no lob at this time    Gait Analysis   Gait Level Of Assist Minimal Assist   Assistive Device Front Wheel Walker   Distance (Feet) 3   # of Times Distance was Traveled 2   Skilled Intervention Verbal Cuing;Sequencing   Comments side stepping along side of bed 2x. Seated rest break in between. Taye for balance, short shuffling steps, fatigues quickly    Bed Mobility    Supine to Sit Moderate Assist   Sit to Supine Minimal Assist  (for BLE only )   Scooting Minimal Assist   Skilled Intervention Verbal  Cuing;Sequencing   Comments modA for trunk and BLE to reach EOB, He was able to lateral scoot with extra time and no assist. Pt minimally using RUE at this time but once back in bed leans onto his right side    Functional Mobility   Sit to Stand Minimal Assist   Skilled Intervention Verbal Cuing;Sequencing   Comments with fww, Taye for lift off, extra time but able to complete STS with no posterior lean today    Short Term Goals    Short Term Goal # 1 Pt will perform supine<>sit from flat HOB/no railing with min A within 6 visits to progress back to baseline.    Goal Outcome # 1 goal not met   Short Term Goal # 2 Pt will perform sit<>stand with fWW and min A wtihin 6 visits to ensure independent mobility at home.    Goal Outcome # 2 Goal met   Short Term Goal # 3 Pt will ambulate x 150ft with fWW and supervision wtihin 6 visits to progress to baseline.    Goal Outcome # 3 Goal not met   Short Term Goal # 4 Pt will ascend/descend 1 step with B UE support and min A within 6 visits to ensure independent mobility ath ome.    Goal Outcome # 4 Goal not met

## 2022-02-01 NOTE — DISCHARGE PLANNING
Anticipated Discharge Disposition:   SNF    Action:   Discussed discharge planning needs during rounds. Per MD, pending ID recommendation, wound vac still in place, pt receiving IV antibiotics. Per MD, pt now agreeable to SNF.    RN VALORIE spoke to pt. Discussed discharge planning, placement. Pt agreed to go to SNF, but wants to ask his friend who works at a SNF in [place] where she works first, then he will update RN VALORIE. Bedside RN updated.    Barriers to Discharge:   Medical clearance.  SNF choice.  Placement acceptance and bed availability.    Plan:   [facility] will continue to follow and assist with discharge planning needs.

## 2022-02-01 NOTE — PROGRESS NOTES
Infectious Disease Progress Note    Author: Xiang Vera M.D. Date & Time of service: 2022  8:55 AM    Chief Complaint:  MSSA bacteremia  Joint pain    Interval History:   Temp 101.5 WBC 10.1 seen by Ortho-recommended IR aspiration right shoulder and left hip   Tmax 100.4 WBC 8.1 No procedures done as yet   AF s/p OR yesterday-op note reviewed +purulence seen   AF (100.5) blood cxs from  showing staph   AF WBC 7.5  blood cxs from  now also +SA   Tmax 100.4, O2 room air Blood cultures  prelim neg   T-max 99.6 yesterday afternoon, culture results as below.  Tolerating Ancef   patient remains afebrile, no CBC this morning, tolerating antibiotics.  Tentative plan as below    Labs Reviewed, Medications Reviewed     Review of Systems:  Review of Systems   Constitutional: Negative for fever.   Respiratory: Negative for shortness of breath.    Musculoskeletal: Positive for joint pain.   All other systems reviewed and are negative.      Hemodynamics:  Temp (24hrs), Av.9 °C (98.4 °F), Min:36.8 °C (98.2 °F), Max:36.9 °C (98.5 °F)  Temperature: 36.9 °C (98.5 °F)  Pulse  Av.9  Min: 60  Max: 98   Blood Pressure : 137/63       Physical Exam:  Physical Exam  Vitals and nursing note reviewed.   Constitutional:       General: He is not in acute distress.     Appearance: He is ill-appearing. He is not toxic-appearing or diaphoretic.   HENT:      Mouth/Throat:      Pharynx: No oropharyngeal exudate.   Eyes:      General: No scleral icterus.        Right eye: No discharge.         Left eye: No discharge.      Conjunctiva/sclera: Conjunctivae normal.   Cardiovascular:      Rate and Rhythm: Normal rate.   Pulmonary:      Effort: Pulmonary effort is normal. No respiratory distress.      Breath sounds: No stridor.   Abdominal:      General: There is no distension.      Tenderness: There is no abdominal tenderness.   Musculoskeletal:         General: Swelling and tenderness present.       Right lower leg: No edema.      Left lower leg: No edema.      Comments: Right shoulder with wound VAC in place, swelling, no active discharge.  Pain of left hip on passive movements improved, however significant left lower extremity, especially leg pain   Skin:     Coloration: Skin is pale. Skin is not jaundiced.   Neurological:      General: No focal deficit present.      Comments: Left lower extremity weakness but per patient this is secondary to pain.   Psychiatric:         Behavior: Behavior normal.         Meds:    Current Facility-Administered Medications:   •  NICU/PEDS enoxaparin (LOVENOX) injection  •  docusate calcium  •  polyethylene glycol/lytes  •  sennosides  •  ceFAZolin  •  NS  •  ondansetron  •  ondansetron  •  acetaminophen  •  bisacodyl  •  acetaminophen  •  oxyCODONE immediate-release **OR** oxyCODONE immediate-release    Labs:  No results for input(s): WBC, RBC, HEMOGLOBIN, HEMATOCRIT, MCV, MCH, RDW, PLATELETCT, MPV, NEUTSPOLYS, LYMPHOCYTES, MONOCYTES, EOSINOPHILS, BASOPHILS, RBCMORPHOLO in the last 72 hours.  No results for input(s): SODIUM, POTASSIUM, CHLORIDE, CO2, GLUCOSE, BUN, CPKTOTAL in the last 72 hours.  No results for input(s): ALBUMIN, TBILIRUBIN, ALKPHOSPHAT, TOTPROTEIN, ALTSGPT, ASTSGOT, CREATININE in the last 72 hours.    Imaging:  CT-HIP WITH PLUS RECONS LEFT    Result Date: 1/24/2022 1/23/2022 9:25 PM HISTORY/REASON FOR EXAM:  hip pain, bacteremia. TECHNIQUE/ EXAM DESCRIPTION AND NUMBER OF VIEWS:  CT scan of the pelvis/hip with contrast and including reconstructions. Thin-section helical images were obtained from the iliac crests through the lesser trochanters with contrast. Coronal reconstructions were generated from the axial images. 100 mL of Omnipaque 350 nonionic contrast was utilized. Low dose optimization technique was utilized for this CT exam including automated exposure control and adjustment of the mA and/or kV according to patient size. COMPARISON: None.  FINDINGS: Small left hip effusion. No fracture or dislocation. 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 No osseous destruction to suggest osteoarthritis. Atherosclerosis. No pelvic lymphadenopathy or pelvic mass lesions. Large left inguinal hernia containing a portion of nonobstructed sigmoid colon. The hernia measures approximately 13 x 7 cm. Spondylosis.     1. Small left hip effusion, which may be sterile or infected. 2. No intramuscular subcutaneous fluid collections. No evidence of osteomyelitis. 3. Large, 13 cm left inguinal hernia containing nonobstructed sigmoid colon.    CT-SHOULDER WITH PLUS RECONS RIGHT    Result Date: 1/24/2022 1/23/2022 9:25 PM HISTORY/REASON FOR EXAM:  Right shoulder pain, weakness, bacteremia. TECHNIQUE/ EXAM DESCRIPTION AND NUMBER OF VIEWS:  CT scan of the RIGHT shoulder with contrast with reconstructions. Axial spiral CT images were obtained of the upper extremity from the shoulder through the proximal third of the humerus. Images were reviewed at soft tissue and bone windows with administration of 100 mL of Omnipaque 350 nonionic contrast without complication. Sagittal reformations were then generated. Up to date radiation dose reduction adjustments have been utilized to meet ALARA standards for radiation dose reduction. COMPARISON: None. FINDINGS: Loculated right glenohumeral joint effusion, small-to-moderate, with some synovial hyperenhancement. The fluid extends into the bicipital groove, along the biceps tendon. Several intramuscular pockets of fluid in the subscapularis muscle measure up to 1.8 x 1.8 cm. Pocket of fluid in the anterior deltoid muscle measures 1.8 x 1.8 cm. No cortical destruction to suggest osteomyelitis. Mild to moderate glenohumeral and acromioclavicular osteoarthrosis. No fracture or dislocation. Partially visualized small right pleural effusion and associated atelectasis. Visualized mediastinum shows no acute abnormality. Partially visualized hiatal  hernia.     1. Loculated small to moderate glenohumeral joint effusion. It may be infected and represent septic arthritis. 2. Several small intramuscular abscesses in the subscapularis and anterior head of the deltoid. 3. No evidence of osteomyelitis. 4. No fracture or dislocation. 5 Partially visualized small right pleural effusion and associated atelectasis.    DX-CHEST-PORTABLE (1 VIEW)    Result Date: 1/23/2022 1/23/2022 10:45 AM HISTORY/REASON FOR EXAM: Bacteremia. Covid 19 infection. TECHNIQUE/EXAM DESCRIPTION AND NUMBER OF VIEWS: Single portable view of the chest. COMPARISON: None FINDINGS: There is no evidence of focal consolidation or evidence of pulmonary edema. There is no pleural effusion. The heart is borderline enlarged. There is a hiatal hernia.     Borderline cardiomegaly.    MR-LUMBAR SPINE-WITH & W/O    Result Date: 1/20/2022 1/20/2022 1:58 PM HISTORY/REASON FOR EXAM:  Extradural or subdural abscess; low back pain, h/o spinal abscess. TECHNIQUE/EXAM DESCRIPTION: MRI of the lumbar spine without and with contrast. The study was performed on a [employer]passera 1.5 Sobia MRI scanner. T1 sagittal, T2 fast spin-echo sagittal, and T2 axial images were obtained of the lumbar spine. T1 post-contrast fat-suppressed sagittal images were obtained. mL ProHance contrast was administered intravenously. COMPARISON:  None. FINDINGS: There is S-shaped lumbar scoliosis. There is minimal anterolisthesis of L5 on S1 and L3 on 4. There is no pathologic marrow infiltration. There is no abnormal contrast enhancement. There is no perivertebral abnormality. There are postsurgical changes as evidenced by L5-S1 laminectomy. There is mild chronic compression deformity at L1. At the level of L5-S1,there is mild diffuse disc bulge. Bilateral facet joint arthropathy is seen. There is mild right neural foraminal stenosis. At the level of L4-5,there is diffuse disc bulge. Bilateral facet joint arthropathy is seen. There is no central  canal stenosis. There is severe right neural foraminal stenosis. At the level of L3-4,there is mild central canal and neural foraminal stenosis. At the level of L2-3,there is minimal asymmetric disc bulge without significant spinal or neural foraminal stenosis. At the level of L1-2,there is no spinal or neural foraminal stenosis. The conus terminates at the level of L1. The visualized lower thoracic spinal cord is unremarkable. The visualized pre-and paraspinal soft tissues appear normal. There is no abnormal contrast enhancement.     1.  There is no evidence of lumbar spinal abscess. 2.  Degenerative scoliosis. 3.  Severe right L4 neural foraminal stenosis.    EC-ECHOCARDIOGRAM LTD W/O CONT    Result Date: 2022  Transthoracic Echo Report Echocardiography Laboratory CONCLUSIONS Limited study per Covid19 protocol. Normal left ventricular systolic function. Mild aortic insufficiency. Right ventricular systolic pressure is estimated to be 45 mmHg. NATALYA NAVARRO Exam Date:         2022                    14:01 Exam Location:     Inpatient Priority:          Routine Ordering Physician:        SALIMA EPPS Referring Physician: Sonographer:               Meggan VARELA Age:    77     Gender:    M MRN:    [id number] :    1945 BSA:    1.97   Ht (in):    71     Wt (lb):    170 Exam Type:     Limited Indications:     Fever ICD Codes:       780.6 CPT Codes:       06320 BP:   130    /   68     HR:   55 Technical Quality:       Fair MEASUREMENTS  (Male / Female) Normal Values 2D ECHO LV Diastolic Diameter PLAX        5.4 cm                4.2 - 5.9 / 3.9 - 5.3 cm LV Systolic Diameter PLAX         4.1 cm                2.1 - 4.0 cm IVS Diastolic Thickness           0.85 cm               LVPW Diastolic Thickness          0.95 cm               Estimated LV Ejection Fraction    55 %                  LV Ejection Fraction MOD BP       57.3 %                >= 55  % LV Ejection Fraction MOD 4C       53.9 %                 LV Ejection Fraction MOD 2C       61.6 %                IVC Diameter                      0.88 cm               DOPPLER AV Peak Velocity                  1.8 m/s               AV Peak Gradient                  12.7 mmHg             AI Pressure Half Time             827 ms                TR Peak Velocity                  298 cm/s              * Indicates values subject to auto-interpretation LV EF:  55    % FINDINGS Left Ventricle Normal left ventricular size, thickness, and systolic function. False tendon seen in the apex. The left ventricular ejection fraction is visually estimated to be 55%. Right Ventricle The right ventricle is dilated. Normal right ventricular systolic function. Right Atrium Normal right atrial size. Normal inferior vena cava size and inspiratory collapse. Left Atrium Mitral Valve Structurally normal mitral valve without significant stenosis. Trace mitral regurgitation. Aortic Valve Tricuspid aortic valve. Aortic valve sclerosis without significant stenosis. Mild aortic insufficiency. Tricuspid Valve Structurally normal tricuspid valve without significant stenosis. Mild tricuspid regurgitation. Right ventricular systolic pressure is estimated to be 45 mmHg. Pulmonic Valve Structurally normal pulmonic valve without significant stenosis or regurgitation. Pericardium Normal pericardium without effusion. Aorta Aundrea N To (Electronically Signed) Final Date:     24 January 2022                 00:44      Micro:  Results     Procedure Component Value Units Date/Time    BLOOD CULTURE [[id number]]  (Abnormal)  (Susceptibility) Collected: 01/28/22 1048    Order Status: Completed Specimen: Blood from Peripheral Updated: 02/01/22 0738     Significant Indicator POS     Source BLD     Site PERIPHERAL     Culture Result Growth detected by Bactec instrument. 01/30/2022  03:24      Staphylococcus aureus    Narrative:      CALL  [place] tel. [phone],  CALLED  [facility] tel. [phone] 01/30/2022,  "03:24, RB PERF. RESULTS CALLED TO: RN  [phone]  Enhanced Droplet, Contact, and Eye Protection  Per Hospital Policy: Only change Specimen Src: to \"Line\" if  specified by physician order.  Right AC    Susceptibility     Staphylococcus aureus (1)     Antibiotic Interpretation Microscan   Method Status    Azithromycin Sensitive <=2 mcg/mL TEMI Final    Clindamycin Sensitive <=0.25 mcg/mL TEMI Final    Cefazolin Sensitive <=8 mcg/mL TEMI Final    Cefepime Sensitive <=4 mcg/mL TEMI Final    Ceftaroline Sensitive <=0.5 mcg/mL TEMI Final    Daptomycin Sensitive <=0.5 mcg/mL TEMI Final    Erythromycin Sensitive <=0.25 mcg/mL TEMI Final    Ampicillin/sulbactam Sensitive <=8/4 mcg/mL TEMI Final    Vancomycin Sensitive 1 mcg/mL TEMI Final    Oxacillin Sensitive <=0.25 mcg/mL TEMI Final    Pip/Tazobactam Sensitive <=8 mcg/mL TEMI Final    Trimeth/Sulfa Sensitive <=0.5/9.5 mcg/mL TEMI Final    Tetracycline Sensitive <=4 mcg/mL TEMI Final                   BLOOD CULTURE [[id number]] Collected: 01/31/22 1607    Order Status: Completed Specimen: Blood from Peripheral Updated: 02/01/22 0732     Significant Indicator NEG     Source BLD     Site PERIPHERAL     Culture Result No Growth  Note: Blood cultures are incubated for 5 days and  are monitored continuously.Positive blood cultures  are called to the RN and reported as soon as  they are identified.      Narrative:      Enhanced Droplet, Contact, and Eye Protection  Per Hospital Policy: Only change Specimen Src: to \"Line\" if  specified by physician order.  Left AC    BLOOD CULTURE [[id number]] Collected: 01/31/22 1722    Order Status: Completed Specimen: Blood from Peripheral Updated: 02/01/22 0732     Significant Indicator NEG     Source BLD     Site PERIPHERAL     Culture Result No Growth  Note: Blood cultures are incubated for 5 days and  are monitored continuously.Positive blood cultures  are called to the RN and reported as soon as  they are identified.      Narrative:      Enhanced Droplet, " "Contact, and Eye Protection  Per Hospital Policy: Only change Specimen Src: to \"Line\" if  specified by physician order.  No site indicated    Anaerobic Culture [[id number]] Collected: 01/25/22 1553    Order Status: Completed Specimen: Synovial Updated: 01/30/22 1402     Significant Indicator NEG     Source SYNO     Site Right shoulder     Culture Result No Anaerobes isolated.    Narrative:      Surgery Specimen    FLUID CULTURE W/GRAM STAIN [[id number]] Collected: 01/25/22 1553    Order Status: Completed Specimen: Synovial Updated: 01/30/22 1402     Significant Indicator NEG     Source SYNO     Site Right shoulder     Culture Result No growth at 72 hours.     Gram Stain Result Moderate WBCs.  No organisms seen.      Narrative:      Surgery Specimen    CULTURE TISSUE W/ GRM STAIN [[id number]] Collected: 01/25/22 1552    Order Status: Completed Specimen: Tissue Updated: 01/30/22 1401     Significant Indicator NEG     Source TISS     Site Right Shoulder Tissue     Culture Result No growth at 72 hours.     Gram Stain Result No organisms seen.    Narrative:      Surgery Specimen    Anaerobic Culture [[id number]] Collected: 01/25/22 1552    Order Status: Completed Specimen: Tissue Updated: 01/30/22 1401     Significant Indicator NEG     Source TISS     Site Right Shoulder Tissue     Culture Result No Anaerobes isolated.    Narrative:      Surgery Specimen    CULTURE WOUND W/ GRAM STAIN [[id number]] Collected: 01/25/22 1552    Order Status: Completed Specimen: Wound Updated: 01/30/22 1400     Significant Indicator NEG     Source WND     Site Right shoulder     Culture Result No growth at 72 hours.     Gram Stain Result Few WBCs.  No organisms seen.      Narrative:      Surgery - swabs received    Anaerobic Culture [[id number]] Collected: 01/25/22 1552    Order Status: Completed Specimen: Wound Updated: 01/30/22 1400     Significant Indicator NEG     Source WND     Site Right shoulder     Culture Result No Anaerobes isolated.    " "Narrative:      Surgery - swabs received    CULTURE WOUND W/ GRAM STAIN [[id number]] Collected: 01/25/22 1552    Order Status: Completed Specimen: Wound Updated: 01/30/22 1400     Significant Indicator NEG     Source WND     Site Right Shoulder     Culture Result No growth at 72 hours.     Gram Stain Result Rare WBCs.  No organisms seen.      Narrative:      Surgery - swabs received    Anaerobic Culture [[id number]] Collected: 01/25/22 1552    Order Status: Completed Specimen: Wound Updated: 01/30/22 1400     Significant Indicator NEG     Source WND     Site Right Shoulder     Culture Result No Anaerobes isolated.    Narrative:      Surgery - swabs received    BLOOD CULTURE [[id number]]  (Abnormal) Collected: 01/26/22 1901    Order Status: Completed Specimen: Blood from Peripheral Updated: 01/30/22 0937     Significant Indicator POS     Source BLD     Site PERIPHERAL     Culture Result Growth detected by Bactec instrument. 01/29/2022  04:00      Staphylococcus aureus  See previous culture for sensitivity report.      Narrative:      CALL  [place]  [facility] tel. [phone],  CALLED  [facility] tel. [phone] 01/29/2022, 04:01, RB PERF. RESULTS CALLED TO: RN  [phone]  Enhanced Droplet, Contact, and Eye Protection  Per Hospital Policy: Only change Specimen Src: to \"Line\" if  specified by physician order.  Right Forearm/Arm    BLOOD CULTURE [[id number]] Collected: 01/25/22 0521    Order Status: Completed Specimen: Blood from Peripheral Updated: 01/30/22 0700     Significant Indicator NEG     Source BLD     Site PERIPHERAL     Culture Result No growth after 5 days of incubation.    Narrative:      Enhanced Droplet, Contact, and Eye Protection  Per Hospital Policy: Only change Specimen Src: to \"Line\" if  specified by physician order.  Left AC    BLOOD CULTURE [[id number]] Collected: 01/25/22 0521    Order Status: Completed Specimen: Blood from Peripheral Updated: 01/30/22 0700     Significant Indicator NEG     Source BLD     Site PERIPHERAL " "    Culture Result No growth after 5 days of incubation.    Narrative:      Enhanced Droplet, Contact, and Eye Protection  Per Hospital Policy: Only change Specimen Src: to \"Line\" if  specified by physician order.  Right AC    BLOOD CULTURE [[id number]]  (Abnormal) Collected: 01/26/22 1901    Order Status: Completed Specimen: Blood from Peripheral Updated: 01/29/22 1201     Significant Indicator POS     Source BLD     Site PERIPHERAL     Culture Result 01/28/2022  00:02  Growth detected by Bactec instrument.      Staphylococcus aureus  See previous culture for sensitivity report.      Narrative:      CALL  [place]  [facility] tel. [phone],  CALLED  [facility] tel. [phone] 01/28/2022, 00:05, RB PERF. RESULTS CALLED TO: RN  [phone]  Enhanced Droplet, Contact, and Eye Protection  Per Hospital Policy: Only change Specimen Src: to \"Line\" if  specified by physician order.  Right AC    BLOOD CULTURE [[id number]]  (Abnormal)  (Susceptibility) Collected: 01/23/22 1103    Order Status: Completed Specimen: Blood from Peripheral Updated: 01/29/22 1019     Significant Indicator POS     Source BLD     Site PERIPHERAL     Culture Result Growth detected by Bactec instrument. 01/27/2022  11:29      Staphylococcus aureus    Narrative:      CALL  [place]  [facility] tel. [phone],  CALLED  [facility] tel. [phone] 01/27/2022, 11:32, RB PERF. RESULTS CALLED  TO:[phone]  Enhanced Droplet, Contact, and Eye Protection  Per Hospital Policy: Only change Specimen Src: to \"Line\" if  specified by physician order.  Right AC    Susceptibility     Staphylococcus aureus (1)     Antibiotic Interpretation Microscan   Method Status    Azithromycin Sensitive <=2 mcg/mL TEMI Final    Clindamycin Sensitive <=0.25 mcg/mL TEMI Final    Cefazolin Sensitive <=8 mcg/mL TEMI Final    Cefepime Sensitive <=4 mcg/mL TEMI Final    Ceftaroline Sensitive <=0.5 mcg/mL TEMI Final    Daptomycin Sensitive 1 mcg/mL TEMI Final    Erythromycin Sensitive <=0.25 mcg/mL TEMI Final    Ampicillin/sulbactam " "Sensitive <=8/4 mcg/mL TEMI Final    Vancomycin Sensitive 1 mcg/mL TEMI Final    Oxacillin Sensitive <=0.25 mcg/mL TEMI Final    Pip/Tazobactam Sensitive <=8 mcg/mL TEMI Final    Trimeth/Sulfa Sensitive <=0.5/9.5 mcg/mL TEMI Final    Tetracycline Sensitive <=4 mcg/mL TEMI Final                   BLOOD CULTURE [[id number]] Collected: 01/24/22 0822    Order Status: Completed Specimen: Blood from Peripheral Updated: 01/29/22 0900     Significant Indicator NEG     Source BLD     Site PERIPHERAL     Culture Result No growth after 5 days of incubation.    Narrative:      Enhanced Droplet, Contact, and Eye Protection  Per Hospital Policy: Only change Specimen Src: to \"Line\" if  specified by physician order.  Repeat Cultures are needed  Left AC    BLOOD CULTURE [[id number]] Collected: 01/28/22 1048    Order Status: Completed Specimen: Blood from Peripheral Updated: 01/29/22 0825     Significant Indicator NEG     Source BLD     Site PERIPHERAL     Culture Result No Growth  Note: Blood cultures are incubated for 5 days and  are monitored continuously.Positive blood cultures  are called to the RN and reported as soon as  they are identified.      Narrative:      Enhanced Droplet, Contact, and Eye Protection  Per Hospital Policy: Only change Specimen Src: to \"Line\" if  specified by physician order.  Left Hand    MRSA By PCR (Amp) [[id number]] Collected: 01/29/22 0436    Order Status: Completed Specimen: Respirate from Nares Updated: 01/29/22 0716     MRSA by PCR Negative    Narrative:      Enhanced Droplet, Contact, and Eye Protection  Collected By: [id number] JUANCARLOS TRINH    BLOOD CULTURE [[id number]] Collected: 01/28/22 0000    Order Status: Canceled Specimen: Other from Peripheral     BLOOD CULTURE [[id number]] Collected: 01/28/22 0000    Order Status: Canceled Specimen: Other from Peripheral     BLOOD CULTURE [[id number]]  (Abnormal) Collected: 01/24/22 0822    Order Status: Completed Specimen: Blood from Peripheral Updated: " "01/27/22 1022     Significant Indicator POS     Source BLD     Site PERIPHERAL     Culture Result Growth detected by Bactec instrument. 01/26/2022  18:11      Staphylococcus aureus  See previous culture for sensitivity report.      Narrative:      CALL  [place]  [facility] tel. [phone],  CALLED  [facility] tel. [phone] 01/26/2022, 18:13, RB PERF. RESULTS CALLED  TO:Machelle Delos Reyes, RN  Enhanced Droplet, Contact, and Eye Protection  Per Hospital Policy: Only change Specimen Src: to \"Line\" if  specified by physician order.  Right AC    BLOOD CULTURE [[id number]]  (Abnormal) Collected: 01/23/22 1103    Order Status: Completed Specimen: Blood from Peripheral Updated: 01/27/22 1021     Significant Indicator POS     Source BLD     Site PERIPHERAL     Culture Result Growth detected by Bactec instrument. 01/26/2022  05:51      Staphylococcus aureus  Methicillin sensitive Staphylococcus aureus by screening  method.  See previous culture for sensitivity report.      Narrative:      CALL  [place]  [facility] tel. [phone],  CALLED  [facility] tel. [phone] 01/26/2022, 05:53, RB PERF. RESULTS CALLED  TO:Norma Hoffman Rn  Enhanced Droplet, Contact, and Eye Protection  Per Hospital Policy: Only change Specimen Src: to \"Line\" if  specified by physician order.  Left Forearm/Arm    GRAM STAIN [[id number]] Collected: 01/25/22 1552    Order Status: Completed Specimen: Tissue Updated: 01/26/22 1257     Significant Indicator .     Source TISS     Site Right Shoulder Tissue     Gram Stain Result No organisms seen.    Narrative:      Surgery Specimen    GRAM STAIN [[id number]] Collected: 01/25/22 1552    Order Status: Completed Specimen: Wound Updated: 01/26/22 0943     Significant Indicator .     Source WND     Site Right shoulder     Gram Stain Result Few WBCs.  No organisms seen.      Narrative:      Surgery - swabs received    GRAM STAIN [[id number]] Collected: 01/25/22 1552    Order Status: Completed Specimen: Wound Updated: 01/26/22 0943     Significant " Indicator .     Source WND     Site Right Shoulder     Gram Stain Result Rare WBCs.  No organisms seen.      Narrative:      Surgery - swabs received    GRAM STAIN [[id number]] Collected: 01/25/22 1553    Order Status: Completed Specimen: Synovial Updated: 01/26/22 0739     Significant Indicator .     Source SYNO     Site Right shoulder     Gram Stain Result Moderate WBCs.  No organisms seen.      Narrative:      Surgery Specimen          Assessment:  77 y.o. male admitted 1/22/2022.  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

## 2022-02-01 NOTE — CARE PLAN
Problem: Pain - Standard  Goal: Alleviation of pain or a reduction in pain to the patient’s comfort goal  Note: Prn meds per MAR     Problem: Fall Risk  Goal: Patient will remain free from falls  Note: Call light and Bed alarm on   The patient is Watcher - Medium risk of patient condition declining or worsening    Shift Goals  Clinical Goals: pain management, sleep/rest  Patient Goals: comfort/sleep  Family Goals: DENISSE    Progress made toward(s) clinical / shift goals: free of pain and infection    Patient is not progressing towards the following goals:

## 2022-02-01 NOTE — CARE PLAN
The patient is Stable - Low risk of patient condition declining or worsening    Shift Goals  Clinical Goals: pain management, sleep/rest  Patient Goals: comfort/sleep  Family Goals: DENISSE    Progress made toward(s) clinical / shift goals:    Pt A&O x4, to specific orientation questions, but often demonstrates confusion. SO with difficulty in BUE due to procedure and BLE with good strength. Pt has DVT in L leg, concern for embolism, had pt stay in bed, which pt agreed. Pain management not met, pt did not experience much reduction in pain after receiving meds. Wound vac still draining serosanguinous fluid, no overt neurological or physical deficits/changes noted during shift. Assessments ongoing.    Patient is not progressing towards the following goals:      Problem: Pain - Standard  Goal: Alleviation of pain or a reduction in pain to the patient’s comfort goal  Outcome: Not Met     Problem: Knowledge Deficit - Standard  Goal: Patient and family/care givers will demonstrate understanding of plan of care, disease process/condition, diagnostic tests and medications  Outcome: Not Progressing     Problem: Skin Integrity  Goal: Skin integrity is maintained or improved  Outcome: Not Met

## 2022-02-02 ENCOUNTER — APPOINTMENT (OUTPATIENT)
Dept: RADIOLOGY | Facility: MEDICAL CENTER | Age: 77
DRG: 548 | End: 2022-02-02
Attending: STUDENT IN AN ORGANIZED HEALTH CARE EDUCATION/TRAINING PROGRAM
Payer: MEDICARE

## 2022-02-02 LAB
BACTERIA BLD CULT: ABNORMAL
BACTERIA BLD CULT: ABNORMAL
BACTERIA BLD CULT: NORMAL
SIGNIFICANT IND 70042: ABNORMAL
SIGNIFICANT IND 70042: NORMAL
SITE SITE: ABNORMAL
SITE SITE: NORMAL
SOURCE SOURCE: ABNORMAL
SOURCE SOURCE: NORMAL

## 2022-02-02 PROCEDURE — 700111 HCHG RX REV CODE 636 W/ 250 OVERRIDE (IP): Performed by: HOSPITALIST

## 2022-02-02 PROCEDURE — 72158 MRI LUMBAR SPINE W/O & W/DYE: CPT | Mod: MG

## 2022-02-02 PROCEDURE — 302098 PASTE RING (FLAT): Performed by: STUDENT IN AN ORGANIZED HEALTH CARE EDUCATION/TRAINING PROGRAM

## 2022-02-02 PROCEDURE — 700102 HCHG RX REV CODE 250 W/ 637 OVERRIDE(OP): Performed by: STUDENT IN AN ORGANIZED HEALTH CARE EDUCATION/TRAINING PROGRAM

## 2022-02-02 PROCEDURE — 700117 HCHG RX CONTRAST REV CODE 255: Performed by: STUDENT IN AN ORGANIZED HEALTH CARE EDUCATION/TRAINING PROGRAM

## 2022-02-02 PROCEDURE — A9270 NON-COVERED ITEM OR SERVICE: HCPCS | Performed by: STUDENT IN AN ORGANIZED HEALTH CARE EDUCATION/TRAINING PROGRAM

## 2022-02-02 PROCEDURE — A9270 NON-COVERED ITEM OR SERVICE: HCPCS | Performed by: HOSPITALIST

## 2022-02-02 PROCEDURE — 700102 HCHG RX REV CODE 250 W/ 637 OVERRIDE(OP): Performed by: HOSPITALIST

## 2022-02-02 PROCEDURE — 99232 SBSQ HOSP IP/OBS MODERATE 35: CPT | Performed by: INTERNAL MEDICINE

## 2022-02-02 PROCEDURE — A9576 INJ PROHANCE MULTIPACK: HCPCS | Performed by: STUDENT IN AN ORGANIZED HEALTH CARE EDUCATION/TRAINING PROGRAM

## 2022-02-02 PROCEDURE — 770001 HCHG ROOM/CARE - MED/SURG/GYN PRIV*

## 2022-02-02 PROCEDURE — 700105 HCHG RX REV CODE 258: Performed by: INTERNAL MEDICINE

## 2022-02-02 PROCEDURE — 99232 SBSQ HOSP IP/OBS MODERATE 35: CPT | Performed by: STUDENT IN AN ORGANIZED HEALTH CARE EDUCATION/TRAINING PROGRAM

## 2022-02-02 PROCEDURE — 97605 NEG PRS WND THER DME<=50SQCM: CPT

## 2022-02-02 PROCEDURE — 700101 HCHG RX REV CODE 250: Performed by: INTERNAL MEDICINE

## 2022-02-02 RX ADMIN — NAFCILLIN SODIUM 2 G: 2 INJECTION, POWDER, FOR SOLUTION INTRAMUSCULAR; INTRAVENOUS at 09:29

## 2022-02-02 RX ADMIN — GADOTERIDOL 18 ML: 279.3 INJECTION, SOLUTION INTRAVENOUS at 23:18

## 2022-02-02 RX ADMIN — DOCUSATE CALCIUM 240 MG: 240 CAPSULE, LIQUID FILLED ORAL at 05:32

## 2022-02-02 RX ADMIN — OXYCODONE HYDROCHLORIDE 10 MG: 10 TABLET ORAL at 23:43

## 2022-02-02 RX ADMIN — WATER 2 G: 100 INJECTION, SOLUTION INTRAVENOUS at 05:32

## 2022-02-02 RX ADMIN — NAFCILLIN SODIUM 2 G: 2 INJECTION, POWDER, FOR SOLUTION INTRAMUSCULAR; INTRAVENOUS at 13:00

## 2022-02-02 RX ADMIN — APIXABAN 10 MG: 5 TABLET, FILM COATED ORAL at 17:34

## 2022-02-02 RX ADMIN — OXYCODONE HYDROCHLORIDE 10 MG: 10 TABLET ORAL at 05:32

## 2022-02-02 RX ADMIN — POLYETHYLENE GLYCOL 3350 1 PACKET: 17 POWDER, FOR SOLUTION ORAL at 05:32

## 2022-02-02 RX ADMIN — APIXABAN 10 MG: 5 TABLET, FILM COATED ORAL at 05:32

## 2022-02-02 RX ADMIN — OXYCODONE HYDROCHLORIDE 10 MG: 10 TABLET ORAL at 13:02

## 2022-02-02 RX ADMIN — NAFCILLIN SODIUM 2 G: 2 INJECTION, POWDER, FOR SOLUTION INTRAMUSCULAR; INTRAVENOUS at 20:52

## 2022-02-02 RX ADMIN — NAFCILLIN SODIUM 2 G: 2 INJECTION, POWDER, FOR SOLUTION INTRAMUSCULAR; INTRAVENOUS at 17:34

## 2022-02-02 ASSESSMENT — ENCOUNTER SYMPTOMS
FEVER: 0
SHORTNESS OF BREATH: 0

## 2022-02-02 ASSESSMENT — PAIN DESCRIPTION - PAIN TYPE
TYPE: ACUTE PAIN

## 2022-02-02 NOTE — DISCHARGE PLANNING
Anticipated Discharge Disposition:   SNF, with wound vac    [facility] # [id number]    Action:   RN VALORIE spoke to pt, discussed discharge planning needs, placement. Received SNF choice. Choice form faxed to [facility].    Barriers to Discharge:   Medical clearance.  Placement acceptance and bed availability.    Plan:   [facility]ospital Care Management will continue to follow and assist with discharge planning needs.
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
## 2022-02-02 NOTE — PROGRESS NOTES
"   Orthopaedic Progress Note    Interval changes:  Patient doing well  Cultures form right shoulder with no growth  No further ortho intervention planned   Continue wound care till closed    ROS - Patient denies any new issues.  Pain well controlled.    /63   Pulse 61   Temp 36.9 °C (98.5 °F) (Temporal)   Resp 18   Ht 1.803 m (5' 11\")   Wt 77.1 kg (170 lb)   SpO2 96%       Patient seen and examined  No acute distress  Breathing non labored  RRR  Right shoulder vac dressing CDI without leak, CDI, moves all toes, cap refill <2 sec.            Active Hospital Problems    Diagnosis    • DVT (deep venous thrombosis) ([facility]) [I82.409]    • Pyogenic arthritis of right shoulder region ([facility]) [M00.9]    • Hyponatremia [E87.1]    • MSSA bacteremia [R78.81, B95.61]    • COVID-19 [U07.1]    • Spinal stenosis at L4-L5 level [M48.061]    • Hypertension [I10]        Assessment/Plan:  Patient doing well  Cultures form right shoulder with no growth  No further ortho intervention planned   POD#7 S/P   1.  Irrigation and debridement, right glenohumeral joint.  2.  Multiple cultures.  3.  Application of wound VAC.  Wt bearing status - WBAT  Wound care/Drains - Vac dressings to be left in place  Future Procedures - none planned   Sutures/Staples out- 14 days post operatively  PT/OT-initiated  Antibiotics: ancef 2g IVQ8  DVT Prophylaxis- TEDS/SCDs/Foot pumps  Gunter-none  Case Coordination for Discharge Planning - Disposition pending abx needs      I have performed a physical exam and reviewed and updated ROS and Plan today (2/1). In review of yesterday's note (1/31), there are no changes except as documented above.   "

## 2022-02-02 NOTE — PROGRESS NOTES
Shanique from Lab called with critical result of positive blood cultures growing gram positive cocci clusters which is Staph at 2022. Critical lab result read back to Shanique.   Hospitalist Chantal Matute notified of critical lab result at 2039.  Critical lab result read back by hospitalist. No new orders received.

## 2022-02-02 NOTE — CARE PLAN
The patient is Stable - Low risk of patient condition declining or worsening    Shift Goals  Clinical Goals: pain management, vitals WNL, sleep  Patient Goals: pain management, sleep  Family Goals: DENISSE    Progress made toward(s) clinical / shift goals:    Problem: Pain - Standard  Goal: Alleviation of pain or a reduction in pain to the patient’s comfort goal  Outcome: Progressing     Problem: Knowledge Deficit - Standard  Goal: Patient and family/care givers will demonstrate understanding of plan of care, disease process/condition, diagnostic tests and medications  Outcome: Progressing     Problem: Skin Integrity  Goal: Skin integrity is maintained or improved  Outcome: Progressing

## 2022-02-02 NOTE — DISCHARGE PLANNING
Received Choice form at 4041  Agency/Facility Name: [facility]   Referral sent per Choice form @ [phone]

## 2022-02-02 NOTE — WOUND TEAM
[facility]  Inpatient Services  l Wound and Skin Care Evaluation    Admission Date: 1/22/2022     No order of IP CONSULT TO WOUND CARE is found.     HPI, PMH, SH: Reviewed    Past Surgical History:   Procedure Laterality Date   • IRRIGATION & DEBRIDEMENT GENERAL Right 1/25/2022    Procedure: IRRIGATION AND DEBRIDEMENT, WOUND  , MULTIPLE CULTURES;  Surgeon: Larry Aldridge M.D.;  Location: SURGERY [facility];  Service: Orthopedics   • APPLICATION OR REPLACEMENT, WOUND VAC Right 1/25/2022    Procedure: APPLICATION WOUND VAC;  Surgeon: Larry Aldridge M.D.;  Location: SURGERY [facility];  Service: Orthopedics   • LUMBAR LAMINECTOMY DISKECTOMY  4/29/2018    Procedure: LUMBAR LAMINECTOMY Y L4-S1;  Surgeon: Fercho Herron M.D.;  Location: SURGERY [facility];  Service: Neurosurgery   • IRRIGATION & DEBRIDEMENT NEURO  4/29/2018    Procedure: IRRIGATION & DEBRIDEMENT NEURO- epidural mass;  Surgeon: Fercho Herron M.D.;  Location: SURGERY [facility];  Service: Neurosurgery     Social History     Tobacco Use   • Smoking status: Never Smoker   • Smokeless tobacco: Never Used   Substance Use Topics   • Alcohol use: Yes     Chief Complaint   Patient presents with   • Back Pain     x2 days, right lower back. Patient injured his back while working on a car. Patient reports he had an MRI 2 days ago that didn't show anything.    • Shoulder Pain     x 2 days, right   • Knee Pain     x 2 days , left   • Failure to Thrive     Patient lives with his son who takes care of him. Patient's son reports that the patient is unable to get out of bed and he is having trouble taking care of him.    • Flu Like Symptoms     covid+ 1/20      Diagnosis: Positive blood cultures [R78.81]    Unit where seen by Wound Team: S141/00     WOUND CONSULT/FOLLOW UP RELATED TO:  I&D of R shoulder with WV placement.     WOUND HISTORY:  77y.o male admitted for COVID-19 and back pain. Diagnosed with R shoulder joint pyarthrosis and  underwent I&D of R glenohumeral joint with WV application on 1/25 with Dr. Aldridge. Pertinent history of HTN. Pertinent home med of medrol.    WOUND ASSESSMENT/LDA     Negative Pressure Wound Therapy 01/25/22 Surgical Shoulder Anterior Right (Active)   NPWT Pump Mode / Pressure Setting Ulta;Continuous;125 mmHg    Dressing Type Small;Black Foam (Veraflo)    Number of Foam Pieces Used 1    Canister Changed No    Output (mL) 100 mL    NEXT Dressing Change/Treatment Date 02/04/22    VAC VeraFlo Irrigant Normal Saline    VAC VeraFlo Soak Time (mins) 5    VAC VeraFlo Instill Volume (ml) 10    VAC VeraFlo - Therapy Time (hrs) 2    VAC VeraFlo Pressure (mm/Hg) Continuous;125 mmHg    WOUND NURSE ONLY - Time Spent with Patient (mins) 45      Wound 01/25/22 Incision Shoulder Right WOUND VAC (Active)   Wound Image      Site Assessment Red;Black;Yellow;Bleeding;Undermining;Tunneling    Periwound Assessment Pink    Margins Unattached edges    Closure Secondary intention, Sutures    Drainage Amount Small    Drainage Description Serosanguineous    Treatments Cleansed;Site care    Wound Cleansing Approved Wound Cleanser    Periwound Protectant Drape;Paste Ring;Skin Protectant Wipes to Periwound    Dressing Cleansing/Solutions Normal Saline    Dressing Options Collagen Dressing;Wound Vac    Dressing Changed Changed    Dressing Status Clean;Dry;Intact    Dressing Change/Treatment Frequency Monday, Wednesday, Friday, and As Needed    NEXT Dressing Change/Treatment Date 02/04/22    NEXT Weekly Photo (Inpatient Only) 02/04/22    Shape Irregular Oval    Wound Odor None    Pulses N/A    Exposed Structures Muscle    WOUND NURSE ONLY - Time Spent with Patient (mins) 45         Vascular:    ISABEL:   No results found.    Lab Values:    Lab Results   Component Value Date/Time    WBC 7.5 01/28/2022 12:51 AM    RBC 3.61 (L) 01/28/2022 12:51 AM    HEMOGLOBIN 9.9 (L) 01/28/2022 12:51 AM    HEMATOCRIT 30.1 (L) 01/28/2022 12:51 AM    CREACTPROT 22.06 (H)  01/25/2022 10:11 AM    SEDRATEWES 65 (H) 01/28/2022 12:51 AM        Culture Results show:  Recent Results (from the past 720 hour(s))   CULTURE WOUND W/ GRAM STAIN    Collection Time: 01/25/22  3:52 PM    Specimen: Wound   Result Value Ref Range    Significant Indicator NEG     Source WND     Site Right shoulder     Culture Result No growth at 72 hours.     Gram Stain Result Few WBCs.  No organisms seen.          Pain Level/Medicated: PO oxycodone    INTERVENTIONS BY WOUND TEAM:  Chart and images reviewed. Discussed with bedside RN. All areas of concern (based on picture review, LDA review and discussion with bedside RN) have been thoroughly assessed. Documentation of areas based on significant findings. This RN in to assess patient. Performed standard wound care which includes appropriate positioning, dressing removal and non-selective debridement. Pictures and measurements obtained weekly if/when required.  Preparation for Dressing removal: Dressing soaked with NS  Non-selectively Debrided with:  cleanser and gauze.  Sharp debridement: N/A  Triny wound: Cleansed with cleanser, Prepped with no string barrier & paste ring.  Primary Dressing: One half thick piece of black foam, wilber @ 12 o'clock.  Secondary (Outer) Dressing: Button, drape, and trackpad    Interdisciplinary consultation: Patient, Bedside RN, Wound RN (Bessy)    EVALUATION / RATIONALE FOR TREATMENT:  Most Recent Date:  2/2//22: Tunneling at 12 o'clock that's too small for foam to fit into, small piece of wilber applied to assist in healing. Muscle still present, improving undermining present from 9-11 o'clock. Continue NPWT vac changes.  1/31/22: Muscle visible less adipose tissue, undermining still present.  Changed veraflo settings, continue POC  1/28/22: Muscle and adipose tissue exposed. Undermining noted from 9-11 o'clock and 12-2 o'clock. Wound bed clean with scant serosang drainage. Veraflo initiated to keep underlying structures moist and to  mechanically debride wound bed. Patient sleeping for entirety of procedure- recommend PO pre-med only. Recommend ongoing NPWT and F/U with outpatient wound vs home health if patient continues to refuse SNF placement.     Goals: Steady decrease in wound area and depth weekly.    WOUND TEAM PLAN OF CARE ([X] for frequency of wound follow up,):  Nursing to follow orders written for wound care. Contact wound team if area fails to progress, deteriorates or with any questions/concerns  Dressing changes by wound team:                   Follow up 3 times weekly:                NPWT change 3 times weekly:   X  Follow up 1-2 times weekly:      Follow up Bi-Monthly:                   Follow up as needed:     Other (explain):     NURSING PLAN OF CARE ORDERS (X):  Dressing changes: See Dressing Care orders: X  Skin care: See Skin Care orders: X  RN Prevention Protocol: X  Rectal tube care: See Rectal Tube Care orders:   Other orders:    RSKIN:   CURRENTLY IN PLACE (X), APPLIED THIS VISIT (A), ORDERED (O):   Q shift Jensen:  X  Q shift pressure point assessments:  X    Surface/Positioning   Pressure redistribution mattress X           Low Airloss          Bariatric foam      Bariatric SAM     Waffle cushion        Waffle Overlay      X    Reposition q 2 hours      TAPs Turning system     Z Ash Pillow     Offloading/Redistribution   Sacral Mepilex (Silicone dressing)     Heel Mepilex (Silicone dressing)         Heel float boots (Prevalon boot)             Float Heels off Bed with Pillows           Respiratory   Silicone O2 tubing         Gray Foam Ear protectors     Cannula fixation Device (Tender )          High flow offloading Clip    Elastic head band offloading device      Anchorfast                                                         Trach with Optifoam split foam             Containment/Moisture Prevention     Rectal tube or BMS    Purwick/Condom Cath        Gunter Catheter    Barrier wipes           Barrier paste        Antifungal tx      Interdry      Urinal X     Mobilization   Up to chair        Ambulate      PT/OT  X    Nutrition   Dietician        Diabetes Education      PO X     TF     TPN     NPO   # days     Anticipated discharge plans:   LTACH:        SNF/Rehab:                  Home Health Care:    X    Vs SNF  Outpatient Wound Center:            Self/Family Care:        Other:                  Vac Discharge Needs:  Not Applicable Pt not on a wound vac:       Regular Vac while inpatient, alternative dressing at DC:        Regular Vac in use and continued at DC:            Reg. Vac w/ Skin Sub/Biologic in use. Will need to be changed 2x wkly:      Veraflo Vac while inpatient, ok to transition to Regular Vac on Discharge:      X     Veraflo Vac while inpatient, will need to remain on Veraflo Vac upon discharge:

## 2022-02-02 NOTE — PROGRESS NOTES
Received report from belkis RN. Patient in bed locked in lowest position with call light in reach. POC discussed. All questions answered.

## 2022-02-02 NOTE — PROGRESS NOTES
[facility] Medicine Daily Progress Note    Date of Service  2/2/2022    Chief Complaint  Yunier Denney is a 77 y.o. male admitted 1/22/2022 with back, leg pain.    Hospital Course  A 77-year-old man with h/o epidural abscess (follows with Dr. Herron) presented with right leg pain, knee pain, viral-like symptoms including nausea, chills, malaise and fatigue. Patient reports he was diagnosed with COVID-19 on 1/20.   Patient has elevated ESR at 47 and CRP of 198.9.  Blood cultures x2 returned positive for staph aureus. Patient hospitalized for IV Unasyn.      Interval Problem Update  No acute events overnight.  Blood culture 1/31 positive, likely staph.  Patient continues to refuse JAZIEL.  Will re image lumbar spine for occult infection.  Can consider shoulder imaging if MRI is negative.  Ancef changed to nafcillin per ID recommendations.  Hold off on PICC line until culture negative for 72 hours.  Wound vac to right shoulder.      I have personally seen and examined the patient at bedside. I discussed the plan of care with patient and bedside RN.    Consultants/Specialty  infectious disease, orthopedics and IR    Code Status  Full Code    Disposition  Patient is not medically cleared for discharge.   Anticipate discharge to TBD.  I have placed the appropriate orders for post-discharge needs.    Review of Systems  Constitutional: Positive for chills, fever and malaise/fatigue.   HENT: Negative.    Eyes: Negative.    Respiratory: Negative for cough and shortness of breath.    Cardiovascular: Negative for chest pain and leg swelling.   Gastrointestinal: Negative for nausea and vomiting.   Genitourinary: Negative for dysuria.   Musculoskeletal: Positive for right shoulder, left hip pain.   Skin: Negative for rash.   Neurological: Positive for weakness.     Physical Exam  Temp:  [36.6 °C (97.8 °F)-37.5 °C (99.5 °F)] 36.6 °C (97.8 °F)  Pulse:  [71-77] 74  Resp:  [16-18] 16  BP: (120-140)/(74-97) 130/97  SpO2:  [93 %-95 %] 95  %    Physical Exam  Vitals and nursing note reviewed.   Constitutional:       Appearance: He is ill-appearing.   HENT:      Head: Normocephalic and atraumatic.      Nose: Nose normal.      Mouth/Throat:      Mouth: Mucous membranes are moist.      Pharynx: Oropharynx is clear.   Eyes:      Pupils: Pupils are equal, round, and reactive to light.   Cardiovascular:      Rate and Rhythm: Normal rate and regular rhythm.   Pulmonary:      Effort: Pulmonary effort is normal. No respiratory distress.      Breath sounds: No wheezing or rales.   Abdominal:      General: Abdomen is flat. Bowel sounds are normal. There is no distension.      Tenderness: There is no abdominal tenderness. There is no guarding.      Hernia: A hernia (left inguinal, reduciable, non-tender) is present.   Musculoskeletal:         General: right shoulder swollen, tender; left hip tender     Cervical back: Normal range of motion.      Right lower leg: No edema.      Left lower leg: No edema.   Skin:     General: Skin is warm.   Neurological:      General: No focal deficit present.      Mental Status: He is alert and oriented to person, place, and time.     Fluids    Intake/Output Summary (Last 24 hours) at 2/2/2022 1037  Last data filed at 2/2/2022 0658  Gross per 24 hour   Intake --   Output 1050 ml   Net -1050 ml       Laboratory                        Imaging  US-EXTREMITY VENOUS LOWER UNILAT LEFT   Final Result      CT-CHEST,ABDOMEN,PELVIS WITH   Final Result      1.  No evidence of acute inflammatory process in the chest, abdomen or pelvis.   2.  Left hip effusion has nearly resolved.   3.  Cardiomegaly.   4.  Cholelithiasis versus gallbladder sludge. No cholecystitis.   5.  Moderate to large hiatal hernia, containing 70-80% of organoaxially rotated stomach.   6.  Left inguinal hernia smaller, and again contains nonobstructed sigmoid colon.   7.  Nonocclusive filling defect in the left common femoral vein may represent mixing artifact or a  nonocclusive DVT. Recommend further evaluation with venous duplex ultrasound.      DX-ABORTED DX PROCEDURE   Final Result      Aborted fluoroscopic guided left hip aspiration due to overlying bowel contained within the inguinal hernia. Findings were discussed with Dr. Cespedes. If necessary, joint aspiration could be performed under CT guidance.      CT-HIP WITH PLUS RECONS LEFT   Final Result         1. Small left hip effusion, which may be sterile or infected.   2. No intramuscular subcutaneous fluid collections. No evidence of osteomyelitis.   3. Large, 13 cm left inguinal hernia containing nonobstructed sigmoid colon.      CT-SHOULDER WITH PLUS RECONS RIGHT   Final Result         1. Loculated small to moderate glenohumeral joint effusion. It may be infected and represent septic arthritis.   2. Several small intramuscular abscesses in the subscapularis and anterior head of the deltoid.   3. No evidence of osteomyelitis.   4. No fracture or dislocation.   5 Partially visualized small right pleural effusion and associated atelectasis.      EC-ECHOCARDIOGRAM LTD W/O CONT   Final Result      DX-CHEST-PORTABLE (1 VIEW)   Final Result      Borderline cardiomegaly.      MR-LUMBAR SPINE-WITH & W/O    (Results Pending)        Assessment/Plan  * Pyogenic arthritis of right shoulder region (HCC)  Assessment & Plan  CT showed loculated small to moderate right glenohumeral joint effusion; it may be infected and represent septic arthritis; several small intramuscular abscesses in the subscapularis and anterior head of the deltoid; partially visualized small right pleural effusion and associated atelectasis.  CT showed small left hip effusion, which may be sterile or infected; repeat CT shows smaller effusion size  S/p drainage of shoulder abscess, wound vac in place    DVT (deep venous thrombosis) (HCC)  Assessment & Plan  Mixing artifact vs clot on CTAP, f/u US shows DVT  Started on lovenox BID, transitioned to eliquis  2/1    Hyponatremia  Assessment & Plan  Mild   Monitor     Spinal stenosis at L4-L5 level  Assessment & Plan  MRI showing severe L4 stenosis  Pain control    COVID-19  Assessment & Plan  Patient is currently not requiring oxygen hold steroids  Continue with enhanced precautions  Placed on oxygen with SPO2 below 90%    MSSA bacteremia- (present on admission)  Assessment & Plan  Patient has blood cultures x 2 positive for staph aureus that reflected at [facility] when he visited ER from 1/20/2022.  Echo showed normal left ventricular systolic function; mild aortic insufficiency.   CT showed loculated small to moderate right glenohumeral joint effusion; it may be infected and represent septic arthritis; several small intramuscular abscesses in the subscapularis and anterior head of the deltoid; partially visualized small right pleural effusion and associated atelectasis.  CT showed small left hip effusion, which may be sterile or infected.  S/p I+D shoulder joint, wound vac applied. Operative cultures negative.  Blood cultures from 1/20 and 1/22 positive for MSSA.  Blood cultures form 1/28 positive, 1/31 positive.  JAZIEL ordered given recurrent fever/positive blood culture, however patient refuses JAZIEL.  Will re-image MRI lumbar spine to evaluate for occult infection  Ancef changed to nafcillin 2/2 per ID recommendations, length of antibiotics to be determined    Hypertension- (present on admission)  Assessment & Plan  Blood pressures currently very well.  We will hold BP meds at this time.       VTE prophylaxis: eliquis    I have performed a physical exam and reviewed and updated ROS and Plan today (2/2/2022). In review of yesterday's note (2/1/2022), there are no changes except as documented above.

## 2022-02-03 PROCEDURE — 36415 COLL VENOUS BLD VENIPUNCTURE: CPT

## 2022-02-03 PROCEDURE — 700102 HCHG RX REV CODE 250 W/ 637 OVERRIDE(OP): Performed by: STUDENT IN AN ORGANIZED HEALTH CARE EDUCATION/TRAINING PROGRAM

## 2022-02-03 PROCEDURE — 700101 HCHG RX REV CODE 250: Performed by: INTERNAL MEDICINE

## 2022-02-03 PROCEDURE — 97530 THERAPEUTIC ACTIVITIES: CPT

## 2022-02-03 PROCEDURE — 97110 THERAPEUTIC EXERCISES: CPT

## 2022-02-03 PROCEDURE — A9270 NON-COVERED ITEM OR SERVICE: HCPCS | Performed by: HOSPITALIST

## 2022-02-03 PROCEDURE — 770001 HCHG ROOM/CARE - MED/SURG/GYN PRIV*

## 2022-02-03 PROCEDURE — 700102 HCHG RX REV CODE 250 W/ 637 OVERRIDE(OP): Performed by: HOSPITALIST

## 2022-02-03 PROCEDURE — 99232 SBSQ HOSP IP/OBS MODERATE 35: CPT | Performed by: STUDENT IN AN ORGANIZED HEALTH CARE EDUCATION/TRAINING PROGRAM

## 2022-02-03 PROCEDURE — 97112 NEUROMUSCULAR REEDUCATION: CPT

## 2022-02-03 PROCEDURE — 99233 SBSQ HOSP IP/OBS HIGH 50: CPT | Performed by: INTERNAL MEDICINE

## 2022-02-03 PROCEDURE — 87040 BLOOD CULTURE FOR BACTERIA: CPT

## 2022-02-03 PROCEDURE — 700105 HCHG RX REV CODE 258: Performed by: INTERNAL MEDICINE

## 2022-02-03 PROCEDURE — A9270 NON-COVERED ITEM OR SERVICE: HCPCS | Performed by: STUDENT IN AN ORGANIZED HEALTH CARE EDUCATION/TRAINING PROGRAM

## 2022-02-03 PROCEDURE — 97535 SELF CARE MNGMENT TRAINING: CPT

## 2022-02-03 RX ADMIN — DOCUSATE CALCIUM 240 MG: 240 CAPSULE, LIQUID FILLED ORAL at 04:41

## 2022-02-03 RX ADMIN — NAFCILLIN SODIUM 2 G: 2 INJECTION, POWDER, FOR SOLUTION INTRAMUSCULAR; INTRAVENOUS at 13:23

## 2022-02-03 RX ADMIN — ACETAMINOPHEN 650 MG: 325 TABLET, FILM COATED ORAL at 08:23

## 2022-02-03 RX ADMIN — NAFCILLIN SODIUM 2 G: 2 INJECTION, POWDER, FOR SOLUTION INTRAMUSCULAR; INTRAVENOUS at 01:24

## 2022-02-03 RX ADMIN — NAFCILLIN SODIUM 2 G: 2 INJECTION, POWDER, FOR SOLUTION INTRAMUSCULAR; INTRAVENOUS at 04:40

## 2022-02-03 RX ADMIN — NAFCILLIN SODIUM 2 G: 2 INJECTION, POWDER, FOR SOLUTION INTRAMUSCULAR; INTRAVENOUS at 09:49

## 2022-02-03 RX ADMIN — APIXABAN 10 MG: 5 TABLET, FILM COATED ORAL at 16:48

## 2022-02-03 RX ADMIN — NAFCILLIN SODIUM 2 G: 2 INJECTION, POWDER, FOR SOLUTION INTRAMUSCULAR; INTRAVENOUS at 16:48

## 2022-02-03 RX ADMIN — APIXABAN 10 MG: 5 TABLET, FILM COATED ORAL at 04:41

## 2022-02-03 RX ADMIN — NAFCILLIN SODIUM 2 G: 2 INJECTION, POWDER, FOR SOLUTION INTRAMUSCULAR; INTRAVENOUS at 21:01

## 2022-02-03 RX ADMIN — OXYCODONE HYDROCHLORIDE 10 MG: 10 TABLET ORAL at 06:35

## 2022-02-03 RX ADMIN — POLYETHYLENE GLYCOL 3350 1 PACKET: 17 POWDER, FOR SOLUTION ORAL at 09:48

## 2022-02-03 RX ADMIN — OXYCODONE HYDROCHLORIDE 10 MG: 10 TABLET ORAL at 21:00

## 2022-02-03 ASSESSMENT — COGNITIVE AND FUNCTIONAL STATUS - GENERAL
WALKING IN HOSPITAL ROOM: A LITTLE
STANDING UP FROM CHAIR USING ARMS: A LITTLE
MOBILITY SCORE: 11
CLIMB 3 TO 5 STEPS WITH RAILING: A LOT
SUGGESTED CMS G CODE MODIFIER MOBILITY: CL
MOVING TO AND FROM BED TO CHAIR: UNABLE
SUGGESTED CMS G CODE MODIFIER DAILY ACTIVITY: CK
TURNING FROM BACK TO SIDE WHILE IN FLAT BAD: UNABLE
MOVING FROM LYING ON BACK TO SITTING ON SIDE OF FLAT BED: UNABLE
DAILY ACTIVITIY SCORE: 18
DRESSING REGULAR UPPER BODY CLOTHING: A LITTLE
DRESSING REGULAR LOWER BODY CLOTHING: A LOT
HELP NEEDED FOR BATHING: A LITTLE
TOILETING: A LOT

## 2022-02-03 ASSESSMENT — ENCOUNTER SYMPTOMS
SHORTNESS OF BREATH: 0
BACK PAIN: 1
FEVER: 0

## 2022-02-03 ASSESSMENT — PAIN DESCRIPTION - PAIN TYPE
TYPE: ACUTE PAIN

## 2022-02-03 ASSESSMENT — GAIT ASSESSMENTS
ASSISTIVE DEVICE: FRONT WHEEL WALKER
GAIT LEVEL OF ASSIST: MINIMAL ASSIST
DISTANCE (FEET): 3
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
## 2022-02-03 NOTE — CARE PLAN
The patient is Stable - Low risk of patient condition declining or worsening    Shift Goals  Clinical Goals: Wound care  Patient Goals: pain management    Progress made toward(s) clinical / shift goals:    Plan of care reviewed with pt, all questions and concerns addressed. Wound vac in place and operating properly to R shoulder. IV abx therapy in place. Pt reminded to use call light when in need of assistance, pt verbalized understanding and agreeable.    Problem: Pain - Standard  Goal: Alleviation of pain or a reduction in pain to the patient’s comfort goal  Outcome: Progressing     Problem: Knowledge Deficit - Standard  Goal: Patient and family/care givers will demonstrate understanding of plan of care, disease process/condition, diagnostic tests and medications  Outcome: Progressing     Problem: Fall Risk  Goal: Patient will remain free from falls  Outcome: Progressing     Problem: Skin Integrity  Goal: Skin integrity is maintained or improved  Outcome: Progressing

## 2022-02-03 NOTE — PROGRESS NOTES
0700 Assumed care of pt. No events reported overnight. MRI spine completed last night. Wound vac to right shoulder remains in place. Pt reports muscle pain to the left thigh. No redness or edema noted. Pt reports that he has been trying to move around in bed more.

## 2022-02-03 NOTE — CARE PLAN
The patient is Stable - Low risk of patient condition declining or worsening    Shift Goals  Clinical Goals: increase mobility, wound care, pain control  Patient Goals: rest comfortably   Family Goals: DENISSE    Progress made toward(s) clinical / shift goals:  all      Patient is not progressing towards the following goals:

## 2022-02-03 NOTE — PROGRESS NOTES
[facility] Daily Progress Note    Date of Service  2/3/2022    Chief Complaint  Yunier Denney is a 77 y.o. male admitted 1/22/2022 with back, leg pain.    Hospital Course  A 77-year-old man with h/o epidural abscess (follows with Dr. Herron) presented with right leg pain, knee pain, viral-like symptoms including nausea, chills, malaise and fatigue. Patient reports he was diagnosed with COVID-19 on 1/20.   Patient has elevated ESR at 47 and CRP of 198.9.  Blood cultures x2 returned positive for staph aureus. Patient hospitalized for IV Unasyn.      Interval Problem Update  No acute events overnight.  Blood culture 1/31 positive, coag negative staph, likely contaminant.  MRI lumbar spine shows right lateral L4-L5 vertebral body enhancement, possible osteomyelitis?  On nafcillin q4h.  Hold off on PICC line for now.  Wound vac to right shoulder.      I have personally seen and examined the patient at bedside. I discussed the plan of care with patient and bedside RN.    Consultants/Specialty  infectious disease, orthopedics and IR    Code Status  Full Code    Disposition  Patient is not medically cleared for discharge.   Anticipate discharge to TBD.  I have placed the appropriate orders for post-discharge needs.    Review of Systems  Constitutional: Positive for chills, fever and malaise/fatigue.   HENT: Negative.    Eyes: Negative.    Respiratory: Negative for cough and shortness of breath.    Cardiovascular: Negative for chest pain and leg swelling.   Gastrointestinal: Negative for nausea and vomiting.   Genitourinary: Negative for dysuria.   Musculoskeletal: Positive for right shoulder, left hip pain.   Skin: Negative for rash.   Neurological: Positive for weakness.     Physical Exam  Temp:  [36.3 °C (97.3 °F)-37.7 °C (99.9 °F)] 36.3 °C (97.3 °F)  Pulse:  [63-79] 63  Resp:  [16-18] 18  BP: (102-118)/(57-79) 104/57  SpO2:  [92 %-96 %] 96 %    Physical Exam  Vitals and nursing note reviewed.   Constitutional:        Appearance: He is ill-appearing.   HENT:      Head: Normocephalic and atraumatic.      Nose: Nose normal.      Mouth/Throat:      Mouth: Mucous membranes are moist.      Pharynx: Oropharynx is clear.   Eyes:      Pupils: Pupils are equal, round, and reactive to light.   Cardiovascular:      Rate and Rhythm: Normal rate and regular rhythm.   Pulmonary:      Effort: Pulmonary effort is normal. No respiratory distress.      Breath sounds: No wheezing or rales.   Abdominal:      General: Abdomen is flat. Bowel sounds are normal. There is no distension.      Tenderness: There is no abdominal tenderness. There is no guarding.      Hernia: A hernia (left inguinal, reduciable, non-tender) is present.   Musculoskeletal:         General: right shoulder swollen, tender; left hip tender     Cervical back: Normal range of motion.      Right lower leg: No edema.      Left lower leg: No edema.   Skin:     General: Skin is warm.   Neurological:      General: No focal deficit present.      Mental Status: He is alert and oriented to person, place, and time.     Fluids    Intake/Output Summary (Last 24 hours) at 2/3/2022 1015  Last data filed at 2/2/2022 2122  Gross per 24 hour   Intake 600 ml   Output 200 ml   Net 400 ml       Laboratory                        Imaging  MR-LUMBAR SPINE-WITH & W/O   Final Result      Interval development of abnormal marrow edema and enhancement within the right lateral L4 and L5 vertebral bodies. There are asymmetric severe degenerative changes on the right side however given the short-term interval development of this abnormal    enhancement and would raise concern for developing osteomyelitis. Clinical correlation and short-term follow-up is recommended. There is also some increased edema in the adjacent right psoas muscle. There is no evidence of epidural or paravertebral    abscess.      US-EXTREMITY VENOUS LOWER UNILAT LEFT   Final Result      CT-CHEST,ABDOMEN,PELVIS WITH   Final Result      1.   No evidence of acute inflammatory process in the chest, abdomen or pelvis.   2.  Left hip effusion has nearly resolved.   3.  Cardiomegaly.   4.  Cholelithiasis versus gallbladder sludge. No cholecystitis.   5.  Moderate to large hiatal hernia, containing 70-80% of organoaxially rotated stomach.   6.  Left inguinal hernia smaller, and again contains nonobstructed sigmoid colon.   7.  Nonocclusive filling defect in the left common femoral vein may represent mixing artifact or a nonocclusive DVT. Recommend further evaluation with venous duplex ultrasound.      DX-ABORTED DX PROCEDURE   Final Result      Aborted fluoroscopic guided left hip aspiration due to overlying bowel contained within the inguinal hernia. Findings were discussed with Dr. Cespedes. If necessary, joint aspiration could be performed under CT guidance.      CT-HIP WITH PLUS RECONS LEFT   Final Result         1. Small left hip effusion, which may be sterile or infected.   2. No intramuscular subcutaneous fluid collections. No evidence of osteomyelitis.   3. Large, 13 cm left inguinal hernia containing nonobstructed sigmoid colon.      CT-SHOULDER WITH PLUS RECONS RIGHT   Final Result         1. Loculated small to moderate glenohumeral joint effusion. It may be infected and represent septic arthritis.   2. Several small intramuscular abscesses in the subscapularis and anterior head of the deltoid.   3. No evidence of osteomyelitis.   4. No fracture or dislocation.   5 Partially visualized small right pleural effusion and associated atelectasis.      EC-ECHOCARDIOGRAM LTD W/O CONT   Final Result      DX-CHEST-PORTABLE (1 VIEW)   Final Result      Borderline cardiomegaly.           Assessment/Plan  * Pyogenic arthritis of right shoulder region (HCC)  Assessment & Plan  CT showed loculated small to moderate right glenohumeral joint effusion; it may be infected and represent septic arthritis; several small intramuscular abscesses in the subscapularis and  anterior head of the deltoid; partially visualized small right pleural effusion and associated atelectasis.  CT showed small left hip effusion, which may be sterile or infected; repeat CT shows smaller effusion size  S/p drainage of shoulder abscess, wound vac in place    DVT (deep venous thrombosis) (HCC)  Assessment & Plan  Mixing artifact vs clot on CTAP, f/u US shows DVT  Started on lovenox BID, transitioned to eliquis 2/1    Hyponatremia  Assessment & Plan  Mild   Monitor     Spinal stenosis at L4-L5 level  Assessment & Plan  MRI showing severe L4 stenosis  Pain control    COVID-19  Assessment & Plan  Patient is currently not requiring oxygen hold steroids  Continue with enhanced precautions  Placed on oxygen with SPO2 below 90%    MSSA bacteremia- (present on admission)  Assessment & Plan  Patient has blood cultures x 2 positive for staph aureus that reflected at [facility] when he visited ER from 1/20/2022.  Echo showed normal left ventricular systolic function; mild aortic insufficiency.   CT showed loculated small to moderate right glenohumeral joint effusion; it may be infected and represent septic arthritis; several small intramuscular abscesses in the subscapularis and anterior head of the deltoid; partially visualized small right pleural effusion and associated atelectasis.  CT showed small left hip effusion, which may be sterile or infected.  S/p I+D shoulder joint, wound vac applied. Operative cultures negative.  Blood cultures from 1/20 and 1/22 positive for MSSA.  Blood cultures form 1/28 positive, 1/31 positive.  JAZIEL ordered given recurrent fever/positive blood culture, however patient refuses JAZIEL.  Will re-image MRI lumbar spine to evaluate for occult infection  Ancef changed to nafcillin 2/2 per ID recommendations, length of antibiotics to be determined    Hypertension- (present on admission)  Assessment & Plan  Blood pressures currently very well.  We will hold BP meds at this time.       VTE  prophylaxis: eliquis    I have performed a physical exam and reviewed and updated ROS and Plan today (2/3/2022). In review of yesterday's note (2/2/2022), there are no changes except as documented above.

## 2022-02-03 NOTE — THERAPY
Occupational Therapy  Daily Treatment     Patient Name: Yunier Denney  Age:  77 y.o., Sex:  male  Medical Record #: [id number]  Today's Date: 2/3/2022     Precautions  Precautions: (P) Fall Risk  Comments: WBAT right shoulder, wound vac     Assessment    Patient with increased activity tolerance and motivation. Patient with able to stand and take side steps to head of bed with FWW. Patient with AAROM for BUE to increase independence in ADL and functional mobility. Patient working on dynamic/static sitting balance task at EOB. Patient progressing towards short term goals. Continue OT POC       Plan    Continue current treatment plan.       Discharge Recommendations: (P) Recommend post-acute placement for additional occupational therapy services prior to discharge home    Subjective    Patient alert and cooperative during session. Nurse carilto'gisela occupational therapist to work with patient.       Objective    RUE AAROM 3 sets of 10 reps shoulder flexion, shoulder horizontal adduction, elbow flexion  Cervical AAROM 3 sets of 6 reps cervical rotation of L and R, cervical lateral flexion L and R     02/03/22 0935   Total Time Spent   Total Time Spent (Mins) 30   Treatment Charges   OT Self Care / ADL 1   OT Therapy Activity 1   Precautions   Precautions Fall Risk   Cognition    Comments Patient cooperative during session   Bed Mobility    Supine to Sit Moderate Assist   Sit to Supine Minimal Assist   Scooting Minimal Assist   Activities of Daily Living   Upper Body Dressing Moderate Assist  (Donning gown sitting EOB )   Lower Body Dressing Maximal Assist  (Patient donning socks sitting EOB )   How much help from another person does the patient currently need...   Putting on and taking off regular lower body clothing? 2   Bathing (including washing, rinsing, and drying)? 3   Toileting, which includes using a toilet, bedpan, or urinal? 2   Putting on and taking off regular upper body clothing? 3   Taking care of personal  grooming such as brushing teeth? 4   Eating meals? 4   6 Clicks Daily Activity Score 18   Functional Mobility   Sit to Stand Minimal Assist  (From bed level with FWW)   Comments Patient with side steps to head of bed with min A    Activity Tolerance   Sitting Edge of Bed ~10 minutes   Short Term Goals   Short Term Goal # 1 Patient will perform toileting with setup    Goal Outcome # 1 Progressing as expected   Short Term Goal # 2 Patient will perform UB/LB dressing with min A    Goal Outcome # 2 Progressing as expected   Short Term Goal # 3 Patient will perform 3/3 simple grooming task standing setup   Goal Outcome # 3 Progressing as expected   Education Group   Additional Comments Patient educated on importance of OOB activity and safety with functional mobility   Anticipated Discharge Equipment and Recommendations   Discharge Recommendations Recommend post-acute placement for additional occupational therapy services prior to discharge home   Interdisciplinary Plan of Care Collaboration   Collaboration Comments SANDRA martinez on functional mobility and ADL status    Session Information   Date / Session Number  2/3 #2 (1/3 2/8)   Priority 3

## 2022-02-03 NOTE — INFECTION CONTROL
This patient is considered COVID RECOVERED.    Patient initially tested positive for COVID on 1/20/2022.  Patient is greater than or equal to 10 days from symptom onset and/or positive test, with symptom improvement.  Per the CDC guidance, this patient no longer requires transmission based precautions.  Patient may be placed on any unit per the bed assignment policy, including placement in a semi-private room with a roommate.

## 2022-02-03 NOTE — CARE PLAN
Problem: Pain - Standard  Goal: Alleviation of pain or a reduction in pain to the patient’s comfort goal  Outcome: Progressing     Problem: Knowledge Deficit - Standard  Goal: Patient and family/care givers will demonstrate understanding of plan of care, disease process/condition, diagnostic tests and medications  Outcome: Progressing     Problem: Skin Integrity  Goal: Skin integrity is maintained or improved  Outcome: Progressing   The patient is Stable - Low risk of patient condition declining or worsening    Shift Goals  Clinical Goals: pain management, vitals WNL, sleep  Patient Goals: pain managemnet  Family Goals: DENISSE    Progress made toward(s) clinical / shift goals:  Pain management. Pt also doing range of motion with R extremity.    Patient is not progressing towards the following goals:

## 2022-02-04 PROBLEM — M46.20 VERTEBRAL OSTEOMYELITIS (HCC): Status: ACTIVE | Noted: 2022-02-04

## 2022-02-04 PROCEDURE — 700102 HCHG RX REV CODE 250 W/ 637 OVERRIDE(OP): Performed by: HOSPITALIST

## 2022-02-04 PROCEDURE — A9270 NON-COVERED ITEM OR SERVICE: HCPCS | Performed by: HOSPITALIST

## 2022-02-04 PROCEDURE — 99233 SBSQ HOSP IP/OBS HIGH 50: CPT | Performed by: STUDENT IN AN ORGANIZED HEALTH CARE EDUCATION/TRAINING PROGRAM

## 2022-02-04 PROCEDURE — 700101 HCHG RX REV CODE 250: Performed by: INTERNAL MEDICINE

## 2022-02-04 PROCEDURE — 99233 SBSQ HOSP IP/OBS HIGH 50: CPT | Performed by: INTERNAL MEDICINE

## 2022-02-04 PROCEDURE — 770001 HCHG ROOM/CARE - MED/SURG/GYN PRIV*

## 2022-02-04 PROCEDURE — 302098 PASTE RING (FLAT): Performed by: STUDENT IN AN ORGANIZED HEALTH CARE EDUCATION/TRAINING PROGRAM

## 2022-02-04 PROCEDURE — 97607 NEG PRS WND THR NDME<=50SQCM: CPT

## 2022-02-04 PROCEDURE — A9270 NON-COVERED ITEM OR SERVICE: HCPCS | Performed by: STUDENT IN AN ORGANIZED HEALTH CARE EDUCATION/TRAINING PROGRAM

## 2022-02-04 PROCEDURE — 700102 HCHG RX REV CODE 250 W/ 637 OVERRIDE(OP): Performed by: STUDENT IN AN ORGANIZED HEALTH CARE EDUCATION/TRAINING PROGRAM

## 2022-02-04 PROCEDURE — 700105 HCHG RX REV CODE 258: Performed by: INTERNAL MEDICINE

## 2022-02-04 RX ADMIN — OXYCODONE HYDROCHLORIDE 10 MG: 10 TABLET ORAL at 10:08

## 2022-02-04 RX ADMIN — APIXABAN 10 MG: 5 TABLET, FILM COATED ORAL at 05:30

## 2022-02-04 RX ADMIN — OXYCODONE HYDROCHLORIDE 10 MG: 10 TABLET ORAL at 23:12

## 2022-02-04 RX ADMIN — NAFCILLIN SODIUM 2 G: 2 INJECTION, POWDER, FOR SOLUTION INTRAMUSCULAR; INTRAVENOUS at 08:39

## 2022-02-04 RX ADMIN — NAFCILLIN SODIUM 2 G: 2 INJECTION, POWDER, FOR SOLUTION INTRAMUSCULAR; INTRAVENOUS at 20:34

## 2022-02-04 RX ADMIN — NAFCILLIN SODIUM 2 G: 2 INJECTION, POWDER, FOR SOLUTION INTRAMUSCULAR; INTRAVENOUS at 05:30

## 2022-02-04 RX ADMIN — NAFCILLIN SODIUM 2 G: 2 INJECTION, POWDER, FOR SOLUTION INTRAMUSCULAR; INTRAVENOUS at 12:40

## 2022-02-04 RX ADMIN — BISACODYL 10 MG: 10 SUPPOSITORY RECTAL at 10:00

## 2022-02-04 RX ADMIN — APIXABAN 10 MG: 5 TABLET, FILM COATED ORAL at 16:39

## 2022-02-04 RX ADMIN — SENNOSIDES 17.2 MG: 8.6 TABLET, FILM COATED ORAL at 20:34

## 2022-02-04 RX ADMIN — NAFCILLIN SODIUM 2 G: 2 INJECTION, POWDER, FOR SOLUTION INTRAMUSCULAR; INTRAVENOUS at 16:39

## 2022-02-04 RX ADMIN — NAFCILLIN SODIUM 2 G: 2 INJECTION, POWDER, FOR SOLUTION INTRAMUSCULAR; INTRAVENOUS at 01:14

## 2022-02-04 ASSESSMENT — PAIN DESCRIPTION - PAIN TYPE
TYPE: ACUTE PAIN

## 2022-02-04 ASSESSMENT — ENCOUNTER SYMPTOMS
BACK PAIN: 1
FEVER: 0
SHORTNESS OF BREATH: 0

## 2022-02-04 NOTE — PROGRESS NOTES
"   Orthopaedic Progress Note    Interval changes:  Patient doing well. Covid clearance from Infx Control  Cultures from R shoulder NGTD  No further ortho intervention planned   Continue wound care till closed    ROS - Patient denies any new issues.  Pain well controlled.    /59   Pulse 78   Temp 36.9 °C (98.4 °F) (Temporal)   Resp 18   Ht 1.803 m (5' 11\")   Wt 77.1 kg (170 lb)   SpO2 98%       Patient seen and examined  No acute distress  Breathing non labored  RRR  Right shoulder vac dressing CDI without leak, CDI, fingers SILT/F/E, cap refill <2 sec.        Active Hospital Problems    Diagnosis    • DVT (deep venous thrombosis) ([facility]) [I82.409]    • Pyogenic arthritis of right shoulder region ([facility]) [M00.9]    • Hyponatremia [E87.1]    • MSSA bacteremia [R78.81, B95.61]    • COVID-19 [U07.1]    • Spinal stenosis at L4-L5 level [M48.061]    • Hypertension [I10]      Assessment/Plan:  Patient doing well  Cultures form right shoulder with no growth  No further ortho intervention planned   POD#9 S/P   1.  Irrigation and debridement, right glenohumeral joint.  2.  Multiple cultures.  3.  Application of wound VAC.  Wt bearing status - WBAT  Wound care/Drains - Vac dressings to be left in place  Future Procedures - none planned   Sutures/Staples out- 14 days post operatively  PT/OT-initiated  Antibiotics: ancef 2g IVQ8  DVT Prophylaxis- TEDS/SCDs/Foot pumps  Gunter-none  Case Coordination for Discharge Planning - Disposition pending abx needs      "

## 2022-02-04 NOTE — CARE PLAN
Problem: Pain - Standard  Goal: Alleviation of pain or a reduction in pain to the patient’s comfort goal  Outcome: Progressing     Problem: Knowledge Deficit - Standard  Goal: Patient and family/care givers will demonstrate understanding of plan of care, disease process/condition, diagnostic tests and medications  Outcome: Progressing     Problem: Fall Risk  Goal: Patient will remain free from falls  Outcome: Progressing     Problem: Skin Integrity  Goal: Skin integrity is maintained or improved  Outcome: Progressing     The patient is Stable - Low risk of patient condition declining or worsening    Shift Goals  Clinical Goals: increase mobility, wound care, pain control, iv abx  Patient Goals: rest, sleep  Family Goals: N/A    Progress made toward(s) clinical / shift goals:  POC and medications discussed with pt. Pt reported 8/10 of lower back pain/ left leg pain. Medicated per MAR for pain. Pt able to turn self. Encouraged ambulation. Pt stated that he wished to rest for the night and declined.

## 2022-02-04 NOTE — THERAPY
Physical Therapy   Daily Treatment     Patient Name: Yunier Denney  Age:  77 y.o., Sex:  male  Medical Record #: [id number]  Today's Date: 2/3/2022     Precautions  Precautions: Fall Risk  Comments: (P) WBAT right shoulder, wound vac; extensive left LE DVT     Assessment    Pt with improving upright tolerance; very focused on left LE pain this session; disucssed positioning in bed as he prefers to maintain knee flexion which will heal in this position and further increase standing pain; needing max cues for P/AROM of shoulder and neck; better tolerance of standing this session; will follow needs placement.     Plan    Continue current treatment plan.    DC Equipment Recommendations:  Unable to determine at this time  Discharge Recommendations: Recommend post-acute placement for additional physical therapy services prior to discharge home      Abridged Subjective/Objective       02/03/22 1035   Cognition    Cognition / Consciousness X   Level of Consciousness Alert   New Learning Impaired   Attention Impaired   Initiation Impaired   Comments cooperative; interrupts therapist frequently    Supine Lower Body Exercise   Supine Lower Body Exercises Yes   Short Arc Quad   (x 3 for demo with pillow support)   Heel Slide Left  (2 reps pre mobility)   Sitting Lower Body Exercises   Sitting Lower Body Exercises Yes   Long Arc Quad 1 set of 10;Bilateral   Standing Lower Body Exercises   Standing Lower Body Exercises Yes   Mini Squat Partial  (x 5 to pt fatigue)   Balance   Sitting Balance (Static) Fair -   Sitting Balance (Dynamic) Poor +   Standing Balance (Static) Fair -   Standing Balance (Dynamic) Poor +   Weight Shift Sitting Fair   Weight Shift Standing Fair   Skilled Intervention Postural Facilitation;Sequencing;Tactile Cuing;Verbal Cuing   Comments left sidelying/elbow for thoracic extension side stretch; left cervical side bending for upper trap stretch with manual trigger point release to upper trap; focus on  unsupported sitting with head in midline and pulling to middle    Gait Analysis   Gait Level Of Assist Minimal Assist   Assistive Device Front Wheel Walker   Distance (Feet) 3   Skilled Intervention Sequencing;Postural Facilitation;Tactile Cuing;Verbal Cuing   Bed Mobility    Supine to Sit Moderate Assist   Sit to Supine Minimal Assist   Functional Mobility   Sit to Stand Minimal Assist  (with FWW)   Bed, Chair, Wheelchair Transfer Refused   Patient / Family Goals    Patient / Family Goal #1 to improve pain/activity tolerance    Goal #1 Outcome Goal met   Short Term Goals    Short Term Goal # 1 Pt will perform supine<>sit from flat HOB/no railing with min A within 6 visits to progress back to baseline.    Goal Outcome # 1 goal not met   Short Term Goal # 2 Pt will perform sit<>stand with fWW and min A wtihin 6 visits to ensure independent mobility at home.    Goal Outcome # 2 Goal met, new goal added   Short Term Goal # 2 B  Pt will perfom sit<>stand with FWW and supervision within 6 visits to ensure return to baseline.    Goal Outcome # 2 B Goal not met   Short Term Goal # 3 Pt will ambulate x 150ft with fWW and supervision wtihin 6 visits to progress to baseline.    Goal Outcome # 3 Goal not met   Short Term Goal # 4 Pt will ascend/descend 1 step with B UE support and min A within 6 visits to ensure independent mobility ath ome.    Goal Outcome # 4 Goal not met   Education Group   Additional Comments edu on positioning in bed with cervical chin tucks and cervical side bending to left shoulder

## 2022-02-04 NOTE — ASSESSMENT & PLAN NOTE
MRI lumbar spine shows bone marrow edema by L4 L5 vertebral bodies  Given recurrent positive blood cultures growing MSSA, will presume osteomyelitis  No signs of cord compression or abscesses  Continue 6 week course of antibiotics, end date 3/13  On nafcillin q4h, appreciate ID recommendations.  PICC line today 2/7

## 2022-02-04 NOTE — PROGRESS NOTES
[facility] Medicine Daily Progress Note    Date of Service  2/4/2022    Chief Complaint  Yunier Denney is a 77 y.o. male admitted 1/22/2022 with back, leg pain.    Hospital Course  A 77-year-old man with h/o epidural abscess (follows with Dr. Herron) presented with right leg pain, knee pain, viral-like symptoms including nausea, chills, malaise and fatigue. Patient reports he was diagnosed with COVID-19 on 1/20.   Patient has elevated ESR at 47 and CRP of 198.9.  Blood cultures x2 returned positive for staph aureus. Patient hospitalized for IV Unasyn.      Interval Problem Update  No acute events overnight.  Blood culture 1/31 with coag negative staph, likely contaminant.  Blood culture 2/3 no growth to date.  Will treat MSSA bacteremia with presumed vertebral osteomyelitis for 6 weeks antibiotic course.  Appreciate ID recommendations regarding antibiotic.  Continue nafcillin q4h.  Wound vac to right shoulder.  PT and OT recommend SNF, referral placed.    I have personally seen and examined the patient at bedside. I discussed the plan of care with patient and bedside RN.    Consultants/Specialty  infectious disease, orthopedics and IR    Code Status  Full Code    Disposition  Patient is not medically cleared for discharge.   Anticipate discharge to SNF vs home.  I have placed the appropriate orders for post-discharge needs.    Review of Systems  Constitutional: Positive for chills, fever and malaise/fatigue.   HENT: Negative.    Eyes: Negative.    Respiratory: Negative for cough and shortness of breath.    Cardiovascular: Negative for chest pain and leg swelling.   Gastrointestinal: Negative for nausea and vomiting.   Genitourinary: Negative for dysuria.   Musculoskeletal: Positive for right shoulder, left hip pain.   Skin: Negative for rash.   Neurological: Positive for weakness.     Physical Exam  Temp:  [36.6 °C (97.9 °F)-37.1 °C (98.8 °F)] 36.6 °C (97.9 °F)  Pulse:  [60-78] 61  Resp:  [16-18] 17  BP:  (103-131)/(59-64) 103/64  SpO2:  [93 %-98 %] 93 %    Physical Exam  Vitals and nursing note reviewed.   Constitutional:       Appearance: He is not ill appearing.  HENT:      Head: Normocephalic and atraumatic.      Nose: Nose normal.      Mouth/Throat:      Mouth: Mucous membranes are moist.      Pharynx: Oropharynx is clear.   Eyes:      Pupils: Pupils are equal, round, and reactive to light.   Cardiovascular:      Rate and Rhythm: Normal rate and regular rhythm.   Pulmonary:      Effort: Pulmonary effort is normal. No respiratory distress.      Breath sounds: No wheezing or rales.   Abdominal:      General: Abdomen is flat. Bowel sounds are normal. There is no distension.      Tenderness: There is no abdominal tenderness. There is no guarding.      Hernia: A hernia (left inguinal, reduciable, non-tender) is present.   Musculoskeletal:         General: right shoulder with wound vac     Cervical back: Normal range of motion.      Right lower leg: No edema.      Left lower leg: No edema.   Skin:     General: Skin is warm.   Neurological:      General: No focal deficit present.      Mental Status: He is alert and oriented to person, place, and time.     Fluids    Intake/Output Summary (Last 24 hours) at 2/4/2022 1035  Last data filed at 2/3/2022 1400  Gross per 24 hour   Intake --   Output 200 ml   Net -200 ml       Laboratory                        Imaging  MR-LUMBAR SPINE-WITH & W/O   Final Result      Interval development of abnormal marrow edema and enhancement within the right lateral L4 and L5 vertebral bodies. There are asymmetric severe degenerative changes on the right side however given the short-term interval development of this abnormal    enhancement and would raise concern for developing osteomyelitis. Clinical correlation and short-term follow-up is recommended. There is also some increased edema in the adjacent right psoas muscle. There is no evidence of epidural or paravertebral    abscess.       US-EXTREMITY VENOUS LOWER UNILAT LEFT   Final Result      CT-CHEST,ABDOMEN,PELVIS WITH   Final Result      1.  No evidence of acute inflammatory process in the chest, abdomen or pelvis.   2.  Left hip effusion has nearly resolved.   3.  Cardiomegaly.   4.  Cholelithiasis versus gallbladder sludge. No cholecystitis.   5.  Moderate to large hiatal hernia, containing 70-80% of organoaxially rotated stomach.   6.  Left inguinal hernia smaller, and again contains nonobstructed sigmoid colon.   7.  Nonocclusive filling defect in the left common femoral vein may represent mixing artifact or a nonocclusive DVT. Recommend further evaluation with venous duplex ultrasound.      DX-ABORTED DX PROCEDURE   Final Result      Aborted fluoroscopic guided left hip aspiration due to overlying bowel contained within the inguinal hernia. Findings were discussed with Dr. Cespedes. If necessary, joint aspiration could be performed under CT guidance.      CT-HIP WITH PLUS RECONS LEFT   Final Result         1. Small left hip effusion, which may be sterile or infected.   2. No intramuscular subcutaneous fluid collections. No evidence of osteomyelitis.   3. Large, 13 cm left inguinal hernia containing nonobstructed sigmoid colon.      CT-SHOULDER WITH PLUS RECONS RIGHT   Final Result         1. Loculated small to moderate glenohumeral joint effusion. It may be infected and represent septic arthritis.   2. Several small intramuscular abscesses in the subscapularis and anterior head of the deltoid.   3. No evidence of osteomyelitis.   4. No fracture or dislocation.   5 Partially visualized small right pleural effusion and associated atelectasis.      EC-ECHOCARDIOGRAM LTD W/O CONT   Final Result      DX-CHEST-PORTABLE (1 VIEW)   Final Result      Borderline cardiomegaly.      EC-ECHOCARDIOGRAM COMPLETE W/O CONT    (Results Pending)        Assessment/Plan  * Pyogenic arthritis of right shoulder region (HCC)  Assessment & Plan  CT showed loculated  small to moderate right glenohumeral joint effusion; it may be infected and represent septic arthritis; several small intramuscular abscesses in the subscapularis and anterior head of the deltoid; partially visualized small right pleural effusion and associated atelectasis.  CT showed small left hip effusion, which may be sterile or infected; repeat CT shows smaller effusion size  S/p drainage of shoulder abscess, wound vac in place    Vertebral osteomyelitis ([facility])  Assessment & Plan  MRI lumbar spine shows bone marrow edema by L4 L5 vertebral bodies  Given recurrent positive blood cultures growing MSSA, will presume osteomyelitis  No signs of cord compression or abscesses  Continue 6 week course of antibiotics  On nafcillin q4h, appreciate ID recommendations.  PICC line when blood cultures negative x72 hours.    DVT (deep venous thrombosis) ([facility])  Assessment & Plan  Mixing artifact vs clot on CTAP, f/u US shows DVT  Started on lovenox BID, transitioned to eliquis 2/1    Hyponatremia  Assessment & Plan  Mild   Monitor     Spinal stenosis at L4-L5 level  Assessment & Plan  MRI showing severe L4 stenosis  Pain control    COVID-19  Assessment & Plan  Patient is currently not requiring oxygen hold steroids  Continue with enhanced precautions  Placed on oxygen with SPO2 below 90%    MSSA bacteremia- (present on admission)  Assessment & Plan  Patient has blood cultures x 2 positive for staph aureus that reflected at [facility] when he visited ER from 1/20/2022.  Echo showed normal left ventricular systolic function; mild aortic insufficiency.   CT showed loculated small to moderate right glenohumeral joint effusion; it may be infected and represent septic arthritis; several small intramuscular abscesses in the subscapularis and anterior head of the deltoid; partially visualized small right pleural effusion and associated atelectasis.  CT showed small left hip effusion, which may be sterile or infected.  S/p I+D  shoulder joint, wound vac applied. Operative cultures negative.  Blood cultures from 1/20 and 1/22 positive for MSSA.  Blood cultures form 1/28 positive, 1/31 positive.  JAZIEL ordered given recurrent fever/positive blood culture, however patient refuses JAZIEL.  Will re-image MRI lumbar spine to evaluate for occult infection  Ancef changed to nafcillin 2/2 per ID recommendations, length of antibiotics to be determined    Hypertension- (present on admission)  Assessment & Plan  Blood pressures currently very well.  We will hold BP meds at this time.       VTE prophylaxis: eliquis    I have performed a physical exam and reviewed and updated ROS and Plan today (2/4/2022). In review of yesterday's note (2/3/2022), there are no changes except as documented above.

## 2022-02-04 NOTE — DISCHARGE PLANNING
Agency/Facility Name: [facility]  Outcome: Left message, awaiting call back.    Agency/Facility Name: [facility]   Spoke To: Bianca  Outcome: Reviewing referral.    Agency/Facility Name: [facility]  Outcome: Left message, awaiting call back.    Agency/Facility Name: [facility]  Outcome: Left message, awaiting call back.

## 2022-02-04 NOTE — CARE PLAN
The patient is Watcher - Medium risk of patient condition declining or worsening    Shift Goals  Clinical Goals: increase mobility, pain control, wound care  Patient Goals: rest comfortably   Family Goals: N/A    Progress made toward(s) clinical / shift goals:  all    Patient is not progressing towards the following goals:

## 2022-02-04 NOTE — PROGRESS NOTES
Pt A/Ox4. In RA. R shoulder with wound vac in place. Dressing-CDI. No pain of right shoulder. No numbness or tingling identified on extremities. Pt reported 8/10 of lower back pain/left leg pain. Medicated per MAR for pain. L leg felt warm and not swollen. Pt appeared to be in no distress. No questions or concerns at this time. POC discussed with pt. Call light within reach and all needs are met at this time. Moderate fall precautions in place. Pt calls appropriately.

## 2022-02-04 NOTE — WOUND TEAM
[facility]  Inpatient Services Wound and Skin Care Evaluation    Admission Date: 1/22/2022     No order of IP CONSULT TO WOUND CARE is found.     HPI, PMH, SH: Reviewed    Past Surgical History:   Procedure Laterality Date   • IRRIGATION & DEBRIDEMENT GENERAL Right 1/25/2022    Procedure: IRRIGATION AND DEBRIDEMENT, WOUND  , MULTIPLE CULTURES;  Surgeon: Larry Aldridge M.D.;  Location: SURGERY [facility];  Service: Orthopedics   • APPLICATION OR REPLACEMENT, WOUND VAC Right 1/25/2022    Procedure: APPLICATION WOUND VAC;  Surgeon: Larry Aldridge M.D.;  Location: SURGERY [facility];  Service: Orthopedics   • LUMBAR LAMINECTOMY DISKECTOMY  4/29/2018    Procedure: LUMBAR LAMINECTOMY Y L4-S1;  Surgeon: Fercho Herron M.D.;  Location: SURGERY [facility];  Service: Neurosurgery   • IRRIGATION & DEBRIDEMENT NEURO  4/29/2018    Procedure: IRRIGATION & DEBRIDEMENT NEURO- epidural mass;  Surgeon: Fercho Herron M.D.;  Location: SURGERY [facility];  Service: Neurosurgery     Social History     Tobacco Use   • Smoking status: Never Smoker   • Smokeless tobacco: Never Used   Substance Use Topics   • Alcohol use: Yes     Chief Complaint   Patient presents with   • Back Pain     x2 days, right lower back. Patient injured his back while working on a car. Patient reports he had an MRI 2 days ago that didn't show anything.    • Shoulder Pain     x 2 days, right   • Knee Pain     x 2 days , left   • Failure to Thrive     Patient lives with his son who takes care of him. Patient's son reports that the patient is unable to get out of bed and he is having trouble taking care of him.    • Flu Like Symptoms     covid+ 1/20      Diagnosis: Positive blood cultures [R78.81]    Unit where seen by Wound Team: S141/00     WOUND CONSULT/FOLLOW UP RELATED TO:  I&D of R shoulder with WV placement.     WOUND HISTORY:  77y.o male admitted for COVID-19 and back pain. Diagnosed with R shoulder joint pyarthrosis and  underwent I&D of R glenohumeral joint with WV application on 1/25 with Dr. Aldridge. Pertinent history of HTN. Pertinent home med of medrol.    WOUND ASSESSMENT/LDA           Negative Pressure Wound Therapy 01/25/22 Surgical Shoulder Anterior Right (Active)   NPWT Pump Mode / Pressure Setting [place]ta;125 mmHg;Intermittent 02/04/22 1100   Dressing Type Black Foam (Veraflo) 02/04/22 1100   Number of Foam Pieces Used 2 02/04/22 1100   Canister Changed No 02/04/22 1100   Output (mL) 0 mL 02/04/22 1100   NEXT Dressing Change/Treatment Date 02/07/22 02/04/22 1100   VAC VeraFlo Irrigant Normal Saline 02/04/22 1100   VAC VeraFlo Soak Time (mins) 5 02/04/22 1100   VAC VeraFlo Instill Volume (ml) 10 02/04/22 1100   VAC VeraFlo - Therapy Time (hrs) 2 02/04/22 1100   VAC VeraFlo Pressure (mm/Hg) Intermittent;125 mmHg 02/04/22 1100   WOUND NURSE ONLY - Time Spent with Patient (mins) 45 01/28/22 1412           Wound 01/25/22 Incision Shoulder Right WOUND VAC (Active)   Wound Image    02/04/22 1100   Site Assessment Pale;Pink;Red 02/04/22 1100   Periwound Assessment [place] 02/04/22 1100   Margins Defined edges;Unattached edges 02/04/22 1100   Closure Secondary intention 02/04/22 1100   Drainage Amount Scant 02/04/22 1100   Drainage Description Serous 02/04/22 1100   Treatments Cleansed;Site care 02/04/22 1100   Wound Cleansing Approved Wound Cleanser 02/04/22 1100   Periwound Protectant Skin Protectant Wipes to Periwound;Paste Ring 02/04/22 1100   Dressing Cleansing/Solutions Normal Saline 02/04/22 1100   Dressing Options Wound Vac 02/04/22 1100   Dressing Changed Changed 02/04/22 1100   Dressing Status Clean;Dry;Intact 02/04/22 1100   Dressing Change/Treatment Frequency Monday, Wednesday, Friday, and As Needed 02/04/22 1100   NEXT Dressing Change/Treatment Date 02/07/22 02/04/22 1100   NEXT Weekly Photo (Inpatient Only) 02/11/22 02/04/22 1100   Number of Staples Removed 0 01/31/22 1000   Wound Length (cm) 8.3 cm 02/04/22 1100   Wound  Width (cm) 2.5 cm 02/04/22 1100   Wound Depth (cm) 0.4 cm 02/04/22 1100   Wound Surface Area (cm^2) 20.75 cm^2 02/04/22 1100   Wound Volume (cm^3) 8.3 cm^3 02/04/22 1100   Wound Healing % 68 02/04/22 1100   Undermining (cm) 0.2 cm 02/04/22 1100   Undermining of Wound, 1st Location From 6 o'clock;To 10 o'clock 02/04/22 1100   Undermining (cm) - 2nd location 1 cm 01/28/22 1412   Undermining of Wound, 2nd Location From 9 o'clock;To 11 o'clock 01/28/22 1412   Shape chay 02/04/22 1100   Wound Odor None 02/04/22 1100   Pulses N/A 02/04/22 1100   Exposed Structures Muscle 02/04/22 1100   WOUND NURSE ONLY - Time Spent with Patient (mins) 60 02/04/22 1100           Vascular:    ISABEL:   No results found.    Lab Values:    Lab Results   Component Value Date/Time    WBC 7.5 01/28/2022 12:51 AM    RBC 3.61 (L) 01/28/2022 12:51 AM    HEMOGLOBIN 9.9 (L) 01/28/2022 12:51 AM    HEMATOCRIT 30.1 (L) 01/28/2022 12:51 AM    CREACTPROT 22.06 (H) 01/25/2022 10:11 AM    SEDRATEWES 65 (H) 01/28/2022 12:51 AM        Culture Results show:  Recent Results (from the past 720 hour(s))   CULTURE WOUND W/ GRAM STAIN    Collection Time: 01/25/22  3:52 PM    Specimen: Wound   Result Value Ref Range    Significant Indicator NEG     Source WND     Site Right shoulder     Culture Result No growth at 72 hours.     Gram Stain Result Few WBCs.  No organisms seen.          Pain Level/Medicated: PO oxycodone    INTERVENTIONS BY WOUND TEAM:  Chart and images reviewed. Discussed with bedside RN. All areas of concern (based on picture review, LDA review and discussion with bedside RN) have been thoroughly assessed. Documentation of areas based on significant findings. This RN in to assess patient. Performed standard wound care which includes appropriate positioning, dressing removal and non-selective debridement. Pictures and measurements obtained weekly if/when required.  Preparation for Dressing removal: Dressing soaked with NS  Non-selectively Debrided  with:  cleanser and gauze.  Sharp debridement: N/A  Triny wound: Cleansed with cleanser, Prepped with no string barrier & paste ring.  Primary Dressing: One half thick piece of black foam, .  Secondary (Outer) Dressing: Button, drape, and trackpad    Interdisciplinary consultation: Patient, Bedside RN Raysa    EVALUATION / RATIONALE FOR TREATMENT:  Most Recent Date:  2/4/22: New measurements taken, undermining still present but decreasing, muscle present continue VF, next change switch to regular vac and wilber.    2/2//22: Tunneling at 12 o'clock that's too small for foam to fit into, small piece of wilber applied to assist in healing. Muscle still present, improving undermining present from 9-11 o'clock. Continue NPWT vac changes.  1/31/22: Muscle visible less adipose tissue, undermining still present.  Changed veraflo settings, continue POC  1/28/22: Muscle and adipose tissue exposed. Undermining noted from 9-11 o'clock and 12-2 o'clock. Wound bed clean with scant serosang drainage. Veraflo initiated to keep underlying structures moist and to mechanically debride wound bed. Patient sleeping for entirety of procedure- recommend PO pre-med only. Recommend ongoing NPWT and F/U with outpatient wound vs home health if patient continues to refuse SNF placement.     Goals: Steady decrease in wound area and depth weekly.    WOUND TEAM PLAN OF CARE ([X] for frequency of wound follow up,):  Nursing to follow orders written for wound care. Contact wound team if area fails to progress, deteriorates or with any questions/concerns  Dressing changes by wound team:                   Follow up 3 times weekly:                NPWT change 3 times weekly:   X  Follow up 1-2 times weekly:      Follow up Bi-Monthly:                   Follow up as needed:     Other (explain):     NURSING PLAN OF CARE ORDERS (X):  Dressing changes: See Dressing Care orders: X  Skin care: See Skin Care orders: X  RN Prevention Protocol: X  Rectal tube care:  See Rectal Tube Care orders:   Other orders:    RSKIN:   CURRENTLY IN PLACE (X), APPLIED THIS VISIT (A), ORDERED (O):   Q shift Jensen:  X  Q shift pressure point assessments:  X    Surface/Positioning   Pressure redistribution mattress X           Low Airloss          Bariatric foam      Bariatric SAM     Waffle cushion        Waffle Overlay      X    Reposition q 2 hours      TAPs Turning system     Z Ash Pillow     Offloading/Redistribution   Sacral Mepilex (Silicone dressing)     Heel Mepilex (Silicone dressing)         Heel float boots (Prevalon boot)             Float Heels off Bed with Pillows           Respiratory   Silicone O2 tubing         Gray Foam Ear protectors     Cannula fixation Device (Tender )          High flow offloading Clip    Elastic head band offloading device      Anchorfast                                                         Trach with Optifoam split foam             Containment/Moisture Prevention     Rectal tube or BMS    Purwick/Condom Cath        Gunter Catheter    Barrier wipes           Barrier paste       Antifungal tx      Interdry      Urinal X     Mobilization   Up to chair        Ambulate      PT/OT  X    Nutrition   Dietician        Diabetes Education      PO X     TF     TPN     NPO   # days     Anticipated discharge plans:   LTACH:        SNF/Rehab:                  Home Health Care:    X    Vs SNF  Outpatient Wound Center:            Self/Family Care:        Other:                  Vac Discharge Needs:  Not Applicable Pt not on a wound vac:       Regular Vac while inpatient, alternative dressing at DC:        Regular Vac in use and continued at DC:            Reg. Vac w/ Skin Sub/Biologic in use. Will need to be changed 2x wkly:      Veraflo Vac while inpatient, ok to transition to Regular Vac on Discharge:      X     Veraflo Vac while inpatient, will need to remain on Veraflo Vac upon discharge:                          Consent (Nose)/Introductory Paragraph: The rationale for Mohs was explained to the patient and consent was obtained. The risks, benefits and alternatives to therapy were discussed in detail. Specifically, the risks of nasal deformity, changes in the flow of air through the nose, infection, scarring, bleeding, prolonged wound healing, incomplete removal, allergy to anesthesia, nerve injury and recurrence were addressed. Prior to the procedure, the treatment site was clearly identified and confirmed by the patient. All components of Universal Protocol/PAUSE Rule completed.

## 2022-02-04 NOTE — PROGRESS NOTES
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 repeat TTE  -MRI of the lumbar spine was repeated on 2/3 which showed new bone marrow edema and enhancement within the right lateral L4 and L5 vertebral bodies, no abscesses noted.  Also noted edema in the adjacent right psoas, no abscess  -Noted extensive left lower extremity DVT, may be a reason for persistent bacteremia if seeded  -On 2/2, switched from Ancef to IV nafcillin 2 g every 4 hours given persistent bacteremia and possible inoculum effect (in retrospect, that positive blood culture is likely a contaminant as above, identified as a coagulase-negative Staph)  -Anticipating discharge either to a facility or home with home care and daily infusions at [facility]. If going to a facility, recommend IV Ancef 2 g every 8 hours through 3/13/2022.  If going to [facility] infusions, then recommend IV daptomycin 8 mg/KG every 24 hours through 3/13/2022.  We are not credentialed there and cannot write orders  -At least weekly CBC with differential, CMP, CPK while on IV antibiotics    Joint Pain  Right shoulder abscess  ABx as above  CT  Right shoulder with effusion and multiple pockets fluid (ant deltoid and subscapularis) confirmed abscess  S/p I and D 1/25 right glenohumeral joint -operative cultures neg  CT left hip small left hip effusion-likely too small to drain.  On CT chest abdomen pelvis  obtained on 1/29, the hip effusion has nearly resolved  Antibiotics as above.  Left leg pain likely from the extensive DVT.  Management per primary    SARS-CoV-2 PCR positive  Continue supportive care  On RA    DW Dr Ryasa MCDONALD will follow.  Please call with questions.

## 2022-02-05 ENCOUNTER — APPOINTMENT (OUTPATIENT)
Dept: CARDIOLOGY | Facility: MEDICAL CENTER | Age: 77
DRG: 548 | End: 2022-02-05
Attending: INTERNAL MEDICINE
Payer: MEDICARE

## 2022-02-05 LAB
BACTERIA BLD CULT: NORMAL
EKG IMPRESSION: NORMAL
SIGNIFICANT IND 70042: NORMAL
SITE SITE: NORMAL
SOURCE SOURCE: NORMAL

## 2022-02-05 PROCEDURE — 700102 HCHG RX REV CODE 250 W/ 637 OVERRIDE(OP): Performed by: STUDENT IN AN ORGANIZED HEALTH CARE EDUCATION/TRAINING PROGRAM

## 2022-02-05 PROCEDURE — 93308 TTE F-UP OR LMTD: CPT

## 2022-02-05 PROCEDURE — 700102 HCHG RX REV CODE 250 W/ 637 OVERRIDE(OP): Performed by: HOSPITALIST

## 2022-02-05 PROCEDURE — A9270 NON-COVERED ITEM OR SERVICE: HCPCS | Performed by: HOSPITALIST

## 2022-02-05 PROCEDURE — 700102 HCHG RX REV CODE 250 W/ 637 OVERRIDE(OP): Performed by: NURSE PRACTITIONER

## 2022-02-05 PROCEDURE — 93308 TTE F-UP OR LMTD: CPT | Mod: 26 | Performed by: INTERNAL MEDICINE

## 2022-02-05 PROCEDURE — 99233 SBSQ HOSP IP/OBS HIGH 50: CPT | Performed by: INTERNAL MEDICINE

## 2022-02-05 PROCEDURE — 99232 SBSQ HOSP IP/OBS MODERATE 35: CPT | Performed by: STUDENT IN AN ORGANIZED HEALTH CARE EDUCATION/TRAINING PROGRAM

## 2022-02-05 PROCEDURE — 700101 HCHG RX REV CODE 250: Performed by: INTERNAL MEDICINE

## 2022-02-05 PROCEDURE — A9270 NON-COVERED ITEM OR SERVICE: HCPCS | Performed by: STUDENT IN AN ORGANIZED HEALTH CARE EDUCATION/TRAINING PROGRAM

## 2022-02-05 PROCEDURE — 770001 HCHG ROOM/CARE - MED/SURG/GYN PRIV*

## 2022-02-05 PROCEDURE — A9270 NON-COVERED ITEM OR SERVICE: HCPCS | Performed by: NURSE PRACTITIONER

## 2022-02-05 PROCEDURE — 700105 HCHG RX REV CODE 258: Performed by: INTERNAL MEDICINE

## 2022-02-05 PROCEDURE — 93005 ELECTROCARDIOGRAM TRACING: CPT | Performed by: INTERNAL MEDICINE

## 2022-02-05 PROCEDURE — 93010 ELECTROCARDIOGRAM REPORT: CPT | Performed by: INTERNAL MEDICINE

## 2022-02-05 RX ORDER — CHOLECALCIFEROL (VITAMIN D3) 125 MCG
5 CAPSULE ORAL NIGHTLY PRN
Status: DISCONTINUED | OUTPATIENT
Start: 2022-02-05 | End: 2022-02-10 | Stop reason: HOSPADM

## 2022-02-05 RX ADMIN — NAFCILLIN SODIUM 2 G: 2 INJECTION, POWDER, FOR SOLUTION INTRAMUSCULAR; INTRAVENOUS at 06:17

## 2022-02-05 RX ADMIN — NAFCILLIN SODIUM 2 G: 2 INJECTION, POWDER, FOR SOLUTION INTRAMUSCULAR; INTRAVENOUS at 20:43

## 2022-02-05 RX ADMIN — APIXABAN 10 MG: 5 TABLET, FILM COATED ORAL at 06:17

## 2022-02-05 RX ADMIN — OXYCODONE 5 MG: 5 TABLET ORAL at 14:48

## 2022-02-05 RX ADMIN — NAFCILLIN SODIUM 2 G: 2 INJECTION, POWDER, FOR SOLUTION INTRAMUSCULAR; INTRAVENOUS at 08:52

## 2022-02-05 RX ADMIN — ACETAMINOPHEN 650 MG: 325 TABLET, FILM COATED ORAL at 09:25

## 2022-02-05 RX ADMIN — NAFCILLIN SODIUM 2 G: 2 INJECTION, POWDER, FOR SOLUTION INTRAMUSCULAR; INTRAVENOUS at 12:55

## 2022-02-05 RX ADMIN — APIXABAN 10 MG: 5 TABLET, FILM COATED ORAL at 17:11

## 2022-02-05 RX ADMIN — NAFCILLIN SODIUM 2 G: 2 INJECTION, POWDER, FOR SOLUTION INTRAMUSCULAR; INTRAVENOUS at 02:43

## 2022-02-05 RX ADMIN — Medication 5 MG: at 21:40

## 2022-02-05 RX ADMIN — SENNOSIDES 17.2 MG: 8.6 TABLET, FILM COATED ORAL at 20:43

## 2022-02-05 RX ADMIN — NAFCILLIN SODIUM 2 G: 2 INJECTION, POWDER, FOR SOLUTION INTRAMUSCULAR; INTRAVENOUS at 17:11

## 2022-02-05 ASSESSMENT — PATIENT HEALTH QUESTIONNAIRE - PHQ9
1. LITTLE INTEREST OR PLEASURE IN DOING THINGS: NOT AT ALL
SUM OF ALL RESPONSES TO PHQ9 QUESTIONS 1 AND 2: 0

## 2022-02-05 ASSESSMENT — ENCOUNTER SYMPTOMS
FEVER: 0
BACK PAIN: 1
SHORTNESS OF BREATH: 0

## 2022-02-05 ASSESSMENT — PAIN DESCRIPTION - PAIN TYPE: TYPE: ACUTE PAIN

## 2022-02-05 NOTE — CARE PLAN
Problem: Pain - Standard  Goal: Alleviation of pain or a reduction in pain to the patient’s comfort goal  Outcome: Progressing     Problem: Knowledge Deficit - Standard  Goal: Patient and family/care givers will demonstrate understanding of plan of care, disease process/condition, diagnostic tests and medications  Outcome: Progressing     Problem: Fall Risk  Goal: Patient will remain free from falls  Outcome: Progressing     Problem: Skin Integrity  Goal: Skin integrity is maintained or improved  Outcome: Progressing     The patient is Stable - Low risk of patient condition declining or worsening    Shift Goals  Clinical Goals: increase mobility, pain control and wound care  Patient Goals: rest comfortably  Family Goals: N/A    Progress made toward(s) clinical / shift goals: POC and medications discussed with pt. Discussed wound vac with pt. Pt verbalized understanding. Pt reported of 8/10 of back and left leg pain. Medicated per MAR for pain. Mepliex and waffle overlay to prevent skin breakdown. Pt able to self turns. Moderate fall precautions in place. Bed alarm on.

## 2022-02-05 NOTE — PROGRESS NOTES
"   Orthopaedic Progress Note    Interval changes:  Patient doing well. Covid clearance from Infx Control  Cultures from R shoulder NGTD  No further ortho intervention planned   Continue wound care till closed    ROS - Patient denies any new issues.  Pain well controlled.    /58   Pulse 61   Temp 37.2 °C (99 °F) (Temporal)   Resp 20   Ht 1.803 m (5' 11\")   Wt 77.1 kg (170 lb)   SpO2 94%     Patient seen and examined  No acute distress  Breathing non labored  RRR  Right shoulder vac dressing CDI without leak, CDI, fingers SILT/F/E, cap refill <2 sec.      Active Hospital Problems    Diagnosis    • Vertebral osteomyelitis (HCC) [M46.20]    • DVT (deep venous thrombosis) ([facility]) [I82.409]    • Pyogenic arthritis of right shoulder region ([facility]) [M00.9]    • Hyponatremia [E87.1]    • MSSA bacteremia [R78.81, B95.61]    • COVID-19 [U07.1]    • Spinal stenosis at L4-L5 level [M48.061]    • Hypertension [I10]      Assessment/Plan:  Patient doing well  Cultures form right shoulder with no growth  No further ortho intervention planned   POD#10 S/P   1.  Irrigation and debridement, right glenohumeral joint.  2.  Multiple cultures.  3.  Application of wound VAC.  Wt bearing status - WBAT  Wound care/Drains - Vac dressings to closure  Future Procedures - none planned   Sutures/Staples out- 14 days post operatively  PT/OT-initiated  Antibiotics: ancef 2g IVQ8  DVT Prophylaxis- TEDS/SCDs/Foot pumps  Gunter-none  Case Coordination for Discharge Planning - Disposition pending abx needs . OK for SNF. Follow-Up: needs appointment with Dr. Aldridge at  [facility] at 10-14 days post-op for re-evaluation, staple removal and radiographs.      "

## 2022-02-05 NOTE — PROGRESS NOTES
[facility] Medicine Daily Progress Note    Date of Service  2/5/2022    Chief Complaint  Yunier Denney is a 77 y.o. male admitted 1/22/2022 with back, leg pain.    Hospital Course  A 77-year-old man with h/o epidural abscess (follows with Dr. Herron) presented with right leg pain, knee pain, viral-like symptoms including nausea, chills, malaise and fatigue. Patient reports he was diagnosed with COVID-19 on 1/20.   Patient has elevated ESR at 47 and CRP of 198.9.  Blood cultures x2 returned positive for staph aureus. Patient hospitalized for IV Unasyn.      Interval Problem Update  No acute events overnight.  Blood culture 2/3 no growth to date.  Continue nafcillin, will need 6 week antibiotic course, end date 3/13.  Patient would prefer to discharge to home with [facility].  Will order home health, will need wound vac on discharge.  PICC line tomorrow.    I have personally seen and examined the patient at bedside. I discussed the plan of care with patient and bedside RN.    Consultants/Specialty  infectious disease, orthopedics and IR    Code Status  Full Code    Disposition  Patient is not medically cleared for discharge.   Anticipate discharge to SNF vs home.  I have placed the appropriate orders for post-discharge needs.    Review of Systems  Constitutional: Positive for chills, fever and malaise/fatigue.   HENT: Negative.    Eyes: Negative.    Respiratory: Negative for cough and shortness of breath.    Cardiovascular: Negative for chest pain and leg swelling.   Gastrointestinal: Negative for nausea and vomiting.   Genitourinary: Negative for dysuria.   Musculoskeletal: Positive for right shoulder, left hip pain.   Skin: Negative for rash.   Neurological: Positive for weakness.     Physical Exam  Temp:  [36.4 °C (97.6 °F)-37.3 °C (99.1 °F)] 36.4 °C (97.6 °F)  Pulse:  [61-75] 61  Resp:  [17-20] 17  BP: (103-122)/(58-70) 119/68  SpO2:  [94 %-98 %] 94 %    Physical Exam  Vitals and nursing note reviewed.   Constitutional:        Appearance: He is not ill appearing.  HENT:      Head: Normocephalic and atraumatic.      Nose: Nose normal.      Mouth/Throat:      Mouth: Mucous membranes are moist.      Pharynx: Oropharynx is clear.   Eyes:      Pupils: Pupils are equal, round, and reactive to light.   Cardiovascular:      Rate and Rhythm: Normal rate and regular rhythm.   Pulmonary:      Effort: Pulmonary effort is normal. No respiratory distress.      Breath sounds: No wheezing or rales.   Abdominal:      General: Abdomen is flat. Bowel sounds are normal. There is no distension.      Tenderness: There is no abdominal tenderness. There is no guarding.      Hernia: A hernia (left inguinal, reduciable, non-tender) is present.   Musculoskeletal:         General: right shoulder with wound vac     Cervical back: Normal range of motion.      Right lower leg: No edema.      Left lower leg: No edema.   Skin:     General: Skin is warm.   Neurological:      General: No focal deficit present.      Mental Status: He is alert and oriented to person, place, and time.     Fluids    Intake/Output Summary (Last 24 hours) at 2/5/2022 1031  Last data filed at 2/4/2022 2000  Gross per 24 hour   Intake --   Output 40 ml   Net -40 ml       Laboratory                        Imaging  EC-ECHOCARDIOGRAM LTD W/O CONT         MR-LUMBAR SPINE-WITH & W/O   Final Result      Interval development of abnormal marrow edema and enhancement within the right lateral L4 and L5 vertebral bodies. There are asymmetric severe degenerative changes on the right side however given the short-term interval development of this abnormal    enhancement and would raise concern for developing osteomyelitis. Clinical correlation and short-term follow-up is recommended. There is also some increased edema in the adjacent right psoas muscle. There is no evidence of epidural or paravertebral    abscess.      US-EXTREMITY VENOUS LOWER UNILAT LEFT   Final Result      CT-CHEST,ABDOMEN,PELVIS WITH    Final Result      1.  No evidence of acute inflammatory process in the chest, abdomen or pelvis.   2.  Left hip effusion has nearly resolved.   3.  Cardiomegaly.   4.  Cholelithiasis versus gallbladder sludge. No cholecystitis.   5.  Moderate to large hiatal hernia, containing 70-80% of organoaxially rotated stomach.   6.  Left inguinal hernia smaller, and again contains nonobstructed sigmoid colon.   7.  Nonocclusive filling defect in the left common femoral vein may represent mixing artifact or a nonocclusive DVT. Recommend further evaluation with venous duplex ultrasound.      DX-ABORTED DX PROCEDURE   Final Result      Aborted fluoroscopic guided left hip aspiration due to overlying bowel contained within the inguinal hernia. Findings were discussed with Dr. Cespedes. If necessary, joint aspiration could be performed under CT guidance.      CT-HIP WITH PLUS RECONS LEFT   Final Result         1. Small left hip effusion, which may be sterile or infected.   2. No intramuscular subcutaneous fluid collections. No evidence of osteomyelitis.   3. Large, 13 cm left inguinal hernia containing nonobstructed sigmoid colon.      CT-SHOULDER WITH PLUS RECONS RIGHT   Final Result         1. Loculated small to moderate glenohumeral joint effusion. It may be infected and represent septic arthritis.   2. Several small intramuscular abscesses in the subscapularis and anterior head of the deltoid.   3. No evidence of osteomyelitis.   4. No fracture or dislocation.   5 Partially visualized small right pleural effusion and associated atelectasis.      EC-ECHOCARDIOGRAM LTD W/O CONT   Final Result      DX-CHEST-PORTABLE (1 VIEW)   Final Result      Borderline cardiomegaly.           Assessment/Plan  * Pyogenic arthritis of right shoulder region (HCC)  Assessment & Plan  CT showed loculated small to moderate right glenohumeral joint effusion; it may be infected and represent septic arthritis; several small intramuscular abscesses in the  subscapularis and anterior head of the deltoid; partially visualized small right pleural effusion and associated atelectasis.  CT showed small left hip effusion, which may be sterile or infected; repeat CT shows smaller effusion size  S/p drainage of shoulder abscess, wound vac in place    Vertebral osteomyelitis ([facility])  Assessment & Plan  MRI lumbar spine shows bone marrow edema by L4 L5 vertebral bodies  Given recurrent positive blood cultures growing MSSA, will presume osteomyelitis  No signs of cord compression or abscesses  Continue 6 week course of antibiotics  On nafcillin q4h, appreciate ID recommendations.  PICC line when blood cultures negative x72 hours.    DVT (deep venous thrombosis) ([facility])  Assessment & Plan  Mixing artifact vs clot on CTAP, f/u US shows DVT  Started on lovenox BID, transitioned to eliquis 2/1    Hyponatremia  Assessment & Plan  Mild   Monitor     Spinal stenosis at L4-L5 level  Assessment & Plan  MRI showing severe L4 stenosis  Pain control    COVID-19  Assessment & Plan  Patient is currently not requiring oxygen hold steroids  Continue with enhanced precautions  Placed on oxygen with SPO2 below 90%    MSSA bacteremia- (present on admission)  Assessment & Plan  Patient has blood cultures x 2 positive for staph aureus that reflected at [facility] when he visited ER from 1/20/2022.  Echo showed normal left ventricular systolic function; mild aortic insufficiency.   CT showed loculated small to moderate right glenohumeral joint effusion; it may be infected and represent septic arthritis; several small intramuscular abscesses in the subscapularis and anterior head of the deltoid; partially visualized small right pleural effusion and associated atelectasis.  CT showed small left hip effusion, which may be sterile or infected.  S/p I+D shoulder joint, wound vac applied. Operative cultures negative.  Blood cultures from 1/20 and 1/22 positive for MSSA.  Blood cultures form 1/28 positive,  1/31 positive.  JAZIEL ordered given recurrent fever/positive blood culture, however patient refuses JAZIEL.  Will re-image MRI lumbar spine to evaluate for occult infection  Ancef changed to nafcillin 2/2 per ID recommendations, length of antibiotics to be determined    Hypertension- (present on admission)  Assessment & Plan  Blood pressures currently very well.  We will hold BP meds at this time.       VTE prophylaxis: eliquis    I have performed a physical exam and reviewed and updated ROS and Plan today (2/5/2022). In review of yesterday's note (2/4/2022), there are no changes except as documented above.

## 2022-02-05 NOTE — PROGRESS NOTES
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 edema and enhancement within the right lateral L4 and L5 vertebral bodies, no abscesses noted.  Also noted edema in the adjacent right psoas, no abscess  -Noted extensive left lower extremity DVT, may be a reason for persistent bacteremia if seeded  -On 2/2, switched from Ancef to IV nafcillin 2 g every 4 hours given persistent bacteremia and possible inoculum effect (in retrospect, that positive blood culture is likely a contaminant as above, identified as a coagulase-negative Staph)  -Anticipating discharge either to a facility or home with home care and daily infusions at [facility]. If going to a facility, recommend IV Ancef 2 g every 8 hours through 3/13/2022.  If going to [facility] infusions, then recommend IV daptomycin 8 mg/KG every 24 hours through 3/13/2022.  We are not credentialed there and cannot write orders  -At least weekly CBC with differential, CMP, CPK while on IV antibiotics    Joint Pain  Right shoulder abscess  ABx as above  CT  Right shoulder with effusion and multiple pockets fluid (ant deltoid and subscapularis) confirmed abscess  S/p I and D 1/25 right glenohumeral joint -operative cultures neg  CT left hip small left hip effusion-likely too small to drain.  On CT chest abdomen pelvis obtained on 1/29, the hip effusion has nearly resolved  Antibiotics as above.  Left leg pain likely from the extensive DVT.  Management per  primary    SARS-CoV-2 PCR positive  Continue supportive care  On RA    DW Dr Tabares    ID will follow.  Please call with questions.

## 2022-02-05 NOTE — CARE PLAN
The patient is Watcher - Medium risk of patient condition declining or worsening    Shift Goals  Clinical Goals: increase mobility  Patient Goals: rle comfort  Family Goals: n/a    Progress made toward(s) clinical / shift goals:  please see below     Patient is not progressing towards the following goals:    Minor pain - tylenol. Changed wound vac canister - causing some intermittent pain and discomfort. Calling appropriately.Up to chair - will encourage more ambulation. Elevating LLE. WCTM.

## 2022-02-05 NOTE — FACE TO FACE
Face to Face Supporting Documentation - Home Health    The encounter with this patient was in whole or in part the primary reason for home health admission.    Date of encounter:   Patient:                    MRN:                       YOB: 2023  Yunier Denney  [id number]  1945     Home health to see patient for:  Skilled Nursing care for assessment, interventions & education, Wound Care, Physical Therapy evaluation and treatment and Occupational therapy evaluation and treatment    Skilled need for:  New Onset Medical Diagnosis MSSA bacteremia    Skilled nursing interventions to include:  Wound Care    Homebound status evidenced by:  Needs the assistance of another person in order to leave the home. Leaving home requires a considerable and taxing effort. There is a normal inability to leave the home.    Community Physician to provide follow up care: Pcp Pt States None     Optional Interventions? No      I certify the face to face encounter for this home health care referral meets the CMS requirements and the encounter/clinical assessment with the patient was, in whole, or in part, for the medical condition(s) listed above, which is the primary reason for home health care. Based on my clinical findings: the service(s) are medically necessary, support the need for home health care, and the homebound criteria are met.  I certify that this patient has had a face to face encounter by myself.  Joaquin Tabares M.D. - NPI: [id number]

## 2022-02-06 LAB
ERYTHROCYTE [DISTWIDTH] IN BLOOD BY AUTOMATED COUNT: 50.9 FL (ref 35.9–50)
HCT VFR BLD AUTO: 29.3 % (ref 42–52)
HGB BLD-MCNC: 9 G/DL (ref 14–18)
MCH RBC QN AUTO: 26.7 PG (ref 27–33)
MCHC RBC AUTO-ENTMCNC: 30.7 G/DL (ref 33.7–35.3)
MCV RBC AUTO: 86.9 FL (ref 81.4–97.8)
PLATELET # BLD AUTO: 335 K/UL (ref 164–446)
PMV BLD AUTO: 8.5 FL (ref 9–12.9)
RBC # BLD AUTO: 3.37 M/UL (ref 4.7–6.1)
WBC # BLD AUTO: 4.1 K/UL (ref 4.8–10.8)

## 2022-02-06 PROCEDURE — 36415 COLL VENOUS BLD VENIPUNCTURE: CPT

## 2022-02-06 PROCEDURE — A9270 NON-COVERED ITEM OR SERVICE: HCPCS | Performed by: HOSPITALIST

## 2022-02-06 PROCEDURE — 700102 HCHG RX REV CODE 250 W/ 637 OVERRIDE(OP): Performed by: HOSPITALIST

## 2022-02-06 PROCEDURE — 770001 HCHG ROOM/CARE - MED/SURG/GYN PRIV*

## 2022-02-06 PROCEDURE — 700102 HCHG RX REV CODE 250 W/ 637 OVERRIDE(OP): Performed by: STUDENT IN AN ORGANIZED HEALTH CARE EDUCATION/TRAINING PROGRAM

## 2022-02-06 PROCEDURE — A9270 NON-COVERED ITEM OR SERVICE: HCPCS | Performed by: NURSE PRACTITIONER

## 2022-02-06 PROCEDURE — 700101 HCHG RX REV CODE 250: Performed by: INTERNAL MEDICINE

## 2022-02-06 PROCEDURE — 700102 HCHG RX REV CODE 250 W/ 637 OVERRIDE(OP): Performed by: NURSE PRACTITIONER

## 2022-02-06 PROCEDURE — A9270 NON-COVERED ITEM OR SERVICE: HCPCS | Performed by: STUDENT IN AN ORGANIZED HEALTH CARE EDUCATION/TRAINING PROGRAM

## 2022-02-06 PROCEDURE — 99232 SBSQ HOSP IP/OBS MODERATE 35: CPT | Performed by: STUDENT IN AN ORGANIZED HEALTH CARE EDUCATION/TRAINING PROGRAM

## 2022-02-06 PROCEDURE — 85027 COMPLETE CBC AUTOMATED: CPT

## 2022-02-06 PROCEDURE — 99233 SBSQ HOSP IP/OBS HIGH 50: CPT | Performed by: INTERNAL MEDICINE

## 2022-02-06 PROCEDURE — 700105 HCHG RX REV CODE 258: Performed by: INTERNAL MEDICINE

## 2022-02-06 RX ADMIN — DOCUSATE CALCIUM 240 MG: 240 CAPSULE, LIQUID FILLED ORAL at 06:03

## 2022-02-06 RX ADMIN — NAFCILLIN SODIUM 2 G: 2 INJECTION, POWDER, FOR SOLUTION INTRAMUSCULAR; INTRAVENOUS at 21:12

## 2022-02-06 RX ADMIN — NAFCILLIN SODIUM 2 G: 2 INJECTION, POWDER, FOR SOLUTION INTRAMUSCULAR; INTRAVENOUS at 13:12

## 2022-02-06 RX ADMIN — NAFCILLIN SODIUM 2 G: 2 INJECTION, POWDER, FOR SOLUTION INTRAMUSCULAR; INTRAVENOUS at 08:42

## 2022-02-06 RX ADMIN — POLYETHYLENE GLYCOL 3350 1 PACKET: 17 POWDER, FOR SOLUTION ORAL at 06:04

## 2022-02-06 RX ADMIN — OXYCODONE 5 MG: 5 TABLET ORAL at 15:02

## 2022-02-06 RX ADMIN — APIXABAN 10 MG: 5 TABLET, FILM COATED ORAL at 06:03

## 2022-02-06 RX ADMIN — ACETAMINOPHEN 1000 MG: 500 TABLET ORAL at 11:22

## 2022-02-06 RX ADMIN — APIXABAN 5 MG: 5 TABLET, FILM COATED ORAL at 17:10

## 2022-02-06 RX ADMIN — Medication 5 MG: at 21:11

## 2022-02-06 RX ADMIN — SENNOSIDES 17.2 MG: 8.6 TABLET, FILM COATED ORAL at 21:12

## 2022-02-06 RX ADMIN — ACETAMINOPHEN 650 MG: 325 TABLET, FILM COATED ORAL at 06:13

## 2022-02-06 RX ADMIN — NAFCILLIN SODIUM 2 G: 2 INJECTION, POWDER, FOR SOLUTION INTRAMUSCULAR; INTRAVENOUS at 17:10

## 2022-02-06 RX ADMIN — ACETAMINOPHEN 650 MG: 325 TABLET, FILM COATED ORAL at 21:11

## 2022-02-06 RX ADMIN — NAFCILLIN SODIUM 2 G: 2 INJECTION, POWDER, FOR SOLUTION INTRAMUSCULAR; INTRAVENOUS at 06:04

## 2022-02-06 RX ADMIN — NAFCILLIN SODIUM 2 G: 2 INJECTION, POWDER, FOR SOLUTION INTRAMUSCULAR; INTRAVENOUS at 01:48

## 2022-02-06 ASSESSMENT — ENCOUNTER SYMPTOMS
FEVER: 0
SHORTNESS OF BREATH: 0
BACK PAIN: 1

## 2022-02-06 ASSESSMENT — PAIN DESCRIPTION - PAIN TYPE: TYPE: ACUTE PAIN

## 2022-02-06 NOTE — CARE PLAN
The patient is Watcher - Medium risk of patient condition declining or worsening    Shift Goals  Clinical Goals: increase mobilization  Patient Goals: decrease pain and go home   Family Goals: increase compliance     Progress made toward(s) clinical / shift goals:  please see below    Patient is not progressing towards the following goals:      Very anxious. Tylenol for pain. Incontinent x1.of bm this am. Patient very adamant that he wants to go HOME not a snf at discharge...I let case management know. [facility].

## 2022-02-06 NOTE — PROGRESS NOTES
"   Orthopaedic Progress Note    Interval changes:  Patient doing well. Covid clearance from [facility] Control  Cultures from R shoulder NGTD  No further ortho intervention planned   Continue wound care till closed    ROS - Patient denies any new issues.  Pain well controlled.    /65   Pulse (!) 56   Temp 36.5 °C (97.7 °F) (Temporal)   Resp 18   Ht 1.803 m (5' 11\")   Wt 77.1 kg (170 lb)   SpO2 96%     Patient seen and examined  No acute distress  Breathing non labored  RRR  Right shoulder vac dressing CDI without leak, CDI, fingers SILT/F/E, cap refill <2 sec.      Active Hospital Problems    Diagnosis    • Vertebral osteomyelitis ([facility]) [M46.20]    • DVT (deep venous thrombosis) ([facility]) [I82.409]    • Pyogenic arthritis of right shoulder region ([facility]) [M00.9]    • Hyponatremia [E87.1]    • MSSA bacteremia [R78.81, B95.61]    • COVID-19 [U07.1]    • Spinal stenosis at L4-L5 level [M48.061]    • Hypertension [I10]      Assessment/Plan:  Patient doing well  Cultures form right shoulder with no growth  No further ortho intervention planned   POD#12 S/P   1.  Irrigation and debridement, right glenohumeral joint.  2.  Multiple cultures.  3.  Application of wound VAC.  Wt bearing status - WBAT  Wound care/Drains - Vac dressings to closure  Future Procedures - none planned   Sutures/Staples out- 14 days post operatively  PT/OT-initiated  Antibiotics: ancef 2g IVQ8  DVT Prophylaxis- TEDS/SCDs/Foot pumps  Gunter-none  Case Coordination for Discharge Planning - Disposition pending abx needs . OK for SNF. Follow-Up: needs appointment with Dr. Aldridge at  [facility] in one week for re-evaluation, .      "

## 2022-02-06 NOTE — PROGRESS NOTES
Hospital Medicine Daily Progress Note    Date of Service  2/6/2022    Chief Complaint  Yunier Denney is a 77 y.o. male admitted 1/22/2022 with back, leg pain.    Hospital Course  A 77-year-old man with h/o epidural abscess (follows with Dr. Herron) presented with right leg pain, knee pain, viral-like symptoms including nausea, chills, malaise and fatigue. Patient reports he was diagnosed with COVID-19 on 1/20.   Patient has elevated ESR at 47 and CRP of 198.9.  Blood cultures x2 returned positive for staph aureus. Patient hospitalized for IV Unasyn.      Interval Problem Update  No acute events overnight.  Blood culture 2/3 no growth to date.  PICC line placement ordered.  Continue nafcillin, will need 6 week antibiotic course, end date 3/13.  Patient would prefer to discharge to home with [facility].  Home health ordered, will need wound vac on discharge.    I have personally seen and examined the patient at bedside. I discussed the plan of care with patient and bedside RN.    Consultants/Specialty  infectious disease, orthopedics and IR    Code Status  Full Code    Disposition  Patient is not medically cleared for discharge.   Anticipate discharge to SNF vs home.  I have placed the appropriate orders for post-discharge needs.    Review of Systems  Constitutional: Positive for chills, fever and malaise/fatigue.   HENT: Negative.    Eyes: Negative.    Respiratory: Negative for cough and shortness of breath.    Cardiovascular: Negative for chest pain and leg swelling.   Gastrointestinal: Negative for nausea and vomiting.   Genitourinary: Negative for dysuria.   Musculoskeletal: Positive for right shoulder, left hip pain.   Skin: Negative for rash.   Neurological: Positive for weakness.     Physical Exam  Temp:  [36.2 °C (97.1 °F)-37 °C (98.6 °F)] 36.5 °C (97.7 °F)  Pulse:  [56-66] 56  Resp:  [16-18] 18  BP: (110-140)/(63-69) 110/65  SpO2:  [93 %-97 %] 96 %    Physical Exam  Vitals and nursing note reviewed.    Constitutional:       Appearance: He is not ill appearing.  HENT:      Head: Normocephalic and atraumatic.      Nose: Nose normal.      Mouth/Throat:      Mouth: Mucous membranes are moist.      Pharynx: Oropharynx is clear.   Eyes:      Pupils: Pupils are equal, round, and reactive to light.   Cardiovascular:      Rate and Rhythm: Normal rate and regular rhythm.   Pulmonary:      Effort: Pulmonary effort is normal. No respiratory distress.      Breath sounds: No wheezing or rales.   Abdominal:      General: Abdomen is flat. Bowel sounds are normal. There is no distension.      Tenderness: There is no abdominal tenderness. There is no guarding.      Hernia: A hernia (left inguinal, reduciable, non-tender) is present.   Musculoskeletal:         General: right shoulder with wound vac     Cervical back: Normal range of motion.      Right lower leg: No edema.      Left lower leg: No edema.   Skin:     General: Skin is warm.   Neurological:      General: No focal deficit present.      Mental Status: He is alert and oriented to person, place, and time.     Fluids    Intake/Output Summary (Last 24 hours) at 2/6/2022 1031  Last data filed at 2/6/2022 0604  Gross per 24 hour   Intake 320 ml   Output 1000 ml   Net -680 ml       Laboratory                        Imaging  EC-ECHOCARDIOGRAM LTD W/O CONT   Final Result      MR-LUMBAR SPINE-WITH & W/O   Final Result      Interval development of abnormal marrow edema and enhancement within the right lateral L4 and L5 vertebral bodies. There are asymmetric severe degenerative changes on the right side however given the short-term interval development of this abnormal    enhancement and would raise concern for developing osteomyelitis. Clinical correlation and short-term follow-up is recommended. There is also some increased edema in the adjacent right psoas muscle. There is no evidence of epidural or paravertebral    abscess.      US-EXTREMITY VENOUS LOWER UNILAT LEFT   Final  Result      CT-CHEST,ABDOMEN,PELVIS WITH   Final Result      1.  No evidence of acute inflammatory process in the chest, abdomen or pelvis.   2.  Left hip effusion has nearly resolved.   3.  Cardiomegaly.   4.  Cholelithiasis versus gallbladder sludge. No cholecystitis.   5.  Moderate to large hiatal hernia, containing 70-80% of organoaxially rotated stomach.   6.  Left inguinal hernia smaller, and again contains nonobstructed sigmoid colon.   7.  Nonocclusive filling defect in the left common femoral vein may represent mixing artifact or a nonocclusive DVT. Recommend further evaluation with venous duplex ultrasound.      DX-ABORTED DX PROCEDURE   Final Result      Aborted fluoroscopic guided left hip aspiration due to overlying bowel contained within the inguinal hernia. Findings were discussed with Dr. Cespedes. If necessary, joint aspiration could be performed under CT guidance.      CT-HIP WITH PLUS RECONS LEFT   Final Result         1. Small left hip effusion, which may be sterile or infected.   2. No intramuscular subcutaneous fluid collections. No evidence of osteomyelitis.   3. Large, 13 cm left inguinal hernia containing nonobstructed sigmoid colon.      CT-SHOULDER WITH PLUS RECONS RIGHT   Final Result         1. Loculated small to moderate glenohumeral joint effusion. It may be infected and represent septic arthritis.   2. Several small intramuscular abscesses in the subscapularis and anterior head of the deltoid.   3. No evidence of osteomyelitis.   4. No fracture or dislocation.   5 Partially visualized small right pleural effusion and associated atelectasis.      EC-ECHOCARDIOGRAM LTD W/O CONT   Final Result      DX-CHEST-PORTABLE (1 VIEW)   Final Result      Borderline cardiomegaly.      IR-PICC LINE PLACEMENT W/ GUIDANCE > AGE 5    (Results Pending)        Assessment/Plan  * Pyogenic arthritis of right shoulder region (HCC)  Assessment & Plan  CT showed loculated small to moderate right glenohumeral  joint effusion; it may be infected and represent septic arthritis; several small intramuscular abscesses in the subscapularis and anterior head of the deltoid; partially visualized small right pleural effusion and associated atelectasis.  CT showed small left hip effusion, which may be sterile or infected; repeat CT shows smaller effusion size  S/p drainage of shoulder abscess, wound vac in place    Vertebral osteomyelitis ([facility])  Assessment & Plan  MRI lumbar spine shows bone marrow edema by L4 L5 vertebral bodies  Given recurrent positive blood cultures growing MSSA, will presume osteomyelitis  No signs of cord compression or abscesses  Continue 6 week course of antibiotics  On nafcillin q4h, appreciate ID recommendations.  PICC line when blood cultures negative x72 hours.    DVT (deep venous thrombosis) ([facility])  Assessment & Plan  Mixing artifact vs clot on CTAP, f/u US shows DVT  Started on lovenox BID, transitioned to eliquis 2/1    Hyponatremia  Assessment & Plan  Mild   Monitor     Spinal stenosis at L4-L5 level  Assessment & Plan  MRI showing severe L4 stenosis  Pain control    COVID-19  Assessment & Plan  Patient is currently not requiring oxygen hold steroids  Continue with enhanced precautions  Placed on oxygen with SPO2 below 90%    MSSA bacteremia- (present on admission)  Assessment & Plan  Patient has blood cultures x 2 positive for staph aureus that reflected at [facility] when he visited ER from 1/20/2022.  Echo showed normal left ventricular systolic function; mild aortic insufficiency.   CT showed loculated small to moderate right glenohumeral joint effusion; it may be infected and represent septic arthritis; several small intramuscular abscesses in the subscapularis and anterior head of the deltoid; partially visualized small right pleural effusion and associated atelectasis.  CT showed small left hip effusion, which may be sterile or infected.  S/p I+D shoulder joint, wound vac applied. Operative  cultures negative.  Blood cultures from 1/20 and 1/22 positive for MSSA.  Blood cultures form 1/28 positive, 1/31 positive.  JAZIEL ordered given recurrent fever/positive blood culture, however patient refuses JAZIEL.  Will re-image MRI lumbar spine to evaluate for occult infection  Ancef changed to nafcillin 2/2 per ID recommendations, length of antibiotics to be determined    Hypertension- (present on admission)  Assessment & Plan  Blood pressures currently very well.  We will hold BP meds at this time.       VTE prophylaxis: eliquis    I have performed a physical exam and reviewed and updated ROS and Plan today (2/6/2022). In review of yesterday's note (2/5/2022), there are no changes except as documented above.

## 2022-02-06 NOTE — CARE PLAN
The patient is Stable - Low risk of patient condition declining or worsening    Shift Goals  Clinical Goals: increase mobility  Patient Goals: rle comfort  Family Goals: n/a    Progress made toward(s) clinical / shift goals:    Problem: Pain - Standard  Goal: Alleviation of pain or a reduction in pain to the patient’s comfort goal  Outcome: Progressing     Problem: Knowledge Deficit - Standard  Goal: Patient and family/care givers will demonstrate understanding of plan of care, disease process/condition, diagnostic tests and medications  Outcome: Progressing     Problem: Fall Risk  Goal: Patient will remain free from falls  Outcome: Progressing     Problem: Skin Integrity  Goal: Skin integrity is maintained or improved  Outcome: Progressing       Patient is not progressing towards the following goals:
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
## 2022-02-06 NOTE — PROGRESS NOTES
"   Orthopaedic Progress Note    Interval changes:  Patient doing well. Covid clearance from Infx Control  Cultures from R shoulder NGTD  No further ortho intervention planned   Continue wound care till closed    ROS - Patient denies any new issues.  Pain well controlled.    /65   Pulse 65   Temp 36.2 °C (97.1 °F) (Temporal)   Resp 17   Ht 1.803 m (5' 11\")   Wt 77.1 kg (170 lb)   SpO2 93%     Patient seen and examined  No acute distress  Breathing non labored  RRR  Right shoulder vac dressing CDI without leak, CDI, fingers SILT/F/E, cap refill <2 sec.      Active Hospital Problems    Diagnosis    • Vertebral osteomyelitis ([facility]) [M46.20]    • DVT (deep venous thrombosis) ([facility]) [I82.409]    • Pyogenic arthritis of right shoulder region ([facility]) [M00.9]    • Hyponatremia [E87.1]    • MSSA bacteremia [R78.81, B95.61]    • COVID-19 [U07.1]    • Spinal stenosis at L4-L5 level [M48.061]    • Hypertension [I10]      Assessment/Plan:  Patient doing well  Cultures form right shoulder with no growth  No further ortho intervention planned   POD#11 S/P   1.  Irrigation and debridement, right glenohumeral joint.  2.  Multiple cultures.  3.  Application of wound VAC.  Wt bearing status - WBAT  Wound care/Drains - Vac dressings to closure  Future Procedures - none planned   Sutures/Staples out- 14 days post operatively  PT/OT-initiated  Antibiotics: ancef 2g IVQ8  DVT Prophylaxis- TEDS/SCDs/Foot pumps  Gunter-none  Case Coordination for Discharge Planning - Disposition pending abx needs . OK for SNF. Follow-Up: needs appointment with Dr. Aldridge at  [facility] in one week for re-evaluation, .      "

## 2022-02-07 ENCOUNTER — APPOINTMENT (OUTPATIENT)
Dept: RADIOLOGY | Facility: MEDICAL CENTER | Age: 77
DRG: 548 | End: 2022-02-07
Attending: STUDENT IN AN ORGANIZED HEALTH CARE EDUCATION/TRAINING PROGRAM
Payer: MEDICARE

## 2022-02-07 LAB
ANION GAP SERPL CALC-SCNC: 11 MMOL/L (ref 7–16)
BUN SERPL-MCNC: 10 MG/DL (ref 8–22)
CALCIUM SERPL-MCNC: 8.2 MG/DL (ref 8.5–10.5)
CHLORIDE SERPL-SCNC: 102 MMOL/L (ref 96–112)
CO2 SERPL-SCNC: 23 MMOL/L (ref 20–33)
CREAT SERPL-MCNC: 0.72 MG/DL (ref 0.5–1.4)
GLUCOSE SERPL-MCNC: 114 MG/DL (ref 65–99)
POTASSIUM SERPL-SCNC: 3.1 MMOL/L (ref 3.6–5.5)
SODIUM SERPL-SCNC: 136 MMOL/L (ref 135–145)

## 2022-02-07 PROCEDURE — 302098 PASTE RING (FLAT): Performed by: STUDENT IN AN ORGANIZED HEALTH CARE EDUCATION/TRAINING PROGRAM

## 2022-02-07 PROCEDURE — A9270 NON-COVERED ITEM OR SERVICE: HCPCS | Performed by: NURSE PRACTITIONER

## 2022-02-07 PROCEDURE — 700102 HCHG RX REV CODE 250 W/ 637 OVERRIDE(OP): Performed by: HOSPITALIST

## 2022-02-07 PROCEDURE — 36415 COLL VENOUS BLD VENIPUNCTURE: CPT

## 2022-02-07 PROCEDURE — 97607 NEG PRS WND THR NDME<=50SQCM: CPT

## 2022-02-07 PROCEDURE — 770001 HCHG ROOM/CARE - MED/SURG/GYN PRIV*

## 2022-02-07 PROCEDURE — A9270 NON-COVERED ITEM OR SERVICE: HCPCS | Performed by: STUDENT IN AN ORGANIZED HEALTH CARE EDUCATION/TRAINING PROGRAM

## 2022-02-07 PROCEDURE — A9270 NON-COVERED ITEM OR SERVICE: HCPCS | Performed by: HOSPITALIST

## 2022-02-07 PROCEDURE — 700105 HCHG RX REV CODE 258: Performed by: INTERNAL MEDICINE

## 2022-02-07 PROCEDURE — 700101 HCHG RX REV CODE 250: Performed by: INTERNAL MEDICINE

## 2022-02-07 PROCEDURE — 80048 BASIC METABOLIC PNL TOTAL CA: CPT

## 2022-02-07 PROCEDURE — 99232 SBSQ HOSP IP/OBS MODERATE 35: CPT | Performed by: STUDENT IN AN ORGANIZED HEALTH CARE EDUCATION/TRAINING PROGRAM

## 2022-02-07 PROCEDURE — 700102 HCHG RX REV CODE 250 W/ 637 OVERRIDE(OP): Performed by: STUDENT IN AN ORGANIZED HEALTH CARE EDUCATION/TRAINING PROGRAM

## 2022-02-07 PROCEDURE — 700102 HCHG RX REV CODE 250 W/ 637 OVERRIDE(OP): Performed by: NURSE PRACTITIONER

## 2022-02-07 RX ORDER — POTASSIUM CHLORIDE 20 MEQ/1
40 TABLET, EXTENDED RELEASE ORAL ONCE
Status: COMPLETED | OUTPATIENT
Start: 2022-02-07 | End: 2022-02-07

## 2022-02-07 RX ADMIN — NAFCILLIN SODIUM 2 G: 2 INJECTION, POWDER, FOR SOLUTION INTRAMUSCULAR; INTRAVENOUS at 05:39

## 2022-02-07 RX ADMIN — APIXABAN 5 MG: 5 TABLET, FILM COATED ORAL at 05:40

## 2022-02-07 RX ADMIN — APIXABAN 5 MG: 5 TABLET, FILM COATED ORAL at 17:38

## 2022-02-07 RX ADMIN — POLYETHYLENE GLYCOL 3350 1 PACKET: 17 POWDER, FOR SOLUTION ORAL at 05:39

## 2022-02-07 RX ADMIN — POTASSIUM CHLORIDE 40 MEQ: 1500 TABLET, EXTENDED RELEASE ORAL at 08:49

## 2022-02-07 RX ADMIN — NAFCILLIN SODIUM 2 G: 2 INJECTION, POWDER, FOR SOLUTION INTRAMUSCULAR; INTRAVENOUS at 01:02

## 2022-02-07 RX ADMIN — NAFCILLIN SODIUM 2 G: 2 INJECTION, POWDER, FOR SOLUTION INTRAMUSCULAR; INTRAVENOUS at 21:35

## 2022-02-07 RX ADMIN — OXYCODONE 5 MG: 5 TABLET ORAL at 01:01

## 2022-02-07 RX ADMIN — OXYCODONE 5 MG: 5 TABLET ORAL at 23:39

## 2022-02-07 RX ADMIN — OXYCODONE 5 MG: 5 TABLET ORAL at 17:38

## 2022-02-07 RX ADMIN — NAFCILLIN SODIUM 2 G: 2 INJECTION, POWDER, FOR SOLUTION INTRAMUSCULAR; INTRAVENOUS at 08:49

## 2022-02-07 RX ADMIN — Medication 5 MG: at 21:40

## 2022-02-07 RX ADMIN — ACETAMINOPHEN 650 MG: 325 TABLET, FILM COATED ORAL at 14:25

## 2022-02-07 RX ADMIN — NAFCILLIN SODIUM 2 G: 2 INJECTION, POWDER, FOR SOLUTION INTRAMUSCULAR; INTRAVENOUS at 17:39

## 2022-02-07 RX ADMIN — ACETAMINOPHEN 650 MG: 325 TABLET, FILM COATED ORAL at 08:49

## 2022-02-07 RX ADMIN — NAFCILLIN SODIUM 2 G: 2 INJECTION, POWDER, FOR SOLUTION INTRAMUSCULAR; INTRAVENOUS at 14:25

## 2022-02-07 ASSESSMENT — PAIN DESCRIPTION - PAIN TYPE
TYPE: ACUTE PAIN
TYPE: ACUTE PAIN

## 2022-02-07 NOTE — PROGRESS NOTES
[facility] Medicine Daily Progress Note    Date of Service  2/7/2022    Chief Complaint  Yunier Denney is a 77 y.o. male admitted 1/22/2022 with back, leg pain.    Hospital Course  A 77-year-old man with h/o epidural abscess (follows with Dr. Herron) presented with right leg pain, knee pain, viral-like symptoms including nausea, chills, malaise and fatigue. Patient reports he was diagnosed with COVID-19 on 1/20.   Patient has elevated ESR at 47 and CRP of 198.9.  Blood cultures x2 returned positive for staph aureus. Patient hospitalized for IV Unasyn.      Interval Problem Update  No acute events overnight.  Blood culture 2/3 no growth to date.  PICC line placement ordered.  Continue nafcillin, will need 6 week antibiotic course, end date 3/13.  Patient would prefer to discharge to home with [facility].  Ramp up activity level while inpatient in preparation for discharge home.  Home health ordered, will need wound vac on discharge.  CM working on home health, [facility] arrangement, home wound vac.    I have personally seen and examined the patient at bedside. I discussed the plan of care with patient and bedside RN.    Consultants/Specialty  infectious disease, orthopedics and IR    Code Status  Full Code    Disposition  Patient is medically cleared for discharge.  Anticipate discharge to home.  I have placed the appropriate orders for post-discharge needs.    Review of Systems  Constitutional: Positive for chills, fever and malaise/fatigue.   HENT: Negative.    Eyes: Negative.    Respiratory: Negative for cough and shortness of breath.    Cardiovascular: Negative for chest pain and leg swelling.   Gastrointestinal: Negative for nausea and vomiting.   Genitourinary: Negative for dysuria.   Musculoskeletal: Positive for right shoulder, left hip pain.   Skin: Negative for rash.   Neurological: Positive for weakness.     Physical Exam  Temp:  [36.8 °C (98.2 °F)-37.7 °C (99.9 °F)] 36.8 °C (98.3 °F)  Pulse:  [59-69] 61  Resp:   [18-20] 18  BP: (121-130)/(63-73) 130/73  SpO2:  [92 %-96 %] 94 %    Physical Exam  Vitals and nursing note reviewed.   Constitutional:       Appearance: He is not ill appearing.  HENT:      Head: Normocephalic and atraumatic.      Nose: Nose normal.      Mouth/Throat:      Mouth: Mucous membranes are moist.      Pharynx: Oropharynx is clear.   Eyes:      Pupils: Pupils are equal, round, and reactive to light.   Cardiovascular:      Rate and Rhythm: Normal rate and regular rhythm.   Pulmonary:      Effort: Pulmonary effort is normal. No respiratory distress.      Breath sounds: No wheezing or rales.   Abdominal:      General: Abdomen is flat. Bowel sounds are normal. There is no distension.      Tenderness: There is no abdominal tenderness. There is no guarding.      Hernia: A hernia (left inguinal, reduciable, non-tender) is present.   Musculoskeletal:         General: right shoulder with wound vac     Cervical back: Normal range of motion.      Right lower leg: No edema.      Left lower leg: No edema.   Skin:     General: Skin is warm.   Neurological:      General: No focal deficit present.      Mental Status: He is alert and oriented to person, place, and time.     Fluids    Intake/Output Summary (Last 24 hours) at 2/7/2022 1138  Last data filed at 2/7/2022 0609  Gross per 24 hour   Intake 200 ml   Output 800 ml   Net -600 ml       Laboratory  Recent Labs     02/06/22  1337   WBC 4.1*   RBC 3.37*   HEMOGLOBIN 9.0*   HEMATOCRIT 29.3*   MCV 86.9   MCH 26.7*   MCHC 30.7*   RDW 50.9*   PLATELETCT 335   MPV 8.5*     Recent Labs     02/07/22  0038   SODIUM 136   POTASSIUM 3.1*   CHLORIDE 102   CO2 23   GLUCOSE 114*   BUN 10   CREATININE 0.72   CALCIUM 8.2*                   Imaging  EC-ECHOCARDIOGRAM LTD W/O CONT   Final Result      MR-LUMBAR SPINE-WITH & W/O   Final Result      Interval development of abnormal marrow edema and enhancement within the right lateral L4 and L5 vertebral bodies. There are asymmetric severe  degenerative changes on the right side however given the short-term interval development of this abnormal    enhancement and would raise concern for developing osteomyelitis. Clinical correlation and short-term follow-up is recommended. There is also some increased edema in the adjacent right psoas muscle. There is no evidence of epidural or paravertebral    abscess.      US-EXTREMITY VENOUS LOWER UNILAT LEFT   Final Result      CT-CHEST,ABDOMEN,PELVIS WITH   Final Result      1.  No evidence of acute inflammatory process in the chest, abdomen or pelvis.   2.  Left hip effusion has nearly resolved.   3.  Cardiomegaly.   4.  Cholelithiasis versus gallbladder sludge. No cholecystitis.   5.  Moderate to large hiatal hernia, containing 70-80% of organoaxially rotated stomach.   6.  Left inguinal hernia smaller, and again contains nonobstructed sigmoid colon.   7.  Nonocclusive filling defect in the left common femoral vein may represent mixing artifact or a nonocclusive DVT. Recommend further evaluation with venous duplex ultrasound.      DX-ABORTED DX PROCEDURE   Final Result      Aborted fluoroscopic guided left hip aspiration due to overlying bowel contained within the inguinal hernia. Findings were discussed with Dr. Cespedes. If necessary, joint aspiration could be performed under CT guidance.      CT-HIP WITH PLUS RECONS LEFT   Final Result         1. Small left hip effusion, which may be sterile or infected.   2. No intramuscular subcutaneous fluid collections. No evidence of osteomyelitis.   3. Large, 13 cm left inguinal hernia containing nonobstructed sigmoid colon.      CT-SHOULDER WITH PLUS RECONS RIGHT   Final Result         1. Loculated small to moderate glenohumeral joint effusion. It may be infected and represent septic arthritis.   2. Several small intramuscular abscesses in the subscapularis and anterior head of the deltoid.   3. No evidence of osteomyelitis.   4. No fracture or dislocation.   5 Partially  visualized small right pleural effusion and associated atelectasis.      EC-ECHOCARDIOGRAM LTD W/O CONT   Final Result      DX-CHEST-PORTABLE (1 VIEW)   Final Result      Borderline cardiomegaly.      IR-PICC LINE PLACEMENT W/ GUIDANCE > AGE 5    (Results Pending)        Assessment/Plan  * Pyogenic arthritis of right shoulder region ([facility])  Assessment & Plan  CT showed loculated small to moderate right glenohumeral joint effusion; it may be infected and represent septic arthritis; several small intramuscular abscesses in the subscapularis and anterior head of the deltoid; partially visualized small right pleural effusion and associated atelectasis.  CT showed small left hip effusion, which may be sterile or infected; repeat CT shows smaller effusion size  S/p drainage of shoulder abscess, wound vac in place    Vertebral osteomyelitis ([facility])  Assessment & Plan  MRI lumbar spine shows bone marrow edema by L4 L5 vertebral bodies  Given recurrent positive blood cultures growing MSSA, will presume osteomyelitis  No signs of cord compression or abscesses  Continue 6 week course of antibiotics, end date 3/13  On nafcillin q4h, appreciate ID recommendations.  PICC line today 2/7    DVT (deep venous thrombosis) ([facility])  Assessment & Plan  Mixing artifact vs clot on CTAP, f/u US shows DVT  Started on lovenox BID, transitioned to eliquis 2/1    Hyponatremia  Assessment & Plan  Mild   Monitor     Spinal stenosis at L4-L5 level  Assessment & Plan  MRI showing severe L4 stenosis  Pain control    COVID-19  Assessment & Plan  Patient is currently not requiring oxygen hold steroids  Continue with enhanced precautions  Placed on oxygen with SPO2 below 90%    MSSA bacteremia- (present on admission)  Assessment & Plan  Patient has blood cultures x 2 positive for staph aureus that reflected at [facility] when he visited ER from 1/20/2022.  CT showed loculated small to moderate right glenohumeral joint effusion; several small  intramuscular abscesses in the subscapularis and anterior head of the deltoid  S/p I+D shoulder joint, wound vac applied. Operative cultures negative.  Blood cultures from 1/20 and 1/22 positive for MSSA.  Blood cultures form 1/28 positive  Blood cultures negative 1/31, 1 of 2 bottles with coag neg staph contaminant.  JAZIEL ordered given recurrent fever/positive blood culture, however patient refuses JAZIEL.  MRI lumbar spine shows vertebral body osteomyelitis.  Ancef changed to nafcillin 2/2 per ID recommendations, will need 6 weeks antibiotics, end date 3/13  Can be on daptomycin if using [facility], or ancef q8h if going to SNF  PICC line placed 2/7    Hypertension- (present on admission)  Assessment & Plan  Blood pressures currently very well.  We will hold BP meds at this time.       VTE prophylaxis: eliquis    I have performed a physical exam and reviewed and updated ROS and Plan today (2/7/2022). In review of yesterday's note (2/6/2022), there are no changes except as documented above.

## 2022-02-07 NOTE — PROGRESS NOTES
"   Orthopaedic Progress Note    Interval changes:  Patient doing well. Covid clearance from Infx Control  Cultures from R shoulder NGTD  No further ortho intervention planned   Continue wound care till closed    ROS - Patient denies any new issues.  Pain well controlled.    /73   Pulse 61   Temp 36.8 °C (98.3 °F) (Temporal)   Resp 18   Ht 1.803 m (5' 11\")   Wt 77.1 kg (170 lb)   SpO2 94%     Patient seen and examined  No acute distress  Breathing non labored  RRR  Right shoulder vac dressing CDI without leak, CDI, fingers SILT/F/E, cap refill <2 sec.      Active Hospital Problems    Diagnosis    • Vertebral osteomyelitis ([facility]) [M46.20]    • DVT (deep venous thrombosis) ([facility]) [I82.409]    • Pyogenic arthritis of right shoulder region ([facility]) [M00.9]    • Hyponatremia [E87.1]    • MSSA bacteremia [R78.81, B95.61]    • COVID-19 [U07.1]    • Spinal stenosis at L4-L5 level [M48.061]    • Hypertension [I10]      Assessment/Plan:  Patient doing well  Cultures form right shoulder with no growth  No further ortho intervention planned   POD#13 S/P   1.  Irrigation and debridement, right glenohumeral joint.  2.  Multiple cultures.  3.  Application of wound VAC.  Wt bearing status - WBAT  Wound care/Drains - Vac dressings to closure  Future Procedures - none planned   Sutures/Staples out- 14 days post operatively  PT/OT-initiated  Antibiotics: ancef 2g IVQ8  DVT Prophylaxis- TEDS/SCDs/Foot pumps  Gunter-none  Case Coordination for Discharge Planning - Disposition pending abx needs . OK for SNF. Follow-Up: needs appointment with Dr. Aldridge at  [facility] in one week for re-evaluation, .      "

## 2022-02-07 NOTE — CARE PLAN
The patient is Stable - Low risk of patient condition declining or worsening    Shift Goals  Clinical Goals: increase independence in self care  Patient Goals: increase mobility  Family Goals: n/a    Progress made toward(s) clinical / shift goals:  please see below     Patient is not progressing towards the following goals:    A+Ox4. Tylenol for pain. PICC today. Wound vac in place - no acute issues. Mepilex to sacrum.

## 2022-02-07 NOTE — DISCHARGE PLANNING
Anticipated Discharge Disposition: Home with Home Health, outpatient infusion and wound vac    Action: Discussed in IDT rounds that the patient will need HH, wound vac and outpatient infusion set up.  Spoke with the patient at the bedside. The patient made choices for HH. The patient would like to go to infusion center in [facility].    Patient's address is: [address] [place] [address]  [place] [address]    Patient goes to [facility] in [place] for primary care.    Barriers to Discharge: Wound vac pending physician's signature, HH, infusion order    Plan: Will need to order HH and wound vac. Will also will need an infusion order

## 2022-02-07 NOTE — CARE PLAN
The patient is Stable - Low risk of patient condition declining or worsening    Shift Goals  Clinical Goals: increase mobilization  Patient Goals: decrease pain and go home   Family Goals: increase compliance     Progress made toward(s) clinical / shift goals:    Problem: Pain - Standard  Goal: Alleviation of pain or a reduction in pain to the patient’s comfort goal  Outcome: Progressing     Problem: Knowledge Deficit - Standard  Goal: Patient and family/care givers will demonstrate understanding of plan of care, disease process/condition, diagnostic tests and medications  Outcome: Progressing     Problem: Fall Risk  Goal: Patient will remain free from falls  Outcome: Progressing     Problem: Skin Integrity  Goal: Skin integrity is maintained or improved  Outcome: Progressing       Patient is not progressing towards the following goals:

## 2022-02-07 NOTE — DISCHARGE PLANNING
@1544  Agency/Facility Name: [facility]  Spoke To: Darlene  Outcome: What is discharge plan and infusion plan?    @1340  Agency/Facility Name: [facility]  Spoke To: Darlene  Outcome: Checking for referral.    Received Choice form at 1220  Agency/Facility Name: [facility]  Referral sent per Choice form @ 1224

## 2022-02-08 ENCOUNTER — APPOINTMENT (OUTPATIENT)
Dept: RADIOLOGY | Facility: MEDICAL CENTER | Age: 77
DRG: 548 | End: 2022-02-08
Attending: STUDENT IN AN ORGANIZED HEALTH CARE EDUCATION/TRAINING PROGRAM
Payer: MEDICARE

## 2022-02-08 LAB
ANION GAP SERPL CALC-SCNC: 9 MMOL/L (ref 7–16)
BACTERIA BLD CULT: NORMAL
BACTERIA BLD CULT: NORMAL
BASOPHILS # BLD AUTO: 0.5 % (ref 0–1.8)
BASOPHILS # BLD: 0.02 K/UL (ref 0–0.12)
BUN SERPL-MCNC: 7 MG/DL (ref 8–22)
CALCIUM SERPL-MCNC: 8.1 MG/DL (ref 8.5–10.5)
CHLORIDE SERPL-SCNC: 103 MMOL/L (ref 96–112)
CK SERPL-CCNC: 22 U/L (ref 0–154)
CO2 SERPL-SCNC: 24 MMOL/L (ref 20–33)
CREAT SERPL-MCNC: 0.65 MG/DL (ref 0.5–1.4)
CRP SERPL HS-MCNC: 6.06 MG/DL (ref 0–0.75)
EOSINOPHIL # BLD AUTO: 0.05 K/UL (ref 0–0.51)
EOSINOPHIL NFR BLD: 1.4 % (ref 0–6.9)
ERYTHROCYTE [DISTWIDTH] IN BLOOD BY AUTOMATED COUNT: 51.1 FL (ref 35.9–50)
ERYTHROCYTE [SEDIMENTATION RATE] IN BLOOD BY WESTERGREN METHOD: 71 MM/HOUR (ref 0–20)
GLUCOSE SERPL-MCNC: 119 MG/DL (ref 65–99)
HCT VFR BLD AUTO: 27.4 % (ref 42–52)
HGB BLD-MCNC: 8.6 G/DL (ref 14–18)
IMM GRANULOCYTES # BLD AUTO: 0.01 K/UL (ref 0–0.11)
IMM GRANULOCYTES NFR BLD AUTO: 0.3 % (ref 0–0.9)
LYMPHOCYTES # BLD AUTO: 0.96 K/UL (ref 1–4.8)
LYMPHOCYTES NFR BLD: 26.2 % (ref 22–41)
MCH RBC QN AUTO: 26.9 PG (ref 27–33)
MCHC RBC AUTO-ENTMCNC: 31.4 G/DL (ref 33.7–35.3)
MCV RBC AUTO: 85.6 FL (ref 81.4–97.8)
MONOCYTES # BLD AUTO: 0.41 K/UL (ref 0–0.85)
MONOCYTES NFR BLD AUTO: 11.2 % (ref 0–13.4)
NEUTROPHILS # BLD AUTO: 2.21 K/UL (ref 1.82–7.42)
NEUTROPHILS NFR BLD: 60.4 % (ref 44–72)
NRBC # BLD AUTO: 0 K/UL
NRBC BLD-RTO: 0 /100 WBC
PLATELET # BLD AUTO: 344 K/UL (ref 164–446)
PMV BLD AUTO: 8.9 FL (ref 9–12.9)
POTASSIUM SERPL-SCNC: 3.5 MMOL/L (ref 3.6–5.5)
RBC # BLD AUTO: 3.2 M/UL (ref 4.7–6.1)
SIGNIFICANT IND 70042: NORMAL
SIGNIFICANT IND 70042: NORMAL
SITE SITE: NORMAL
SITE SITE: NORMAL
SODIUM SERPL-SCNC: 136 MMOL/L (ref 135–145)
SOURCE SOURCE: NORMAL
SOURCE SOURCE: NORMAL
WBC # BLD AUTO: 3.7 K/UL (ref 4.8–10.8)

## 2022-02-08 PROCEDURE — A9270 NON-COVERED ITEM OR SERVICE: HCPCS | Performed by: STUDENT IN AN ORGANIZED HEALTH CARE EDUCATION/TRAINING PROGRAM

## 2022-02-08 PROCEDURE — A9270 NON-COVERED ITEM OR SERVICE: HCPCS | Performed by: HOSPITALIST

## 2022-02-08 PROCEDURE — 86140 C-REACTIVE PROTEIN: CPT

## 2022-02-08 PROCEDURE — 97530 THERAPEUTIC ACTIVITIES: CPT

## 2022-02-08 PROCEDURE — 700105 HCHG RX REV CODE 258: Performed by: INTERNAL MEDICINE

## 2022-02-08 PROCEDURE — A9270 NON-COVERED ITEM OR SERVICE: HCPCS | Performed by: NURSE PRACTITIONER

## 2022-02-08 PROCEDURE — 02HV33Z INSERTION OF INFUSION DEVICE INTO SUPERIOR VENA CAVA, PERCUTANEOUS APPROACH: ICD-10-PCS | Performed by: STUDENT IN AN ORGANIZED HEALTH CARE EDUCATION/TRAINING PROGRAM

## 2022-02-08 PROCEDURE — 82550 ASSAY OF CK (CPK): CPT

## 2022-02-08 PROCEDURE — 700102 HCHG RX REV CODE 250 W/ 637 OVERRIDE(OP): Performed by: HOSPITALIST

## 2022-02-08 PROCEDURE — 770001 HCHG ROOM/CARE - MED/SURG/GYN PRIV*

## 2022-02-08 PROCEDURE — 700102 HCHG RX REV CODE 250 W/ 637 OVERRIDE(OP): Performed by: STUDENT IN AN ORGANIZED HEALTH CARE EDUCATION/TRAINING PROGRAM

## 2022-02-08 PROCEDURE — 36415 COLL VENOUS BLD VENIPUNCTURE: CPT

## 2022-02-08 PROCEDURE — 80048 BASIC METABOLIC PNL TOTAL CA: CPT

## 2022-02-08 PROCEDURE — 85652 RBC SED RATE AUTOMATED: CPT

## 2022-02-08 PROCEDURE — 97535 SELF CARE MNGMENT TRAINING: CPT

## 2022-02-08 PROCEDURE — C1751 CATH, INF, PER/CENT/MIDLINE: HCPCS

## 2022-02-08 PROCEDURE — 700101 HCHG RX REV CODE 250: Performed by: INTERNAL MEDICINE

## 2022-02-08 PROCEDURE — 700102 HCHG RX REV CODE 250 W/ 637 OVERRIDE(OP): Performed by: NURSE PRACTITIONER

## 2022-02-08 PROCEDURE — B548ZZA ULTRASONOGRAPHY OF SUPERIOR VENA CAVA, GUIDANCE: ICD-10-PCS | Performed by: STUDENT IN AN ORGANIZED HEALTH CARE EDUCATION/TRAINING PROGRAM

## 2022-02-08 PROCEDURE — 85025 COMPLETE CBC W/AUTO DIFF WBC: CPT

## 2022-02-08 PROCEDURE — 99232 SBSQ HOSP IP/OBS MODERATE 35: CPT | Performed by: HOSPITALIST

## 2022-02-08 RX ADMIN — NAFCILLIN SODIUM 2 G: 2 INJECTION, POWDER, FOR SOLUTION INTRAMUSCULAR; INTRAVENOUS at 09:57

## 2022-02-08 RX ADMIN — ACETAMINOPHEN 650 MG: 325 TABLET, FILM COATED ORAL at 05:04

## 2022-02-08 RX ADMIN — NAFCILLIN SODIUM 2 G: 2 INJECTION, POWDER, FOR SOLUTION INTRAMUSCULAR; INTRAVENOUS at 22:08

## 2022-02-08 RX ADMIN — NAFCILLIN SODIUM 2 G: 2 INJECTION, POWDER, FOR SOLUTION INTRAMUSCULAR; INTRAVENOUS at 01:49

## 2022-02-08 RX ADMIN — OXYCODONE 5 MG: 5 TABLET ORAL at 18:12

## 2022-02-08 RX ADMIN — OXYCODONE 5 MG: 5 TABLET ORAL at 14:28

## 2022-02-08 RX ADMIN — OXYCODONE 5 MG: 5 TABLET ORAL at 22:49

## 2022-02-08 RX ADMIN — Medication 5 MG: at 22:08

## 2022-02-08 RX ADMIN — NAFCILLIN SODIUM 2 G: 2 INJECTION, POWDER, FOR SOLUTION INTRAMUSCULAR; INTRAVENOUS at 05:04

## 2022-02-08 RX ADMIN — APIXABAN 5 MG: 5 TABLET, FILM COATED ORAL at 05:04

## 2022-02-08 RX ADMIN — ACETAMINOPHEN 650 MG: 325 TABLET, FILM COATED ORAL at 10:30

## 2022-02-08 RX ADMIN — APIXABAN 5 MG: 5 TABLET, FILM COATED ORAL at 18:13

## 2022-02-08 RX ADMIN — DOCUSATE CALCIUM 240 MG: 240 CAPSULE, LIQUID FILLED ORAL at 05:04

## 2022-02-08 RX ADMIN — NAFCILLIN SODIUM 2 G: 2 INJECTION, POWDER, FOR SOLUTION INTRAMUSCULAR; INTRAVENOUS at 18:12

## 2022-02-08 RX ADMIN — NAFCILLIN SODIUM 2 G: 2 INJECTION, POWDER, FOR SOLUTION INTRAMUSCULAR; INTRAVENOUS at 14:28

## 2022-02-08 ASSESSMENT — COGNITIVE AND FUNCTIONAL STATUS - GENERAL
TOILETING: A LITTLE
DRESSING REGULAR UPPER BODY CLOTHING: A LITTLE
DRESSING REGULAR LOWER BODY CLOTHING: A LITTLE
SUGGESTED CMS G CODE MODIFIER DAILY ACTIVITY: CJ
DAILY ACTIVITIY SCORE: 20
HELP NEEDED FOR BATHING: A LITTLE

## 2022-02-08 ASSESSMENT — PAIN DESCRIPTION - PAIN TYPE
TYPE: ACUTE PAIN
TYPE: ACUTE PAIN;SURGICAL PAIN
TYPE: ACUTE PAIN
TYPE: ACUTE PAIN

## 2022-02-08 NOTE — DISCHARGE PLANNING
Agency/Facility Name: [facility]  Outcome: Sent to infusion without order so they can check the insurance and billing.

## 2022-02-08 NOTE — THERAPY
Occupational Therapy  Daily Treatment     Patient Name: Yunier Denney  Age:  77 y.o., Sex:  male  Medical Record #: [id number]  Today's Date: 2/8/2022     Precautions  Precautions: Fall Risk  Comments: WBAT R shoulder     Assessment    Patient progressing towards short term goals. Patient with increased functional mobility and activity tolerance.Ambulated with FWW in room. Patient educated on safety with functional mobility and importance of OOB activity. Patient UB/LB dressing from bed level.      Plan    Continue current treatment plan.       Discharge Recommendations: Recommend post-acute placement for additional occupational therapy services prior to discharge home    Subjective    Patient alert and cooperative during session. Nurse river occupational therapist to work with patient.       Objective       02/08/22 0730   Total Time Spent   Total Time Spent (Mins) 38   Treatment Charges   OT Self Care / ADL 2   OT Therapy Activity 1   Precautions   Precautions Fall Risk   Comments WBAT R shoulder    Vitals   O2 Delivery Device None - Room Air   Pain   Intervention Repositioned   Pain 0 - 10 Group   Location Leg;Shoulder   Location Orientation Left;Right   Pain Rating Scale (NPRS) 2   Description Aching   Comfort Goal Perform Activity;Comfort with Movement   Cognition    Level of Consciousness Alert   Comments Alert and cooperative during session   Bed Mobility    Supine to Sit Minimal Assist   Scooting Contact Guard Assist   Activities of Daily Living   Upper Body Dressing Minimal Assist  (Donning gown still guarding RUE )   Lower Body Dressing Moderate Assist  (Donning socks sitting EOB )   How much help from another person does the patient currently need...   Putting on and taking off regular lower body clothing? 3   Bathing (including washing, rinsing, and drying)? 3   Toileting, which includes using a toilet, bedpan, or urinal? 3   Putting on and taking off regular upper body clothing? 3   Taking care of  personal grooming such as brushing teeth? 4   Eating meals? 4   6 Clicks Daily Activity Score 20   Functional Mobility   Sit to Stand Minimal Assist   Comments Ambulated from bed to chair on L side of bed with FWW. Patient ambulate ~5 feet x2 with FWW forward and backwards. Left sitting in chair with all needs met and nurse call button in reach   Patient / Family Goals   Patient / Family Goal #1 go home   Short Term Goals   Short Term Goal # 1 Patient will perform toileting with setup    Goal Outcome # 1 Progressing as expected   Short Term Goal # 2 Patient will perform UB/LB dressing with min A    Goal Outcome # 2 Progressing as expected   Short Term Goal # 3 Patient will perform 3/3 simple grooming task standing setup   Goal Outcome # 3 Progressing as expected

## 2022-02-08 NOTE — DISCHARGE PLANNING
Anticipated Discharge Disposition: Home with HH, IV infusion outpt vs home, wound vac.     Action: Spoke with the pt at bedside. Discussed DC plan. Pt states that he would like to find out if IV ABX can be done at home. He states if there is a large co-pay then he will go to outpt infusion clinic at [facility]. Voalte message to attending MD for [employer] order. IV ABX referral will need to come from ID MD.     Barriers to Discharge: Pending medical stability, wound vac, HH arrangements, IV ABX outpt vs in-home    Plan: cont to f/u on DC needs     1340 - Sent a packet to [facility] rep [place]. Pending acceptance and [facility] w/v delivery.     1124 - Voalte message to ID MD Pacheco to clarify IV ABX order. Pending response. Spoke with [facility] rep [place], and will start working on pt's wound vac. States she will f/u with this CM if needed additional notes/information. Requested for DCP to fax pt's information to [facility] to check if pt has any co-pays and how much.     1533 - Per [facility] care pt's co-pay would be a few hundred dollars a week if he receives IV ABX at home. Pt is not able to afford it and will need to be seen at the infusion center at [facility]. PC to [facility] and spoke with Maurice. He states they will accept the pt to their services. PC to Sveta at [facility] and provided information for      1615 - PC to [facility] infusion [place]. Spoke with Patricia. States that we will need to fax them an order for outpt IV ABX to [phone]. Direct line for scheduling is [phone]. Voalte message to attending MD if able to place an outpt referral to outpt infusion as per ID note from 2/6/2022  they are not able to refer the pt to outpt IV infusion as they are not credentialed there.

## 2022-02-08 NOTE — WOUND TEAM
[facility]  Inpatient Services Wound and Skin Care Evaluation    Admission Date: 1/22/2022     No order of IP CONSULT TO WOUND CARE is found.     HPI, PMH, SH: Reviewed    Past Surgical History:   Procedure Laterality Date   • IRRIGATION & DEBRIDEMENT GENERAL Right 1/25/2022    Procedure: IRRIGATION AND DEBRIDEMENT, WOUND  , MULTIPLE CULTURES;  Surgeon: Larry Aldridge M.D.;  Location: SURGERY [facility];  Service: Orthopedics   • APPLICATION OR REPLACEMENT, WOUND VAC Right 1/25/2022    Procedure: APPLICATION WOUND VAC;  Surgeon: Larry Aldridge M.D.;  Location: SURGERY [facility];  Service: Orthopedics   • LUMBAR LAMINECTOMY DISKECTOMY  4/29/2018    Procedure: LUMBAR LAMINECTOMY Y L4-S1;  Surgeon: Fercho Herron M.D.;  Location: SURGERY [facility];  Service: Neurosurgery   • IRRIGATION & DEBRIDEMENT NEURO  4/29/2018    Procedure: IRRIGATION & DEBRIDEMENT NEURO- epidural mass;  Surgeon: Fercho Herron M.D.;  Location: SURGERY [facility];  Service: Neurosurgery     Social History     Tobacco Use   • Smoking status: Never Smoker   • Smokeless tobacco: Never Used   Substance Use Topics   • Alcohol use: Yes     Chief Complaint   Patient presents with   • Back Pain     x2 days, right lower back. Patient injured his back while working on a car. Patient reports he had an MRI 2 days ago that didn't show anything.    • Shoulder Pain     x 2 days, right   • Knee Pain     x 2 days , left   • Failure to Thrive     Patient lives with his son who takes care of him. Patient's son reports that the patient is unable to get out of bed and he is having trouble taking care of him.    • Flu Like Symptoms     covid+ 1/20      Diagnosis: Positive blood cultures [R78.81]    Unit where seen by Wound Team: S141/00     WOUND CONSULT/FOLLOW UP RELATED TO:  I&D of R shoulder with WV placement.     WOUND HISTORY:  77y.o male admitted for COVID-19 and back pain. Diagnosed with R shoulder joint pyarthrosis and  underwent I&D of R glenohumeral joint with WV application on 1/25 with Dr. Aldridge. Pertinent history of HTN. Pertinent home med of medrol.    WOUND ASSESSMENT/LDA       Negative Pressure Wound Therapy 01/25/22 Surgical Shoulder Anterior Right (Active)   NPWT Pump Mode / Pressure Setting Continuous;125 mmHg 02/07/22 1200   Dressing Type Small;Black Foam (Regular) 02/07/22 1200   Number of Foam Pieces Used 2 02/07/22 1200   Canister Changed No 02/07/22 1200   Output (mL) 0 mL 02/07/22 1200   NEXT Dressing Change/Treatment Date 02/09/22 02/07/22 1200   VAC VeraFlo Irrigant Other (Comments) 02/07/22 1200   VAC VeraFlo Soak Time (mins) 0 02/07/22 1200   VAC VeraFlo Instill Volume (ml) 0 02/07/22 1200   VAC VeraFlo - Therapy Time (hrs) 0 02/07/22 1200   VAC VeraFlo Pressure (mm/Hg) Continuous;125 mmHg 02/07/22 1200   WOUND NURSE ONLY - Time Spent with Patient (mins) 60 02/07/22 1200           Wound 01/25/22 Incision Shoulder Right WOUND VAC (Active)   Wound Image    02/07/22 1700   Site Assessment Red;Lykens 02/07/22 1700   Periwound Assessment Pink 02/07/22 1700   Margins Defined edges;Unattached edges 02/07/22 1700   Closure Secondary intention 02/07/22 1700   Drainage Amount Scant 02/07/22 1700   Drainage Description Serous 02/07/22 1700   Treatments Cleansed;Site care 02/07/22 1700   Wound Cleansing Approved Wound Cleanser 02/07/22 1700   Periwound Protectant Skin Protectant Wipes to Periwound;Drape 02/07/22 1700   Dressing Cleansing/Solutions Not Applicable 02/07/22 1700   Dressing Options Collagen Dressing;Wound Vac 02/07/22 1700   Dressing Changed Changed 02/07/22 1700   Dressing Status Clean;Dry;Intact 02/07/22 1700   Dressing Change/Treatment Frequency Monday, Wednesday, Friday, and As Needed 02/07/22 1700   NEXT Dressing Change/Treatment Date 02/09/22 02/07/22 1700   NEXT Weekly Photo (Inpatient Only) 02/11/22 02/07/22 1700                                                          Shape chay 02/07/22 1700   Wound  Odor None 02/07/22 1700   Pulses N/A 02/07/22 1700   Exposed Structures None 02/07/22 1700   WOUND NURSE ONLY - Time Spent with Patient (mins) 60 02/07/22 1700           Vascular:    ISABEL:   No results found.    Lab Values:    Lab Results   Component Value Date/Time    WBC 4.1 (L) 02/06/2022 01:37 PM    RBC 3.37 (L) 02/06/2022 01:37 PM    HEMOGLOBIN 9.0 (L) 02/06/2022 01:37 PM    HEMATOCRIT 29.3 (L) 02/06/2022 01:37 PM    CREACTPROT 22.06 (H) 01/25/2022 10:11 AM    SEDRATEWES 65 (H) 01/28/2022 12:51 AM        Culture Results show:  Recent Results (from the past 720 hour(s))   CULTURE WOUND W/ GRAM STAIN    Collection Time: 01/25/22  3:52 PM    Specimen: Wound   Result Value Ref Range    Significant Indicator NEG     Source WND     Site Right shoulder     Culture Result No growth at 72 hours.     Gram Stain Result Few WBCs.  No organisms seen.          Pain Level/Medicated: PO oxycodone    INTERVENTIONS BY WOUND TEAM:  Chart and images reviewed. Discussed with bedside RN. All areas of concern (based on picture review, LDA review and discussion with bedside RN) have been thoroughly assessed. Documentation of areas based on significant findings. This RN in to assess patient. Performed standard wound care which includes appropriate positioning, dressing removal and non-selective debridement. Pictures and measurements obtained weekly if/when required.  Preparation for Dressing removal: Dressing soaked with NS  Non-selectively Debrided with:  cleanser and gauze.  Sharp debridement: N/A  Triny wound: Cleansed with cleanser, Prepped with no string barrier & drape  Primary Dressing: One half thick piece of black foam cut to fit wound bed  Secondary (Outer) Dressing: Button, drape, and trackpad    Interdisciplinary consultation: Patient, Bedside RN    EVALUATION / RATIONALE FOR TREATMENT:  Most Recent Date  2/7/22: patient switched to regular NPWT, collagen added, granular tissue covering underlying tissue.  Continue  POC:  2/4/22: New measurements taken, undermining still present but decreasing, muscle present continue VF, next change switch to regular vac and wilber.    2/2//22: Tunneling at 12 o'clock that's too small for foam to fit into, small piece of wilber applied to assist in healing. Muscle still present, improving undermining present from 9-11 o'clock. Continue NPWT vac changes.  1/31/22: Muscle visible less adipose tissue, undermining still present.  Changed veraflo settings, continue POC  1/28/22: Muscle and adipose tissue exposed. Undermining noted from 9-11 o'clock and 12-2 o'clock. Wound bed clean with scant serosang drainage. Veraflo initiated to keep underlying structures moist and to mechanically debride wound bed. Patient sleeping for entirety of procedure- recommend PO pre-med only. Recommend ongoing NPWT and F/U with outpatient wound vs home health if patient continues to refuse SNF placement.     Goals: Steady decrease in wound area and depth weekly.    WOUND TEAM PLAN OF CARE ([X] for frequency of wound follow up,):  Nursing to follow orders written for wound care. Contact wound team if area fails to progress, deteriorates or with any questions/concerns  Dressing changes by wound team:                   Follow up 3 times weekly:                NPWT change 3 times weekly:   X  Follow up 1-2 times weekly:      Follow up Bi-Monthly:                   Follow up as needed:     Other (explain):     NURSING PLAN OF CARE ORDERS (X):  Dressing changes: See Dressing Care orders: X  Skin care: See Skin Care orders: X  RN Prevention Protocol: X  Rectal tube care: See Rectal Tube Care orders:   Other orders:    RSKIN:   CURRENTLY IN PLACE (X), APPLIED THIS VISIT (A), ORDERED (O):   Q shift Jensen:  X  Q shift pressure point assessments:  X    Surface/Positioning   Pressure redistribution mattress X           Low Airloss          Bariatric foam      Bariatric SAM     Waffle cushion        Waffle Overlay      X     Reposition q 2 hours      TAPs Turning system     Z Ash Pillow     Offloading/Redistribution   Sacral Mepilex (Silicone dressing)     Heel Mepilex (Silicone dressing)         Heel float boots (Prevalon boot)             Float Heels off Bed with Pillows           Respiratory   Silicone O2 tubing         Gray Foam Ear protectors     Cannula fixation Device (Tender )          High flow offloading Clip    Elastic head band offloading device      Anchorfast                                                         Trach with Optifoam split foam             Containment/Moisture Prevention     Rectal tube or BMS    Purwick/Condom Cath        Gunter Catheter    Barrier wipes           Barrier paste       Antifungal tx      Interdry      Urinal X     Mobilization   Up to chair        Ambulate      PT/OT  X    Nutrition   Dietician        Diabetes Education      PO X     TF     TPN     NPO   # days     Anticipated discharge plans:   LTACH:        SNF/Rehab:                  Home Health Care:    X    Vs SNF  Outpatient Wound Center:            Self/Family Care:        Other:                  Vac Discharge Needs:  Not Applicable Pt not on a wound vac:       Regular Vac while inpatient, alternative dressing at DC:        Regular Vac in use and continued at DC:            Reg. Vac w/ Skin Sub/Biologic in use. Will need to be changed 2x wkly:      Veraflo Vac while inpatient, ok to transition to Regular Vac on Discharge:      X     Veraflo Vac while inpatient, will need to remain on Veraflo Vac upon discharge:

## 2022-02-08 NOTE — DISCHARGE PLANNING
Agency/Facility Name: [facility]  Spoke To: Admission  Outcome: Checking if they can do it with outpatient infusion.

## 2022-02-08 NOTE — CARE PLAN
The patient is Stable - Low risk of patient condition declining or worsening    Shift Goals  Clinical Goals: PICC placement, mobility  Patient Goals: sleep, ABX  Family Goals: n/a    Progress made toward(s) clinical / shift goals:  pt updated on POC, educated on Q2 turns, educated on non pharm ways to manage pain, wound vac in place, ABX administration    Patient is not progressing towards the following goals:

## 2022-02-08 NOTE — PROGRESS NOTES
Hospital Medicine Daily Progress Note    Date of Service  2/8/2022    Chief Complaint  Yunier Denney is a 77 y.o. unvaccinated male admitted 1/22/2022 with back, leg pain.    Hospital Course  A 77-year-old man with h/o epidural abscess (follows with Dr. Herron) presented with right leg pain, knee pain, viral-like symptoms including nausea, chills, malaise and fatigue. Patient reports he was diagnosed with COVID-19 on 1/20.   Patient has elevated ESR at 47 and CRP of 198.9.  Blood cultures x2 returned positive for staph aureus. Patient hospitalized for IV Unasyn.  2/7;  Left PICC line placed.    Interval Problem Update  2/8: Patient seen while sitting in bed.  He was getting up to chair and is motivated to go home versus going to a skilled nursing facility.  Patient was unable to afford home IV infusion daptomycin.  Therefore he will need his PCP to accept in order daptomycin as an outpatient at [facility] where the PCP has privileges.  Patient complains of left leg pain explained he has extensive DVT likely is a sequelae of COVID-19 which she was positive on 120.  PICC line without pain edema or erythema.  Wound VAC in place on right shoulder.  I have personally seen and examined the patient at bedside. I discussed the plan of care with patient and bedside RN.    Consultants/Specialty  infectious disease, orthopedics and IR    Code Status  Full Code    Disposition  Patient is medically cleared for discharge.  Anticipate discharge to home. HH PT/OT/home wound vac.   Needs social work to contact PCP for antibiotic orders at [facility] outpatient IV infusion center.  Recommendations per ID IV daptomycin through 3/13/2022.  Continued same dose as in hospital.  I have placed the appropriate orders for post-discharge needs.    Review of Systems  Constitutional: Positive for chills, fever and malaise/fatigue.   HENT: Negative.    Eyes: Negative.    Respiratory: Negative for cough and shortness of  breath.    Cardiovascular: Negative for chest pain and leg swelling.   Gastrointestinal: Negative for nausea and vomiting.   Genitourinary: Negative for dysuria.   Musculoskeletal: Positive for right shoulder, left hip pain.   Skin: Negative for rash.   Neurological: Positive for weakness.     Physical Exam  Temp:  [36.8 °C (98.3 °F)-37.2 °C (99 °F)] 37.2 °C (99 °F)  Pulse:  [56-81] 73  Resp:  [16-20] 18  BP: (121-128)/(60-74) 124/65  SpO2:  [93 %-98 %] 98 %    Physical Exam  Vitals and nursing note reviewed.   Constitutional:       Appearance: He is not ill appearing.  HENT:      Head: Normocephalic and atraumatic.      Nose: Nose normal.      Mouth/Throat:      Mouth: Mucous membranes are moist.      Pharynx: Oropharynx is clear.   Eyes:      Pupils: Pupils are equal, round, and reactive to light.   Cardiovascular:      Rate and Rhythm: Normal rate and regular rhythm.   Pulmonary:      Effort: Pulmonary effort is normal. No respiratory distress.      Breath sounds: No wheezing or rales.   Abdominal:      General: Abdomen is flat. Bowel sounds are normal. There is no distension.      Tenderness: There is no abdominal tenderness. There is no guarding.      Hernia: A hernia (left inguinal, reduciable, non-tender) is present.   Musculoskeletal:         General: right shoulder with wound vac     Cervical back: Normal range of motion.      Right lower leg: No edema.      Left lower leg: No edema.   Skin:     General: Skin is warm.   Neurological:      General: No focal deficit present.      Mental Status: He is alert and oriented to person, place, and time.     Fluids  No intake or output data in the 24 hours ending 02/08/22 2028    Laboratory  Recent Labs     02/06/22  1337 02/08/22  0321   WBC 4.1* 3.7*   RBC 3.37* 3.20*   HEMOGLOBIN 9.0* 8.6*   HEMATOCRIT 29.3* 27.4*   MCV 86.9 85.6   MCH 26.7* 26.9*   MCHC 30.7* 31.4*   RDW 50.9* 51.1*   PLATELETCT 335 344   MPV 8.5* 8.9*     Recent Labs     02/07/22  0038  02/08/22  0321   SODIUM 136 136   POTASSIUM 3.1* 3.5*   CHLORIDE 102 103   CO2 23 24   GLUCOSE 114* 119*   BUN 10 7*   CREATININE 0.72 0.65   CALCIUM 8.2* 8.1*                   Imaging  IR-PICC LINE PLACEMENT W/ GUIDANCE > AGE 5   Final Result                  Ultrasound-guided PICC placement performed by qualified nursing staff as    above.          EC-ECHOCARDIOGRAM LTD W/O CONT   Final Result      MR-LUMBAR SPINE-WITH & W/O   Final Result      Interval development of abnormal marrow edema and enhancement within the right lateral L4 and L5 vertebral bodies. There are asymmetric severe degenerative changes on the right side however given the short-term interval development of this abnormal    enhancement and would raise concern for developing osteomyelitis. Clinical correlation and short-term follow-up is recommended. There is also some increased edema in the adjacent right psoas muscle. There is no evidence of epidural or paravertebral    abscess.      US-EXTREMITY VENOUS LOWER UNILAT LEFT   Final Result      CT-CHEST,ABDOMEN,PELVIS WITH   Final Result      1.  No evidence of acute inflammatory process in the chest, abdomen or pelvis.   2.  Left hip effusion has nearly resolved.   3.  Cardiomegaly.   4.  Cholelithiasis versus gallbladder sludge. No cholecystitis.   5.  Moderate to large hiatal hernia, containing 70-80% of organoaxially rotated stomach.   6.  Left inguinal hernia smaller, and again contains nonobstructed sigmoid colon.   7.  Nonocclusive filling defect in the left common femoral vein may represent mixing artifact or a nonocclusive DVT. Recommend further evaluation with venous duplex ultrasound.      DX-ABORTED DX PROCEDURE   Final Result      Aborted fluoroscopic guided left hip aspiration due to overlying bowel contained within the inguinal hernia. Findings were discussed with Dr. Cespedes. If necessary, joint aspiration could be performed under CT guidance.      CT-HIP WITH PLUS RECONS LEFT   Final  Result         1. Small left hip effusion, which may be sterile or infected.   2. No intramuscular subcutaneous fluid collections. No evidence of osteomyelitis.   3. Large, 13 cm left inguinal hernia containing nonobstructed sigmoid colon.      CT-SHOULDER WITH PLUS RECONS RIGHT   Final Result         1. Loculated small to moderate glenohumeral joint effusion. It may be infected and represent septic arthritis.   2. Several small intramuscular abscesses in the subscapularis and anterior head of the deltoid.   3. No evidence of osteomyelitis.   4. No fracture or dislocation.   5 Partially visualized small right pleural effusion and associated atelectasis.      EC-ECHOCARDIOGRAM LTD W/O CONT   Final Result      DX-CHEST-PORTABLE (1 VIEW)   Final Result      Borderline cardiomegaly.           Assessment/Plan  * Pyogenic arthritis of right shoulder region (HCC)  Assessment & Plan  CT showed loculated small to moderate right glenohumeral joint effusion; it may be infected and represent septic arthritis; several small intramuscular abscesses in the subscapularis and anterior head of the deltoid; partially visualized small right pleural effusion and associated atelectasis.  CT showed small left hip effusion, which may be sterile or infected; repeat CT shows smaller effusion size  S/p drainage of shoulder abscess, wound vac in place    Vertebral osteomyelitis ([facility])  Assessment & Plan  MRI lumbar spine shows bone marrow edema by L4 L5 vertebral bodies  Given recurrent positive blood cultures growing MSSA, will presume osteomyelitis  No signs of cord compression or abscesses  Continue 6 week course of antibiotics, end date 3/13  On nafcillin q4h, appreciate ID recommendations.  PICC line today 2/7    DVT (deep venous thrombosis) ([facility])  Assessment & Plan  Mixing artifact vs clot on CTAP, f/u US shows DVT  Started on lovenox BID, transitioned to eliquis 2/1    Hyponatremia  Assessment & Plan  Mild   Monitor     Spinal stenosis  at L4-L5 level  Assessment & Plan  MRI showing severe L4 stenosis  Pain control    COVID-19  Assessment & Plan  Patient is currently not requiring oxygen hold steroids  Continue with enhanced precautions  Placed on oxygen with SPO2 below 90%    MSSA bacteremia- (present on admission)  Assessment & Plan  Patient has blood cultures x 2 positive for staph aureus that reflected at [facility] when he visited ER from 1/20/2022.  CT showed loculated small to moderate right glenohumeral joint effusion; several small intramuscular abscesses in the subscapularis and anterior head of the deltoid  S/p I+D shoulder joint, wound vac applied. Operative cultures negative.  Blood cultures from 1/20 and 1/22 positive for MSSA.  Blood cultures form 1/28 positive  Blood cultures negative 1/31, 1 of 2 bottles with coag neg staph contaminant.  JAZIEL ordered given recurrent fever/positive blood culture, however patient refuses JAZIEL.  MRI lumbar spine shows vertebral body osteomyelitis.  Ancef changed to nafcillin 2/2 per ID recommendations, will need 6 weeks antibiotics, end date 3/13  Can be on daptomycin if using [facility], or ancef q8h if going to SNF  PICC line placed 2/7    Hypertension- (present on admission)  Assessment & Plan  Blood pressures currently very well.  We will hold BP meds at this time.       VTE prophylaxis: eliquis    I have performed a physical exam and reviewed and updated ROS and Plan today (2/8/2022). In review of yesterday's note (2/7/2022), there are no changes except as documented above.

## 2022-02-08 NOTE — DIETARY
Nutrition Services: Brief Update    Pt requesting Boost oral nutrition supplements.  RD will add supplement order and adjust menu accordingly.    RD will continue to follow per department guidelines.

## 2022-02-08 NOTE — CARE PLAN
The patient is Stable - Low risk of patient condition declining or worsening    Shift Goals  Clinical Goals: Up to chair for all meals, PICC placement  Patient Goals: increase strength   Family Goals: n/a    Progress made toward(s) clinical / shift goals:  Pt ambulating to bathroom and up to chair for all meals throughout the day. PICC placed, discussed discharge planning with pt.     Problem: Pain - Standard  Goal: Alleviation of pain or a reduction in pain to the patient’s comfort goal  Outcome: Progressing  Note: Pt medicated per MAR, repositioning and distraction used as non-pharmacological adjuncts.      Problem: Fall Risk  Goal: Patient will remain free from falls  Outcome: Progressing  Note: Bed low and locked with strip alarm on. Pt educated on fall precautions in place, strip alarm used when up in chair.        Patient is not progressing towards the following goals: n/a

## 2022-02-08 NOTE — PROCEDURES
Vascular Access Team     Date of Insertion: 2/8/2022   Arm Circumference: 27.5  Internal length: 43  External Length: 0  Vein Occupancy %: 22   Reason for PICC: abx  Labs: WBC 4.1, , BUN 7, Cr 0.65, GFR >60, INR n/a     Consents confirmed, vessel patency confirmed with ultrasound. Risks and benefits of procedure explained to patient and education regarding central line associated bloodstream infections provided. Questions answered.      PICC placed in LUE per licensed provider order with ultrasound guidance.  4 Fr, 1 lumen PICC placed in brachial vein after 1 attempt(s). 2 mL of 1% lidocaine injected intradermally at the insertion site. A 21 gauge microintroducer needle was visualized entering the vein and modified Seldinger technique was used to obtain access to the vein. 43 cm catheter inserted and brisk blood return was observed from each lumen upon aspiration. Line secured at the 0 cm marker. TCS stylet removed and observed to be fully intact. Each lumen flushed using pulsatile method without resistance with 10 mL 0.9% normal saline. PICC line secured with Biopatch and Tegaderm.     PICC tip placement location is confirmed by nurse to be in the Superior Vena Cava (SVC) utilizing 3CG technology. PICC line is appropriate for use at this time. Patient tolerated procedure well, without complications.  Patient condition relayed to primary RN or ordering physician via this post procedure note in the EMR.      Ultrasound images uploaded to PACS and viewable in the EMR - yes  Ultrasound imaged printed and placed in paper chart - no     [employer] Power PICC ref # [id number], Lot # [id number], Expiration Date 02/28/2023

## 2022-02-09 ENCOUNTER — TELEPHONE (OUTPATIENT)
Dept: SCHEDULING | Facility: IMAGING CENTER | Age: 77
End: 2022-02-09

## 2022-02-09 PROCEDURE — A9270 NON-COVERED ITEM OR SERVICE: HCPCS | Performed by: HOSPITALIST

## 2022-02-09 PROCEDURE — 700101 HCHG RX REV CODE 250: Performed by: INTERNAL MEDICINE

## 2022-02-09 PROCEDURE — A9270 NON-COVERED ITEM OR SERVICE: HCPCS | Performed by: NURSE PRACTITIONER

## 2022-02-09 PROCEDURE — A9270 NON-COVERED ITEM OR SERVICE: HCPCS | Performed by: STUDENT IN AN ORGANIZED HEALTH CARE EDUCATION/TRAINING PROGRAM

## 2022-02-09 PROCEDURE — 700102 HCHG RX REV CODE 250 W/ 637 OVERRIDE(OP): Performed by: HOSPITALIST

## 2022-02-09 PROCEDURE — 97605 NEG PRS WND THER DME<=50SQCM: CPT

## 2022-02-09 PROCEDURE — 770001 HCHG ROOM/CARE - MED/SURG/GYN PRIV*

## 2022-02-09 PROCEDURE — 700102 HCHG RX REV CODE 250 W/ 637 OVERRIDE(OP): Performed by: NURSE PRACTITIONER

## 2022-02-09 PROCEDURE — 99232 SBSQ HOSP IP/OBS MODERATE 35: CPT | Performed by: HOSPITALIST

## 2022-02-09 PROCEDURE — 97116 GAIT TRAINING THERAPY: CPT

## 2022-02-09 PROCEDURE — 97530 THERAPEUTIC ACTIVITIES: CPT

## 2022-02-09 PROCEDURE — 700105 HCHG RX REV CODE 258: Performed by: INTERNAL MEDICINE

## 2022-02-09 PROCEDURE — 700102 HCHG RX REV CODE 250 W/ 637 OVERRIDE(OP): Performed by: STUDENT IN AN ORGANIZED HEALTH CARE EDUCATION/TRAINING PROGRAM

## 2022-02-09 RX ORDER — HEPARIN SODIUM (PORCINE) LOCK FLUSH IV SOLN 100 UNIT/ML 100 UNIT/ML
500 SOLUTION INTRAVENOUS PRN
Status: CANCELLED | OUTPATIENT
Start: 2022-02-09

## 2022-02-09 RX ORDER — 0.9 % SODIUM CHLORIDE 0.9 %
3 VIAL (ML) INJECTION PRN
Status: CANCELLED | OUTPATIENT
Start: 2022-02-09

## 2022-02-09 RX ORDER — 0.9 % SODIUM CHLORIDE 0.9 %
10 VIAL (ML) INJECTION PRN
Status: CANCELLED | OUTPATIENT
Start: 2022-02-09

## 2022-02-09 RX ORDER — 0.9 % SODIUM CHLORIDE 0.9 %
VIAL (ML) INJECTION PRN
Status: CANCELLED | OUTPATIENT
Start: 2022-02-09

## 2022-02-09 RX ADMIN — NAFCILLIN SODIUM 2 G: 2 INJECTION, POWDER, FOR SOLUTION INTRAMUSCULAR; INTRAVENOUS at 17:18

## 2022-02-09 RX ADMIN — OXYCODONE HYDROCHLORIDE 10 MG: 10 TABLET ORAL at 03:23

## 2022-02-09 RX ADMIN — OXYCODONE HYDROCHLORIDE 10 MG: 10 TABLET ORAL at 21:21

## 2022-02-09 RX ADMIN — APIXABAN 5 MG: 5 TABLET, FILM COATED ORAL at 17:18

## 2022-02-09 RX ADMIN — NAFCILLIN SODIUM 2 G: 2 INJECTION, POWDER, FOR SOLUTION INTRAMUSCULAR; INTRAVENOUS at 13:21

## 2022-02-09 RX ADMIN — APIXABAN 5 MG: 5 TABLET, FILM COATED ORAL at 05:36

## 2022-02-09 RX ADMIN — SENNOSIDES 17.2 MG: 8.6 TABLET, FILM COATED ORAL at 21:21

## 2022-02-09 RX ADMIN — NAFCILLIN SODIUM 2 G: 2 INJECTION, POWDER, FOR SOLUTION INTRAMUSCULAR; INTRAVENOUS at 09:22

## 2022-02-09 RX ADMIN — NAFCILLIN SODIUM 2 G: 2 INJECTION, POWDER, FOR SOLUTION INTRAMUSCULAR; INTRAVENOUS at 21:20

## 2022-02-09 RX ADMIN — NAFCILLIN SODIUM 2 G: 2 INJECTION, POWDER, FOR SOLUTION INTRAMUSCULAR; INTRAVENOUS at 01:52

## 2022-02-09 RX ADMIN — NAFCILLIN SODIUM 2 G: 2 INJECTION, POWDER, FOR SOLUTION INTRAMUSCULAR; INTRAVENOUS at 05:36

## 2022-02-09 RX ADMIN — OXYCODONE 5 MG: 5 TABLET ORAL at 09:24

## 2022-02-09 RX ADMIN — OXYCODONE HYDROCHLORIDE 10 MG: 10 TABLET ORAL at 17:18

## 2022-02-09 RX ADMIN — Medication 5 MG: at 21:20

## 2022-02-09 ASSESSMENT — GAIT ASSESSMENTS
GAIT LEVEL OF ASSIST: STANDBY ASSIST
DISTANCE (FEET): 30
ASSISTIVE DEVICE: FRONT WHEEL WALKER

## 2022-02-09 ASSESSMENT — COGNITIVE AND FUNCTIONAL STATUS - GENERAL
CLIMB 3 TO 5 STEPS WITH RAILING: TOTAL
STANDING UP FROM CHAIR USING ARMS: A LITTLE
WALKING IN HOSPITAL ROOM: A LITTLE
MOVING TO AND FROM BED TO CHAIR: A LITTLE
SUGGESTED CMS G CODE MODIFIER MOBILITY: CK
TURNING FROM BACK TO SIDE WHILE IN FLAT BAD: A LITTLE
MOBILITY SCORE: 16
MOVING FROM LYING ON BACK TO SITTING ON SIDE OF FLAT BED: A LITTLE

## 2022-02-09 ASSESSMENT — PAIN DESCRIPTION - PAIN TYPE
TYPE: ACUTE PAIN;SURGICAL PAIN

## 2022-02-09 NOTE — PROGRESS NOTES
"   Orthopaedic Progress Note    Interval changes:  Patient doing well. Covid clearance from Infx Control  Cultures from R shoulder NGTD  No further ortho intervention planned   Continue wound care till closed  PICC today    ROS - Patient denies any new issues.  Pain well controlled.    /60   Pulse (!) 56   Temp 36.8 °C (98.3 °F) (Temporal)   Resp 18   Ht 1.803 m (5' 11\")   Wt 77.1 kg (170 lb)   SpO2 96%     Patient seen and examined  No acute distress  Breathing non labored  RRR  Right shoulder vac dressing CDI without leak, CDI, fingers SILT/F/E, cap refill <2 sec.      Active Hospital Problems    Diagnosis    • Vertebral osteomyelitis ([facility]) [M46.20]    • DVT (deep venous thrombosis) ([facility]) [I82.409]    • Pyogenic arthritis of right shoulder region ([facility]) [M00.9]    • Hyponatremia [E87.1]    • MSSA bacteremia [R78.81, B95.61]    • COVID-19 [U07.1]    • Spinal stenosis at L4-L5 level [M48.061]    • Hypertension [I10]      Assessment/Plan:  Patient doing well  Cultures form right shoulder with no growth  No further ortho intervention planned   POD#14 S/P   1.  Irrigation and debridement, right glenohumeral joint.  2.  Multiple cultures.  3.  Application of wound VAC.  Wt bearing status - WBAT  Wound care/Drains - Vac dressings to closure  Future Procedures - none planned   Sutures/Staples out- 14 days post operatively  PT/OT-initiated  Antibiotics: ancef 2g IVQ8  DVT Prophylaxis- TEDS/SCDs/Foot pumps  Gunter-none  Case Coordination for Discharge Planning - Disposition pending abx needs . OK for SNF. Follow-Up: needs appointment with Dr. Aldridge at  [facility] in one week for re-evaluation, .      "

## 2022-02-09 NOTE — THERAPY
Physical Therapy   Daily Treatment     Patient Name: Yunier Denney  Age:  77 y.o., Sex:  male  Medical Record #: [id number]  Today's Date: 2/9/2022     Precautions  Precautions: Fall Risk (wound right shoulder )    Assessment    Pt seen for PT treatment, pt progressing towards goals. Pt able to slowly ambulate for over 30 ft with FWW, antalgic pattern but no LOB. Pt states he has assistance at home and is confident with home vs SNF. Pt has all required DME, requesting hospital bed. Criteria for hospital bed discussed, pt states he may try his home bed first then follow up if hospital bed is needed. Pt will benefit from continued PT while in house, recommend home health PT services upon discharge.    Plan    Continue current treatment plan.    DC Equipment Recommendations:  (pt has all required DME at home including WC and FWW)  Discharge Recommendations: Recommend home health for continued physical therapy services         Objective       02/09/22 1304   Cognition    Level of Consciousness Alert   New Learning   (WFL)   Attention   (WFL)   Initiation   (WFL)   Comments pleasant, cooperative and motivated.    Supine Lower Body Exercise   Short Arc Quad 1 set of 10   Heel Slide 1 set of 10   Sitting Lower Body Exercises   Long Arc Quad 1 set of 10   Balance   Sitting Balance (Static) Fair +   Sitting Balance (Dynamic) Fair   Standing Balance (Static) Fair   Standing Balance (Dynamic) Fair -   Weight Shift Sitting Fair   Weight Shift Standing Fair   Skilled Intervention Verbal Cuing;Tactile Cuing;Sequencing   Comments extended time to complete. increased effort    Gait Analysis   Gait Level Of Assist Standby Assist   Assistive Device Front Wheel Walker   Distance (Feet) 30   # of Times Distance was Traveled 1   Deviation Antalgic;Step To;Increased Base Of Support;Shuffled Gait;Decreased Heel Strike;Decreased Toe Off  (heavy reliance on FWW for support )   # of Stairs Climbed 0   Weight Bearing Status no restrictions.     Skilled Intervention Tactile Cuing;Sequencing;Verbal Cuing   Bed Mobility    Supine to Sit Minimal Assist   Sit to Supine Minimal Assist  (assist with LLE into bed. )   Skilled Intervention Tactile Cuing;Verbal Cuing   Functional Mobility   Sit to Stand Supervised  (cues for hand placement )   Bed, Chair, Wheelchair Transfer Supervised   Transfer Method Stand Step  (w/ FWW)   Skilled Intervention Tactile Cuing;Verbal Cuing   Activity Tolerance   Sitting Edge of Bed 8 min    Standing 4 min    Patient / Family Goals    Patient / Family Goal #1 to improve pain/activity tolerance    Goal #1 Outcome Progressing as expected   Short Term Goals    Short Term Goal # 1 Pt will perform supine<>sit from flat HOB/no railing with min A within 6 visits to progress back to baseline.    Goal Outcome # 1 Progressing as expected   Short Term Goal # 2 Pt will perform sit<>stand with fWW and min A within 6 visits to ensure independent mobility at home.    Goal Outcome # 2 Goal met   Short Term Goal # 2 B  Pt will perfom sit<>stand with FWW and supervision within 6 visits to ensure return to baseline.    Goal Outcome # 2 B Progressing as expected   Short Term Goal # 3 Pt will ambulate x 150ft with fWW and supervision wtihin 6 visits to progress to baseline.    Goal Outcome # 3 Progressing slower than expected   Short Term Goal # 4 Pt will ascend/descend 1 step with B UE support and min A within 6 visits to ensure independent mobility at home.    Goal Outcome # 4 Progressing slower than expected   Education Group   Role of Physical Therapist Patient Response Patient;Acceptance;Explanation;Verbal Demonstration   Exercises - Supine Patient Response Patient;Acceptance;Explanation;Demonstration;Action Demonstration   Anticipated Discharge Equipment and Recommendations   DC Equipment Recommendations   (pt has all required DME at home including WC and FWW)

## 2022-02-09 NOTE — PROGRESS NOTES
Hospital Medicine Daily Progress Note    Date of Service  2/9/2022    Chief Complaint  Yunier Denney is a 77 y.o. unvaccinated male admitted 1/22/2022 with back, leg pain.    Hospital Course  A 77-year-old man with h/o epidural abscess (follows with Dr. Herron) presented with right leg pain, knee pain, viral-like symptoms including nausea, chills, malaise and fatigue. Patient reports he was diagnosed with COVID-19 on 1/20.   Patient has elevated ESR at 47 and CRP of 198.9.  Blood cultures x2 returned positive for staph aureus. Patient hospitalized for IV Unasyn.  2/7;  Left PICC line placed.    Interval Problem Update  2/8: Patient seen while sitting in bed.  He was getting up to chair and is motivated to go home versus going to a skilled nursing facility.  Patient was unable to afford home IV infusion daptomycin.  Therefore he will need his PCP to accept in order daptomycin as an outpatient at [facility] where the PCP has privileges.  Patient complains of left leg pain explained he has extensive DVT likely is a sequelae of COVID-19 which she was positive on 120.  PICC line without pain edema or erythema.  Wound VAC in place on right shoulder.  2/9:  Will get [facility] IV daptomycin infusions at [facility].  No PCP.  Has HH approved for PT/OT and wound vac care.  Patient requesting a hospital bed, but does not qualify per .  I certify that the patient requires continued medically necessary hospital services for the treatment of osteomyelitis of spine and right shoulder septic arthritis and will remain in the hospital for  1 days.  Discharge plan is anticipated to be 2/10/22.    I have personally seen and examined the patient at bedside. I discussed the plan of care with patient and bedside RN.    Consultants/Specialty  infectious disease, orthopedics and IR    Code Status  Full Code    Disposition  Patient is medically cleared for discharge.  Anticipate discharge to home. HH PT/OT/home wound vac.   Needs [facility]  ID to order OPIC IV abx infusions. Recommendations per ID IV daptomycin through 3/13/2022.   I have placed the appropriate orders for post-discharge needs.    Review of Systems  Constitutional: Positive for chills, fever and malaise/fatigue.   HENT: Negative.    Eyes: Negative.    Respiratory: Negative for cough and shortness of breath.    Cardiovascular: Negative for chest pain and leg swelling.   Gastrointestinal: Negative for nausea and vomiting.   Genitourinary: Negative for dysuria.   Musculoskeletal: Positive for right shoulder, left hip pain.   Skin: Negative for rash.   Neurological: Positive for weakness.     Physical Exam  Temp:  [36.4 °C (97.5 °F)-37.2 °C (99 °F)] 36.8 °C (98.2 °F)  Pulse:  [60-81] 64  Resp:  [17-20] 20  BP: (124-142)/(65-74) 129/72  SpO2:  [94 %-98 %] 94 %    Physical Exam  Vitals and nursing note reviewed.   Constitutional:       Appearance: He is not ill appearing.  HENT:      Head: Normocephalic and atraumatic.      Nose: Nose normal.      Mouth/Throat:      Mouth: Mucous membranes are moist.      Pharynx: Oropharynx is clear.   Eyes:      Pupils: Pupils are equal, round, and reactive to light.   Cardiovascular:      Rate and Rhythm: Normal rate and regular rhythm.   Pulmonary:      Effort: Pulmonary effort is normal. No respiratory distress.      Breath sounds: No wheezing or rales.   Abdominal:      General: Abdomen is flat. Bowel sounds are normal. There is no distension.      Tenderness: There is no abdominal tenderness. There is no guarding.      Hernia: A hernia (left inguinal, reduciable, non-tender) is present.   Musculoskeletal:         General: right shoulder with wound vac     Cervical back: Normal range of motion.      Right lower leg: No edema.      Left lower leg: No edema.   Skin:     General: Skin is warm.   Neurological:      General: No focal deficit present.      Mental Status: He is alert and oriented to person, place, and time.     Fluids    Intake/Output Summary  (Last 24 hours) at 2/9/2022 1500  Last data filed at 2/9/2022 1200  Gross per 24 hour   Intake 440 ml   Output 800 ml   Net -360 ml       Laboratory  Recent Labs     02/08/22  0321   WBC 3.7*   RBC 3.20*   HEMOGLOBIN 8.6*   HEMATOCRIT 27.4*   MCV 85.6   MCH 26.9*   MCHC 31.4*   RDW 51.1*   PLATELETCT 344   MPV 8.9*     Recent Labs     02/07/22  0038 02/08/22  0321   SODIUM 136 136   POTASSIUM 3.1* 3.5*   CHLORIDE 102 103   CO2 23 24   GLUCOSE 114* 119*   BUN 10 7*   CREATININE 0.72 0.65   CALCIUM 8.2* 8.1*                   Imaging  IR-PICC LINE PLACEMENT W/ GUIDANCE > AGE 5   Final Result                  Ultrasound-guided PICC placement performed by qualified nursing staff as    above.          EC-ECHOCARDIOGRAM LTD W/O CONT   Final Result      MR-LUMBAR SPINE-WITH & W/O   Final Result      Interval development of abnormal marrow edema and enhancement within the right lateral L4 and L5 vertebral bodies. There are asymmetric severe degenerative changes on the right side however given the short-term interval development of this abnormal    enhancement and would raise concern for developing osteomyelitis. Clinical correlation and short-term follow-up is recommended. There is also some increased edema in the adjacent right psoas muscle. There is no evidence of epidural or paravertebral    abscess.      US-EXTREMITY VENOUS LOWER UNILAT LEFT   Final Result      CT-CHEST,ABDOMEN,PELVIS WITH   Final Result      1.  No evidence of acute inflammatory process in the chest, abdomen or pelvis.   2.  Left hip effusion has nearly resolved.   3.  Cardiomegaly.   4.  Cholelithiasis versus gallbladder sludge. No cholecystitis.   5.  Moderate to large hiatal hernia, containing 70-80% of organoaxially rotated stomach.   6.  Left inguinal hernia smaller, and again contains nonobstructed sigmoid colon.   7.  Nonocclusive filling defect in the left common femoral vein may represent mixing artifact or a nonocclusive DVT. Recommend  further evaluation with venous duplex ultrasound.      DX-ABORTED DX PROCEDURE   Final Result      Aborted fluoroscopic guided left hip aspiration due to overlying bowel contained within the inguinal hernia. Findings were discussed with Dr. Cespedes. If necessary, joint aspiration could be performed under CT guidance.      CT-HIP WITH PLUS RECONS LEFT   Final Result         1. Small left hip effusion, which may be sterile or infected.   2. No intramuscular subcutaneous fluid collections. No evidence of osteomyelitis.   3. Large, 13 cm left inguinal hernia containing nonobstructed sigmoid colon.      CT-SHOULDER WITH PLUS RECONS RIGHT   Final Result         1. Loculated small to moderate glenohumeral joint effusion. It may be infected and represent septic arthritis.   2. Several small intramuscular abscesses in the subscapularis and anterior head of the deltoid.   3. No evidence of osteomyelitis.   4. No fracture or dislocation.   5 Partially visualized small right pleural effusion and associated atelectasis.      EC-ECHOCARDIOGRAM LTD W/O CONT   Final Result      DX-CHEST-PORTABLE (1 VIEW)   Final Result      Borderline cardiomegaly.           Assessment/Plan  * Pyogenic arthritis of right shoulder region (HCC)  Assessment & Plan  CT showed loculated small to moderate right glenohumeral joint effusion; it may be infected and represent septic arthritis; several small intramuscular abscesses in the subscapularis and anterior head of the deltoid; partially visualized small right pleural effusion and associated atelectasis.  CT showed small left hip effusion, which may be sterile or infected; repeat CT shows smaller effusion size  S/p drainage of shoulder abscess, wound vac in place    Vertebral osteomyelitis (HCC)  Assessment & Plan  MRI lumbar spine shows bone marrow edema by L4 L5 vertebral bodies  Given recurrent positive blood cultures growing MSSA, will presume osteomyelitis  No signs of cord compression or  abscesses  Continue 6 week course of antibiotics, end date 3/13  On nafcillin q4h, appreciate ID recommendations.  PICC line today 2/7    DVT (deep venous thrombosis) (HCC)  Assessment & Plan  Mixing artifact vs clot on CTAP, f/u US shows DVT  Started on lovenox BID, transitioned to eliquis 2/1    Hyponatremia  Assessment & Plan  Mild   Monitor     Spinal stenosis at L4-L5 level  Assessment & Plan  MRI showing severe L4 stenosis  Pain control    COVID-19  Assessment & Plan  Patient is currently not requiring oxygen hold steroids  Continue with enhanced precautions  Placed on oxygen with SPO2 below 90%    MSSA bacteremia- (present on admission)  Assessment & Plan  Patient has blood cultures x 2 positive for staph aureus that reflected at [facility] when he visited ER from 1/20/2022.  CT showed loculated small to moderate right glenohumeral joint effusion; several small intramuscular abscesses in the subscapularis and anterior head of the deltoid  S/p I+D shoulder joint, wound vac applied. Operative cultures negative.  Blood cultures from 1/20 and 1/22 positive for MSSA.  Blood cultures form 1/28 positive  Blood cultures negative 1/31, 1 of 2 bottles with coag neg staph contaminant.  JAZIEL ordered given recurrent fever/positive blood culture, however patient refuses JAZIEL.  MRI lumbar spine shows vertebral body osteomyelitis.  Ancef changed to nafcillin 2/2 per ID recommendations, will need 6 weeks antibiotics, end date 3/13  Can be on daptomycin if using [facility], or ancef q8h if going to SNF  PICC line placed 2/7    Hypertension- (present on admission)  Assessment & Plan  Blood pressures currently very well.  We will hold BP meds at this time.       VTE prophylaxis: eliquis    I have performed a physical exam and reviewed and updated ROS and Plan today (2/9/2022). In review of yesterday's note (2/8/2022), there are no changes except as documented above.

## 2022-02-09 NOTE — WOUND TEAM
[facility]  Inpatient Services Wound and Skin Care Evaluation    Admission Date: 1/22/2022     No order of IP CONSULT TO WOUND CARE is found.     HPI, PMH, SH: Reviewed    Past Surgical History:   Procedure Laterality Date   • IRRIGATION & DEBRIDEMENT GENERAL Right 1/25/2022    Procedure: IRRIGATION AND DEBRIDEMENT, WOUND  , MULTIPLE CULTURES;  Surgeon: Larry Aldridge M.D.;  Location: SURGERY [facility];  Service: Orthopedics   • APPLICATION OR REPLACEMENT, WOUND VAC Right 1/25/2022    Procedure: APPLICATION WOUND VAC;  Surgeon: Larry Aldridge M.D.;  Location: SURGERY [facility];  Service: Orthopedics   • LUMBAR LAMINECTOMY DISKECTOMY  4/29/2018    Procedure: LUMBAR LAMINECTOMY Y L4-S1;  Surgeon: Fercho Herron M.D.;  Location: SURGERY [facility];  Service: Neurosurgery   • IRRIGATION & DEBRIDEMENT NEURO  4/29/2018    Procedure: IRRIGATION & DEBRIDEMENT NEURO- epidural mass;  Surgeon: Fercho Herron M.D.;  Location: SURGERY [facility];  Service: Neurosurgery     Social History     Tobacco Use   • Smoking status: Never Smoker   • Smokeless tobacco: Never Used   Substance Use Topics   • Alcohol use: Yes     Chief Complaint   Patient presents with   • Back Pain     x2 days, right lower back. Patient injured his back while working on a car. Patient reports he had an MRI 2 days ago that didn't show anything.    • Shoulder Pain     x 2 days, right   • Knee Pain     x 2 days , left   • Failure to Thrive     Patient lives with his son who takes care of him. Patient's son reports that the patient is unable to get out of bed and he is having trouble taking care of him.    • Flu Like Symptoms     covid+ 1/20      Diagnosis: Positive blood cultures [R78.81]    Unit where seen by Wound Team: S141/00     WOUND CONSULT/FOLLOW UP RELATED TO:  I&D of R shoulder with WV placement.     WOUND HISTORY:  77y.o male admitted for COVID-19 and back pain. Diagnosed with R shoulder joint pyarthrosis and  underwent I&D of R glenohumeral joint with WV application on 1/25 with Dr. Aldridge. Pertinent history of HTN. Pertinent home med of medrol.    WOUND ASSESSMENT/LDA       Negative Pressure Wound Therapy 01/25/22 Surgical Shoulder Anterior Right (Active)   NPWT Pump Mode / Pressure Setting Ulta;125 mmHg;Continuous 02/09/22 1030   Dressing Type Medium;Black Foam (Regular) 02/09/22 1030   Number of Foam Pieces Used 2 02/09/22 1030   Canister Changed No 02/09/22 1030   Output (mL) 0 mL 02/07/22 1200   NEXT Dressing Change/Treatment Date 02/11/22 02/09/22 1030   VAC VeraFlo Irrigant Normal Saline 02/08/22 2208   VAC VeraFlo Soak Time (mins) 0 02/07/22 1200   VAC VeraFlo Instill Volume (ml) 0 02/07/22 1200   VAC VeraFlo - Therapy Time (hrs) 0 02/07/22 1200   VAC VeraFlo Pressure (mm/Hg) Continuous;125 mmHg 02/07/22 1200   WOUND NURSE ONLY - Time Spent with Patient (mins) 45 02/09/22 1030         Wound 01/25/22 Incision Shoulder Right WOUND VAC (Active)   Wound Image    02/07/22 1700   Site Assessment [place] 02/09/22 1030   Periwound Assessment Dry;Intact 02/09/22 1030   Margins Defined edges;Unattached edges 02/09/22 1030   Closure Secondary intention 02/09/22 1030   Drainage Amount None 02/09/22 1030   Drainage Description Yellow 02/08/22 2208   Treatments Site care;Cleansed 02/09/22 1030   Wound Cleansing Approved Wound Cleanser 02/09/22 1030   Periwound Protectant Skin Protectant Wipes to Periwound 02/09/22 1030   Dressing Cleansing/Solutions Not Applicable 02/09/22 1030   Dressing Options Collagen Dressing;Wound Vac 02/09/22 1030   Dressing Changed Changed 02/09/22 1030   Dressing Status Clean;Dry;Intact 02/09/22 1030   Dressing Change/Treatment Frequency Monday, Wednesday, Friday, and As Needed 02/09/22 1030   NEXT Dressing Change/Treatment Date 02/11/22 02/09/22 1030   NEXT Weekly Photo (Inpatient Only) 02/11/22 02/09/22 1030   Number of Staples Removed 0 01/31/22 1000   Non-staged Wound Description Full thickness  02/09/22 1030   Wound Length (cm) 8.3 cm 02/04/22 1100   Wound Width (cm) 2.5 cm 02/04/22 1100   Wound Depth (cm) 0.4 cm 02/04/22 1100   Wound Surface Area (cm^2) 20.75 cm^2 02/04/22 1100   Wound Volume (cm^3) 8.3 cm^3 02/04/22 1100   Wound Healing % 68 02/04/22 1100   Undermining (cm) 0.2 cm 02/04/22 1100   Undermining of Wound, 1st Location From 6 o'clock;To 10 o'clock 02/04/22 1100   Undermining (cm) - 2nd location 1 cm 01/28/22 1412   Undermining of Wound, 2nd Location From 9 o'clock;To 11 o'clock 01/28/22 1412   Shape chay 02/09/22 1030   Wound Odor None 02/09/22 1030   Pulses N/A 02/07/22 1700   Exposed Structures None 02/09/22 1030   WOUND NURSE ONLY - Time Spent with Patient (mins) 45 02/09/22 1030           Vascular:    ISABEL:   No results found.    Lab Values:    Lab Results   Component Value Date/Time    WBC 3.7 (L) 02/08/2022 03:21 AM    RBC 3.20 (L) 02/08/2022 03:21 AM    HEMOGLOBIN 8.6 (L) 02/08/2022 03:21 AM    HEMATOCRIT 27.4 (L) 02/08/2022 03:21 AM    CREACTPROT 6.06 (H) 02/08/2022 03:21 AM    SEDRATEWES 71 (H) 02/08/2022 03:21 AM        Culture Results show:  Recent Results (from the past 720 hour(s))   CULTURE WOUND W/ GRAM STAIN    Collection Time: 01/25/22  3:52 PM    Specimen: Wound   Result Value Ref Range    Significant Indicator NEG     Source WND     Site Right shoulder     Culture Result No growth at 72 hours.     Gram Stain Result Few WBCs.  No organisms seen.          Pain Level/Medicated: PO oxycodone    INTERVENTIONS BY WOUND TEAM:  Chart and images reviewed. Discussed with bedside RN. All areas of concern (based on picture review, LDA review and discussion with bedside RN) have been thoroughly assessed. Documentation of areas based on significant findings. This RN in to assess patient. Performed standard wound care which includes appropriate positioning, dressing removal and non-selective debridement. Pictures and measurements obtained weekly if/when required.    Preparation for  Dressing removal: Dressing soaked with NS  Non-selectively Debrided with:  cleanser and gauze.  Sharp debridement: N/A  Triny wound: Cleansed with cleanser, Prepped with no string barrier & drape  Primary Dressing: One half thick piece of black foam cut to fit wound bed  Secondary (Outer) Dressing: Button, drape, and trackpad    Interdisciplinary consultation: Patient, Bedside RN, Wound RN Susan     EVALUATION / RATIONALE FOR TREATMENT:  Most Recent Date    2/9/22: Wound bed nearly to surface. Collagen placed to area with small depth. Expect to d/c wound vac in a week and transition to dry dressing. Continue NPWT.     2/7/22: patient switched to regular NPWT, collagen added, granular tissue covering underlying tissue.  Continue POC:  2/4/22: New measurements taken, undermining still present but decreasing, muscle present continue VF, next change switch to regular vac and wilber.    2/2//22: Tunneling at 12 o'clock that's too small for foam to fit into, small piece of wilber applied to assist in healing. Muscle still present, improving undermining present from 9-11 o'clock. Continue NPWT vac changes.  1/31/22: Muscle visible less adipose tissue, undermining still present.  Changed veraflo settings, continue POC  1/28/22: Muscle and adipose tissue exposed. Undermining noted from 9-11 o'clock and 12-2 o'clock. Wound bed clean with scant serosang drainage. Veraflo initiated to keep underlying structures moist and to mechanically debride wound bed. Patient sleeping for entirety of procedure- recommend PO pre-med only. Recommend ongoing NPWT and F/U with outpatient wound vs home health if patient continues to refuse SNF placement.     Goals: Steady decrease in wound area and depth weekly.    WOUND TEAM PLAN OF CARE ([X] for frequency of wound follow up,):  Nursing to follow orders written for wound care. Contact wound team if area fails to progress, deteriorates or with any questions/concerns  Dressing changes by wound  team:                   Follow up 3 times weekly:                NPWT change 3 times weekly:   X  Follow up 1-2 times weekly:      Follow up Bi-Monthly:                   Follow up as needed:     Other (explain):     NURSING PLAN OF CARE ORDERS (X):  Dressing changes: See Dressing Care orders: X  Skin care: See Skin Care orders: X  RN Prevention Protocol: X  Rectal tube care: See Rectal Tube Care orders:   Other orders:    RSKIN:   CURRENTLY IN PLACE (X), APPLIED THIS VISIT (A), ORDERED (O):   Q shift Jensen:  X  Q shift pressure point assessments:  X    Surface/Positioning   Pressure redistribution mattress X           Low Airloss          Bariatric foam      Bariatric SAM     Waffle cushion        Waffle Overlay      X    Reposition q 2 hours      TAPs Turning system     Z Ash Pillow     Offloading/Redistribution   Sacral Mepilex (Silicone dressing)     Heel Mepilex (Silicone dressing)         Heel float boots (Prevalon boot)             Float Heels off Bed with Pillows           Respiratory   Silicone O2 tubing         Gray Foam Ear protectors     Cannula fixation Device (Tender )          High flow offloading Clip    Elastic head band offloading device      Anchorfast                                                         Trach with Optifoam split foam             Containment/Moisture Prevention     Rectal tube or BMS    Purwick/Condom Cath        Gunter Catheter    Barrier wipes           Barrier paste       Antifungal tx      Interdry      Urinal X     Mobilization   Up to chair        Ambulate      PT/OT  X    Nutrition   Dietician        Diabetes Education      PO X     TF     TPN     NPO   # days     Anticipated discharge plans:   LTACH:        SNF/Rehab:                  Home Health Care:    X    Vs SNF  Outpatient Wound Center:            Self/Family Care:        Other:                  Vac Discharge Needs:  Not Applicable Pt not on a wound vac:       Regular Vac while inpatient, alternative dressing  at DC:        Regular Vac in use and continued at DC:            Reg. Vac w/ Skin Sub/Biologic in use. Will need to be changed 2x wkly:      Veraflo Vac while inpatient, ok to transition to Regular Vac on Discharge:      X     Veraflo Vac while inpatient, will need to remain on Veraflo Vac upon discharge:

## 2022-02-09 NOTE — CARE PLAN
The patient is Stable - Low risk of patient condition declining or worsening    Shift Goals  Clinical Goals: Up to chair for all meals, PICC placement  Patient Goals: increase strength   Family Goals: n/a    Progress made toward(s) clinical / shift goals:    Problem: Pain - Standard  Goal: Alleviation of pain or a reduction in pain to the patient’s comfort goal  Outcome: Progressing     Problem: Knowledge Deficit - Standard  Goal: Patient and family/care givers will demonstrate understanding of plan of care, disease process/condition, diagnostic tests and medications  Outcome: Progressing     Problem: Fall Risk  Goal: Patient will remain free from falls  Outcome: Progressing     Problem: Skin Integrity  Goal: Skin integrity is maintained or improved  Outcome: Progressing       Patient is not progressing towards the following goals:

## 2022-02-09 NOTE — PROGRESS NOTES
"   Orthopaedic Progress Note    Interval changes:  Patient doing well  Cultures form right shoulder with no growth  No further ortho intervention planned   Continue wound care till closed    ROS - Patient denies any new issues.  Pain well controlled.    /72   Pulse 64   Temp 36.8 °C (98.2 °F) (Temporal)   Resp 20   Ht 1.803 m (5' 11\")   Wt 77.1 kg (170 lb)   SpO2 94%       Patient seen and examined  No acute distress  Breathing non labored  RRR  Right shoulder vac dressing CDI without leak, CDI, moves all toes, cap refill <2 sec.      Recent Labs     02/08/22  0321   WBC 3.7*   RBC 3.20*   HEMOGLOBIN 8.6*   HEMATOCRIT 27.4*   MCV 85.6   MCH 26.9*   MCHC 31.4*   RDW 51.1*   PLATELETCT 344   MPV 8.9*       Active Hospital Problems    Diagnosis    • Vertebral osteomyelitis ([facility]) [M46.20]    • DVT (deep venous thrombosis) ([facility]) [I82.409]    • Pyogenic arthritis of right shoulder region ([facility]) [M00.9]    • Hyponatremia [E87.1]    • MSSA bacteremia [R78.81, B95.61]    • COVID-19 [U07.1]    • Spinal stenosis at L4-L5 level [M48.061]    • Hypertension [I10]        Assessment/Plan:  Patient doing well  Cultures form right shoulder with no growth  No further ortho intervention planned   POD#15 S/P   1.  Irrigation and debridement, right glenohumeral joint.  2.  Multiple cultures.  3.  Application of wound VAC.  Wt bearing status - WBAT  Wound care/Drains - Vac dressings to be left in place  Future Procedures - none planned   Sutures/Staples out - n/a vac to close  PT/OT-initiated  Antibiotics: ancef 2g IVQ8  DVT Prophylaxis- TEDS/SCDs/Foot pumps  Gunter-none  Case Coordination for Discharge Planning - Disposition pending abx needs        "

## 2022-02-09 NOTE — DISCHARGE PLANNING
"Anticipated Discharge Disposition: Home with, wound vac, outpt infusion center for IV ABX    Action: Spoke with the pt at bedside. He is sitting in a chair eating his breakfast. Discussed at length with the pt DC plan and asked if he has a PCP. Pt states he does not have a PCP. Offered the pt to assist with finding a PCP, but pt states \"Sounds like it is going to be a lot of trouble, so I will just go to [facility] instead.\" Noted [employer] supplies are at bedside and were delivered this am. Pt states that he would like to get a hospital bed. Informed him that he won't qualify for one. He states that he would be interested in renting one if possible. Will provide the pt with DME providers so he can call them to make arrangements. Also providing information for Care chest to the pt if he qualifies. Updated MD with pt's wish to go to [facility] outpt IV infusion.  [facility]  has accepted the pt.      Barriers to Discharge: pending outpt IV infusion set up.     Plan: cont to f/u for DC needs.     1035 - PC from Maurice with [facility] . States that they are concerned about pt living alone and if they can educate the pt on how to trouble shoot a wound vac. Informed Maurice that pt is A/O x 4 and is capable of learning. He also states that he will try to find the pt a PCP and he will call me back today with updates.     1250 - Received a referral from Dr Pacheco with ID for oupt infusion center with [facility]. PC to [facility] infusion clinic, spoke with Carlita. Pt is scheduled for IV ABX infusion at [facility] on 2/11/2022 at 1630. Also, spoke with Maurice at [facility]. States they got an appointment with a new PCP for the pt. Noted information is in AVS. Provided information to the pt as well. Spoke with the pt re hospital bed. Reiterated to the pt that medicare will not cover a hospital bed. Provided with resources for the pt to call to purchase/rent a hospital bed. Pt verbalized understanding re his DC plan. RN and MD updated as well.     1311 " - IMM letter signed by the pt, faxed to DCP. Copy given to the pt.   Pt states his son or friends will provide dc transportation. PC from [facility] rep [place] requesting wound care notes for wound measurement. Faxed the wound RN note with all the requested information.

## 2022-02-10 VITALS
HEART RATE: 60 BPM | RESPIRATION RATE: 16 BRPM | WEIGHT: 170 LBS | BODY MASS INDEX: 23.8 KG/M2 | TEMPERATURE: 98.7 F | SYSTOLIC BLOOD PRESSURE: 139 MMHG | DIASTOLIC BLOOD PRESSURE: 75 MMHG | HEIGHT: 71 IN | OXYGEN SATURATION: 95 %

## 2022-02-10 DIAGNOSIS — M46.20 VERTEBRAL OSTEOMYELITIS (HCC): ICD-10-CM

## 2022-02-10 DIAGNOSIS — A49.01 MSSA (METHICILLIN SUSCEPTIBLE STAPHYLOCOCCUS AUREUS) INFECTION: ICD-10-CM

## 2022-02-10 PROCEDURE — 700105 HCHG RX REV CODE 258: Performed by: INTERNAL MEDICINE

## 2022-02-10 PROCEDURE — 700102 HCHG RX REV CODE 250 W/ 637 OVERRIDE(OP): Performed by: HOSPITALIST

## 2022-02-10 PROCEDURE — 99239 HOSP IP/OBS DSCHRG MGMT >30: CPT | Performed by: HOSPITALIST

## 2022-02-10 PROCEDURE — 700111 HCHG RX REV CODE 636 W/ 250 OVERRIDE (IP): Performed by: HOSPITALIST

## 2022-02-10 PROCEDURE — A9270 NON-COVERED ITEM OR SERVICE: HCPCS | Performed by: STUDENT IN AN ORGANIZED HEALTH CARE EDUCATION/TRAINING PROGRAM

## 2022-02-10 PROCEDURE — 700102 HCHG RX REV CODE 250 W/ 637 OVERRIDE(OP): Performed by: STUDENT IN AN ORGANIZED HEALTH CARE EDUCATION/TRAINING PROGRAM

## 2022-02-10 PROCEDURE — 700105 HCHG RX REV CODE 258: Performed by: HOSPITALIST

## 2022-02-10 PROCEDURE — A9270 NON-COVERED ITEM OR SERVICE: HCPCS | Performed by: HOSPITALIST

## 2022-02-10 PROCEDURE — 700101 HCHG RX REV CODE 250: Performed by: INTERNAL MEDICINE

## 2022-02-10 RX ORDER — DOCUSATE CALCIUM 240 MG
240 CAPSULE ORAL DAILY
Qty: 30 CAPSULE | Refills: 0 | Status: SHIPPED | OUTPATIENT
Start: 2022-02-11 | End: 2022-02-15

## 2022-02-10 RX ORDER — POLYETHYLENE GLYCOL 3350 17 G/17G
17 POWDER, FOR SOLUTION ORAL DAILY
Qty: 30 EACH | Refills: 0 | Status: SHIPPED | OUTPATIENT
Start: 2022-02-11

## 2022-02-10 RX ORDER — OXYCODONE HYDROCHLORIDE 5 MG/1
5 TABLET ORAL EVERY 6 HOURS PRN
Qty: 28 TABLET | Refills: 0 | Status: SHIPPED | OUTPATIENT
Start: 2022-02-10 | End: 2022-02-17

## 2022-02-10 RX ORDER — ACETAMINOPHEN 500 MG
1000 TABLET ORAL EVERY 6 HOURS PRN
Qty: 30 TABLET | Refills: 0 | Status: SHIPPED | OUTPATIENT
Start: 2022-02-10

## 2022-02-10 RX ORDER — GABAPENTIN 600 MG/1
600 TABLET ORAL 2 TIMES DAILY
Qty: 60 TABLET | Refills: 3 | Status: SHIPPED | OUTPATIENT
Start: 2022-02-10 | End: 2022-02-15

## 2022-02-10 RX ADMIN — NAFCILLIN SODIUM 2 G: 2 INJECTION, POWDER, FOR SOLUTION INTRAMUSCULAR; INTRAVENOUS at 03:52

## 2022-02-10 RX ADMIN — OXYCODONE 5 MG: 5 TABLET ORAL at 15:08

## 2022-02-10 RX ADMIN — NAFCILLIN SODIUM 2 G: 2 INJECTION, POWDER, FOR SOLUTION INTRAMUSCULAR; INTRAVENOUS at 08:10

## 2022-02-10 RX ADMIN — OXYCODONE HYDROCHLORIDE 10 MG: 10 TABLET ORAL at 03:51

## 2022-02-10 RX ADMIN — ACETAMINOPHEN 650 MG: 325 TABLET, FILM COATED ORAL at 08:10

## 2022-02-10 RX ADMIN — APIXABAN 5 MG: 5 TABLET, FILM COATED ORAL at 04:41

## 2022-02-10 RX ADMIN — DAPTOMYCIN 620 MG: 500 INJECTION, POWDER, LYOPHILIZED, FOR SOLUTION INTRAVENOUS at 10:25
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
## 2022-02-10 NOTE — DISCHARGE PLANNING
Anticipated Discharge Disposition: Home with [facility], [facility] wound vac, outpt infusion at [facility], PCP appointment     Action: DC order is in chart. Pt is stable for DC.   [facility] wound vac is at bedside, been delivered on 2/9/2022 in am to bedside  [facility] for SN/PT/OT has been notified of pt's DC. They will do SOC within 48 hours of pt's DC. Confirmed with Maurice from [facility].   Appt for outpt IV ABX infusion has been arranged with the [facility] and the first appointment will be on 2/11/2022 at 1630. Pt to receive a dose of IV ABX for today prior to DC.   Pt's family/friends will provide DC transportation.     Barriers to Discharge: none    Plan: pt is to DC home today.     1220 - PC to pt's PCP Sina Donaldson NP. Discussed with her pt's case and requested for an order and referral for outpt IV with [facility]. Per Kaylie, she will place it in [facility] and this CM can fax it to the infusion center. Order rec'd and faxed to the infusion center at [phone] along with H/P, DC summary and the most recent labs. Fax sent manually.     1340 - PC to [facility]. Spoke with infusion center representative. She states they have not rec'd fax that was sent about 1 hour ago. Informed her I faxed it x 2. Provided this CM with alternative fax number [phone]. She took down pt's information. Provided her with brief history and pt's needs. Informed her that pt will need to come in tomorrow. She states they will make room for the pt at 1530 tomorrow and transferred me to scheduling. Spoke with Peyton in scheduling. She states she is not able to schedule the pt as she does not have an order. Informed her that I faxed an order packet an hour ago to [phone]. She states that I need to fax it to [phone]. She is not able to schedule the pt until she has an order. Faxed an order and pt's FS to her at the number provided. Will call them in about 1 hour to schedule pt's appointment for tomorrow.     1405 - PC from Jordana with  scheduling at [facility]. States that they rec'd the order, but they will need a signed order. PC to PCP's office, spoke with MA for NP Mendoza. Requested for a signed order to be faxed to [phone]. She will do it shortly. I will f/u with [facility] in 30 minutes.     1445 - PC to [facility] scheduling ([phone]), spoke with Gila. States they got the signed order from NP and pt is scheduled for IV ABX. Spoke with the pt and son at bedside. Provided them with updates and let pt and son know that he has an appointment for IV infusion tomorrow at [facility] at 1530. Both verbalized understanding. RN, MD notified of the above. PC to [facility] outpt infusion clinic and spoke with Peyton. Informed her that pt will be going to [facility] instead for IV ABX and to cancel all the appointments.

## 2022-02-10 NOTE — DISCHARGE INSTRUCTIONS
ID recommending weekly CBC with differential, CMP and CPK.  Discharge Instructions    Discharged to home by car with relative. Discharged via wheelchair, hospital escort: Yes.  Special equipment needed: Not Applicable    Be sure to schedule a follow-up appointment with your primary care doctor or any specialists as instructed.     Discharge Plan:   Influenza Vaccine Indication: Patient Refuses    I understand that a diet low in cholesterol, fat, and sodium is recommended for good health. Unless I have been given specific instructions below for another diet, I accept this instruction as my diet prescription.   Other diet: Other    Special Instructions: None    · Is patient discharged on Warfarin / Coumadin?   No     Depression / Suicide Risk    As you are discharged from this [facility] facility, it is important to learn how to keep safe from harming yourself.    Recognize the warning signs:  · Abrupt changes in personality, positive or negative- including increase in energy   · Giving away possessions  · Change in eating patterns- significant weight changes-  positive or negative  · Change in sleeping patterns- unable to sleep or sleeping all the time   · Unwillingness or inability to communicate  · Depression  · Unusual sadness, discouragement and loneliness  · Talk of wanting to die  · Neglect of personal appearance   · Rebelliousness- reckless behavior  · Withdrawal from people/activities they love  · Confusion- inability to concentrate     If you or a loved one observes any of these behaviors or has concerns about self-harm, here's what you can do:  · Talk about it- your feelings and reasons for harming yourself  · Remove any means that you might use to hurt yourself (examples: pills, rope, extension cords, firearm)  · Get professional help from the community (Mental Health, Substance Abuse, psychological counseling)  · Do not be alone:Call your Safe Contact- someone whom you trust who will be there for  you.  · Call your local CRISIS HOTLINE 661-2467 or 262-768-3144  · Call your local Children's Mobile Crisis Response Team [place] (493) 463-2910 or www.GoodRx  · Call the toll free National Suicide Prevention Hotlines   · National Suicide Prevention Lifeline 915-309-PPWM (7470)  · National Stemgent Line Network 800-SUICIDE (604-6807)

## 2022-02-10 NOTE — PROGRESS NOTES
Assumed care of patient, bedside report received from DENISSE Jones. Updated on POC, call light within reach and fall precautions in place. Bed locked and in lowest position. Patient instructed to call for assistance before getting out of bed. All questions answered, no other needs at this time.

## 2022-02-10 NOTE — CARE PLAN
Problem: Pain - Standard  Goal: Alleviation of pain or a reduction in pain to the patient’s comfort goal  Outcome: Progressing     Problem: Knowledge Deficit - Standard  Goal: Patient and family/care givers will demonstrate understanding of plan of care, disease process/condition, diagnostic tests and medications  Outcome: Progressing     Problem: Fall Risk  Goal: Patient will remain free from falls  Outcome: Progressing   The patient is Stable - Low risk of patient condition declining or worsening    Shift Goals  Clinical Goals: OOB to chair for meals, pain control, discharge  Patient Goals: pain control  Family Goals: n/a    Progress made toward(s) clinical / shift goals:  Patient OOB to chair for meals, patient to discharge home today.    Patient is not progressing towards the following goals:

## 2022-02-10 NOTE — PROGRESS NOTES
Call from , Marina Harper, regarding need for outpatient infusion order for [facility].     Patient is currently in the hospital for vertebral osteomyelitis or infectious disease consulted on patient.  He was started on IV daptomycin today and  is currently receiving a dose per case management.  He is hoping to get discharged today after his infusion and continue with infusions daily at [facility].     Per notes from Dr. Xiang Vera on 2/6/2022 they are recommending that he get IV daptomycin 8 mg/kg every 24 hours until 3/13/2022.  Patient is requesting to have infusions done in [place] as he lives out here however due to [facility] not having privileges at [facility] they require requested that I place new order as patient will be establishing care with me on 2/16/2022.    Orders placed for IV daptomycin infusions as well as standing orders for, CBC, CMP, CPK weekly lab draws.    Orders placed in [facility] and Marina SALES will fax to [facility] infusion center.

## 2022-02-11 ENCOUNTER — APPOINTMENT (OUTPATIENT)
Dept: ONCOLOGY | Facility: MEDICAL CENTER | Age: 77
End: 2022-02-11
Attending: INTERNAL MEDICINE
Payer: MEDICARE

## 2022-02-11 NOTE — PROGRESS NOTES
Patient discharged home with [facility] wound vac. Wound care to bedside prior to patient leaving due to wound vac malfunction, dressing replaced and hole found in tubing of vac canister. Canister replaced.    Patient discharged home. Patient AOX 4. PICC line left in place for IV ABX. Discharge instructions provided to patient and reviewed with them. All questions answered, patient provided copy of discharge instructions and instructed on when to F/U with MD. Patient wheeled off unit. Patient discharged into the care of his son.

## 2022-02-12 ENCOUNTER — APPOINTMENT (OUTPATIENT)
Dept: ONCOLOGY | Facility: MEDICAL CENTER | Age: 77
End: 2022-02-12
Attending: INTERNAL MEDICINE
Payer: MEDICARE

## 2022-02-13 ENCOUNTER — APPOINTMENT (OUTPATIENT)
Dept: ONCOLOGY | Facility: MEDICAL CENTER | Age: 77
End: 2022-02-13
Payer: MEDICARE

## 2022-02-14 ENCOUNTER — APPOINTMENT (OUTPATIENT)
Dept: ONCOLOGY | Facility: MEDICAL CENTER | Age: 77
End: 2022-02-14
Attending: INTERNAL MEDICINE
Payer: MEDICARE

## 2022-02-14 ENCOUNTER — TELEPHONE (OUTPATIENT)
Dept: SCHEDULING | Facility: IMAGING CENTER | Age: 77
End: 2022-02-14

## 2022-02-15 ENCOUNTER — OFFICE VISIT (OUTPATIENT)
Dept: MEDICAL GROUP | Facility: PHYSICIAN GROUP | Age: 77
End: 2022-02-15
Payer: MEDICARE

## 2022-02-15 ENCOUNTER — APPOINTMENT (OUTPATIENT)
Dept: ONCOLOGY | Facility: MEDICAL CENTER | Age: 77
End: 2022-02-15
Attending: INTERNAL MEDICINE
Payer: MEDICARE

## 2022-02-15 VITALS
TEMPERATURE: 99.8 F | HEART RATE: 67 BPM | RESPIRATION RATE: 14 BRPM | OXYGEN SATURATION: 96 % | HEIGHT: 72 IN | SYSTOLIC BLOOD PRESSURE: 118 MMHG | BODY MASS INDEX: 23.06 KG/M2 | DIASTOLIC BLOOD PRESSURE: 80 MMHG

## 2022-02-15 DIAGNOSIS — R78.81 MSSA BACTEREMIA: ICD-10-CM

## 2022-02-15 DIAGNOSIS — I10 PRIMARY HYPERTENSION: ICD-10-CM

## 2022-02-15 DIAGNOSIS — Z09 HOSPITAL DISCHARGE FOLLOW-UP: ICD-10-CM

## 2022-02-15 DIAGNOSIS — E87.6 HYPOKALEMIA: ICD-10-CM

## 2022-02-15 DIAGNOSIS — B95.61 MSSA BACTEREMIA: ICD-10-CM

## 2022-02-15 DIAGNOSIS — M00.011 STAPHYLOCOCCAL ARTHRITIS OF RIGHT SHOULDER (HCC): ICD-10-CM

## 2022-02-15 DIAGNOSIS — M48.061 SPINAL STENOSIS AT L4-L5 LEVEL: ICD-10-CM

## 2022-02-15 DIAGNOSIS — K40.90 INGUINAL HERNIA WITHOUT OBSTRUCTION OR GANGRENE, RECURRENCE NOT SPECIFIED, UNSPECIFIED LATERALITY: ICD-10-CM

## 2022-02-15 DIAGNOSIS — M46.20 VERTEBRAL OSTEOMYELITIS (HCC): ICD-10-CM

## 2022-02-15 DIAGNOSIS — I82.4Y2 ACUTE DEEP VEIN THROMBOSIS (DVT) OF PROXIMAL VEIN OF LEFT LOWER EXTREMITY (HCC): ICD-10-CM

## 2022-02-15 DIAGNOSIS — G06.1 SPINAL EPIDURAL ABSCESS: ICD-10-CM

## 2022-02-15 PROBLEM — A49.01 METHICILLIN SUSCEPTIBLE STAPHYLOCOCCUS AUREUS INFECTION: Status: ACTIVE | Noted: 2022-02-11

## 2022-02-15 PROBLEM — R06.6 HICCUPS: Status: RESOLVED | Noted: 2018-05-03 | Resolved: 2022-02-15

## 2022-02-15 PROBLEM — R06.81 APNEA: Status: RESOLVED | Noted: 2018-04-29 | Resolved: 2022-02-15

## 2022-02-15 PROCEDURE — 99203 OFFICE O/P NEW LOW 30 MIN: CPT | Performed by: NURSE PRACTITIONER

## 2022-02-15 RX ORDER — AMOXICILLIN 250 MG
CAPSULE ORAL
COMMUNITY
End: 2022-02-15

## 2022-02-16 ENCOUNTER — APPOINTMENT (OUTPATIENT)
Dept: ONCOLOGY | Facility: MEDICAL CENTER | Age: 77
End: 2022-02-16
Attending: INTERNAL MEDICINE
Payer: MEDICARE

## 2022-02-16 ENCOUNTER — TELEPHONE (OUTPATIENT)
Dept: MEDICAL GROUP | Facility: PHYSICIAN GROUP | Age: 77
End: 2022-02-16
Payer: MEDICARE

## 2022-02-16 DIAGNOSIS — R78.81 MSSA BACTEREMIA: ICD-10-CM

## 2022-02-16 DIAGNOSIS — B95.61 MSSA BACTEREMIA: ICD-10-CM

## 2022-02-16 NOTE — TELEPHONE ENCOUNTER
[facility] called stating that they needs an order to d/c pt's picc line when he is done with his infusions. His last infusion is on 3/13/2022. They would like this order faxed to [phone]

## 2022-02-17 ENCOUNTER — APPOINTMENT (OUTPATIENT)
Dept: ONCOLOGY | Facility: MEDICAL CENTER | Age: 77
End: 2022-02-17
Attending: INTERNAL MEDICINE
Payer: MEDICARE

## 2022-02-17 PROBLEM — E87.1 HYPONATREMIA: Status: RESOLVED | Noted: 2022-01-23 | Resolved: 2022-02-17

## 2022-02-17 PROBLEM — K40.90 INGUINAL HERNIA: Status: ACTIVE | Noted: 2022-02-17

## 2022-02-17 PROBLEM — Z09 HOSPITAL DISCHARGE FOLLOW-UP: Status: ACTIVE | Noted: 2022-02-17

## 2022-02-17 PROBLEM — D64.9 POSTOPERATIVE ANEMIA: Status: RESOLVED | Noted: 2018-05-02 | Resolved: 2022-02-17

## 2022-02-17 PROBLEM — U07.1 COVID-19: Status: RESOLVED | Noted: 2022-01-22 | Resolved: 2022-02-17

## 2022-02-17 ASSESSMENT — ENCOUNTER SYMPTOMS
CONSTIPATION: 0
PSYCHIATRIC NEGATIVE: 1
ABDOMINAL PAIN: 0
VOMITING: 0
FEVER: 0
WEIGHT LOSS: 0
COUGH: 0
SHORTNESS OF BREATH: 0
DIARRHEA: 0
HEADACHES: 0
CHILLS: 0
BLOOD IN STOOL: 0
EYES NEGATIVE: 1
NAUSEA: 0
DIZZINESS: 0
PALPITATIONS: 0
WEAKNESS: 1

## 2022-02-18 ENCOUNTER — APPOINTMENT (OUTPATIENT)
Dept: ONCOLOGY | Facility: MEDICAL CENTER | Age: 77
End: 2022-02-18
Attending: INTERNAL MEDICINE
Payer: MEDICARE

## 2022-02-18 NOTE — ASSESSMENT & PLAN NOTE
Acute and uncomplicated condition   Currently taking Eliquis 2/1/2022 1/30/2022   Acute occlusive thrombus in the distal common femoral extanding into    femoral, profunda femoral, popliteal, posterior tibial, and peroneal veins.
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
## 2022-02-18 NOTE — ASSESSMENT & PLAN NOTE
IV daptomycin infusions at [facility] until 3/13/2022  PICC line to [facility]  Will have follow up with ID in [place]

## 2022-02-18 NOTE — ASSESSMENT & PLAN NOTE
Chronic and stable condition  Has been on Losartan previously   Not on any medications at this time  BP controlled 118/80

## 2022-02-18 NOTE — ASSESSMENT & PLAN NOTE
CT 1/26/2022  Large left inguinal hernia containing a portion of nonobstructed sigmoid colon. The hernia measures approximately 13 x 7 cm.
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
## 2022-02-18 NOTE — ASSESSMENT & PLAN NOTE
CT showed loculated small to moderate right groin medial humeral joint effusion which could represent septic arthritis there are also several small intramuscular abscesses in the subscapularis and anterior head of the deltoid.    Patient had irrigation debridement of right clavicular joint  Wound vac placed  Wound vac has been removed and dressing applied  No drainage or erythematous noted

## 2022-02-18 NOTE — ASSESSMENT & PLAN NOTE
Patient was seen in the ER on 1/20/2022 with complaints of low back pain that radiated to his right lower extremity x2 days, fevers of 101.   He was positive for Covid.    It was noted that he had an elevated ESR and CRP as well as blood cultures that returned both with positive staph aureus.  Infectious disease was consulted.    MRI was completed which showed right L4 foraminal stenosis with no abscess.    CT showed small to moderate right glenohumeral joint effusion which could represent septic arthritis as well as small intramuscular abscesses in the subscapularis and anterior the deltoid, orthopedic surgery was consulted and IR was consulted for drainage.    Patient is currently receiving IV daptomycin through his left upper extremity PICC line at [facility] which she will be receiving till 3/13/2022.  At that time he will have a follow-up appointment with infectious disease.

## 2022-02-18 NOTE — ASSESSMENT & PLAN NOTE
Results for RAMON NATALYA TRINH (MRN [id number]) as of 2/17/2022 16:20   Ref. Range 2/8/2022 03:21   Calcium Latest Ref Range: 8.5 - 10.5 mg/dL 8.1 (L)   Pending labs

## 2022-02-18 NOTE — ASSESSMENT & PLAN NOTE
MRI lumbar showed bone marrow edema by L4 and L5 vertebral bodies  MSSA  IV daptomycin until 3/13/2022  PICC LUE  Follow up with ID after infusions completed.

## 2022-02-19 ENCOUNTER — APPOINTMENT (OUTPATIENT)
Dept: ONCOLOGY | Facility: MEDICAL CENTER | Age: 77
End: 2022-02-19
Attending: INTERNAL MEDICINE
Payer: MEDICARE

## 2022-02-20 ENCOUNTER — APPOINTMENT (OUTPATIENT)
Dept: ONCOLOGY | Facility: MEDICAL CENTER | Age: 77
End: 2022-02-20
Attending: INTERNAL MEDICINE
Payer: MEDICARE

## 2022-02-21 ENCOUNTER — APPOINTMENT (OUTPATIENT)
Dept: ONCOLOGY | Facility: MEDICAL CENTER | Age: 77
End: 2022-02-21
Attending: INTERNAL MEDICINE
Payer: MEDICARE

## 2022-02-22 ENCOUNTER — OFFICE VISIT (OUTPATIENT)
Dept: MEDICAL GROUP | Facility: PHYSICIAN GROUP | Age: 77
End: 2022-02-22
Payer: MEDICARE

## 2022-02-22 ENCOUNTER — APPOINTMENT (OUTPATIENT)
Dept: ONCOLOGY | Facility: MEDICAL CENTER | Age: 77
End: 2022-02-22
Attending: INTERNAL MEDICINE
Payer: MEDICARE

## 2022-02-22 VITALS
BODY MASS INDEX: 22.02 KG/M2 | SYSTOLIC BLOOD PRESSURE: 118 MMHG | HEART RATE: 64 BPM | TEMPERATURE: 98.3 F | HEIGHT: 72 IN | OXYGEN SATURATION: 97 % | DIASTOLIC BLOOD PRESSURE: 84 MMHG | RESPIRATION RATE: 16 BRPM | WEIGHT: 162.6 LBS

## 2022-02-22 DIAGNOSIS — M25.562 ACUTE PAIN OF LEFT KNEE: ICD-10-CM

## 2022-02-22 DIAGNOSIS — D64.89 ANEMIA DUE TO OTHER CAUSE, NOT CLASSIFIED: ICD-10-CM

## 2022-02-22 DIAGNOSIS — K59.03 DRUG-INDUCED CONSTIPATION: ICD-10-CM

## 2022-02-22 DIAGNOSIS — I82.4Y2 ACUTE DEEP VEIN THROMBOSIS (DVT) OF PROXIMAL VEIN OF LEFT LOWER EXTREMITY (HCC): ICD-10-CM

## 2022-02-22 DIAGNOSIS — M79.605 LEFT LEG PAIN: ICD-10-CM

## 2022-02-22 PROBLEM — Z09 HOSPITAL DISCHARGE FOLLOW-UP: Status: RESOLVED | Noted: 2022-02-17 | Resolved: 2022-02-22

## 2022-02-22 PROCEDURE — 99214 OFFICE O/P EST MOD 30 MIN: CPT | Performed by: NURSE PRACTITIONER

## 2022-02-22 RX ORDER — TRAMADOL HYDROCHLORIDE 50 MG/1
50 TABLET ORAL EVERY 8 HOURS PRN
Qty: 9 TABLET | Refills: 0 | Status: SHIPPED | OUTPATIENT
Start: 2022-02-22 | End: 2022-02-25

## 2022-02-22 ASSESSMENT — FIBROSIS 4 INDEX: FIB4 SCORE: 1.87

## 2022-02-23 ENCOUNTER — APPOINTMENT (OUTPATIENT)
Dept: ONCOLOGY | Facility: MEDICAL CENTER | Age: 77
End: 2022-02-23
Attending: INTERNAL MEDICINE
Payer: MEDICARE

## 2022-02-23 ENCOUNTER — TELEPHONE (OUTPATIENT)
Dept: INFECTIOUS DISEASES | Facility: MEDICAL CENTER | Age: 77
End: 2022-02-23
Payer: MEDICARE

## 2022-02-23 ENCOUNTER — TELEPHONE (OUTPATIENT)
Dept: MEDICAL GROUP | Facility: PHYSICIAN GROUP | Age: 77
End: 2022-02-23

## 2022-02-23 NOTE — TELEPHONE ENCOUNTER
----- Message from Mandy Grewal M.D. sent at 2/17/2022  8:20 AM PST -----  Regarding: RE: follow up  Iggy Lott,    This is Dr. Grewal.  I am covering for Dr. Vera this week.  I will have our medical assistant contact Yuneir and schedule a follow-up visit in the clinic.    Thank you      ----- Message -----  From: MARJORIE Zafar  Sent: 2/16/2022   4:18 PM PST  To: Xiang Vera M.D.  Subject: follow up                                        Hi Dr. Vera,     Kaylie JOHNSON here. I am following with Yunier whom you saw in the hospital at [facility]. He is getting his infusions at [facility]. He will be done 3/13/2022. Did you want follow up with him outpatient after he has completed his infusions?    Thank you,   Kaylie JOHNSON

## 2022-02-23 NOTE — TELEPHONE ENCOUNTER
Patient called and wants to let Dr. Donaldson know he only has 36 pills of Eliquis as of today. Thank you

## 2022-02-24 ENCOUNTER — APPOINTMENT (OUTPATIENT)
Dept: ONCOLOGY | Facility: MEDICAL CENTER | Age: 77
End: 2022-02-24
Attending: INTERNAL MEDICINE
Payer: MEDICARE

## 2022-02-24 PROBLEM — M79.605 LEFT LEG PAIN: Status: ACTIVE | Noted: 2022-02-24

## 2022-02-24 PROBLEM — K59.03 DRUG-INDUCED CONSTIPATION: Status: ACTIVE | Noted: 2022-02-24

## 2022-02-24 ASSESSMENT — ENCOUNTER SYMPTOMS
HEADACHES: 0
PALPITATIONS: 0
TINGLING: 0
CHILLS: 0
FEVER: 0
DEPRESSION: 0
SHORTNESS OF BREATH: 0
CLAUDICATION: 0
WEIGHT LOSS: 0
DIZZINESS: 0
COUGH: 0

## 2022-02-25 ENCOUNTER — TELEPHONE (OUTPATIENT)
Dept: VASCULAR LAB | Facility: MEDICAL CENTER | Age: 77
End: 2022-02-25
Payer: MEDICARE

## 2022-02-25 ENCOUNTER — APPOINTMENT (OUTPATIENT)
Dept: ONCOLOGY | Facility: MEDICAL CENTER | Age: 77
End: 2022-02-25
Attending: INTERNAL MEDICINE
Payer: MEDICARE

## 2022-02-25 NOTE — TELEPHONE ENCOUNTER
Left voicemail message for patient to return call to [facility] to establish care      hansa Abdullahi, Clinical Pharmacist, CDE, CACP

## 2022-02-25 NOTE — PROGRESS NOTES
CC:  Chief Complaint   Patient presents with   • Lab Results   • Medication Refill     Oxycodone 5mg       HISTORY OF PRESENT ILLNESS: Patient is a 77 y.o. male established patient presenting follow up labs and infection       DVT (deep venous thrombosis) ([facility])  Patient concerned that he wont be able to afford his eliquis and wants to talk about some other medications      Drug-induced constipation  Trying to use stool softeners       Other specified anemias  Improving with DC from hospital  RBC- 3.36, hgb 9.4, hct 27.9  Denies lightheadedness, dizziness or bleeding    Left leg pain  Not sure if related to his DVT or his increase in exercise with  PT  Requesting refills on medication       Patient Active Problem List    Diagnosis Date Noted   • Drug-induced constipation 02/24/2022   • Left leg pain 02/24/2022   • Other specified anemias 02/22/2022   • Inguinal hernia 02/17/2022   • Methicillin susceptible Staphylococcus aureus infection 02/11/2022   • Vertebral osteomyelitis ([facility]) 02/04/2022   • DVT (deep venous thrombosis) ([facility]) 01/30/2022   • Pyogenic arthritis of right shoulder region ([facility]) 01/24/2022   • MSSA bacteremia 01/22/2022   • Spinal stenosis at L4-L5 level 01/22/2022   • Hypokalemia 05/02/2018   • Spinal epidural abscess 04/28/2018   • Hypertension 04/28/2018   • Gram-positive bacteremia 04/28/2018      Allergies:Patient has no known allergies.    Current Outpatient Medications   Medication Sig Dispense Refill   • traMADol (ULTRAM) 50 MG Tab Take 1 Tablet by mouth every 8 hours as needed for Moderate Pain for up to 3 days. 9 Tablet 0   • acetaminophen (TYLENOL) 500 MG Tab Take 2 Tablets by mouth every 6 hours as needed for Mild Pain or Fever. 30 Tablet 0   • apixaban (ELIQUIS) 5mg Tab Take 1 Tablet by mouth 2 times a day. Indications: DVT/PE 60 Tablet 5   • polyethylene glycol/lytes (MIRALAX) 17 g Pack Take 1 Packet by mouth every day. 30 Each 0   • NS SOLN 50 mL with DAPTOmycin 500 MG SOLR 620 mg  Infuse 620 mg into a venous catheter every 24 hours for 30 days. [facility]fusion [facility] 1 Application 0   • VITAMIN D PO Take 1 Tablet by mouth every day.     • Multiple Vitamins-Minerals (EMERGEN-C IMMUNE PO) Take 1 Dose by mouth every day. (Patient not taking: Reported on 2/22/2022)       No current facility-administered medications for this visit.       Social History     Tobacco Use   • Smoking status: Never Smoker   • Smokeless tobacco: Never Used   Vaping Use   • Vaping Use: Never used   Substance Use Topics   • Alcohol use: Yes     Comment: OCC   • Drug use: No     Social History     Social History Narrative    ** Merged History Encounter **            History reviewed. No pertinent family history.     Review of Systems   Constitutional: Positive for malaise/fatigue. Negative for chills, fever and weight loss.   Respiratory: Negative for cough and shortness of breath.    Cardiovascular: Negative for chest pain, palpitations, claudication and leg swelling.   Musculoskeletal: Positive for joint pain.        Left knee and leg pain at times   Skin: Negative.    Neurological: Negative for dizziness, tingling and headaches.   Psychiatric/Behavioral: Negative for depression and suicidal ideas.             - NOTE: All other systems reviewed and are negative, except as in HPI.      Exam:    /84   Pulse 64   Temp 36.8 °C (98.3 °F) (Temporal)   Resp 16   Ht 1.829 m (6')   Wt 73.8 kg (162 lb 9.6 oz)   SpO2 97%  Body mass index is 22.05 kg/m².    General: Alert, pleasant, NAD  EYES:   PERRL, EOMI, no icterus or pallor.  Conjunctivae and lids normal.   HENT:  Normocephalic.  External ears normal.   Heart: Regular rate and rhythm.  S1 and S2 normal.  No murmurs appreciated.  Respiratory: Normal respiratory effort.  Clear to auscultation bilaterally.  Skin: Warm, dry, no rashes.  Musculoskeletal: Gait is normal.  Moves all extremities well.    Extremities: No clubbing, cyanosis or edema noted.    Neurological: No tremors, sensation grossly intact, tone/strength normal, gait is normal, CN2-12 intact.  Psych:  Affect/mood is normal, judgement is good, memory is intact, grooming is appropriate.      LABS: 2/21/2021: Results reviewed and discussed with the patient, questions answered.    Assessment/Plan:  1. Acute pain of left knee  - traMADol (ULTRAM) 50 MG Tab; Take 1 Tablet by mouth every 8 hours as needed for Moderate Pain for up to 3 days.  Dispense: 9 Tablet; Refill: 0  - DX-KNEE 2- LEFT; Future    2. Acute deep vein thrombosis (DVT) of proximal vein of left lower extremity (HCC)  - Referral to Pharmacotherapy Service  Referral placed to help with assistance for cost of eliquis    3. Drug-induced constipation  miralax and metamucil for constipation  Increase in fluids  Stay active    4. Anemia due to other cause, not classified  Continue to monitor improvement with weekly labs for infusions    5. Left leg pain  Will continue to monitor- may be to to increase in activity        Discussed with patient possible alternative diagnoses, patient is to take all medications as prescribed.     If symptoms persist FU w/PCP, if symptoms worsen go to emergency room.     If experiencing any side effects from prescribed medications reports to the office immediately or go to emergency room.    Reviewed indication, dosage, usage and potential adverse effects of prescribed medications.     Reviewed risks and benefits of treatment plan. Patient verbalizes understanding of all instruction and verbally agrees to plan.      Return in about 15 days (around 3/9/2022) for labs and follow up on infection.    My total time spent caring for the patient on the day of the encounter was 31 minutes.   This does not include time spent on separately billable procedures/tests.     Please note that this dictation was created using voice recognition software. I have made every reasonable attempt to correct obvious errors, but I expect that  there are errors of grammar and possibly content that I did not discover before finalizing the note.

## 2022-02-25 NOTE — ASSESSMENT & PLAN NOTE
Not sure if related to his DVT or his increase in exercise with  PT  Requesting refills on medication

## 2022-02-25 NOTE — ASSESSMENT & PLAN NOTE
Patient concerned that he wont be able to afford his eliquis and wants to talk about some other medications

## 2022-02-25 NOTE — ASSESSMENT & PLAN NOTE
Improving with DC from hospital  RBC- 3.36, hgb 9.4, hct 27.9  Denies lightheadedness, dizziness or bleeding
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
## 2022-03-02 ENCOUNTER — DOCUMENTATION (OUTPATIENT)
Dept: VASCULAR LAB | Facility: MEDICAL CENTER | Age: 77
End: 2022-03-02

## 2022-03-02 ENCOUNTER — ANTICOAGULATION VISIT (OUTPATIENT)
Dept: MEDICAL GROUP | Facility: PHYSICIAN GROUP | Age: 77
End: 2022-03-02
Payer: MEDICARE

## 2022-03-02 VITALS — HEART RATE: 79 BPM | DIASTOLIC BLOOD PRESSURE: 77 MMHG | SYSTOLIC BLOOD PRESSURE: 113 MMHG

## 2022-03-02 DIAGNOSIS — Z79.01 CHRONIC ANTICOAGULATION: ICD-10-CM

## 2022-03-02 PROCEDURE — 93793 ANTICOAG MGMT PT WARFARIN: CPT | Performed by: FAMILY MEDICINE

## 2022-03-02 NOTE — PROGRESS NOTES
Initial anticoag note and most recent pcp note reviewed.  Started on oac for dvt associated with back injury and covid in late jan.  Pending further recs from PCP we will continue with 3 mo of oral anticoag and then check back with pcp to see if we can safely discontinue if symptoms have improved.     Will defer any indicated age appropriate screening for occult malignancy to pcp.    Michael Bloch, MD  Anticoagulation Clinic    Cc:  TERRI Donaldson

## 2022-03-02 NOTE — PROGRESS NOTES
Target end date: 4/21/2022     Indication: DVT     Drug: Eliquis 5mg po bid       Pt is a new referral for anticoagulation management for Covid induced DVT, as evidenced by US 1/30/2022.  Pt tested positive for SARS-CoV-2 in the Ed on admission. Eliquis was started in hospital, and pt presents now unable to afford Eliquis       Health Status Since Last Assessment   Patient denies any new relevant medical problems, ED visits or hospitalizations   Patient denies any embolic events (stroke/tia/systemic embolism)    Adherence with DOAC Therapy   Pt has zero missed any doses in the average week    Bleeding Risk Assessment     none Epistaxis   Pt denies any excessive or unusual bleeding/hematomas.  Pt denies any GI bleeds or hematemesis.  Pt denies any concerning daily headache or sub dural hematoma symptoms.     Pt denies any hematuria none   Latest Hemoglobin 8.6   ETOH overuse never     Creatinine Clearance/Renal Function     Latest ClCr >60ml/min    Hepatic function   Latest LFTs WNL   Pt denies any history of liver dysfunction      Drug Interactions   Platelets: 344   ASA/other antiplatelets none   NSAID none    Other drug interactions none     Verified no anticonvulsant or azole therapy, education provided for future use.     Examination   Blood Pressure 113/77 HR 79   Symptomatic hypotension none   Significant gait impairment/imbalance/high risk for falls? none    Final Assessment and Recommendations:   Patient appears stable from the anticoagulation standpoint.     Benefits of continued DOAC therapy outweigh risks for this patient   Recommend pt continue with current DOAC therapy   (with dose adjustment)   DOAC is NOT affordable - PAP application completed, however we need his medicare card for submission.  Pt will bring back to clinic to be scanned into his EHR    Pt given 28 day of samples until decision date is determined.  Pt report he still has 10 days worth of samples.  Will continue to provide samples  until this patient can afford his medications or get them via PAP     Other Actions: cmp/ cbc hemogram ordered prior to next visit    Follow up:   Will follow up with patient 1  months.      Dena Abdullahi, Clinical Pharmacist, CDE, CACP

## 2022-03-03 ENCOUNTER — OFFICE VISIT (OUTPATIENT)
Dept: INFECTIOUS DISEASES | Facility: MEDICAL CENTER | Age: 77
End: 2022-03-03
Payer: MEDICARE

## 2022-03-03 ENCOUNTER — HOSPITAL ENCOUNTER (OUTPATIENT)
Dept: RADIOLOGY | Facility: MEDICAL CENTER | Age: 77
End: 2022-03-03
Attending: NURSE PRACTITIONER
Payer: MEDICARE

## 2022-03-03 VITALS
TEMPERATURE: 100 F | OXYGEN SATURATION: 96 % | HEIGHT: 72 IN | WEIGHT: 159.8 LBS | DIASTOLIC BLOOD PRESSURE: 84 MMHG | SYSTOLIC BLOOD PRESSURE: 134 MMHG | HEART RATE: 94 BPM | RESPIRATION RATE: 16 BRPM | BODY MASS INDEX: 21.64 KG/M2

## 2022-03-03 DIAGNOSIS — B95.61 MSSA BACTEREMIA: ICD-10-CM

## 2022-03-03 DIAGNOSIS — A49.01 METHICILLIN SUSCEPTIBLE STAPHYLOCOCCUS AUREUS INFECTION: ICD-10-CM

## 2022-03-03 DIAGNOSIS — M46.20 VERTEBRAL OSTEOMYELITIS (HCC): ICD-10-CM

## 2022-03-03 DIAGNOSIS — G06.1 SPINAL EPIDURAL ABSCESS: ICD-10-CM

## 2022-03-03 DIAGNOSIS — R78.81 MSSA BACTEREMIA: ICD-10-CM

## 2022-03-03 DIAGNOSIS — M00.011 STAPHYLOCOCCAL ARTHRITIS OF RIGHT SHOULDER (HCC): ICD-10-CM

## 2022-03-03 PROCEDURE — 99214 OFFICE O/P EST MOD 30 MIN: CPT | Performed by: INTERNAL MEDICINE

## 2022-03-03 PROCEDURE — A9576 INJ PROHANCE MULTIPACK: HCPCS | Performed by: NURSE PRACTITIONER

## 2022-03-03 PROCEDURE — 700117 HCHG RX CONTRAST REV CODE 255: Performed by: NURSE PRACTITIONER

## 2022-03-03 PROCEDURE — 72158 MRI LUMBAR SPINE W/O & W/DYE: CPT | Mod: MG

## 2022-03-03 RX ADMIN — GADOTERIDOL 15 ML: 279.3 INJECTION, SOLUTION INTRAVENOUS at 17:03

## 2022-03-03 ASSESSMENT — ENCOUNTER SYMPTOMS
VOMITING: 0
ABDOMINAL PAIN: 0
NAUSEA: 0
CHILLS: 0
SHORTNESS OF BREATH: 0
FEVER: 0
COUGH: 0
BACK PAIN: 1

## 2022-03-03 ASSESSMENT — FIBROSIS 4 INDEX: FIB4 SCORE: 1.87

## 2022-03-04 ENCOUNTER — TELEPHONE (OUTPATIENT)
Dept: INFECTIOUS DISEASES | Facility: MEDICAL CENTER | Age: 77
End: 2022-03-04
Payer: MEDICARE

## 2022-03-04 ENCOUNTER — DOCUMENTATION (OUTPATIENT)
Dept: VASCULAR LAB | Facility: MEDICAL CENTER | Age: 77
End: 2022-03-04
Payer: MEDICARE

## 2022-03-04 DIAGNOSIS — G06.1 SPINAL EPIDURAL ABSCESS: ICD-10-CM

## 2022-03-04 DIAGNOSIS — A49.01 METHICILLIN SUSCEPTIBLE STAPHYLOCOCCUS AUREUS INFECTION: ICD-10-CM

## 2022-03-04 RX ORDER — DOXYCYCLINE 100 MG/1
100 CAPSULE ORAL 2 TIMES DAILY
Qty: 180 CAPSULE | Refills: 2 | Status: SHIPPED | OUTPATIENT
Start: 2022-03-04 | End: 2022-06-02

## 2022-03-04 NOTE — TELEPHONE ENCOUNTER
MRI of the lumbar spine shows type I marrow changes within the abnormal endplate and disc enhancement at L4-5 again with slightly worse within the disc space compared to prior imaging.  There is also new enhancement within the intervertebral disc space that was not seen on the prior study, which may favor low-grade infection.  No phlegmon noted.    We will plan on continuing IV daptomycin through 3/13/2022 followed by oral doxycycline 100 mg twice daily for at least 3 to 6 months pending clinical improvement and serial inflammatory markers.  Requested APRN to prescribe doxycycline 100 mg twice a day until seen in ID clinic    Discussed with patient's primary care provider, ALEX Nicole

## 2022-03-09 ENCOUNTER — OFFICE VISIT (OUTPATIENT)
Dept: MEDICAL GROUP | Facility: PHYSICIAN GROUP | Age: 77
End: 2022-03-09
Payer: MEDICARE

## 2022-03-09 VITALS
TEMPERATURE: 99.7 F | RESPIRATION RATE: 18 BRPM | HEIGHT: 72 IN | OXYGEN SATURATION: 96 % | WEIGHT: 157.8 LBS | HEART RATE: 80 BPM | BODY MASS INDEX: 21.37 KG/M2 | DIASTOLIC BLOOD PRESSURE: 68 MMHG | SYSTOLIC BLOOD PRESSURE: 138 MMHG

## 2022-03-09 DIAGNOSIS — K59.03 CONSTIPATION DUE TO OPIOID THERAPY: ICD-10-CM

## 2022-03-09 DIAGNOSIS — T40.2X5A CONSTIPATION DUE TO OPIOID THERAPY: ICD-10-CM

## 2022-03-09 DIAGNOSIS — K59.03 DRUG-INDUCED CONSTIPATION: ICD-10-CM

## 2022-03-09 DIAGNOSIS — M54.16 LUMBAR NERVE ROOT IMPINGEMENT: ICD-10-CM

## 2022-03-09 DIAGNOSIS — G06.1 SPINAL EPIDURAL ABSCESS: ICD-10-CM

## 2022-03-09 DIAGNOSIS — M46.20 VERTEBRAL OSTEOMYELITIS (HCC): ICD-10-CM

## 2022-03-09 PROCEDURE — 99214 OFFICE O/P EST MOD 30 MIN: CPT | Performed by: NURSE PRACTITIONER

## 2022-03-09 RX ORDER — DOCUSATE SODIUM 100 MG/1
100 CAPSULE, LIQUID FILLED ORAL 2 TIMES DAILY
Qty: 60 CAPSULE | Refills: 0 | Status: SHIPPED | OUTPATIENT
Start: 2022-03-09 | End: 2023-06-29

## 2022-03-09 RX ORDER — GABAPENTIN 600 MG/1
TABLET ORAL
Qty: 87 TABLET | Refills: 0 | Status: SHIPPED | OUTPATIENT
Start: 2022-03-09 | End: 2022-04-08

## 2022-03-09 ASSESSMENT — ENCOUNTER SYMPTOMS
FEVER: 0
WEIGHT LOSS: 0
MYALGIAS: 0
NAUSEA: 0
VOMITING: 0
CONSTIPATION: 1
ABDOMINAL PAIN: 0
COUGH: 0
HEADACHES: 0
FALLS: 0
BACK PAIN: 1
CHILLS: 0
DIZZINESS: 0
SHORTNESS OF BREATH: 0
BLOOD IN STOOL: 0
DIARRHEA: 0

## 2022-03-09 ASSESSMENT — FIBROSIS 4 INDEX: FIB4 SCORE: 1.87

## 2022-03-10 ENCOUNTER — TELEPHONE (OUTPATIENT)
Dept: INFECTIOUS DISEASES | Facility: MEDICAL CENTER | Age: 77
End: 2022-03-10
Payer: MEDICARE

## 2022-03-10 PROBLEM — M54.50 ACUTE LOW BACK PAIN: Status: ACTIVE | Noted: 2022-03-10

## 2022-03-10 NOTE — PATIENT INSTRUCTIONS
antibiotics at [facility] on [address].     Continue with the IV antibiotics until 3/13/2022- they will remove the PICC line at that time.     You will start taking the oral antibiotics (doxycycline) twice a day starting 3/14/2022    You need to follow up with Dr. Grewal in 6 weeks- please make sure you have an appointment with them.

## 2022-03-10 NOTE — TELEPHONE ENCOUNTER
----- Message from Mandy Grewal M.D. sent at 3/4/2022 12:18 PM PST -----  Regarding: Clinic follow up  Iggy Tabares,  Please call patient and let him know that MRI was a little worse so we will continue abx even after IV daptomycin is completed on 3/13. I have order doxy 100 mg BID at his pharmacy. Please let him know and have him start this abx on 3/14. We will plan the doxy for 3-6 months pending clinical improvement and serial inflammatory markers. He will need clinic follow up in 6 weeks.    Thanks,  Dr. Grewal

## 2022-03-10 NOTE — PROGRESS NOTES
CC:  Chief Complaint   Patient presents with   • Lab Results   • Results     MRI       HISTORY OF PRESENT ILLNESS: Patient is a 77 y.o. male established patient presenting MRI follow up and lab follow up      Drug-induced constipation  Acute and uncomplicated condition   Patient took his last pain medication today and states that he has been utilizing miralax, prunes with no change.  He is still having bowel movements  Denies n/v, c/f, blood in stool, abd pain    Spinal epidural abscess  ID reviewed MRI and ordered oral doxycycline 100mg for 3-6 months   Patient has follow up with ID in 6 weeks  Plan for IV antibiotics until 3/13/2022 when he will transition to oral doxycycline   Impression of MRI from 3/8/2022  Advanced multilevel degenerative changes as described above. Type I marrow changes with abnormal endplate and disc enhancement at L4-5 again noted, slightly worse within the disc space compared to the previous examination. Once again, the differential   diagnosis remains low-grade infection versus progression of type I marrow changes. However, there is new enhancement within the intervertebral disc space that was not seen on the previous study which may favor a low-grade infection. No definite pre or   paravertebral phlegmon is seen.Postoperative changes noted with midline laminectomy at L5.        At L3-4, there is moderate impingement upon the right L3 nerve in the extraforaminal zone.     Advanced degenerative changes in the lower lumbar spine with moderate to severe right and moderate left foraminal narrowing at L4-5.     At L5-S1, there is moderate to severe right foraminal narrowing with impingement upon the exiting right L5 nerve.       Hypokalemia  Resolved  Labs 2/28/2022 calcium 9.1    Pyogenic arthritis of right shoulder region ([facility])  resolved    DVT (deep venous thrombosis) ([facility])  Acute and uncomplicated condition   Followed up with Dena Abdullahi Pharmacist for assistance on eliquis    Vertebral  osteomyelitis (HCC)  IV antibiotics until 3/13/2022 then transition to oral doxycycline for 3-6 months pending ID follow up  MRI 3/8/2022 impression   Advanced multilevel degenerative changes as described above. Type I marrow changes with abnormal endplate and disc enhancement at L4-5 again noted, slightly worse within the disc space compared to the previous examination. Once again, the differential   diagnosis remains low-grade infection versus progression of type I marrow changes. However, there is new enhancement within the intervertebral disc space that was not seen on the previous study which may favor a low-grade infection. No definite pre or   paravertebral phlegmon is seen.Postoperative changes noted with midline laminectomy at L5.        At L3-4, there is moderate impingement upon the right L3 nerve in the extraforaminal zone.     Advanced degenerative changes in the lower lumbar spine with moderate to severe right and moderate left foraminal narrowing at L4-5.     At L5-S1, there is moderate to severe right foraminal narrowing with impingement upon the exiting right L5 nerve.       Acute low back pain  MRI impression   Postoperative changes noted with midline laminectomy at L5.  At L3-4, there is moderate impingement upon the right L3 nerve in the extraforaminal zone.  Advanced degenerative changes in the lower lumbar spine with moderate to severe right and moderate left foraminal narrowing at L4-5.  At L5-S1, there is moderate to severe right foraminal narrowing with impingement upon the exiting right L5 nerve.        Patient Active Problem List    Diagnosis Date Noted   • Acute low back pain 03/10/2022   • Drug-induced constipation 02/24/2022   • Left leg pain 02/24/2022   • Other specified anemias 02/22/2022   • Inguinal hernia 02/17/2022   • Methicillin susceptible Staphylococcus aureus infection 02/11/2022   • Vertebral osteomyelitis (HCC) 02/04/2022   • DVT (deep venous thrombosis) ([facility]) 01/30/2022    • Pyogenic arthritis of right shoulder region (HCC) 01/24/2022   • MSSA bacteremia 01/22/2022   • Spinal stenosis at L4-L5 level 01/22/2022   • Hypokalemia 05/02/2018   • Spinal epidural abscess 04/28/2018   • Hypertension 04/28/2018   • Gram-positive bacteremia 04/28/2018      Allergies:Patient has no known allergies.    Current Outpatient Medications   Medication Sig Dispense Refill   • gabapentin (NEURONTIN) 600 MG tablet Take 1 Tablet by mouth every day for 1 day, THEN 1 Tablet 2 times a day for 1 day, THEN 1 Tablet 3 times a day for 28 days. 87 Tablet 0   • docusate sodium (COLACE) 100 MG Cap Take 1 Capsule by mouth 2 times a day. 60 Capsule 0   • doxycycline (MONODOX) 100 MG capsule Take 1 Capsule by mouth 2 times a day for 90 days. 180 Capsule 2   • acetaminophen (TYLENOL) 500 MG Tab Take 2 Tablets by mouth every 6 hours as needed for Mild Pain or Fever. 30 Tablet 0   • apixaban (ELIQUIS) 5mg Tab Take 1 Tablet by mouth 2 times a day. Indications: DVT/PE 60 Tablet 5   • polyethylene glycol/lytes (MIRALAX) 17 g Pack Take 1 Packet by mouth every day. 30 Each 0   • NS SOLN 50 mL with DAPTOmycin 500 MG SOLR 620 mg Infuse 620 mg into a venous catheter every 24 hours for 30 days. [facility] 1 Application 0   • VITAMIN D PO Take 1 Tablet by mouth every day.     • Multiple Vitamins-Minerals (EMERGEN-C IMMUNE PO) Take 1 Dose by mouth every day.       No current facility-administered medications for this visit.       Social History     Tobacco Use   • Smoking status: Never Smoker   • Smokeless tobacco: Never Used   Vaping Use   • Vaping Use: Never used   Substance Use Topics   • Alcohol use: Yes     Comment: OCC   • Drug use: No     Social History     Social History Narrative    ** Merged History Encounter **            History reviewed. No pertinent family history.     Review of Systems   Constitutional: Positive for malaise/fatigue. Negative for chills, fever and weight loss.   Respiratory:  Negative for cough and shortness of breath.    Cardiovascular: Negative for chest pain.   Gastrointestinal: Positive for constipation. Negative for abdominal pain, blood in stool, diarrhea, nausea and vomiting.   Musculoskeletal: Positive for back pain. Negative for falls and myalgias.   Neurological: Negative for dizziness and headaches.             - NOTE: All other systems reviewed and are negative, except as in HPI.      Exam:    /68   Pulse 80   Temp 37.6 °C (99.7 °F) (Temporal)   Resp 18   Ht 1.829 m (6')   Wt 71.6 kg (157 lb 12.8 oz)   SpO2 96%  Body mass index is 21.4 kg/m².    General: Alert, pleasant, NAD  EYES:   PERRL, EOMI, no icterus or pallor.  Conjunctivae and lids normal.   HENT:  Normocephalic.  External ears normal.   Heart: Regular rate and rhythm.  S1 and S2 normal.  No murmurs appreciated.  Respiratory: Normal respiratory effort.  Clear to auscultation bilaterally.  Skin: Warm, dry, no rashes.  Musculoskeletal: Gait is limping to the left.  Moves all extremities well.    Extremities: No clubbing, cyanosis or edema noted.   Neurological: No tremors, sensation grossly intact, tone/strength normal, gait is limping to the left, CN2-12 intact.  Psych:  Affect/mood is normal, judgement is good, memory is intact, grooming is appropriate.      LABS: 2/28/2022, 3/8/2022: Results reviewed and discussed with the patient, questions answered.    Assessment/Plan:  1. Constipation due to opioid therapy  - docusate sodium (COLACE) 100 MG Cap; Take 1 Capsule by mouth 2 times a day.  Dispense: 60 Capsule; Refill: 0  2. Drug-induced constipation  Follow up in 2 weeks for efficacy of constipatino    3. Spinal epidural abscess  4. Vertebral osteomyelitis (HCC)  Continue with IV antibiotics until 3/13/2022 then transition to oral doxycycline   Follow up with ID    5. Lumbar nerve root impingement- gabapentin (NEURONTIN) 600 MG tablet; Take 1 Tablet by mouth every day for 1 day, THEN 1 Tablet 2 times a  day for 1 day, THEN 1 Tablet 3 times a day for 28 days.  Dispense: 87 Tablet; Refill: 0      Discussed with patient possible alternative diagnoses, patient is to take all medications as prescribed.     If symptoms persist FU w/PCP, if symptoms worsen go to emergency room.     If experiencing any side effects from prescribed medications reports to the office immediately or go to emergency room.    Reviewed indication, dosage, usage and potential adverse effects of prescribed medications.     Reviewed risks and benefits of treatment plan. Patient verbalizes understanding of all instruction and verbally agrees to plan.      Return in about 3 weeks (around 3/30/2022) for medication efficacy.    My total time spent caring for the patient on the day of the encounter was 28 minutes.   This does not include time spent on separately billable procedures/tests.     Please note that this dictation was created using voice recognition software. I have made every reasonable attempt to correct obvious errors, but I expect that there are errors of grammar and possibly content that I did not discover before finalizing the note.

## 2022-03-10 NOTE — TELEPHONE ENCOUNTER
Pt notified and pt did  doxy rx. Requested I call later to schedule appointment due to patient traveling.

## 2022-03-11 ENCOUNTER — TELEPHONE (OUTPATIENT)
Dept: INFECTIOUS DISEASES | Facility: MEDICAL CENTER | Age: 77
End: 2022-03-11
Payer: MEDICARE

## 2022-03-11 NOTE — ASSESSMENT & PLAN NOTE
MRI impression   Postoperative changes noted with midline laminectomy at L5.  At L3-4, there is moderate impingement upon the right L3 nerve in the extraforaminal zone.  Advanced degenerative changes in the lower lumbar spine with moderate to severe right and moderate left foraminal narrowing at L4-5.  At L5-S1, there is moderate to severe right foraminal narrowing with impingement upon the exiting right L5 nerve.

## 2022-03-11 NOTE — TELEPHONE ENCOUNTER
----- Message from Mandy Grewal M.D. sent at 3/10/2022  3:34 PM PST -----  Regarding: RE: Clinic follow up  The MRI was done with contrast so there was enhancement within the intervertebral disc at the L4-5 level. This was seen previously as well on previous MRI and is the same site of infection.  MRIs also can lag a patient's clinical presentation. But no new areas of infection.    ----- Message -----  From: Gisel Barahona, Med Ass't  Sent: 3/10/2022   2:27 PM PST  To: Mandy Grewal M.D.  Subject: RE: Clinic follow up                             Pt would like specifics on what was worse. Pt was relayed information and was able to  doxy rx from pharmacy he will start it on 03/14  ----- Message -----  From: Mandy Grewal M.D.  Sent: 3/4/2022  12:20 PM PST  To: Peter Platt Ass't  Subject: Clinic follow up                                 Iggy Tabares,  Please call patient and let him know that MRI was a little worse so we will continue abx even after IV daptomycin is completed on 3/13. I have order doxy 100 mg BID at his pharmacy. Please let him know and have him start this abx on 3/14. We will plan the doxy for 3-6 months pending clinical improvement and serial inflammatory markers. He will need clinic follow up in 6 weeks.    Thanks,  Dr. Grewal

## 2022-03-11 NOTE — ASSESSMENT & PLAN NOTE
IV antibiotics until 3/13/2022 then transition to oral doxycycline for 3-6 months pending ID follow up  MRI 3/8/2022 impression   Advanced multilevel degenerative changes as described above. Type I marrow changes with abnormal endplate and disc enhancement at L4-5 again noted, slightly worse within the disc space compared to the previous examination. Once again, the differential   diagnosis remains low-grade infection versus progression of type I marrow changes. However, there is new enhancement within the intervertebral disc space that was not seen on the previous study which may favor a low-grade infection. No definite pre or   paravertebral phlegmon is seen.Postoperative changes noted with midline laminectomy at L5.        At L3-4, there is moderate impingement upon the right L3 nerve in the extraforaminal zone.     Advanced degenerative changes in the lower lumbar spine with moderate to severe right and moderate left foraminal narrowing at L4-5.     At L5-S1, there is moderate to severe right foraminal narrowing with impingement upon the exiting right L5 nerve.

## 2022-03-11 NOTE — ASSESSMENT & PLAN NOTE
Acute and uncomplicated condition   Patient took his last pain medication today and states that he has been utilizing miralax, prunes with no change.  He is still having bowel movements  Denies n/v, c/f, blood in stool, abd pain

## 2022-03-11 NOTE — ASSESSMENT & PLAN NOTE
ID reviewed MRI and ordered oral doxycycline 100mg for 3-6 months   Patient has follow up with ID in 6 weeks  Plan for IV antibiotics until 3/13/2022 when he will transition to oral doxycycline   Impression of MRI from 3/8/2022  Advanced multilevel degenerative changes as described above. Type I marrow changes with abnormal endplate and disc enhancement at L4-5 again noted, slightly worse within the disc space compared to the previous examination. Once again, the differential   diagnosis remains low-grade infection versus progression of type I marrow changes. However, there is new enhancement within the intervertebral disc space that was not seen on the previous study which may favor a low-grade infection. No definite pre or   paravertebral phlegmon is seen.Postoperative changes noted with midline laminectomy at L5.        At L3-4, there is moderate impingement upon the right L3 nerve in the extraforaminal zone.     Advanced degenerative changes in the lower lumbar spine with moderate to severe right and moderate left foraminal narrowing at L4-5.     At L5-S1, there is moderate to severe right foraminal narrowing with impingement upon the exiting right L5 nerve.

## 2022-03-11 NOTE — ASSESSMENT & PLAN NOTE
Acute and uncomplicated condition   Followed up with Dena Filter Pharmacist for assistance on eliquis

## 2022-03-30 ENCOUNTER — OFFICE VISIT (OUTPATIENT)
Dept: MEDICAL GROUP | Facility: PHYSICIAN GROUP | Age: 77
End: 2022-03-30
Payer: MEDICARE

## 2022-03-30 VITALS
HEART RATE: 67 BPM | BODY MASS INDEX: 21.75 KG/M2 | HEIGHT: 72 IN | RESPIRATION RATE: 14 BRPM | SYSTOLIC BLOOD PRESSURE: 134 MMHG | WEIGHT: 160.6 LBS | OXYGEN SATURATION: 98 % | TEMPERATURE: 98.3 F | DIASTOLIC BLOOD PRESSURE: 66 MMHG

## 2022-03-30 DIAGNOSIS — M46.20 VERTEBRAL OSTEOMYELITIS (HCC): ICD-10-CM

## 2022-03-30 DIAGNOSIS — I82.4Y2 ACUTE DEEP VEIN THROMBOSIS (DVT) OF PROXIMAL VEIN OF LEFT LOWER EXTREMITY (HCC): ICD-10-CM

## 2022-03-30 DIAGNOSIS — E78.5 HYPERLIPIDEMIA, UNSPECIFIED HYPERLIPIDEMIA TYPE: ICD-10-CM

## 2022-03-30 DIAGNOSIS — D64.89 ANEMIA DUE TO OTHER CAUSE, NOT CLASSIFIED: ICD-10-CM

## 2022-03-30 DIAGNOSIS — Z12.5 SCREENING FOR PROSTATE CANCER: ICD-10-CM

## 2022-03-30 DIAGNOSIS — Z12.12 SCREENING FOR COLORECTAL CANCER: ICD-10-CM

## 2022-03-30 DIAGNOSIS — I10 PRIMARY HYPERTENSION: ICD-10-CM

## 2022-03-30 DIAGNOSIS — K59.09 OTHER CONSTIPATION: ICD-10-CM

## 2022-03-30 DIAGNOSIS — D50.9 IRON DEFICIENCY ANEMIA, UNSPECIFIED IRON DEFICIENCY ANEMIA TYPE: ICD-10-CM

## 2022-03-30 DIAGNOSIS — Z12.11 SCREENING FOR COLORECTAL CANCER: ICD-10-CM

## 2022-03-30 PROBLEM — M54.50 ACUTE LOW BACK PAIN: Status: RESOLVED | Noted: 2022-03-10 | Resolved: 2022-03-30

## 2022-03-30 PROBLEM — M00.9 PYOGENIC ARTHRITIS OF RIGHT SHOULDER REGION (HCC): Status: RESOLVED | Noted: 2022-01-24 | Resolved: 2022-03-30

## 2022-03-30 PROCEDURE — 99214 OFFICE O/P EST MOD 30 MIN: CPT | Performed by: NURSE PRACTITIONER

## 2022-03-30 ASSESSMENT — FIBROSIS 4 INDEX: FIB4 SCORE: 1.87

## 2022-03-30 NOTE — ASSESSMENT & PLAN NOTE
Chronic and stable condition   Not currently on medication for this  Increased from last visit 134/66  Denies change in vision, HA, lightheadedness  Will provide BP journal

## 2022-03-30 NOTE — ASSESSMENT & PLAN NOTE
Chronic and stable condition   Takes miralax and colace daily as well as a pick medication (states it is not pepto bismol)  Not getting impacted- does not need to digitally extract stool

## 2022-03-30 NOTE — PATIENT INSTRUCTIONS
Checking Blood Pressure:  -Blood pressure cuff, spend in the $40-65, with good return policy  -It should be automatic, upper arm, measure your arm to get the correct size, probably adult Large but your arm should be under 16.5 cm. If you need an XL cuff, you will have to have it special ordered from a pharmacy or durable medical equipment company.  -Put the cuff in place, rest arm on table near height of your heart, sit quietly for 5 min, legs uncrossed, with back support, then take your blood pressure, write it down, keep a log  -Check no more than 1 time day, maybe 4-5 times per week, try different times of day.  -Can bring your cuff to at least one appointment where it can be calibrated to a manual cuff if you are concerned.  -Goal blood pressure is at least under 140/90, ideally under 130/80.  If you think your BP is overall too high, let us know in the office, we can adjust medications, can use MyChart.       Salt=sodium=sea salt, guidelines say stay under 2,500 mg daily but I ask for under 4,000 mg daily.  Get salt smart, start looking at labels, count it up.  Salt is hidden in everything, salad dressing, sauces, cheese, most canned food, any processed meat

## 2022-03-30 NOTE — ASSESSMENT & PLAN NOTE
Chronic and stable condition   Improving  Finished IV antibiotics 3/13/2022 and transitioned to oral doxycycline for 3-6 months pending ID   C/o some continued back pain but notes improvement  Denies chills, fevers, worsening back pain

## 2022-03-31 ENCOUNTER — ANTICOAGULATION VISIT (OUTPATIENT)
Dept: MEDICAL GROUP | Facility: PHYSICIAN GROUP | Age: 77
End: 2022-03-31
Payer: MEDICARE

## 2022-03-31 DIAGNOSIS — Z79.01 CHRONIC ANTICOAGULATION: ICD-10-CM

## 2022-03-31 PROCEDURE — 99211 OFF/OP EST MAY X REQ PHY/QHP: CPT | Performed by: STUDENT IN AN ORGANIZED HEALTH CARE EDUCATION/TRAINING PROGRAM

## 2022-03-31 ASSESSMENT — ENCOUNTER SYMPTOMS
HEADACHES: 0
WEIGHT LOSS: 0
FALLS: 0
BACK PAIN: 1
VOMITING: 0
BLOOD IN STOOL: 0
ABDOMINAL PAIN: 0
DIARRHEA: 0
WEAKNESS: 0
DIZZINESS: 0
NAUSEA: 0
FEVER: 0
CONSTIPATION: 1
CHILLS: 0
SHORTNESS OF BREATH: 0

## 2022-03-31 NOTE — PROGRESS NOTES
CC:  Chief Complaint   Patient presents with   • Follow-Up     Review of health       HISTORY OF PRESENT ILLNESS: Patient is a 77 y.o. male established patient presenting follow medication for pain.       Other constipation  Chronic and stable condition   Takes miralax and colace daily as well as a pick medication (states it is not pepto bismol)  Not getting impacted- does not need to digitally extract stool    Vertebral osteomyelitis (HCC)  Chronic and stable condition   Improving  Finished IV antibiotics 3/13/2022 and transitioned to oral doxycycline for 3-6 months pending ID   C/o some continued back pain but notes improvement  Denies chills, fevers, worsening back pain     Other specified anemias  Will get iron studies  3/7/2022 hgb- 9.6, hct 29, RBC 3.49    Hypokalemia  Resolved   most recent labs 3/7/2022 4.2    Hypertension  Chronic and stable condition   Not currently on medication for this  Increased from last visit 134/66  Denies change in vision, HA, lightheadedness  Will provide BP journal       Patient Active Problem List    Diagnosis Date Noted   • Other constipation 02/24/2022   • Left leg pain 02/24/2022   • Other specified anemias 02/22/2022   • Inguinal hernia 02/17/2022   • Methicillin susceptible Staphylococcus aureus infection 02/11/2022   • Vertebral osteomyelitis (HCC) 02/04/2022   • DVT (deep venous thrombosis) ([facility]) 01/30/2022   • MSSA bacteremia 01/22/2022   • Spinal stenosis at L4-L5 level 01/22/2022   • Hypokalemia 05/02/2018   • Spinal epidural abscess 04/28/2018   • Hypertension 04/28/2018   • Gram-positive bacteremia 04/28/2018      Allergies:Patient has no known allergies.    Current Outpatient Medications   Medication Sig Dispense Refill   • gabapentin (NEURONTIN) 600 MG tablet Take 1 Tablet by mouth every day for 1 day, THEN 1 Tablet 2 times a day for 1 day, THEN 1 Tablet 3 times a day for 28 days. 87 Tablet 0   • docusate sodium (COLACE) 100 MG Cap Take 1 Capsule by mouth 2 times a  day. 60 Capsule 0   • doxycycline (MONODOX) 100 MG capsule Take 1 Capsule by mouth 2 times a day for 90 days. 180 Capsule 2   • acetaminophen (TYLENOL) 500 MG Tab Take 2 Tablets by mouth every 6 hours as needed for Mild Pain or Fever. 30 Tablet 0   • apixaban (ELIQUIS) 5mg Tab Take 1 Tablet by mouth 2 times a day. Indications: DVT/PE 60 Tablet 5   • polyethylene glycol/lytes (MIRALAX) 17 g Pack Take 1 Packet by mouth every day. 30 Each 0   • VITAMIN D PO Take 1 Tablet by mouth every day.     • Multiple Vitamins-Minerals (EMERGEN-C IMMUNE PO) Take 1 Dose by mouth every day.       No current facility-administered medications for this visit.       Social History     Tobacco Use   • Smoking status: Never Smoker   • Smokeless tobacco: Never Used   Vaping Use   • Vaping Use: Never used   Substance Use Topics   • Alcohol use: Yes     Comment: OCC   • Drug use: No     Social History     Social History Narrative    ** Merged History Encounter **            History reviewed. No pertinent family history.     Review of Systems   Constitutional: Negative for chills, fever and weight loss.   Respiratory: Negative for shortness of breath.    Cardiovascular: Negative for chest pain.   Gastrointestinal: Positive for constipation. Negative for abdominal pain, blood in stool, diarrhea, nausea and vomiting.   Musculoskeletal: Positive for back pain. Negative for falls and joint pain.   Neurological: Negative for dizziness, weakness and headaches.             - NOTE: All other systems reviewed and are negative, except as in HPI.      Exam:    /66   Pulse 67   Temp 36.8 °C (98.3 °F) (Temporal)   Resp 14   Ht 1.829 m (6')   Wt 72.8 kg (160 lb 9.6 oz)   SpO2 98%  Body mass index is 21.78 kg/m².    General: Alert, pleasant, NAD  EYES:   PERRL, EOMI, no icterus or pallor.  Conjunctivae and lids normal.   HENT:  Normocephalic.  External ears normal. Tympanic membranes pearly, opaque.  No nasal drainage present.  Oropharynx  non-erythematous, mucous membranes moist.  Neck supple.   No thyromegaly or masses palpated. No cervical or supraclavicular lymphadenopathy.  Heart: Regular rate and rhythm.  S1 and S2 normal.  No murmurs appreciated.  Respiratory: Normal respiratory effort.  Clear to auscultation bilaterally.  Abdomen: Non-distended, soft, non-tender, no guarding/rebound. Bowel sounds present.  No hepatosplenomegaly.  No hernias.  Skin: Warm, dry, no rashes.  Musculoskeletal: Gait is normal.  Moves all extremities well.    Extremities: No clubbing, cyanosis or edema noted.   Neurological: No tremors, sensation grossly intact, tone/strength normal, gait is normal, CN2-12 intact.  Psych:  Affect/mood is normal, judgement is good, memory is intact, grooming is appropriate.      Assessment/Plan:  1. Other constipation  Continue with colace, miralax    2. Screening for colorectal cancer  - OCCULT BLOOD FECES IMMUNOASSAY; Future    3. Iron deficiency anemia, unspecified iron deficiency anemia type  - FERRITIN; Future  - IRON/TOTAL IRON BIND; Future  Will get labs to see if there is underlying causes to his anemia    4. Vertebral osteomyelitis (HCC)  Continue with oral antibiotics per ID recommendations    5. Anemia due to other cause, not classified  Will get iron panel for possible ALEC cause    6. Primary hypertension  Provided pt with BP journal for follow up regarding trending up BP    7. Acute deep vein thrombosis (DVT) of proximal vein of left lower extremity (HCC)  Continue with Eliquis  Continue to follow with Dena Pharmacist for Patient assistance with eliquis    8. Hyperlipidemia, unspecified hyperlipidemia type  - Lipid Profile; Future    9. Screening for prostate cancer  - PROSTATE SPECIFIC AG SCREENING; Future      Discussed with patient possible alternative diagnoses, patient is to take all medications as prescribed.     If symptoms persist FU w/PCP, if symptoms worsen go to emergency room.     If experiencing any side  effects from prescribed medications reports to the office immediately or go to emergency room.    Reviewed indication, dosage, usage and potential adverse effects of prescribed medications.     Reviewed risks and benefits of treatment plan. Patient verbalizes understanding of all instruction and verbally agrees to plan.      Return in about 3 weeks (around 4/20/2022) for bp follow up.    My total time spent caring for the patient on the day of the encounter was 27 minutes.   This does not include time spent on separately billable procedures/tests.     Please note that this dictation was created using voice recognition software. I have made every reasonable attempt to correct obvious errors, but I expect that there are errors of grammar and possibly content that I did not discover before finalizing the note.

## 2022-03-31 NOTE — PROGRESS NOTES
Target end date:5/10/2022     Indication: covid induced DVT     Drug: Eliquis 5mg po bid         Health Status Since Last Assessment   Patient denies any new relevant medical problems, ED visits or hospitalizations   Patient denies any embolic events (stroke/tia/systemic embolism)    Adherence with DOAC Therapy   Pt has zero missed any doses in the average week    Bleeding Risk Assessment     none Epistaxis   Pt denies any excessive or unusual bleeding/hematomas.  Pt denies any GI bleeds or hematemesis.  Pt denies any concerning daily headache or sub dural hematoma symptoms.     Pt denies any hematuria none   Latest Hemoglobin none   ETOH overuse never     Creatinine Clearance/Renal Function     Latest ClCr >60ml/min    Hepatic function   Latest LFTs WNL   Pt denies any history of liver dysfunction      Drug Interactions   Platelets: 344   ASA/other antiplatelets none   NSAID none   Other drug interactions none   none Verified no anticonvulsant or azole therapy, education provided for future use.       Final Assessment and Recommendations:   Patient appears stable from the anticoagulation standpoint.     Benefits of continued DOAC therapy outweigh risks for this patient   Recommend pt continue with current DOAC therapy   (with dose adjustment)   DOAC is NOT affordable pending PAP, samples provided     Other Actions: cmp/ cbc hemogram ordered prior to next visit    Follow up:   Will follow up with patient 1  Month with SHANNON Abdullahi, Clinical Pharmacist, CDE, CACP

## 2022-04-15 ENCOUNTER — TELEPHONE (OUTPATIENT)
Dept: MEDICAL GROUP | Facility: PHYSICIAN GROUP | Age: 77
End: 2022-04-15
Payer: MEDICARE

## 2022-04-15 NOTE — TELEPHONE ENCOUNTER
Phone Number Called: [phone]    Call outcome: Left detailed message for patient. Informed to call back with any additional questions.    Message: pt needs to schedule f/u visit in next 2 weeks to discuss lab results

## 2022-04-20 ENCOUNTER — TELEPHONE (OUTPATIENT)
Dept: MEDICAL GROUP | Facility: PHYSICIAN GROUP | Age: 77
End: 2022-04-20

## 2022-04-20 NOTE — TELEPHONE ENCOUNTER
Patient came in office and drop off his lab results (in [place]) for Dr. Donaldson to review. Thank you

## 2022-04-21 ENCOUNTER — DOCUMENTATION (OUTPATIENT)
Dept: VASCULAR LAB | Facility: MEDICAL CENTER | Age: 77
End: 2022-04-21
Payer: MEDICARE

## 2022-04-21 DIAGNOSIS — I82.4Y2 ACUTE DEEP VEIN THROMBOSIS (DVT) OF PROXIMAL VEIN OF LEFT LOWER EXTREMITY (HCC): ICD-10-CM

## 2022-04-21 NOTE — PROGRESS NOTES
Spoke with pt by phone. He is inquiring about if he can have a f/u US regarding his LLE DVT. I will order the scan then follow up with pt in vascular on 4/28/22. Asked pt to try and schedule US prior to his visit with me.    Desirae JOHNSON

## 2022-04-27 ENCOUNTER — OFFICE VISIT (OUTPATIENT)
Dept: MEDICAL GROUP | Facility: PHYSICIAN GROUP | Age: 77
End: 2022-04-27
Payer: MEDICARE

## 2022-04-27 ENCOUNTER — TELEPHONE (OUTPATIENT)
Dept: MEDICAL GROUP | Facility: PHYSICIAN GROUP | Age: 77
End: 2022-04-27

## 2022-04-27 VITALS
DIASTOLIC BLOOD PRESSURE: 74 MMHG | OXYGEN SATURATION: 99 % | SYSTOLIC BLOOD PRESSURE: 124 MMHG | TEMPERATURE: 99 F | HEIGHT: 72 IN | WEIGHT: 164 LBS | BODY MASS INDEX: 22.21 KG/M2 | HEART RATE: 73 BPM | RESPIRATION RATE: 14 BRPM

## 2022-04-27 DIAGNOSIS — E78.1 HIGH TRIGLYCERIDES: ICD-10-CM

## 2022-04-27 DIAGNOSIS — I10 PRIMARY HYPERTENSION: ICD-10-CM

## 2022-04-27 DIAGNOSIS — D50.9 IRON DEFICIENCY ANEMIA, UNSPECIFIED IRON DEFICIENCY ANEMIA TYPE: ICD-10-CM

## 2022-04-27 DIAGNOSIS — I82.4Y2 ACUTE DEEP VEIN THROMBOSIS (DVT) OF PROXIMAL VEIN OF LEFT LOWER EXTREMITY (HCC): ICD-10-CM

## 2022-04-27 DIAGNOSIS — E78.2 MIXED HYPERLIPIDEMIA: ICD-10-CM

## 2022-04-27 PROCEDURE — 99214 OFFICE O/P EST MOD 30 MIN: CPT | Performed by: NURSE PRACTITIONER

## 2022-04-27 RX ORDER — ROSUVASTATIN CALCIUM 5 MG/1
5 TABLET, COATED ORAL EVERY EVENING
Qty: 90 TABLET | Refills: 0 | Status: SHIPPED | OUTPATIENT
Start: 2022-04-27 | End: 2023-06-29

## 2022-04-27 ASSESSMENT — FIBROSIS 4 INDEX: FIB4 SCORE: 1.87

## 2022-04-27 NOTE — PATIENT INSTRUCTIONS
Call [facility] in [place] and let them know that you think there is a suture or something in your shoulder that is painful that they worked on in January while you were in the [address], [address]   Phone [phone]   Hours [facility], Monday-Friday 8 a.m. - 5 p.m.   [facility], Monday-Friday 8 a.m. - 5 p.m.       Get labs in 3 months    Check on Rosuvastatin at the pharmacy for cost and     We will order iron infusion for the low iron

## 2022-04-27 NOTE — TELEPHONE ENCOUNTER
Called and spoke to patient. Pt reports understanding with negative occult stool test. MD    ----- Message from MARJORIE Zafar sent at 4/27/2022  7:13 AM PDT -----  Please inform patient that his Occult stool (colon cancer screening) came back negative and he can get annual testing next year

## 2022-04-27 NOTE — ASSESSMENT & PLAN NOTE
Recent lab work shows elevated triglycerides 229  States he eats bread daily and put 2-4 tablespoons sugar in his coffee

## 2022-04-27 NOTE — PROGRESS NOTES
CC:  Chief Complaint   Patient presents with   • Lab Results       HISTORY OF PRESENT ILLNESS: Patient is a 77 y.o. male established patient presenting for follow up on labs     1. Mixed hyperlipidemia  New condition   ASCVD with 29% risk over 10 years  Not on any medication    2. Iron deficiency anemia, unspecified iron deficiency anemia type  New condition   Denies history of ALEC    3. High triglycerides  New condition  ASCVD with 29% risk factor over 10 years  Not currently on any medication    4. Acute deep vein thrombosis (DVT) of proximal vein of left lower extremity (HCC)  Chronic and stable condition  Patient continues to follow with [facility]  Patient also continue to take Eliquis  Has follow-up imaging on 5/2/2022    5. Primary hypertension  Chronic and stable condition  /74 in office     Allergies:Patient has no known allergies.    Current Outpatient Medications   Medication Sig Dispense Refill   • rosuvastatin (CRESTOR) 5 MG Tab Take 1 Tablet by mouth every evening. 90 Tablet 0   • docusate sodium (COLACE) 100 MG Cap Take 1 Capsule by mouth 2 times a day. 60 Capsule 0   • doxycycline (MONODOX) 100 MG capsule Take 1 Capsule by mouth 2 times a day for 90 days. 180 Capsule 2   • acetaminophen (TYLENOL) 500 MG Tab Take 2 Tablets by mouth every 6 hours as needed for Mild Pain or Fever. 30 Tablet 0   • apixaban (ELIQUIS) 5mg Tab Take 1 Tablet by mouth 2 times a day. Indications: DVT/PE 60 Tablet 5   • polyethylene glycol/lytes (MIRALAX) 17 g Pack Take 1 Packet by mouth every day. 30 Each 0   • VITAMIN D PO Take 1 Tablet by mouth every day.     • Multiple Vitamins-Minerals (EMERGEN-C IMMUNE PO) Take 1 Dose by mouth every day.       No current facility-administered medications for this visit.       Social History     Tobacco Use   • Smoking status: Never Smoker   • Smokeless tobacco: Never Used   Vaping Use   • Vaping Use: Never used   Substance Use Topics   • Alcohol use: Yes     Comment:  OCC   • Drug use: No     Social History     Social History Narrative    ** Merged History Encounter **            History reviewed. No pertinent family history.     ROS    see HPI      - NOTE: All other systems reviewed and are negative, except as in HPI.      Exam:    /74   Pulse 73   Temp 37.2 °C (99 °F) (Temporal)   Resp 14   Ht 1.829 m (6')   Wt 74.4 kg (164 lb)   SpO2 99%  Body mass index is 22.24 kg/m².    General: Alert, pleasant, NAD  EYES:   PERRL, EOMI, no icterus or pallor.  Conjunctivae and lids normal.   HENT:  Normocephalic.  External ears normal.  Skin: Warm, dry, no rashes.  Musculoskeletal: Gait is normal.  Moves all extremities well.    Extremities: No clubbing, cyanosis or edema noted.   Neurological: No tremors, sensation grossly intact, tone/strength normal, gait is normal, CN2-12 intact.  Psych:  Affect/mood is normal, judgement is good, memory is intact, grooming is appropriate.      LABS: 4/14/2022: Results reviewed and discussed with the patient, questions answered.    Assessment/Plan:  ALEC (iron deficiency anemia)  Iron labs completed show low TSA 7 and Ferritin 16  Unknown cause  Colorectal screeening recently completed with negative   Will send referral to GI   We will also get infusion set up at [facility] for iron infusion    High triglycerides  Recent lab work shows elevated triglycerides 229  States he eats bread daily and put 2-4 tablespoons sugar in his coffee    DVT (deep venous thrombosis) (HCC)  Patient continues to follow with [facility]  Continues to take Eliquis.  States that he has follow-up imaging scheduled on May 2 to determine if there is still a DVT present      Hypertension  Blood pressure in office 124/74.  Not currently taking medication at this time  Denies change in vision, headache, lightheadedness.       Discussed with patient possible alternative diagnoses, patient is to take all medications as prescribed.     If symptoms persist FU  w/PCP, if symptoms worsen go to emergency room.     If experiencing any side effects from prescribed medications reports to the office immediately or go to emergency room.    Reviewed indication, dosage, usage and potential adverse effects of prescribed medications.     Reviewed risks and benefits of treatment plan. Patient verbalizes understanding of all instruction and verbally agrees to plan.      Return in about 3 months (around 7/27/2022).     Please note that this dictation was created using voice recognition software. I have made every reasonable attempt to correct obvious errors, but I expect that there are errors of grammar and possibly content that I did not discover before finalizing the note.

## 2022-04-27 NOTE — ASSESSMENT & PLAN NOTE
Blood pressure in office 124/74.  Not currently taking medication at this time  Denies change in vision, headache, lightheadedness.

## 2022-04-27 NOTE — ASSESSMENT & PLAN NOTE
Patient continues to follow with [facility]ist  Continues to take Eliquis.  States that he has follow-up imaging scheduled on May 2 to determine if there is still a DVT present

## 2022-04-27 NOTE — ASSESSMENT & PLAN NOTE
Iron labs completed show low TSA 7 and Ferritin 16  Unknown cause  Colorectal screeening recently completed with negative   Will send referral to GI   We will also get infusion set up at [facility] for iron infusion

## 2022-04-28 ENCOUNTER — TELEPHONE (OUTPATIENT)
Dept: ONCOLOGY | Facility: MEDICAL CENTER | Age: 77
End: 2022-04-28
Payer: MEDICARE

## 2022-04-28 PROBLEM — D50.9 IRON DEFICIENCY ANEMIA: Status: ACTIVE | Noted: 2022-04-28

## 2022-04-28 RX ORDER — METHYLPREDNISOLONE SODIUM SUCCINATE 125 MG/2ML
125 INJECTION, POWDER, LYOPHILIZED, FOR SOLUTION INTRAMUSCULAR; INTRAVENOUS PRN
Status: CANCELLED | OUTPATIENT
Start: 2022-04-28

## 2022-04-28 RX ORDER — SODIUM CHLORIDE 9 MG/ML
INJECTION, SOLUTION INTRAVENOUS CONTINUOUS
Status: CANCELLED | OUTPATIENT
Start: 2022-04-28

## 2022-04-28 RX ORDER — EPINEPHRINE 1 MG/ML(1)
0.5 AMPUL (ML) INJECTION PRN
Status: CANCELLED | OUTPATIENT
Start: 2022-04-28

## 2022-04-28 RX ORDER — DIPHENHYDRAMINE HYDROCHLORIDE 50 MG/ML
50 INJECTION INTRAMUSCULAR; INTRAVENOUS PRN
Status: CANCELLED | OUTPATIENT
Start: 2022-04-28

## 2022-05-02 ENCOUNTER — HOSPITAL ENCOUNTER (OUTPATIENT)
Dept: RADIOLOGY | Facility: MEDICAL CENTER | Age: 77
End: 2022-05-02
Attending: NURSE PRACTITIONER
Payer: MEDICARE

## 2022-05-02 DIAGNOSIS — I82.4Y2 ACUTE DEEP VEIN THROMBOSIS (DVT) OF PROXIMAL VEIN OF LEFT LOWER EXTREMITY (HCC): ICD-10-CM

## 2022-05-02 PROCEDURE — 93971 EXTREMITY STUDY: CPT | Mod: LT

## 2022-05-05 ENCOUNTER — ANTICOAGULATION VISIT (OUTPATIENT)
Dept: VASCULAR LAB | Facility: MEDICAL CENTER | Age: 77
End: 2022-05-05
Attending: INTERNAL MEDICINE
Payer: MEDICARE

## 2022-05-05 DIAGNOSIS — I82.4Y2 ACUTE DEEP VEIN THROMBOSIS (DVT) OF PROXIMAL VEIN OF LEFT LOWER EXTREMITY (HCC): ICD-10-CM

## 2022-05-05 PROCEDURE — 99213 OFFICE O/P EST LOW 20 MIN: CPT | Performed by: NURSE PRACTITIONER

## 2022-05-05 PROCEDURE — 99212 OFFICE O/P EST SF 10 MIN: CPT | Performed by: NURSE PRACTITIONER

## 2022-05-05 RX ORDER — ASPIRIN 81 MG/1
81 TABLET, CHEWABLE ORAL DAILY
COMMUNITY

## 2022-05-05 ASSESSMENT — ENCOUNTER SYMPTOMS
MYALGIAS: 1
DIZZINESS: 0
FALLS: 0
BLOOD IN STOOL: 0
SHORTNESS OF BREATH: 0
CLAUDICATION: 0
FEVER: 0
CHILLS: 0
LOSS OF CONSCIOUSNESS: 0
HEMOPTYSIS: 0
SEIZURES: 0
BACK PAIN: 1
BRUISES/BLEEDS EASILY: 0

## 2022-05-05 NOTE — PATIENT INSTRUCTIONS
- finish your current supply of Eliquis 5 mg by mouth twice daily then began taking aspirin 81 mg by mouth daily  - 1 week after stopping Eliquis, go to the lab to have your blood tests done  - go to the ER for shortness of breath, chest pain, pain with deep inhalation, worsening leg swelling and/or pain in calf or leg   - avoid sedentary periods  - let all your providers know you take this medication  - don't stop this medication without permission from your doctor or our clinic  -  refills before you run out  - continue complete avoidance of tobacco products  - avoid hormonal therapies including estrogen or testosterone-containing meds, or raloxifene or tamoxifene (commonly used for osteoporosis)  - to avoid Aspirin and anti-inflammatories (eg. Advil, ibuprofen, Aleve, naproxen, etc) while anticoagulated   - Avoid skiing or other dangerous activities to reduce risk of head injury and brain bleeds  - elevate legs as much as possible, use compression stockings/socks if directed by your provider  - if any bleeding lasting 30min without stopping, please seek care with your PCP, urgent care, or ED  - if having any invasive procedure, please make sure the doctor knows of your history of blood clots and current anticoagulation status  - let us know of any medication changes  - recommended to see your PCP to discuss if you need age-appropriate cancer screenings as a small % of blood clots may be caused by an underlying malignancy  - reversal agents for most blood thinners are now available and used if you have major bleeding

## 2022-05-05 NOTE — PROGRESS NOTES
VASCULAR MEDICINE CLINIC - INITIAL VISIT (ANTICOAGULATION)  05/05/22     Yunier Denney is a 77 y.o. male who presents today for evaluation of length of therapy in regards to his chronic anticoagulation.     Subjective    HPI:  Patient referred for evaluation and management of his anticoagulation therapy.  He was hospitalized 1/22/22-2/10/22 for back pain.   Noted to be Covid + upon admission.   Did not receive Covid vaccines.  He was treated with antibiotics for vertebral osteomyelitis and a right should abscess.  He developed LLE swelling and pain during his hospitalization.  US on 1/30/22 showed an extensive LLE DVT.  D dimer 6.73.  Denies any prior hx of VTE.  No FH of VTEs.  Denies any prior surgeries, traumas or extended travel within the previous 3 months.  He did do some heavy lifting in early January which exacerbated his back pain but did not have any injuries.  No tobacco or hormone use.  No personal hx of cancer.  Has never had a colonoscopy. Had an occult blood feces immunoassay last month which was neg. In need of a psa level which he has ordered.  He was started on Eliquis 10 mg BID x 7 days then 5 mg BID.  Has been adherent to taking.  It is not affordable. He has been using samples. Has not missed doses.  No problems with bleeding.  No further calf pain or swelling.  Has some left knee pain.  No SOB or CP.  He continues to follow with ID. Currently taking doxy for 90 days.  He has completed 3 months of therapy.  Requesting a hypercoag w/u because he worries he could have an underlying clotting disorder.      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) 1/24/2022        Past Surgical History:   Procedure Laterality Date   • IRRIGATION  & DEBRIDEMENT GENERAL Right 1/25/2022    Procedure: IRRIGATION AND DEBRIDEMENT, WOUND  , MULTIPLE CULTURES;  Surgeon: Larry Aldridge M.D.;  Location: SURGERY [facility];  Service: Orthopedics   • APPLICATION OR REPLACEMENT, WOUND VAC Right 1/25/2022    Procedure: APPLICATION WOUND VAC;  Surgeon: Larry Aldridge M.D.;  Location: SURGERY [facility];  Service: Orthopedics   • LUMBAR LAMINECTOMY DISKECTOMY  4/29/2018    Procedure: LUMBAR LAMINECTOMY Y L4-S1;  Surgeon: Fercho Herron M.D.;  Location: SURGERY [facility];  Service: Neurosurgery   • IRRIGATION & DEBRIDEMENT NEURO  4/29/2018    Procedure: IRRIGATION & DEBRIDEMENT NEURO- epidural mass;  Surgeon: Fercho Herron M.D.;  Location: SURGERY [facility];  Service: Neurosurgery        No family history on file.     Social History     Tobacco Use   • Smoking status: Never Smoker   • Smokeless tobacco: Never Used   Vaping Use   • Vaping Use: Never used   Substance Use Topics   • Alcohol use: Yes     Comment: OCC   • Drug use: No        Current Outpatient Medications on File Prior to Visit   Medication Sig Dispense Refill   • rosuvastatin (CRESTOR) 5 MG Tab Take 1 Tablet by mouth every evening. 90 Tablet 0   • docusate sodium (COLACE) 100 MG Cap Take 1 Capsule by mouth 2 times a day. 60 Capsule 0   • doxycycline (MONODOX) 100 MG capsule Take 1 Capsule by mouth 2 times a day for 90 days. 180 Capsule 2   • acetaminophen (TYLENOL) 500 MG Tab Take 2 Tablets by mouth every 6 hours as needed for Mild Pain or Fever. 30 Tablet 0   • apixaban (ELIQUIS) 5mg Tab Take 1 Tablet by mouth 2 times a day. Indications: DVT/PE 60 Tablet 5   • polyethylene glycol/lytes (MIRALAX) 17 g Pack Take 1 Packet by mouth every day. 30 Each 0   • VITAMIN D PO Take 1 Tablet by mouth every day.     • Multiple Vitamins-Minerals (EMERGEN-C IMMUNE PO) Take 1 Dose by mouth every day.       No current facility-administered medications on file prior to visit.        Patient has no known  allergies.     DIET AND EXERCISE:  Weight Change: stable  Diet: common adult  Exercise: no regular exercise program     Review of Systems   Constitutional: Negative for chills and fever.   HENT: Negative for nosebleeds.    Respiratory: Negative for hemoptysis and shortness of breath.    Cardiovascular: Negative for chest pain, claudication and leg swelling.   Gastrointestinal: Negative for blood in stool and melena.   Genitourinary: Negative for hematuria.   Musculoskeletal: Positive for back pain, joint pain and myalgias. Negative for falls.   Neurological: Negative for dizziness, seizures and loss of consciousness.   Endo/Heme/Allergies: Does not bruise/bleed easily.            Objective       Objective:     Physical Exam  Constitutional:       Appearance: Normal appearance. He is normal weight.   Cardiovascular:      Rate and Rhythm: Normal rate and regular rhythm.      Heart sounds: Normal heart sounds.   Pulmonary:      Effort: Pulmonary effort is normal.      Breath sounds: Normal breath sounds.   Abdominal:      General: Abdomen is flat. Bowel sounds are normal.      Palpations: Abdomen is soft.   Musculoskeletal:         General: Normal range of motion.   Skin:     General: Skin is warm and dry.   Neurological:      General: No focal deficit present.      Mental Status: He is alert and oriented to person, place, and time.   Psychiatric:         Mood and Affect: Mood normal.         Behavior: Behavior normal.         Thought Content: Thought content normal.         Judgment: Judgment normal.          DATA REVIEW    No results found for: CHOLSTRLTOT, LDL, HDL, TRIGLYCERIDE    Lab Results   Component Value Date/Time    SODIUM 136 02/08/2022 03:21 AM    POTASSIUM 3.5 (L) 02/08/2022 03:21 AM    CHLORIDE 103 02/08/2022 03:21 AM    CO2 24 02/08/2022 03:21 AM    GLUCOSE 119 (H) 02/08/2022 03:21 AM    BUN 7 (L) 02/08/2022 03:21 AM    CREATININE 0.65 02/08/2022 03:21 AM     Lab Results   Component Value Date/Time     ALKPHOSPHAT 70 01/25/2022 05:21 AM    ASTSGOT 36 01/25/2022 05:21 AM    ALTSGPT 19 01/25/2022 05:21 AM    TBILIRUBIN 0.3 01/25/2022 05:21 AM       INR   Date Value Ref Range Status   01/24/2022 1.11 0.87 - 1.13 Final     Comment:     INR - Non-therapeutic Reference Range: 0.87-1.13  INR - Therapeutic Reference Range: 2.0-4.0       No results found for: POCINR     1/30/22 LLE venous u/s:   FINDINGS:   Left lower extremity.    Acute occlusive thrombus in the distal common femoral extanding into    femoral, profunda femoral, popliteal, posterior tibial, and peroneal veins.    5/2/22 LLE venous u/s:  Previously demonstrated left lower extremity DVT has almost completely resolved.  Residual nonobstructing chronic thrombus is located within the left mid femoral vein.      IMPRESSION:     Interval resolving left lower extremity DVT. Residual nonobstructing thrombus is located within the left mid femoral vein.      Medical Decision Making:  Today's Assessment / Status / Plan:     1. Acute deep vein thrombosis (DVT) of proximal vein of left lower extremity (HCC)  D-DIMER    FACTOR V LEIDEN MUTATION    FACTOR II DNA ANALYSIS    BETA-2 GLYCOPROTEIN I AB,G,A,M    ANTICARDIOLIPIN AB IGG,IGM,IGA    PROTEIN S FUNCTIONAL    PROTEIN C FUNCTIONAL    ANTITHROMBIN PANEL    LUPUS ANTICOAGULANT        Indication for anticoagulation: Extensive LLE DVT in the setting of Covid infection and prolonged hospitalization.    Anti-Platelet/Anticoagulant Discussion:  Discussed the risks and benefits of stopping OAC in this setting. He has completed 3 months of therapy per ACCP guidelines. His provoking factors were Covid and prolonged hospitalization. Known ongoing risk factor is male sex. He has tolerated Eliquis without any problems with bleeding. His repeat US showed a small area of chronic thrombus in the mid femoral vein, otherwise the DVT has resolved. He is requesting a hypercoag w/u to r/o any predisposing factors for increased VTE risk. I  discussed that given his age at presentation with 1st VTE and the fact that he had provoking factors for this VTE occurance, a hypercoag panel is not indicated. He still wishes to pursue. Will have him finish Eliquis then began aspirin 81 mg daily. Discussed the importance of close surveillance of s/sx of recurrent VTE and when to seek emergent medical attention. Asked that he let all his providers know he has a hx of DVT, jamal if having any surgeries or hospitalizations for which he should receive prophylactic anticoagulation.    Anti-Coagulation Plan:  - finish your current supply of Eliquis 5 mg by mouth twice daily then began taking aspirin 81 mg by mouth daily  - 1 week after stopping Eliquis, go to the lab to have your blood tests done  - go to the ER for shortness of breath, chest pain, pain with deep inhalation, worsening leg swelling and/or pain in calf or leg   - avoid sedentary periods  - let all your providers know you take this medication  - don't stop this medication without permission from your doctor or our clinic  -  refills before you run out  - continue complete avoidance of tobacco products  - avoid hormonal therapies including estrogen or testosterone-containing meds, or raloxifene or tamoxifene (commonly used for osteoporosis)  - to avoid Aspirin and anti-inflammatories (eg. Advil, ibuprofen, Aleve, naproxen, etc) while anticoagulated   - Avoid skiing or other dangerous activities to reduce risk of head injury and brain bleeds  - elevate legs as much as possible, use compression stockings/socks if directed by your provider  - if any bleeding lasting 30min without stopping, please seek care with your PCP, urgent care, or ED  - if having any invasive procedure, please make sure the doctor knows of your history of blood clots and current anticoagulation status  - let us know of any medication changes  - recommended to see your PCP to discuss if you need age-appropriate cancer screenings as a  small % of blood clots may be caused by an underlying malignancy  - reversal agents for most blood thinners are now available and used if you have major bleeding    Smoking: continue complete avoidance of all tobacco products    Physical Activity: goal is 3-4 times per week as tolerated.    Weight Management and Nutrition: Heart healthy diet.     Instructed to follow-up with PCP for remainder of adult medical needs: yes  We will partner with other provider in the management of established vascular disease and cardiometabolic risk factors    Studies to Be Obtained: none  Labs to Be Obtained: d-dimer, fvl, ptgm, beta 2 microglobulin antibodies, funct prote c + s, acla, atiii, la.    Follow up in: 4 weeks    Desirae JOHNSON

## 2022-05-09 ENCOUNTER — DOCUMENTATION (OUTPATIENT)
Dept: VASCULAR LAB | Facility: MEDICAL CENTER | Age: 77
End: 2022-05-09

## 2022-05-19 ENCOUNTER — TELEPHONE (OUTPATIENT)
Dept: INFECTIOUS DISEASES | Facility: MEDICAL CENTER | Age: 77
End: 2022-05-19

## 2022-05-19 NOTE — TELEPHONE ENCOUNTER
Pt called stating that he is currently on Doxycycline but he thinks that the infection is gone. He mentioned that when he goes out in the sun his skin turns red. He wants to know if there is a blood test you could order to make sure his infection is gone because he wants to stop taking the abx. Please advise.  Eric: [phone]    Thank you

## 2022-05-26 ENCOUNTER — DOCUMENTATION (OUTPATIENT)
Dept: VASCULAR LAB | Facility: MEDICAL CENTER | Age: 77
End: 2022-05-26
Payer: MEDICARE

## 2022-05-26 NOTE — PROGRESS NOTES
Returned pt's call. He wishes to hold off on hypercoag blood testing for now. He is taking aspirin 81 mg daily and monitoring closely for s/sx of recurrent VTE. He has a hx of an extensive DVT in the setting of Covid infection and hospitalization. No prior VTEs. His f/u u/s showed a chronic thrombus. He completed 3 months of OAC. Given age of 1st VTE and the fact that he had provoking factors, I think it's reasonable to forgo hypercoag testing. Pt in agreement. He wishes to cancel his f/u vasc visit on 6/2/22 but requests I call him the week of 6/12/22 to check in with him which I will do.    Desirae JOHNSON

## 2022-06-08 ASSESSMENT — ENCOUNTER SYMPTOMS
VOMITING: 0
SHORTNESS OF BREATH: 0
COUGH: 0
ABDOMINAL PAIN: 0
CHILLS: 0
FEVER: 0
NAUSEA: 0
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
## 2022-06-09 ENCOUNTER — OFFICE VISIT (OUTPATIENT)
Dept: INFECTIOUS DISEASES | Facility: MEDICAL CENTER | Age: 77
End: 2022-06-09
Payer: MEDICARE

## 2022-06-09 VITALS
TEMPERATURE: 98.5 F | HEIGHT: 72 IN | WEIGHT: 168 LBS | HEART RATE: 60 BPM | SYSTOLIC BLOOD PRESSURE: 126 MMHG | OXYGEN SATURATION: 97 % | BODY MASS INDEX: 22.75 KG/M2 | DIASTOLIC BLOOD PRESSURE: 78 MMHG

## 2022-06-09 DIAGNOSIS — A49.01 METHICILLIN SUSCEPTIBLE STAPHYLOCOCCUS AUREUS INFECTION: ICD-10-CM

## 2022-06-09 DIAGNOSIS — M46.20 VERTEBRAL OSTEOMYELITIS (HCC): ICD-10-CM

## 2022-06-09 DIAGNOSIS — L56.8 PHOTOSENSITIVITY DERMATITIS: ICD-10-CM

## 2022-06-09 PROCEDURE — 99214 OFFICE O/P EST MOD 30 MIN: CPT | Performed by: INTERNAL MEDICINE

## 2022-06-09 ASSESSMENT — FIBROSIS 4 INDEX: FIB4 SCORE: 1.87

## 2022-06-09 ASSESSMENT — ENCOUNTER SYMPTOMS: BACK PAIN: 0

## 2022-06-16 ENCOUNTER — ANTICOAGULATION MONITORING (OUTPATIENT)
Dept: VASCULAR LAB | Facility: MEDICAL CENTER | Age: 77
End: 2022-06-16
Payer: MEDICARE

## 2022-06-16 NOTE — PROGRESS NOTES
Spoke with pt by phone. He is appreciative of the call. No new VTE concerns. Not sure if he will pursue hypercoag testing but will let me know. No vascular f/u scheduled per pt. Continue to follow up with PCP.    Desirae JOHNSON

## 2022-07-26 ENCOUNTER — TELEPHONE (OUTPATIENT)
Dept: MEDICAL GROUP | Facility: PHYSICIAN GROUP | Age: 77
End: 2022-07-26
Payer: MEDICARE

## 2022-07-27 ENCOUNTER — APPOINTMENT (OUTPATIENT)
Dept: MEDICAL GROUP | Facility: PHYSICIAN GROUP | Age: 77
End: 2022-07-27
Payer: MEDICARE

## 2022-07-27 NOTE — TELEPHONE ENCOUNTER
[facility] called requesting call back for possible tylenol and zyrtec order. Called office back at [phone] with now answer and unable to leave message.

## 2023-04-03 NOTE — PROGRESS NOTES
From: Leonel Rocha  To: Colin Weinstein  Sent: 4/3/2023 10:55 AM CDT  Subject: AFIB has returned    UPDATE: Since Jan 4th I have had no monitored AFIB recordings. Now as of Mar 31 I have had many ranging from 39 - 180.    Patient continues to suffer with hiccups and they are lessened at times but still remain, patient has not wanted to take mylanta but wishes to take at this time.

## 2023-06-29 ENCOUNTER — OFFICE VISIT (OUTPATIENT)
Dept: URGENT CARE | Facility: CLINIC | Age: 78
End: 2023-06-29
Payer: MEDICARE

## 2023-06-29 VITALS
BODY MASS INDEX: 22.14 KG/M2 | DIASTOLIC BLOOD PRESSURE: 80 MMHG | TEMPERATURE: 97.2 F | RESPIRATION RATE: 14 BRPM | SYSTOLIC BLOOD PRESSURE: 122 MMHG | OXYGEN SATURATION: 96 % | HEART RATE: 57 BPM | HEIGHT: 72 IN | WEIGHT: 163.5 LBS

## 2023-06-29 DIAGNOSIS — K04.7 DENTAL INFECTION: ICD-10-CM

## 2023-06-29 PROCEDURE — 3074F SYST BP LT 130 MM HG: CPT | Performed by: FAMILY MEDICINE

## 2023-06-29 PROCEDURE — 3079F DIAST BP 80-89 MM HG: CPT | Performed by: FAMILY MEDICINE

## 2023-06-29 PROCEDURE — 99213 OFFICE O/P EST LOW 20 MIN: CPT | Performed by: FAMILY MEDICINE

## 2023-06-29 RX ORDER — AMOXICILLIN AND CLAVULANATE POTASSIUM 875; 125 MG/1; MG/1
1 TABLET, FILM COATED ORAL 2 TIMES DAILY
Qty: 14 TABLET | Refills: 0 | Status: SHIPPED | OUTPATIENT
Start: 2023-06-29 | End: 2023-07-06

## 2023-06-29 ASSESSMENT — FIBROSIS 4 INDEX: FIB4 SCORE: 0.99

## 2023-06-29 NOTE — PROGRESS NOTES
"Subjective     Yunier Denney is a 78 y.o. male who presents with Dental Pain (Gum infection on R bottom side, hard time sleeping x 2 to 3 days  )      - This is a pleasant and nontoxic appearing 78 y.o. person who has come to the walk-in clinic today for:    #1) 2-3 days Rt lower jaw/tooth pain. No trauma, has known \"bad\" teeth and is slowly trying to get them fixed/pulled w/ dentist. No NVFC      ALLERGIES:  Patient has no known allergies.     PMH:  Past Medical History:   Diagnosis Date    Acute low back pain 3/10/2022    COVID-19 1/22/2022    Gram-positive bacteremia 4/28/2018    Gram-positive bacteremia 4/28/2018    Hx of gastroesophageal reflux (GERD)     Hypertension     Inguinal hernia     Pyogenic arthritis of right shoulder region (HCC) 1/24/2022        PSH:  Past Surgical History:   Procedure Laterality Date    IRRIGATION & DEBRIDEMENT GENERAL Right 1/25/2022    Procedure: IRRIGATION AND DEBRIDEMENT, WOUND  , MULTIPLE CULTURES;  Surgeon: Larry Aldridge M.D.;  Location: [facility];  Service: Orthopedics    APPLICATION OR REPLACEMENT, WOUND VAC Right 1/25/2022    Procedure: APPLICATION WOUND VAC;  Surgeon: Larry Aldridge M.D.;  Location: SUR[facility]ERY [facility];  Service: Orthopedics    LUMBAR LAMINECTOMY DISKECTOMY  4/29/2018    Procedure: LUMBAR LAMINECTOMY Y L4-S1;  Surgeon: Fercho Herron M.D.;  Location: [facility];  Service: Neurosurgery    IRRIGATION & DEBRIDEMENT NEURO  4/29/2018    Procedure: IRRIGATION & DEBRIDEMENT NEURO- epidural mass;  Surgeon: Fercho Herron M.D.;  Location: [facility];  Service: Neurosurgery       MEDS:    Current Outpatient Medications:     hydrocortisone 2.5 % Cream topical cream, APPLY CREAM TOPICALLY TO AFFECTED AREA (FACE) TWICE DAILY SPARINGLY FOR 5 DAYS ON, THEN TWO DAYS OFF. THEN ALTERNATE SCHEDULE TO TWO WEEKS ON, THEN TWO WEEKS OFF. DISCONTUE WHEN RESOLVED, THEN USE AS NEEDED FOR FLARES, Disp: , Rfl:     " amoxicillin-clavulanate (AUGMENTIN) 875-125 MG Tab, Take 1 Tablet by mouth 2 times a day for 7 days., Disp: 14 Tablet, Rfl: 0    aspirin (ASA) 81 MG Chew Tab chewable tablet, Chew 81 mg every day. Indications: Venous Thromboembolism, Disp: , Rfl:     acetaminophen (TYLENOL) 500 MG Tab, Take 2 Tablets by mouth every 6 hours as needed for Mild Pain or Fever., Disp: 30 Tablet, Rfl: 0    polyethylene glycol/lytes (MIRALAX) 17 g Pack, Take 1 Packet by mouth every day., Disp: 30 Each, Rfl: 0    VITAMIN D PO, Take 1 Tablet by mouth every day., Disp: , Rfl:     Multiple Vitamins-Minerals (EMERGEN-C IMMUNE PO), Take 1 Dose by mouth every day., Disp: , Rfl:     ** I have documented what I find to be significant in regards to past medical, social, family and surgical history  in my HPI or under PMH/PSH/FH review section, otherwise it is noncontributory **         HPI    Review of Systems   HENT:          Tooth pain   All other systems reviewed and are negative.             Objective     /80 (BP Location: Right arm, Patient Position: Sitting, BP Cuff Size: Adult)   Pulse (!) 57   Temp 36.2 °C (97.2 °F) (Temporal)   Resp 14   Ht 1.829 m (6')   Wt 74.2 kg (163 lb 8 oz)   SpO2 96%   BMI 22.17 kg/m²      Physical Exam  Vitals and nursing note reviewed.   Constitutional:       General: He is not in acute distress.     Appearance: Normal appearance. He is well-developed.   HENT:      Head: Normocephalic.      Mouth/Throat:        Comments: + dental decay. Some ttp/edema over mandible   Cardiovascular:      Heart sounds: Normal heart sounds. No murmur heard.  Pulmonary:      Effort: Pulmonary effort is normal. No respiratory distress.   Neurological:      Mental Status: He is alert.      Motor: No abnormal muscle tone.   Psychiatric:         Mood and Affect: Mood normal.         Behavior: Behavior normal.           Assessment & Plan       1. Dental infection  amoxicillin-clavulanate (AUGMENTIN) 875-125 MG Tab          -  Dx, plan & d/c instructions discussed   - Rest, stay hydrated, OTC Motrin and/or Tylenol as needed  - E.R. precautions discussed     Follow up with your DENTIST office for a recheck on today's visit. ER if not improving in 2-3 days or if feeling/getting worse.     Patient left in stable condition         Pertinent prior office visit notes in Epic have been reviewed by me today on day of this visit.

## 2023-06-30 ENCOUNTER — TELEPHONE (OUTPATIENT)
Dept: HEALTH INFORMATION MANAGEMENT | Facility: OTHER | Age: 78
End: 2023-06-30

## 2023-10-17 ENCOUNTER — OFFICE VISIT (OUTPATIENT)
Dept: URGENT CARE | Facility: CLINIC | Age: 78
End: 2023-10-17
Payer: MEDICARE

## 2023-10-17 VITALS
TEMPERATURE: 97.4 F | DIASTOLIC BLOOD PRESSURE: 70 MMHG | HEIGHT: 73 IN | SYSTOLIC BLOOD PRESSURE: 134 MMHG | HEART RATE: 64 BPM | RESPIRATION RATE: 18 BRPM | OXYGEN SATURATION: 95 % | WEIGHT: 159 LBS | BODY MASS INDEX: 21.07 KG/M2

## 2023-10-17 DIAGNOSIS — H11.31 SUBCONJUNCTIVAL HEMORRHAGE OF RIGHT EYE: ICD-10-CM

## 2023-10-17 DIAGNOSIS — S05.01XA ABRASION OF RIGHT CORNEA, INITIAL ENCOUNTER: ICD-10-CM

## 2023-10-17 PROCEDURE — 99213 OFFICE O/P EST LOW 20 MIN: CPT | Performed by: NURSE PRACTITIONER

## 2023-10-17 PROCEDURE — 3078F DIAST BP <80 MM HG: CPT | Performed by: NURSE PRACTITIONER

## 2023-10-17 PROCEDURE — 3075F SYST BP GE 130 - 139MM HG: CPT | Performed by: NURSE PRACTITIONER

## 2023-10-17 RX ORDER — ERYTHROMYCIN 5 MG/G
1 OINTMENT OPHTHALMIC 2 TIMES DAILY
Qty: 3.5 G | Refills: 0 | Status: SHIPPED | OUTPATIENT
Start: 2023-10-17 | End: 2023-10-22

## 2023-10-17 ASSESSMENT — ENCOUNTER SYMPTOMS
BRUISES/BLEEDS EASILY: 0
PHOTOPHOBIA: 0
EYE REDNESS: 1
EYE DISCHARGE: 0
NAUSEA: 0
EYE PAIN: 1
BLURRED VISION: 0
DOUBLE VISION: 0
VOMITING: 0
DIZZINESS: 0
HEADACHES: 0

## 2023-10-17 ASSESSMENT — FIBROSIS 4 INDEX: FIB4 SCORE: 0.99

## 2023-10-17 ASSESSMENT — VISUAL ACUITY: OU: 1

## 2023-10-18 NOTE — PROGRESS NOTES
Yunier Denney is a 78 y.o. male who presents for Eye Injury (Right eye injury x 3 hours ago)      HPI  This is a new problem. Yunier Denney is a 78 y.o. patient who presents to urgent care with c/o: Approximately 3 hours ago he was trying to work with a piece of plastic and it broke off flying and hitting him in the right eye.  His vision is reported as normal for him.  He says he knows he has poor vision and needs to see an eye doctor but he has not done it for a while.  The last time he went was several years ago and they told him that he needed to wear eyeglasses.  He reports that he does not like wearing glasses.  He is not on anticoagulant therapy.  He denies headache, double vision, nausea, vomiting.  Treatments tried: Cool compress to his eye.    Review of Systems   Eyes:  Positive for pain and redness. Negative for blurred vision, double vision, photophobia and discharge.   Gastrointestinal:  Negative for nausea and vomiting.   Neurological:  Negative for dizziness and headaches.   Endo/Heme/Allergies:  Does not bruise/bleed easily.       Allergies:     No Known Allergies    PMSFS Hx:  Past Medical History:   Diagnosis Date    Acute low back pain 3/10/2022    COVID-19 1/22/2022    Gram-positive bacteremia 4/28/2018    Gram-positive bacteremia 4/28/2018    Hx of gastroesophageal reflux (GERD)     Hypertension     Inguinal hernia     Pyogenic arthritis of right shoulder region (HCC) 1/24/2022     Past Surgical History:   Procedure Laterality Date    IRRIGATION & DEBRIDEMENT GENERAL Right 1/25/2022    Procedure: IRRIGATION AND DEBRIDEMENT, WOUND  , MULTIPLE CULTURES;  Surgeon: Larry Aldridge M.D.;  Location: [facility];  Service: Orthopedics    APPLICATION OR REPLACEMENT, WOUND VAC Right 1/25/2022    Procedure: APPLICATION WOUND VAC;  Surgeon: Larry Aldridge M.D.;  Location: [facility];  Service: Orthopedics    LUMBAR LAMINECTOMY DISKECTOMY  4/29/2018    Procedure: LUMBAR  LAMINECTOMY Y L4-S1;  Surgeon: Fercho Herron M.D.;  Location: SUR[facility]ERY [facility];  Service: Neurosurgery    IRRIGATION & DEBRIDEMENT NEURO  4/29/2018    Procedure: IRRIGATION & DEBRIDEMENT NEURO- epidural mass;  Surgeon: Fercho Herron M.D.;  Location: SUR[facility]ERY [facility];  Service: Neurosurgery     History reviewed. No pertinent family history.  Social History     Tobacco Use    Smoking status: Never    Smokeless tobacco: Never   Substance Use Topics    Alcohol use: Yes     Comment: OCC       Problems:   Patient Active Problem List   Diagnosis    Spinal epidural abscess    Hypertension    Gram-positive bacteremia    Hypokalemia    MSSA bacteremia    Spinal stenosis at L4-L5 level    DVT (deep venous thrombosis) (HCC)    Vertebral osteomyelitis (HCC)    Methicillin susceptible Staphylococcus aureus infection    Inguinal hernia    ALEC (iron deficiency anemia)    Other constipation    Left leg pain    High triglycerides    Iron deficiency anemia       Medications:   Current Outpatient Medications on File Prior to Visit   Medication Sig Dispense Refill    hydrocortisone 2.5 % Cream topical cream APPLY CREAM TOPICALLY TO AFFECTED AREA (FACE) TWICE DAILY SPARINGLY FOR 5 DAYS ON, THEN TWO DAYS OFF. THEN ALTERNATE SCHEDULE TO TWO WEEKS ON, THEN TWO WEEKS OFF. DISCONTUE WHEN RESOLVED, THEN USE AS NEEDED FOR FLARES      aspirin (ASA) 81 MG Chew Tab chewable tablet Chew 81 mg every day. Indications: Venous Thromboembolism      acetaminophen (TYLENOL) 500 MG Tab Take 2 Tablets by mouth every 6 hours as needed for Mild Pain or Fever. 30 Tablet 0    polyethylene glycol/lytes (MIRALAX) 17 g Pack Take 1 Packet by mouth every day. 30 Each 0    VITAMIN D PO Take 1 Tablet by mouth every day.      Multiple Vitamins-Minerals (EMERGEN-C IMMUNE PO) Take 1 Dose by mouth every day.       No current facility-administered medications on file prior to visit.        Objective:     /70   Pulse 64   Temp 36.3 °C (97.4 °F)  "(Temporal)   Resp 18   Ht 1.854 m (6' 1\")   Wt 72.1 kg (159 lb)   SpO2 95%   BMI 20.98 kg/m²     Physical Exam  Vitals and nursing note reviewed.   Constitutional:       Appearance: Normal appearance. He is not ill-appearing.   Eyes:      General: Lids are normal. Vision grossly intact.         Right eye: No discharge.         Left eye: No discharge.      Extraocular Movements: Extraocular movements intact.      Conjunctiva/sclera:      Right eye: Hemorrhage present. No chemosis.     Pupils: Pupils are equal, round, and reactive to light.        Comments:   Visual acuity: ( uncorrected)   OD: 20/50  OS: 20/50  OU: 20/50   Cardiovascular:      Rate and Rhythm: Normal rate.      Pulses: Normal pulses.   Pulmonary:      Effort: Pulmonary effort is normal.   Skin:     General: Skin is warm.      Capillary Refill: Capillary refill takes less than 2 seconds.   Neurological:      Mental Status: He is alert and oriented to person, place, and time.   Psychiatric:         Mood and Affect: Mood normal.         Behavior: Behavior normal.         Thought Content: Thought content normal.         Assessment /Associated Orders:      1. Abrasion of right cornea, initial encounter  erythromycin 5 MG/GM Ointment      2. Subconjunctival hemorrhage of right eye              Medical Decision Making:    Yunier is a very pleasant 78 y.o. male who is clinically stable at today's acute urgent care visit.  No acute distress noted.  VSS. Appropriate for outpatient care at this time.   Acute problem today with uncertain prognosis.   Cool compresses BID/ TID prn   Follow up with optometrist for vision and eye check up in the next few days    Educated in proper administration of  prescription medication(s) ordered today including safety, possible SE, risks, benefits, rationale and alternatives to therapy.   Discussed Dx, management options (risks,benefits, and alternatives to planned treatment), natural progression and supportive care.  " Expressed understanding and the treatment plan was agreed upon.   Questions were encouraged and answered   Return to urgent care prn if new or worsening sx or if there is no improvement in condition prn.    Educated in Red flags and indications to immediately call 911 or present to the Emergency Department.         Please note that this dictation was created using voice recognition software. I have worked with consultants from the vendor as well as technical experts from [facility] to optimize the interface. I have made every reasonable attempt to correct obvious errors, but I expect that there are errors of grammar and possibly content that I did not discover before finalizing the note.  This note was electronically signed by provider

## 2024-05-28 ENCOUNTER — OFFICE VISIT (OUTPATIENT)
Dept: URGENT CARE | Facility: CLINIC | Age: 79
End: 2024-05-28
Payer: MEDICARE

## 2024-05-28 VITALS
OXYGEN SATURATION: 96 % | DIASTOLIC BLOOD PRESSURE: 78 MMHG | WEIGHT: 160 LBS | RESPIRATION RATE: 16 BRPM | BODY MASS INDEX: 22.4 KG/M2 | TEMPERATURE: 98.4 F | HEART RATE: 69 BPM | SYSTOLIC BLOOD PRESSURE: 122 MMHG | HEIGHT: 71 IN

## 2024-05-28 DIAGNOSIS — H92.01 EAR DISCOMFORT, RIGHT: ICD-10-CM

## 2024-05-28 PROCEDURE — 99213 OFFICE O/P EST LOW 20 MIN: CPT | Performed by: PHYSICIAN ASSISTANT

## 2024-05-28 PROCEDURE — 3078F DIAST BP <80 MM HG: CPT | Performed by: PHYSICIAN ASSISTANT

## 2024-05-28 PROCEDURE — 3074F SYST BP LT 130 MM HG: CPT | Performed by: PHYSICIAN ASSISTANT

## 2024-05-28 ASSESSMENT — ENCOUNTER SYMPTOMS
HEADACHES: 0
SINUS PAIN: 0
DIZZINESS: 0
FEVER: 0
SHORTNESS OF BREATH: 0
VOMITING: 0
COUGH: 0
NAUSEA: 0
CHILLS: 0

## 2024-05-28 NOTE — PROGRESS NOTES
"Subjective     Yunier Denney is a 79 y.o. male who presents with Otalgia (Patient coming for r EAR plugged )            Patient is concerned about right ear discomfort that started today. He has been trying to clean his right ear out and aggressively did so. Now he has a \"weird sticky sensation\" in his right ear when he moved his head and turns his head. He denies ear pain. He has a history of cerumen impaction and was worried he still had wax present. No hearing problem or ear drainage.         Past Medical History:   Diagnosis Date    Acute low back pain 3/10/2022    COVID-19 1/22/2022    Gram-positive bacteremia 4/28/2018    Gram-positive bacteremia 4/28/2018    Hx of gastroesophageal reflux (GERD)     Hypertension     Inguinal hernia     Pyogenic arthritis of right shoulder region (HCC) 1/24/2022       Past Surgical History:   Procedure Laterality Date    IRRIGATION & DEBRIDEMENT GENERAL Right 1/25/2022    Procedure: IRRIGATION AND DEBRIDEMENT, WOUND  , MULTIPLE CULTURES;  Surgeon: Larry Aldridge M.D.;  Location: SURGERY [facility];  Service: Orthopedics    APPLICATION OR REPLACEMENT, WOUND VAC Right 1/25/2022    Procedure: APPLICATION WOUND VAC;  Surgeon: Larry Aldridge M.D.;  Location: SURGERY [facility];  Service: Orthopedics    LUMBAR LAMINECTOMY DISKECTOMY  4/29/2018    Procedure: LUMBAR LAMINECTOMY Y L4-S1;  Surgeon: Fercho Herron M.D.;  Location: [facility];  Service: Neurosurgery    IRRIGATION & DEBRIDEMENT NEURO  4/29/2018    Procedure: IRRIGATION & DEBRIDEMENT NEURO- epidural mass;  Surgeon: Fercho Herron M.D.;  Location: SURGERY [facility];  Service: Neurosurgery       No family history on file.    .No Known Allergies      Medications, Allergies, and current problem list reviewed today in Epic      Review of Systems   Constitutional:  Negative for chills, fever and malaise/fatigue.   HENT:  Positive for ear pain. Negative for congestion, ear discharge, hearing loss, " "sinus pain and tinnitus.    Respiratory:  Negative for cough and shortness of breath.    Gastrointestinal:  Negative for nausea and vomiting.   Neurological:  Negative for dizziness and headaches.     All other systems reviewed and are negative.              Objective     /78   Pulse 69   Temp 36.9 °C (98.4 °F) (Temporal)   Resp 16   Ht 1.803 m (5' 11\")   Wt 72.6 kg (160 lb)   SpO2 96%   BMI 22.32 kg/m²      Physical Exam  Constitutional:       General: He is not in acute distress.     Appearance: He is not ill-appearing.   HENT:      Head: Normocephalic and atraumatic.      Right Ear: Tympanic membrane, ear canal and external ear normal.      Left Ear: Tympanic membrane, ear canal and external ear normal.   Eyes:      Conjunctiva/sclera: Conjunctivae normal.   Cardiovascular:      Rate and Rhythm: Normal rate and regular rhythm.   Pulmonary:      Effort: Pulmonary effort is normal. No respiratory distress.      Breath sounds: No stridor. No wheezing.   Skin:     General: Skin is warm and dry.   Neurological:      General: No focal deficit present.      Mental Status: He is alert and oriented to person, place, and time.   Psychiatric:         Mood and Affect: Mood normal.         Behavior: Behavior normal.         Thought Content: Thought content normal.         Judgment: Judgment normal.                             Assessment & Plan        1. Ear discomfort, right      Physical exam benign. No cerumen impaction or signs of otitis media/externa.  Reassurance given    Differential diagnoses, Supportive care, and indications for immediate follow-up discussed with patient.   Pathogenesis of diagnosis discussed including typical length and natural progression.   Instructed to return to clinic or nearest emergency department for any change in condition, further concerns, or worsening of symptoms.      The patient demonstrated a good understanding and agreed with the treatment plan.    Kaylie Ramírez, " SH[place]AM

## 2024-12-04 ENCOUNTER — OFFICE VISIT (OUTPATIENT)
Dept: URGENT CARE | Facility: CLINIC | Age: 79
End: 2024-12-04
Payer: MEDICARE

## 2024-12-04 VITALS
WEIGHT: 160 LBS | SYSTOLIC BLOOD PRESSURE: 124 MMHG | HEART RATE: 67 BPM | BODY MASS INDEX: 22.4 KG/M2 | OXYGEN SATURATION: 96 % | DIASTOLIC BLOOD PRESSURE: 76 MMHG | HEIGHT: 71 IN | TEMPERATURE: 97.8 F | RESPIRATION RATE: 16 BRPM

## 2024-12-04 DIAGNOSIS — J06.9 VIRAL URI WITH COUGH: ICD-10-CM

## 2024-12-04 LAB
FLUAV RNA SPEC QL NAA+PROBE: NEGATIVE
FLUBV RNA SPEC QL NAA+PROBE: NEGATIVE
RSV RNA SPEC QL NAA+PROBE: NEGATIVE
SARS-COV-2 RNA RESP QL NAA+PROBE: NEGATIVE

## 2024-12-04 PROCEDURE — 0241U POCT CEPHEID COV-2, FLU A/B, RSV - PCR: CPT | Performed by: REGISTERED NURSE

## 2024-12-04 PROCEDURE — 3074F SYST BP LT 130 MM HG: CPT | Performed by: REGISTERED NURSE

## 2024-12-04 PROCEDURE — 99213 OFFICE O/P EST LOW 20 MIN: CPT | Performed by: REGISTERED NURSE

## 2024-12-04 PROCEDURE — 3078F DIAST BP <80 MM HG: CPT | Performed by: REGISTERED NURSE

## 2024-12-04 RX ORDER — FLUTICASONE PROPIONATE 50 MCG
2 SPRAY, SUSPENSION (ML) NASAL DAILY
Qty: 9.9 ML | Refills: 0 | Status: SHIPPED | OUTPATIENT
Start: 2024-12-04

## 2024-12-04 RX ORDER — BENZONATATE 100 MG/1
100 CAPSULE ORAL 3 TIMES DAILY PRN
Qty: 30 CAPSULE | Refills: 0 | Status: SHIPPED | OUTPATIENT
Start: 2024-12-04 | End: 2024-12-14

## 2024-12-04 RX ORDER — ROSUVASTATIN CALCIUM 5 MG/1
5 TABLET, COATED ORAL
COMMUNITY

## 2024-12-04 ASSESSMENT — ENCOUNTER SYMPTOMS
SHORTNESS OF BREATH: 0
ABDOMINAL PAIN: 0
DIZZINESS: 0
FEVER: 0

## 2024-12-04 NOTE — PROGRESS NOTES
Subjective:   Yunier Denney is a 79 y.o. male who presents for Nasal Congestion (Patient coming in for head congestion, sinus pressure x 3 days )      HPI  3 days of nasal congestion, post nasal drainage, sinus pressure and cough. Denies chronic heart or lung issues. Not using any medications for the symptoms. Exposed to son who is having similar symptoms. [id number]    Review of Systems   Constitutional:  Negative for fever.   Respiratory:  Negative for shortness of breath.    Cardiovascular:  Negative for chest pain.   Gastrointestinal:  Negative for abdominal pain.   Skin:  Negative for rash.   Neurological:  Negative for dizziness.       No Known Allergies    Patient Active Problem List    Diagnosis Date Noted    Iron deficiency anemia 04/28/2022    High triglycerides 04/27/2022    Other constipation 02/24/2022    Left leg pain 02/24/2022    ALEC (iron deficiency anemia) 02/22/2022    Inguinal hernia 02/17/2022    Methicillin susceptible Staphylococcus aureus infection 02/11/2022    Vertebral osteomyelitis (HCC) 02/04/2022    DVT (deep venous thrombosis) (HCC) 01/30/2022    MSSA bacteremia 01/22/2022    Spinal stenosis at L4-L5 level 01/22/2022    Hypokalemia 05/02/2018    Spinal epidural abscess 04/28/2018    Hypertension 04/28/2018    Gram-positive bacteremia 04/28/2018       Current Outpatient Medications Ordered in Epic   Medication Sig Dispense Refill    fluticasone (FLONASE) 50 MCG/ACT nasal spray Administer 2 Sprays into affected nostril(S) every day. 9.9 mL 0    benzonatate (TESSALON) 100 MG Cap Take 1 Capsule by mouth 3 times a day as needed for Cough for up to 10 days. 30 Capsule 0    hydrocortisone 2.5 % Cream topical cream APPLY CREAM TOPICALLY TO AFFECTED AREA (FACE) TWICE DAILY SPARINGLY FOR 5 DAYS ON, THEN TWO DAYS OFF. THEN ALTERNATE SCHEDULE TO TWO WEEKS ON, THEN TWO WEEKS OFF. DISCONTUE WHEN RESOLVED, THEN USE AS NEEDED FOR FLARES      aspirin (ASA) 81 MG Chew Tab chewable tablet Chew 81  "mg every day. Indications: Venous Thromboembolism      acetaminophen (TYLENOL) 500 MG Tab Take 2 Tablets by mouth every 6 hours as needed for Mild Pain or Fever. 30 Tablet 0    Multiple Vitamins-Minerals (EMERGEN-C IMMUNE PO) Take 1 Dose by mouth every day.      rosuvastatin (CRESTOR) 5 MG Tab Take 5 mg by mouth every day.      polyethylene glycol/lytes (MIRALAX) 17 g Pack Take 1 Packet by mouth every day. (Patient not taking: Reported on 12/4/2024) 30 Each 0    VITAMIN D PO Take 1 Tablet by mouth every day. (Patient not taking: Reported on 12/4/2024)       No current [facility]-ordered facility-administered medications on file.       Past Surgical History:   Procedure Laterality Date    IRRIGATION & DEBRIDEMENT GENERAL Right 1/25/2022    Procedure: IRRIGATION AND DEBRIDEMENT, WOUND  , MULTIPLE CULTURES;  Surgeon: Larry Aldridge M.D.;  Location: SURGERY [facility];  Service: Orthopedics    APPLICATION OR REPLACEMENT, WOUND VAC Right 1/25/2022    Procedure: APPLICATION WOUND VAC;  Surgeon: Larry Aldridge M.D.;  Location: SURGERY [facility];  Service: Orthopedics    LUMBAR LAMINECTOMY DISKECTOMY  4/29/2018    Procedure: LUMBAR LAMINECTOMY Y L4-S1;  Surgeon: Fercho Herron M.D.;  Location: SURGERY [facility];  Service: Neurosurgery    IRRIGATION & DEBRIDEMENT NEURO  4/29/2018    Procedure: IRRIGATION & DEBRIDEMENT NEURO- epidural mass;  Surgeon: Fercho Herron M.D.;  Location: SURGERY [facility];  Service: Neurosurgery       Social History     Tobacco Use    Smoking status: Never    Smokeless tobacco: Never   Vaping Use    Vaping status: Never Used   Substance Use Topics    Alcohol use: Yes     Comment: OCC    Drug use: No       family history is not on file.     Problem list, medications, and allergies reviewed by myself today in Epic.     Objective:   /76   Pulse 67   Temp 36.6 °C (97.8 °F)   Resp 16   Ht 1.803 m (5' 11\")   Wt 72.6 kg (160 lb)   SpO2 96%   BMI 22.32 kg/m²     Physical " Exam  Vitals and nursing note reviewed.   Constitutional:       General: He is not in acute distress.     Appearance: Normal appearance. He is well-developed. He is not ill-appearing, toxic-appearing or diaphoretic.   HENT:      Head: Normocephalic and atraumatic.      Right Ear: Tympanic membrane, ear canal and external ear normal.      Left Ear: Tympanic membrane, ear canal and external ear normal.      Nose: Congestion and rhinorrhea present.      Mouth/Throat:      Mouth: Mucous membranes are moist.      Pharynx: Oropharynx is clear. Uvula midline. Postnasal drip present. No oropharyngeal exudate or posterior oropharyngeal erythema.   Eyes:      General:         Right eye: No discharge.         Left eye: No discharge.      Conjunctiva/sclera: Conjunctivae normal.   Cardiovascular:      Rate and Rhythm: Normal rate and regular rhythm.      Pulses: Normal pulses.      Heart sounds: Normal heart sounds.   Pulmonary:      Effort: Pulmonary effort is normal. No respiratory distress.      Breath sounds: Normal breath sounds. No wheezing, rhonchi or rales.   Chest:      Chest wall: No tenderness.   Musculoskeletal:         General: No swelling or tenderness.      Cervical back: Normal range of motion and neck supple.      Right lower leg: No edema.      Left lower leg: No edema.   Lymphadenopathy:      Cervical: No cervical adenopathy.   Skin:     General: Skin is warm and dry.   Neurological:      General: No focal deficit present.      Mental Status: He is alert and oriented to person, place, and time. Mental status is at baseline.   Psychiatric:         Mood and Affect: Mood normal.         Behavior: Behavior normal.         Thought Content: Thought content normal.         Judgment: Judgment normal.         Lab Results/POC Test Results   Results for orders placed or performed in visit on 12/04/24   POCT CoV-2, Flu A/B, RSV by PCR    Collection Time: 12/04/24 10:37 AM   Result Value Ref Range    SARS-CoV-2 by PCR  Negative Negative, Invalid    Influenza virus A RNA Negative Negative, Invalid    Influenza virus B, PCR Negative Negative, Invalid    RSV, PCR Negative Negative, Invalid           Assessment/Plan:     I personally reviewed prior external notes and test results pertinent to today's visit as well as additional imaging and testing completed in clinic today.    I introduced myself as Osvaldo Dunn a Nurse Practitioner.    1. Viral URI with cough  fluticasone (FLONASE) 50 MCG/ACT nasal spray    benzonatate (TESSALON) 100 MG Cap    POCT CoV-2, Flu A/B, RSV by PCR        TESTING: Viral testing negative  VITAL SIGNS: WNL  MDM/PLAN: No signs of distress.  Talking full sentences.  Does have congestion, rhinorrhea and postnasal drainage.  No adventitious lung sounds.  No rash or nuchal rigidity.  Overall, unremarkable exam w/o red flag signs or symptoms.  We did discuss likely viral etiology.  No evidence of bacterial infection at this time.  Will send antibiotic if symptoms not resolved in the next 5 days.    Offered reassurance  OTC cold medication  Pulmonary toilet  Recommend adequate hydration   Rest  Return precautions discussed  . Medication discussed included indication for use and the potential benefits and side effects. The Patient was encouraged to monitor symptoms closely, and we reviewed the signs and symptoms that require a higher level of care through the emergency department. Patient verbalized understanding.    Please note that this dictation was created using voice recognition software. I have made every reasonable attempt to correct obvious errors, but I expect that there are errors of grammar and possibly content that I did not discover before finalizing the note.    This note was electronically signed by MARJORIE White

## (undated) DEVICE — SEALER BIPOLAR 2.3 AQUAMANTYS

## (undated) DEVICE — SENSOR SPO2 NEO LNCS ADHESIVE (20/BX) SEE USER NOTES

## (undated) DEVICE — SODIUM CHL. IRRIGATION 0.9% 3000ML (4EA/CA 65CA/PF)

## (undated) DEVICE — CONTAINER SPECIMEN BAG OR - STERILE 4 OZ W/LID (100EA/CA)

## (undated) DEVICE — HEMOSTAT SURG ABSORBABLE - 4 X 8 IN SURGICEL (24EA/CA)

## (undated) DEVICE — PROTECTOR ULNA NERVE - (36PR/CA)

## (undated) DEVICE — BLANKET WARMING UPPER BODY - (10/CA)

## (undated) DEVICE — PACK JACKSON TABLE KIT W/OUT - HR (6EA/CA)

## (undated) DEVICE — BLANKET WARMING LOWER BODY - (10/CA) INACTIVE USE #8585

## (undated) DEVICE — SET LEADWIRE 5 LEAD BEDSIDE DISPOSABLE ECG (1SET OF 5/EA)

## (undated) DEVICE — DRAPE IOBAN II INCISE 23X17 - (10EA/BX 4BX/CA)

## (undated) DEVICE — SUTURE 3-0 ETHILON FSLX 30 (36PK/BX)"

## (undated) DEVICE — GLOVE SZ 6.5 BIOGEL PI MICRO - PF LF (50PR/BX)

## (undated) DEVICE — ELECTRODE DUAL RETURN W/ CORD - (50/PK)

## (undated) DEVICE — SUTURE GENERAL

## (undated) DEVICE — TUBING CLEARLINK DUO-VENT - C-FLO (48EA/CA)

## (undated) DEVICE — SLEEVE, VASO, THIGH, MED

## (undated) DEVICE — GUARD SPLASH FOR PULSEVAC (12EA/PK)

## (undated) DEVICE — LACTATED RINGERS INJ 1000 ML - (14EA/CA 60CA/PF)

## (undated) DEVICE — SUTURE 0 COATED VICRYL PLUS - CTX CR(12/BX)18 IN

## (undated) DEVICE — TUBE E-T HI-LO CUFF 6.5MM (10EA/BX)

## (undated) DEVICE — SWAB CULTURE AMIES ESWAB (50EA/PK)

## (undated) DEVICE — DRAPE LARGE 3 QUARTER - (20/CA)

## (undated) DEVICE — DRAPE SURGICAL U 77X120 - (10/CA)

## (undated) DEVICE — TIP INTPLS HFLO ML ORFC BTRY - (12/CS)  FOR SURGILAV

## (undated) DEVICE — MASK ANESTHESIA ADULT  - (100/CA)

## (undated) DEVICE — PAD LAP STERILE 18 X 18 - (5/PK 40PK/CA)

## (undated) DEVICE — HEADREST PRONEVIEW LARGE - (10/CA)

## (undated) DEVICE — GLOVE BIOGEL PI ORTHO SZ 8 PF LF (40PR/BX)

## (undated) DEVICE — GOWN WARMING STANDARD FLEX - (30/CA)

## (undated) DEVICE — PACK NEURO - (2EA/CA)

## (undated) DEVICE — GLOVE BIOGEL PI ORTHO SZ 8.5 PF LF (40/BX)

## (undated) DEVICE — NEPTUNE 4 PORT MANIFOLD - (20/PK)

## (undated) DEVICE — GLOVE BIOGEL SZ 8 SURGICAL PF LTX - (50PR/BX 4BX/CA)

## (undated) DEVICE — PACK LOWER EXTREMITY - (2/CA)

## (undated) DEVICE — CANISTER SUCTION 3000ML MECHANICAL FILTER AUTO SHUTOFF MEDI-VAC NONSTERILE LF DISP  (40EA/CA)

## (undated) DEVICE — GLOVE BIOGEL ECLIPSE PF LATEX SIZE 8.5 (50PR/BX)

## (undated) DEVICE — HANDPIECE 10FT INTPLS SCT PLS IRRIGATION HAND CONTROL SET (6/PK)

## (undated) DEVICE — DRESSING KIT V.A.C. SENSA T.R.A.C. SMALL (10EA/CA)

## (undated) DEVICE — PACK UPPER EXTREMITY (2EA/CA)

## (undated) DEVICE — CHLORAPREP 26 ML APPLICATOR - ORANGE TINT(25/CA)

## (undated) DEVICE — TOWELS CLOTH SURGICAL - (4/PK 20PK/CA)

## (undated) DEVICE — SUCTION INSTRUMENT YANKAUER BULBOUS TIP W/O VENT (50EA/CA)

## (undated) DEVICE — SODIUM CHL IRRIGATION 0.9% 1000ML (12EA/CA)

## (undated) DEVICE — SWAB ANAEROBIC SPEC.COLLECTOR - (25/PK 4PK/CA 100EA/CA)

## (undated) DEVICE — BOVIE BLADE COATED &INSULATED - 25/PK

## (undated) DEVICE — SUTURE 0 VICRYL PLUS CT-1 - 8 X 18 INCH (12/BX)

## (undated) DEVICE — TUBE, CULTURE AEROBIC

## (undated) DEVICE — KIT ANESTHESIA W/CIRCUIT & 3/LT BAG W/FILTER (20EA/CA)

## (undated) DEVICE — DRESSING PETROLEUM GAUZE 5 X 9" (50EA/BX 4BX/CA)"

## (undated) DEVICE — DRAPE U ORTHOPEDIC - (10/BX)

## (undated) DEVICE — TUBING C&T SET FLYING LEADS DRAIN TUBING (10EA/BX)

## (undated) DEVICE — MIDAS LUBRICATOR DIFFUSER PACK (4EA/CA)

## (undated) DEVICE — TOOL DISSECT MATCH HEAD

## (undated) DEVICE — SET EXTENSION WITH 2 PORTS (48EA/CA) ***PART #2C8610 IS A SUBSTITUTE*****

## (undated) DEVICE — SUTURE 2-0 VICRYL PLUS CT-1 - 8 X 18 INCH(12/BX)

## (undated) DEVICE — TOWEL STOP TIMEOUT SAFETY FLAG (40EA/CA)

## (undated) DEVICE — GLOVE BIOGEL PI INDICATOR SZ 6.5 SURGICAL PF LF - (50/BX 4BX/CA)

## (undated) DEVICE — SOD. CHL. INJ. 0.9% 1000 ML - (14EA/CA 60CA/PF)

## (undated) DEVICE — ARMREST CRADLE FOAM - (2PR/PK 12PR/CA)

## (undated) DEVICE — DRAPE STRLE REG TOWEL 18X24 - (10/BX 4BX/CA)"

## (undated) DEVICE — SURGIFOAM (12X7) - (12EA/CA)

## (undated) DEVICE — LACTATED RINGERS INJ. 500 ML - (24EA/CA)

## (undated) DEVICE — KIT EVACUATER 3 SPRING PVC LF 1/8 DRAIN SIZE (10EA/CA)"

## (undated) DEVICE — BLADE SURGICAL CLIPPER - (50EA/CA)

## (undated) DEVICE — ELECTRODE 850 FOAM ADHESIVE - HYDROGEL RADIOTRNSPRNT (50/PK)

## (undated) DEVICE — VAC CANISTER W/GEL 500ML - FITS NEW MACHINES (10EA/CA)

## (undated) DEVICE — DRESSING TRANSPARENT FILM TEGADERM 2.375 X 2.75"  (100EA/BX)"

## (undated) DEVICE — STAPLER SKIN DISP - (6/BX 10BX/CA) VISISTAT

## (undated) DEVICE — HEAD HOLDER JUNIOR/ADULT

## (undated) DEVICE — SUTURE 1 PDS PLUS CT-1 36IN (36EA/BX)

## (undated) DEVICE — KIT ROOM DECONTAMINATION

## (undated) DEVICE — KIT SURGIFLO W/OUT THROMBIN - (6EA/CA)

## (undated) DEVICE — DRAPE LAPAROTOMY T SHEET - (12EA/CA)